# Patient Record
Sex: FEMALE | Race: WHITE | NOT HISPANIC OR LATINO | Employment: UNEMPLOYED | URBAN - METROPOLITAN AREA
[De-identification: names, ages, dates, MRNs, and addresses within clinical notes are randomized per-mention and may not be internally consistent; named-entity substitution may affect disease eponyms.]

---

## 2018-04-16 ENCOUNTER — HOSPITAL ENCOUNTER (EMERGENCY)
Facility: HOSPITAL | Age: 58
End: 2018-04-16
Attending: EMERGENCY MEDICINE | Admitting: EMERGENCY MEDICINE
Payer: COMMERCIAL

## 2018-04-16 ENCOUNTER — APPOINTMENT (EMERGENCY)
Dept: CT IMAGING | Facility: HOSPITAL | Age: 58
End: 2018-04-16
Payer: COMMERCIAL

## 2018-04-16 ENCOUNTER — HOSPITAL ENCOUNTER (INPATIENT)
Facility: HOSPITAL | Age: 58
LOS: 16 days | DRG: 021 | End: 2018-05-02
Attending: EMERGENCY MEDICINE | Admitting: INTERNAL MEDICINE
Payer: COMMERCIAL

## 2018-04-16 VITALS
RESPIRATION RATE: 16 BRPM | TEMPERATURE: 97.5 F | WEIGHT: 192 LBS | HEART RATE: 84 BPM | OXYGEN SATURATION: 96 % | SYSTOLIC BLOOD PRESSURE: 136 MMHG | DIASTOLIC BLOOD PRESSURE: 90 MMHG

## 2018-04-16 DIAGNOSIS — R45.851 PASSIVE SUICIDAL IDEATIONS: ICD-10-CM

## 2018-04-16 DIAGNOSIS — K08.89 TOOTH PAIN WITH CHEWING: ICD-10-CM

## 2018-04-16 DIAGNOSIS — K08.89 TOOTH PAIN: ICD-10-CM

## 2018-04-16 DIAGNOSIS — I60.9 SUBARACHNOID BLEED (HCC): Primary | ICD-10-CM

## 2018-04-16 DIAGNOSIS — F41.1 ANXIETY STATE: ICD-10-CM

## 2018-04-16 DIAGNOSIS — I60.9 SAH (SUBARACHNOID HEMORRHAGE) (HCC): Primary | ICD-10-CM

## 2018-04-16 LAB
ANION GAP BLD CALC-SCNC: 14 MMOL/L (ref 4–13)
APTT PPP: 28 SECONDS (ref 23–35)
BASOPHILS # BLD AUTO: 0.03 THOUSANDS/ΜL (ref 0–0.1)
BASOPHILS NFR BLD AUTO: 0 % (ref 0–1)
BUN BLD-MCNC: 18 MG/DL (ref 5–25)
CA-I BLD-SCNC: 1.18 MMOL/L (ref 1.12–1.32)
CHLORIDE BLD-SCNC: 105 MMOL/L (ref 100–108)
CREAT BLD-MCNC: 0.9 MG/DL (ref 0.6–1.3)
EOSINOPHIL # BLD AUTO: 0.09 THOUSAND/ΜL (ref 0–0.61)
EOSINOPHIL NFR BLD AUTO: 1 % (ref 0–6)
ERYTHROCYTE [DISTWIDTH] IN BLOOD BY AUTOMATED COUNT: 14.2 % (ref 11.6–15.1)
ETHANOL EXG-MCNC: 0 MG/DL
GFR SERPL CREATININE-BSD FRML MDRD: 71 ML/MIN/1.73SQ M
GLUCOSE SERPL-MCNC: 95 MG/DL (ref 65–140)
HCT VFR BLD AUTO: 41.3 % (ref 34.8–46.1)
HCT VFR BLD CALC: 43 % (ref 34.8–46.1)
HGB BLD-MCNC: 13.4 G/DL (ref 11.5–15.4)
HGB BLDA-MCNC: 14.6 G/DL (ref 11.5–15.4)
INR PPP: 0.93 (ref 0.86–1.16)
LYMPHOCYTES # BLD AUTO: 1.22 THOUSANDS/ΜL (ref 0.6–4.47)
LYMPHOCYTES NFR BLD AUTO: 11 % (ref 14–44)
MCH RBC QN AUTO: 30.5 PG (ref 26.8–34.3)
MCHC RBC AUTO-ENTMCNC: 32.4 G/DL (ref 31.4–37.4)
MCV RBC AUTO: 94 FL (ref 82–98)
MONOCYTES # BLD AUTO: 0.75 THOUSAND/ΜL (ref 0.17–1.22)
MONOCYTES NFR BLD AUTO: 7 % (ref 4–12)
NEUTROPHILS # BLD AUTO: 9.09 THOUSANDS/ΜL (ref 1.85–7.62)
NEUTS SEG NFR BLD AUTO: 81 % (ref 43–75)
NRBC BLD AUTO-RTO: 0 /100 WBCS
PCO2 BLD: 26 MMOL/L (ref 21–32)
PLATELET # BLD AUTO: 484 THOUSANDS/UL (ref 149–390)
PMV BLD AUTO: 10.8 FL (ref 8.9–12.7)
POTASSIUM BLD-SCNC: 3.9 MMOL/L (ref 3.5–5.3)
PROTHROMBIN TIME: 12.5 SECONDS (ref 12.1–14.4)
RBC # BLD AUTO: 4.4 MILLION/UL (ref 3.81–5.12)
SODIUM BLD-SCNC: 141 MMOL/L (ref 136–145)
SPECIMEN SOURCE: ABNORMAL
WBC # BLD AUTO: 11.18 THOUSAND/UL (ref 4.31–10.16)

## 2018-04-16 PROCEDURE — 96361 HYDRATE IV INFUSION ADD-ON: CPT

## 2018-04-16 PROCEDURE — 80053 COMPREHEN METABOLIC PANEL: CPT | Performed by: EMERGENCY MEDICINE

## 2018-04-16 PROCEDURE — 36415 COLL VENOUS BLD VENIPUNCTURE: CPT | Performed by: PHYSICIAN ASSISTANT

## 2018-04-16 PROCEDURE — 84100 ASSAY OF PHOSPHORUS: CPT | Performed by: EMERGENCY MEDICINE

## 2018-04-16 PROCEDURE — 85014 HEMATOCRIT: CPT

## 2018-04-16 PROCEDURE — 85730 THROMBOPLASTIN TIME PARTIAL: CPT | Performed by: PHYSICIAN ASSISTANT

## 2018-04-16 PROCEDURE — 70450 CT HEAD/BRAIN W/O DYE: CPT

## 2018-04-16 PROCEDURE — 84484 ASSAY OF TROPONIN QUANT: CPT | Performed by: EMERGENCY MEDICINE

## 2018-04-16 PROCEDURE — 96375 TX/PRO/DX INJ NEW DRUG ADDON: CPT

## 2018-04-16 PROCEDURE — 80047 BASIC METABLC PNL IONIZED CA: CPT

## 2018-04-16 PROCEDURE — 83735 ASSAY OF MAGNESIUM: CPT | Performed by: EMERGENCY MEDICINE

## 2018-04-16 PROCEDURE — 96376 TX/PRO/DX INJ SAME DRUG ADON: CPT

## 2018-04-16 PROCEDURE — 70496 CT ANGIOGRAPHY HEAD: CPT

## 2018-04-16 PROCEDURE — 82075 ASSAY OF BREATH ETHANOL: CPT | Performed by: PHYSICIAN ASSISTANT

## 2018-04-16 PROCEDURE — 85610 PROTHROMBIN TIME: CPT | Performed by: PHYSICIAN ASSISTANT

## 2018-04-16 PROCEDURE — 96365 THER/PROPH/DIAG IV INF INIT: CPT

## 2018-04-16 PROCEDURE — 85025 COMPLETE CBC W/AUTO DIFF WBC: CPT | Performed by: PHYSICIAN ASSISTANT

## 2018-04-16 PROCEDURE — 99285 EMERGENCY DEPT VISIT HI MDM: CPT

## 2018-04-16 RX ORDER — LABETALOL HYDROCHLORIDE 5 MG/ML
10 INJECTION, SOLUTION INTRAVENOUS EVERY 4 HOURS PRN
Status: DISCONTINUED | OUTPATIENT
Start: 2018-04-16 | End: 2018-04-16

## 2018-04-16 RX ORDER — ALBUTEROL SULFATE 90 UG/1
2 AEROSOL, METERED RESPIRATORY (INHALATION) EVERY 6 HOURS PRN
Status: DISCONTINUED | OUTPATIENT
Start: 2018-04-16 | End: 2018-05-02 | Stop reason: HOSPADM

## 2018-04-16 RX ORDER — LABETALOL HYDROCHLORIDE 5 MG/ML
5 INJECTION, SOLUTION INTRAVENOUS ONCE
Status: COMPLETED | OUTPATIENT
Start: 2018-04-16 | End: 2018-04-16

## 2018-04-16 RX ORDER — MORPHINE SULFATE 2 MG/ML
2 INJECTION, SOLUTION INTRAMUSCULAR; INTRAVENOUS ONCE
Status: DISCONTINUED | OUTPATIENT
Start: 2018-04-16 | End: 2018-04-16

## 2018-04-16 RX ORDER — METOCLOPRAMIDE HYDROCHLORIDE 5 MG/ML
10 INJECTION INTRAMUSCULAR; INTRAVENOUS ONCE
Status: COMPLETED | OUTPATIENT
Start: 2018-04-16 | End: 2018-04-16

## 2018-04-16 RX ORDER — ONDANSETRON 2 MG/ML
4 INJECTION INTRAMUSCULAR; INTRAVENOUS EVERY 6 HOURS PRN
Status: DISCONTINUED | OUTPATIENT
Start: 2018-04-16 | End: 2018-05-02 | Stop reason: HOSPADM

## 2018-04-16 RX ORDER — MONTELUKAST SODIUM 10 MG/1
10 TABLET ORAL DAILY
COMMUNITY

## 2018-04-16 RX ORDER — DOCUSATE SODIUM 100 MG/1
100 CAPSULE, LIQUID FILLED ORAL 2 TIMES DAILY
Status: DISCONTINUED | OUTPATIENT
Start: 2018-04-16 | End: 2018-05-02 | Stop reason: HOSPADM

## 2018-04-16 RX ORDER — MORPHINE SULFATE 2 MG/ML
2 INJECTION, SOLUTION INTRAMUSCULAR; INTRAVENOUS ONCE
Status: COMPLETED | OUTPATIENT
Start: 2018-04-16 | End: 2018-04-16

## 2018-04-16 RX ORDER — DIPHENHYDRAMINE HYDROCHLORIDE 50 MG/ML
12.5 INJECTION INTRAMUSCULAR; INTRAVENOUS ONCE
Status: COMPLETED | OUTPATIENT
Start: 2018-04-16 | End: 2018-04-16

## 2018-04-16 RX ORDER — PRAVASTATIN SODIUM 40 MG
40 TABLET ORAL
Status: DISCONTINUED | OUTPATIENT
Start: 2018-04-17 | End: 2018-05-02 | Stop reason: HOSPADM

## 2018-04-16 RX ORDER — KETOROLAC TROMETHAMINE 30 MG/ML
15 INJECTION, SOLUTION INTRAMUSCULAR; INTRAVENOUS ONCE
Status: COMPLETED | OUTPATIENT
Start: 2018-04-16 | End: 2018-04-16

## 2018-04-16 RX ORDER — MONTELUKAST SODIUM 10 MG/1
10 TABLET ORAL DAILY
Status: DISCONTINUED | OUTPATIENT
Start: 2018-04-17 | End: 2018-05-02 | Stop reason: HOSPADM

## 2018-04-16 RX ORDER — FENTANYL CITRATE 50 UG/ML
50 INJECTION, SOLUTION INTRAMUSCULAR; INTRAVENOUS EVERY 2 HOUR PRN
Status: DISCONTINUED | OUTPATIENT
Start: 2018-04-16 | End: 2018-04-17

## 2018-04-16 RX ORDER — SODIUM CHLORIDE, SODIUM GLUCONATE, SODIUM ACETATE, POTASSIUM CHLORIDE, MAGNESIUM CHLORIDE, SODIUM PHOSPHATE, DIBASIC, AND POTASSIUM PHOSPHATE .53; .5; .37; .037; .03; .012; .00082 G/100ML; G/100ML; G/100ML; G/100ML; G/100ML; G/100ML; G/100ML
100 INJECTION, SOLUTION INTRAVENOUS CONTINUOUS
Status: DISCONTINUED | OUTPATIENT
Start: 2018-04-16 | End: 2018-04-18

## 2018-04-16 RX ORDER — FLUTICASONE FUROATE AND VILANTEROL 200; 25 UG/1; UG/1
1 POWDER RESPIRATORY (INHALATION) DAILY
COMMUNITY

## 2018-04-16 RX ORDER — LABETALOL HYDROCHLORIDE 5 MG/ML
10 INJECTION, SOLUTION INTRAVENOUS EVERY 4 HOURS PRN
Status: DISCONTINUED | OUTPATIENT
Start: 2018-04-16 | End: 2018-04-19

## 2018-04-16 RX ORDER — FAMOTIDINE 20 MG/1
20 TABLET, FILM COATED ORAL 2 TIMES DAILY
Status: DISCONTINUED | OUTPATIENT
Start: 2018-04-16 | End: 2018-04-17

## 2018-04-16 RX ORDER — DEXTROSE AND SODIUM CHLORIDE 5; .45 G/100ML; G/100ML
100 INJECTION, SOLUTION INTRAVENOUS CONTINUOUS
Status: DISCONTINUED | OUTPATIENT
Start: 2018-04-16 | End: 2018-04-16

## 2018-04-16 RX ORDER — LORAZEPAM 2 MG/ML
2 INJECTION INTRAMUSCULAR AS NEEDED
Status: DISCONTINUED | OUTPATIENT
Start: 2018-04-16 | End: 2018-04-17

## 2018-04-16 RX ORDER — NIMODIPINE 30 MG/1
60 CAPSULE, LIQUID FILLED ORAL
Status: DISCONTINUED | OUTPATIENT
Start: 2018-04-17 | End: 2018-04-30

## 2018-04-16 RX ORDER — ALBUTEROL SULFATE 90 UG/1
2 AEROSOL, METERED RESPIRATORY (INHALATION) EVERY 6 HOURS PRN
COMMUNITY

## 2018-04-16 RX ORDER — ACETAMINOPHEN 325 MG/1
650 TABLET ORAL EVERY 6 HOURS PRN
Status: DISCONTINUED | OUTPATIENT
Start: 2018-04-16 | End: 2018-04-17

## 2018-04-16 RX ADMIN — LABETALOL 20 MG/4 ML (5 MG/ML) INTRAVENOUS SYRINGE 5 MG: at 21:01

## 2018-04-16 RX ADMIN — SODIUM CHLORIDE, SODIUM GLUCONATE, SODIUM ACETATE, POTASSIUM CHLORIDE, MAGNESIUM CHLORIDE, SODIUM PHOSPHATE, DIBASIC, AND POTASSIUM PHOSPHATE 100 ML/HR: .53; .5; .37; .037; .03; .012; .00082 INJECTION, SOLUTION INTRAVENOUS at 23:43

## 2018-04-16 RX ADMIN — SODIUM CHLORIDE 1000 ML: 0.9 INJECTION, SOLUTION INTRAVENOUS at 20:19

## 2018-04-16 RX ADMIN — LEVETIRACETAM 1000 MG: 100 INJECTION, SOLUTION INTRAVENOUS at 23:29

## 2018-04-16 RX ADMIN — IOHEXOL 85 ML: 350 INJECTION, SOLUTION INTRAVENOUS at 21:21

## 2018-04-16 RX ADMIN — MORPHINE SULFATE 2 MG: 2 INJECTION, SOLUTION INTRAMUSCULAR; INTRAVENOUS at 21:44

## 2018-04-16 RX ADMIN — DESMOPRESSIN ACETATE 26 MCG: 4 SOLUTION INTRAVENOUS at 21:27

## 2018-04-16 RX ADMIN — LABETALOL 20 MG/4 ML (5 MG/ML) INTRAVENOUS SYRINGE 5 MG: at 21:33

## 2018-04-16 RX ADMIN — NIMODIPINE 60 MG: 30 CAPSULE ORAL at 23:46

## 2018-04-16 RX ADMIN — DOCUSATE SODIUM 100 MG: 100 CAPSULE, LIQUID FILLED ORAL at 23:41

## 2018-04-16 RX ADMIN — FENTANYL CITRATE 50 MCG: 50 INJECTION, SOLUTION INTRAMUSCULAR; INTRAVENOUS at 23:44

## 2018-04-16 RX ADMIN — DIPHENHYDRAMINE HYDROCHLORIDE 12.5 MG: 50 INJECTION, SOLUTION INTRAMUSCULAR; INTRAVENOUS at 20:21

## 2018-04-16 RX ADMIN — KETOROLAC TROMETHAMINE 15 MG: 30 INJECTION, SOLUTION INTRAMUSCULAR at 20:19

## 2018-04-16 RX ADMIN — METOCLOPRAMIDE 10 MG: 5 INJECTION, SOLUTION INTRAMUSCULAR; INTRAVENOUS at 20:22

## 2018-04-16 RX ADMIN — FAMOTIDINE 20 MG: 20 TABLET, FILM COATED ORAL at 23:42

## 2018-04-16 RX ADMIN — MORPHINE SULFATE 2 MG: 2 INJECTION, SOLUTION INTRAMUSCULAR; INTRAVENOUS at 21:38

## 2018-04-17 ENCOUNTER — APPOINTMENT (INPATIENT)
Dept: RADIOLOGY | Facility: HOSPITAL | Age: 58
DRG: 021 | End: 2018-04-17
Payer: COMMERCIAL

## 2018-04-17 ENCOUNTER — ANESTHESIA (INPATIENT)
Dept: RADIOLOGY | Facility: HOSPITAL | Age: 58
DRG: 021 | End: 2018-04-17
Payer: COMMERCIAL

## 2018-04-17 ENCOUNTER — ANESTHESIA EVENT (INPATIENT)
Dept: RADIOLOGY | Facility: HOSPITAL | Age: 58
DRG: 021 | End: 2018-04-17
Payer: COMMERCIAL

## 2018-04-17 ENCOUNTER — APPOINTMENT (INPATIENT)
Dept: NON INVASIVE DIAGNOSTICS | Facility: HOSPITAL | Age: 58
DRG: 021 | End: 2018-04-17
Payer: COMMERCIAL

## 2018-04-17 PROBLEM — E87.6 HYPOKALEMIA: Status: ACTIVE | Noted: 2018-04-17

## 2018-04-17 PROBLEM — E88.09 HYPOALBUMINEMIA: Status: ACTIVE | Noted: 2018-04-17

## 2018-04-17 PROBLEM — Z87.09 HISTORY OF ASTHMA: Status: ACTIVE | Noted: 2018-04-17

## 2018-04-17 PROBLEM — I60.9 SAH (SUBARACHNOID HEMORRHAGE) (HCC): Status: ACTIVE | Noted: 2018-04-17

## 2018-04-17 LAB
ABO GROUP BLD: NORMAL
ALBUMIN SERPL BCP-MCNC: 2.9 G/DL (ref 3.5–5)
ALBUMIN SERPL BCP-MCNC: 3.1 G/DL (ref 3.5–5)
ALP SERPL-CCNC: 51 U/L (ref 46–116)
ALP SERPL-CCNC: 62 U/L (ref 46–116)
ALT SERPL W P-5'-P-CCNC: 13 U/L (ref 12–78)
ALT SERPL W P-5'-P-CCNC: 15 U/L (ref 12–78)
ANION GAP SERPL CALCULATED.3IONS-SCNC: 2 MMOL/L (ref 4–13)
ANION GAP SERPL CALCULATED.3IONS-SCNC: 5 MMOL/L (ref 4–13)
AST SERPL W P-5'-P-CCNC: 12 U/L (ref 5–45)
AST SERPL W P-5'-P-CCNC: 22 U/L (ref 5–45)
ATRIAL RATE: 68 BPM
BASOPHILS # BLD AUTO: 0.03 THOUSANDS/ΜL (ref 0–0.1)
BASOPHILS NFR BLD AUTO: 0 % (ref 0–1)
BILIRUB SERPL-MCNC: 0.31 MG/DL (ref 0.2–1)
BILIRUB SERPL-MCNC: 0.35 MG/DL (ref 0.2–1)
BLD GP AB SCN SERPL QL: NEGATIVE
BUN SERPL-MCNC: 14 MG/DL (ref 5–25)
BUN SERPL-MCNC: 14 MG/DL (ref 5–25)
CALCIUM SERPL-MCNC: 8 MG/DL (ref 8.3–10.1)
CALCIUM SERPL-MCNC: 8.1 MG/DL (ref 8.3–10.1)
CHLORIDE SERPL-SCNC: 110 MMOL/L (ref 100–108)
CHLORIDE SERPL-SCNC: 111 MMOL/L (ref 100–108)
CHOLEST SERPL-MCNC: 136 MG/DL (ref 50–200)
CO2 SERPL-SCNC: 21 MMOL/L (ref 21–32)
CO2 SERPL-SCNC: 26 MMOL/L (ref 21–32)
CREAT SERPL-MCNC: 0.58 MG/DL (ref 0.6–1.3)
CREAT SERPL-MCNC: 0.72 MG/DL (ref 0.6–1.3)
EOSINOPHIL # BLD AUTO: 0.19 THOUSAND/ΜL (ref 0–0.61)
EOSINOPHIL NFR BLD AUTO: 2 % (ref 0–6)
ERYTHROCYTE [DISTWIDTH] IN BLOOD BY AUTOMATED COUNT: 14.4 % (ref 11.6–15.1)
EST. AVERAGE GLUCOSE BLD GHB EST-MCNC: 97 MG/DL
GFR SERPL CREATININE-BSD FRML MDRD: 103 ML/MIN/1.73SQ M
GFR SERPL CREATININE-BSD FRML MDRD: 93 ML/MIN/1.73SQ M
GLUCOSE SERPL-MCNC: 94 MG/DL (ref 65–140)
GLUCOSE SERPL-MCNC: 96 MG/DL (ref 65–140)
HBA1C MFR BLD: 5 % (ref 4.2–6.3)
HCT VFR BLD AUTO: 35.3 % (ref 34.8–46.1)
HDLC SERPL-MCNC: 50 MG/DL (ref 40–60)
HGB BLD-MCNC: 11.1 G/DL (ref 11.5–15.4)
LDLC SERPL CALC-MCNC: 77 MG/DL (ref 0–100)
LYMPHOCYTES # BLD AUTO: 2.75 THOUSANDS/ΜL (ref 0.6–4.47)
LYMPHOCYTES NFR BLD AUTO: 27 % (ref 14–44)
MAGNESIUM SERPL-MCNC: 2.1 MG/DL (ref 1.6–2.6)
MAGNESIUM SERPL-MCNC: 2.2 MG/DL (ref 1.6–2.6)
MCH RBC QN AUTO: 30.3 PG (ref 26.8–34.3)
MCHC RBC AUTO-ENTMCNC: 31.4 G/DL (ref 31.4–37.4)
MCV RBC AUTO: 96 FL (ref 82–98)
MONOCYTES # BLD AUTO: 0.88 THOUSAND/ΜL (ref 0.17–1.22)
MONOCYTES NFR BLD AUTO: 9 % (ref 4–12)
NEUTROPHILS # BLD AUTO: 6.5 THOUSANDS/ΜL (ref 1.85–7.62)
NEUTS SEG NFR BLD AUTO: 62 % (ref 43–75)
NRBC BLD AUTO-RTO: 0 /100 WBCS
P AXIS: 62 DEGREES
PHOSPHATE SERPL-MCNC: 3.2 MG/DL (ref 2.7–4.5)
PHOSPHATE SERPL-MCNC: 3.3 MG/DL (ref 2.7–4.5)
PLATELET # BLD AUTO: 430 THOUSANDS/UL (ref 149–390)
PMV BLD AUTO: 9.7 FL (ref 8.9–12.7)
POTASSIUM SERPL-SCNC: 3.7 MMOL/L (ref 3.5–5.3)
POTASSIUM SERPL-SCNC: 4.4 MMOL/L (ref 3.5–5.3)
PR INTERVAL: 158 MS
PROT SERPL-MCNC: 5.6 G/DL (ref 6.4–8.2)
PROT SERPL-MCNC: 7 G/DL (ref 6.4–8.2)
QRS AXIS: 66 DEGREES
QRSD INTERVAL: 83 MS
QT INTERVAL: 383 MS
QTC INTERVAL: 408 MS
RBC # BLD AUTO: 3.66 MILLION/UL (ref 3.81–5.12)
RH BLD: POSITIVE
SODIUM SERPL-SCNC: 134 MMOL/L (ref 136–145)
SODIUM SERPL-SCNC: 141 MMOL/L (ref 136–145)
SPECIMEN EXPIRATION DATE: NORMAL
T WAVE AXIS: 43 DEGREES
TRIGL SERPL-MCNC: 46 MG/DL
TROPONIN I SERPL-MCNC: <0.02 NG/ML
VENTRICULAR RATE: 68 BPM
WBC # BLD AUTO: 10.37 THOUSAND/UL (ref 4.31–10.16)

## 2018-04-17 PROCEDURE — 99291 CRITICAL CARE FIRST HOUR: CPT | Performed by: EMERGENCY MEDICINE

## 2018-04-17 PROCEDURE — 36226 PLACE CATH VERTEBRAL ART: CPT | Performed by: NEUROLOGICAL SURGERY

## 2018-04-17 PROCEDURE — C1894 INTRO/SHEATH, NON-LASER: HCPCS

## 2018-04-17 PROCEDURE — 80307 DRUG TEST PRSMV CHEM ANLYZR: CPT | Performed by: NEUROLOGICAL SURGERY

## 2018-04-17 PROCEDURE — 80061 LIPID PANEL: CPT | Performed by: EMERGENCY MEDICINE

## 2018-04-17 PROCEDURE — 36556 INSERT NON-TUNNEL CV CATH: CPT | Performed by: EMERGENCY MEDICINE

## 2018-04-17 PROCEDURE — 86850 RBC ANTIBODY SCREEN: CPT | Performed by: PHYSICIAN ASSISTANT

## 2018-04-17 PROCEDURE — 80053 COMPREHEN METABOLIC PANEL: CPT | Performed by: EMERGENCY MEDICINE

## 2018-04-17 PROCEDURE — B41FYZZ FLUOROSCOPY OF RIGHT LOWER EXTREMITY ARTERIES USING OTHER CONTRAST: ICD-10-PCS | Performed by: NEUROLOGICAL SURGERY

## 2018-04-17 PROCEDURE — 85025 COMPLETE CBC W/AUTO DIFF WBC: CPT | Performed by: EMERGENCY MEDICINE

## 2018-04-17 PROCEDURE — 05HM33Z INSERTION OF INFUSION DEVICE INTO RIGHT INTERNAL JUGULAR VEIN, PERCUTANEOUS APPROACH: ICD-10-PCS | Performed by: EMERGENCY MEDICINE

## 2018-04-17 PROCEDURE — 36226 PLACE CATH VERTEBRAL ART: CPT

## 2018-04-17 PROCEDURE — 93886 INTRACRANIAL COMPLETE STUDY: CPT

## 2018-04-17 PROCEDURE — 84100 ASSAY OF PHOSPHORUS: CPT | Performed by: EMERGENCY MEDICINE

## 2018-04-17 PROCEDURE — 93886 INTRACRANIAL COMPLETE STUDY: CPT | Performed by: SURGERY

## 2018-04-17 PROCEDURE — C1769 GUIDE WIRE: HCPCS

## 2018-04-17 PROCEDURE — 93005 ELECTROCARDIOGRAM TRACING: CPT

## 2018-04-17 PROCEDURE — 83036 HEMOGLOBIN GLYCOSYLATED A1C: CPT | Performed by: EMERGENCY MEDICINE

## 2018-04-17 PROCEDURE — 71045 X-RAY EXAM CHEST 1 VIEW: CPT

## 2018-04-17 PROCEDURE — B318YZZ FLUOROSCOPY OF BILATERAL INTERNAL CAROTID ARTERIES USING OTHER CONTRAST: ICD-10-PCS | Performed by: NEUROLOGICAL SURGERY

## 2018-04-17 PROCEDURE — 92610 EVALUATE SWALLOWING FUNCTION: CPT

## 2018-04-17 PROCEDURE — B31DYZZ FLUOROSCOPY OF RIGHT VERTEBRAL ARTERY USING OTHER CONTRAST: ICD-10-PCS | Performed by: NEUROLOGICAL SURGERY

## 2018-04-17 PROCEDURE — 83735 ASSAY OF MAGNESIUM: CPT | Performed by: EMERGENCY MEDICINE

## 2018-04-17 PROCEDURE — 36224 PLACE CATH CAROTD ART: CPT | Performed by: NEUROLOGICAL SURGERY

## 2018-04-17 PROCEDURE — 61107 TDH PNXR IMPLT VENTR CATH: CPT | Performed by: NEUROLOGICAL SURGERY

## 2018-04-17 PROCEDURE — 36224 PLACE CATH CAROTD ART: CPT

## 2018-04-17 PROCEDURE — 86900 BLOOD TYPING SEROLOGIC ABO: CPT | Performed by: PHYSICIAN ASSISTANT

## 2018-04-17 PROCEDURE — 93010 ELECTROCARDIOGRAM REPORT: CPT | Performed by: INTERNAL MEDICINE

## 2018-04-17 PROCEDURE — C1760 CLOSURE DEV, VASC: HCPCS

## 2018-04-17 PROCEDURE — 99232 SBSQ HOSP IP/OBS MODERATE 35: CPT | Performed by: NEUROLOGICAL SURGERY

## 2018-04-17 PROCEDURE — 86901 BLOOD TYPING SEROLOGIC RH(D): CPT | Performed by: PHYSICIAN ASSISTANT

## 2018-04-17 RX ORDER — FENTANYL CITRATE 50 UG/ML
INJECTION, SOLUTION INTRAMUSCULAR; INTRAVENOUS AS NEEDED
Status: DISCONTINUED | OUTPATIENT
Start: 2018-04-17 | End: 2018-04-17 | Stop reason: SURG

## 2018-04-17 RX ORDER — PROPOFOL 10 MG/ML
INJECTION, EMULSION INTRAVENOUS AS NEEDED
Status: DISCONTINUED | OUTPATIENT
Start: 2018-04-17 | End: 2018-04-17 | Stop reason: SURG

## 2018-04-17 RX ORDER — CEFAZOLIN SODIUM 1 G/3ML
INJECTION, POWDER, FOR SOLUTION INTRAMUSCULAR; INTRAVENOUS AS NEEDED
Status: DISCONTINUED | OUTPATIENT
Start: 2018-04-17 | End: 2018-04-17 | Stop reason: SURG

## 2018-04-17 RX ORDER — POTASSIUM CHLORIDE 20 MEQ/1
40 TABLET, EXTENDED RELEASE ORAL ONCE
Status: DISCONTINUED | OUTPATIENT
Start: 2018-04-17 | End: 2018-04-17

## 2018-04-17 RX ORDER — ALBUTEROL SULFATE 2.5 MG/3ML
SOLUTION RESPIRATORY (INHALATION) AS NEEDED
Status: DISCONTINUED | OUTPATIENT
Start: 2018-04-17 | End: 2018-04-17 | Stop reason: SURG

## 2018-04-17 RX ORDER — HALOPERIDOL 5 MG/ML
5 INJECTION INTRAMUSCULAR ONCE
Status: COMPLETED | OUTPATIENT
Start: 2018-04-17 | End: 2018-04-17

## 2018-04-17 RX ORDER — LORAZEPAM 2 MG/ML
0.5 INJECTION INTRAMUSCULAR AS NEEDED
Status: DISCONTINUED | OUTPATIENT
Start: 2018-04-17 | End: 2018-04-17

## 2018-04-17 RX ORDER — SODIUM CHLORIDE, SODIUM LACTATE, POTASSIUM CHLORIDE, CALCIUM CHLORIDE 600; 310; 30; 20 MG/100ML; MG/100ML; MG/100ML; MG/100ML
INJECTION, SOLUTION INTRAVENOUS CONTINUOUS PRN
Status: DISCONTINUED | OUTPATIENT
Start: 2018-04-17 | End: 2018-04-17 | Stop reason: SURG

## 2018-04-17 RX ORDER — OXYCODONE HYDROCHLORIDE 5 MG/1
5 TABLET ORAL EVERY 6 HOURS PRN
Status: DISCONTINUED | OUTPATIENT
Start: 2018-04-17 | End: 2018-04-20

## 2018-04-17 RX ORDER — EPHEDRINE SULFATE 50 MG/ML
INJECTION, SOLUTION INTRAVENOUS AS NEEDED
Status: DISCONTINUED | OUTPATIENT
Start: 2018-04-17 | End: 2018-04-17 | Stop reason: SURG

## 2018-04-17 RX ORDER — HYDROMORPHONE HYDROCHLORIDE 2 MG/ML
INJECTION, SOLUTION INTRAMUSCULAR; INTRAVENOUS; SUBCUTANEOUS AS NEEDED
Status: DISCONTINUED | OUTPATIENT
Start: 2018-04-17 | End: 2018-04-17 | Stop reason: SURG

## 2018-04-17 RX ORDER — IBUPROFEN 200 MG
200 TABLET ORAL EVERY 6 HOURS PRN
COMMUNITY
End: 2018-05-02 | Stop reason: HOSPADM

## 2018-04-17 RX ORDER — ONDANSETRON 2 MG/ML
INJECTION INTRAMUSCULAR; INTRAVENOUS AS NEEDED
Status: DISCONTINUED | OUTPATIENT
Start: 2018-04-17 | End: 2018-04-17 | Stop reason: SURG

## 2018-04-17 RX ORDER — LIDOCAINE HYDROCHLORIDE 10 MG/ML
INJECTION, SOLUTION EPIDURAL; INFILTRATION; INTRACAUDAL; PERINEURAL
Status: COMPLETED
Start: 2018-04-17 | End: 2018-04-17

## 2018-04-17 RX ORDER — POTASSIUM CHLORIDE 29.8 MG/ML
40 INJECTION INTRAVENOUS ONCE
Status: COMPLETED | OUTPATIENT
Start: 2018-04-17 | End: 2018-04-17

## 2018-04-17 RX ORDER — ACETAMINOPHEN 325 MG/1
650 TABLET ORAL EVERY 6 HOURS SCHEDULED
Status: DISCONTINUED | OUTPATIENT
Start: 2018-04-17 | End: 2018-05-02 | Stop reason: HOSPADM

## 2018-04-17 RX ORDER — LIDOCAINE HYDROCHLORIDE 10 MG/ML
INJECTION, SOLUTION INFILTRATION; PERINEURAL AS NEEDED
Status: DISCONTINUED | OUTPATIENT
Start: 2018-04-17 | End: 2018-04-17 | Stop reason: SURG

## 2018-04-17 RX ORDER — ROCURONIUM BROMIDE 10 MG/ML
INJECTION, SOLUTION INTRAVENOUS AS NEEDED
Status: DISCONTINUED | OUTPATIENT
Start: 2018-04-17 | End: 2018-04-17 | Stop reason: SURG

## 2018-04-17 RX ORDER — SODIUM CHLORIDE 9 MG/ML
INJECTION, SOLUTION INTRAVENOUS CONTINUOUS PRN
Status: DISCONTINUED | OUTPATIENT
Start: 2018-04-17 | End: 2018-04-17 | Stop reason: SURG

## 2018-04-17 RX ADMIN — PROPOFOL 150 MG: 10 INJECTION, EMULSION INTRAVENOUS at 08:46

## 2018-04-17 RX ADMIN — ROCURONIUM BROMIDE 20 MG: 10 INJECTION INTRAVENOUS at 09:49

## 2018-04-17 RX ADMIN — HYDROMORPHONE HYDROCHLORIDE 1 MG: 1 INJECTION, SOLUTION INTRAMUSCULAR; INTRAVENOUS; SUBCUTANEOUS at 13:12

## 2018-04-17 RX ADMIN — NIMODIPINE 30 MG: 30 CAPSULE ORAL at 06:30

## 2018-04-17 RX ADMIN — EPHEDRINE SULFATE 5 MG: 50 INJECTION, SOLUTION INTRAMUSCULAR; INTRAVENOUS; SUBCUTANEOUS at 09:57

## 2018-04-17 RX ADMIN — SODIUM CHLORIDE: 0.9 INJECTION, SOLUTION INTRAVENOUS at 08:57

## 2018-04-17 RX ADMIN — OXYCODONE HYDROCHLORIDE 5 MG: 5 TABLET ORAL at 23:56

## 2018-04-17 RX ADMIN — DOCUSATE SODIUM 100 MG: 100 CAPSULE, LIQUID FILLED ORAL at 20:13

## 2018-04-17 RX ADMIN — ACETAMINOPHEN 650 MG: 325 TABLET, FILM COATED ORAL at 23:55

## 2018-04-17 RX ADMIN — HYDROMORPHONE HYDROCHLORIDE 0.2 MG: 2 INJECTION, SOLUTION INTRAMUSCULAR; INTRAVENOUS; SUBCUTANEOUS at 10:40

## 2018-04-17 RX ADMIN — NIMODIPINE 60 MG: 30 CAPSULE ORAL at 23:55

## 2018-04-17 RX ADMIN — NIMODIPINE 60 MG: 30 CAPSULE ORAL at 13:00

## 2018-04-17 RX ADMIN — ROCURONIUM BROMIDE 40 MG: 10 INJECTION INTRAVENOUS at 08:46

## 2018-04-17 RX ADMIN — NIMODIPINE 30 MG: 30 CAPSULE ORAL at 04:34

## 2018-04-17 RX ADMIN — LEVETIRACETAM 750 MG: 100 INJECTION, SOLUTION INTRAVENOUS at 20:14

## 2018-04-17 RX ADMIN — ALBUTEROL SULFATE 2.5 MG: 2.5 SOLUTION RESPIRATORY (INHALATION) at 10:36

## 2018-04-17 RX ADMIN — IODIXANOL 60 ML: 320 INJECTION, SOLUTION INTRAVASCULAR at 10:37

## 2018-04-17 RX ADMIN — SODIUM CHLORIDE, SODIUM GLUCONATE, SODIUM ACETATE, POTASSIUM CHLORIDE, MAGNESIUM CHLORIDE, SODIUM PHOSPHATE, DIBASIC, AND POTASSIUM PHOSPHATE 100 ML/HR: .53; .5; .37; .037; .03; .012; .00082 INJECTION, SOLUTION INTRAVENOUS at 16:39

## 2018-04-17 RX ADMIN — NIMODIPINE 60 MG: 30 CAPSULE ORAL at 20:13

## 2018-04-17 RX ADMIN — FAMOTIDINE 20 MG: 10 INJECTION, SOLUTION INTRAVENOUS at 13:07

## 2018-04-17 RX ADMIN — HYDROMORPHONE HYDROCHLORIDE 0.5 MG: 1 INJECTION, SOLUTION INTRAMUSCULAR; INTRAVENOUS; SUBCUTANEOUS at 20:14

## 2018-04-17 RX ADMIN — FENTANYL CITRATE 25 MCG: 50 INJECTION, SOLUTION INTRAMUSCULAR; INTRAVENOUS at 09:00

## 2018-04-17 RX ADMIN — SODIUM CHLORIDE, SODIUM LACTATE, POTASSIUM CHLORIDE, AND CALCIUM CHLORIDE: .6; .31; .03; .02 INJECTION, SOLUTION INTRAVENOUS at 08:35

## 2018-04-17 RX ADMIN — FLUTICASONE PROPIONATE AND SALMETEROL 1 PUFF: 50; 250 POWDER RESPIRATORY (INHALATION) at 12:51

## 2018-04-17 RX ADMIN — NIMODIPINE 60 MG: 30 CAPSULE ORAL at 16:41

## 2018-04-17 RX ADMIN — LIDOCAINE HYDROCHLORIDE 50 MG: 10 INJECTION, SOLUTION INFILTRATION; PERINEURAL at 08:46

## 2018-04-17 RX ADMIN — FENTANYL CITRATE 50 MCG: 50 INJECTION, SOLUTION INTRAMUSCULAR; INTRAVENOUS at 06:32

## 2018-04-17 RX ADMIN — POTASSIUM CHLORIDE 40 MEQ: 400 INJECTION, SOLUTION INTRAVENOUS at 13:02

## 2018-04-17 RX ADMIN — ACETAMINOPHEN 650 MG: 325 TABLET, FILM COATED ORAL at 20:12

## 2018-04-17 RX ADMIN — CEFAZOLIN 2000 MG: 1 INJECTION, POWDER, FOR SOLUTION INTRAVENOUS at 08:54

## 2018-04-17 RX ADMIN — ONDANSETRON 4 MG: 2 INJECTION INTRAMUSCULAR; INTRAVENOUS at 10:29

## 2018-04-17 RX ADMIN — HYDROMORPHONE HYDROCHLORIDE 1 MG: 1 INJECTION, SOLUTION INTRAMUSCULAR; INTRAVENOUS; SUBCUTANEOUS at 01:03

## 2018-04-17 RX ADMIN — FENTANYL CITRATE 50 MCG: 50 INJECTION, SOLUTION INTRAMUSCULAR; INTRAVENOUS at 03:41

## 2018-04-17 RX ADMIN — FENTANYL CITRATE 75 MCG: 50 INJECTION, SOLUTION INTRAMUSCULAR; INTRAVENOUS at 08:46

## 2018-04-17 RX ADMIN — SUGAMMADEX 200 MG: 100 INJECTION, SOLUTION INTRAVENOUS at 10:23

## 2018-04-17 RX ADMIN — EPHEDRINE SULFATE 5 MG: 50 INJECTION, SOLUTION INTRAMUSCULAR; INTRAVENOUS; SUBCUTANEOUS at 10:25

## 2018-04-17 RX ADMIN — HALOPERIDOL LACTATE 5 MG: 5 INJECTION, SOLUTION INTRAMUSCULAR at 01:04

## 2018-04-17 RX ADMIN — LEVETIRACETAM 750 MG: 100 INJECTION, SOLUTION INTRAVENOUS at 12:48

## 2018-04-17 RX ADMIN — LIDOCAINE HYDROCHLORIDE 10 MG: 10 INJECTION, SOLUTION EPIDURAL; INFILTRATION; INTRACAUDAL; PERINEURAL at 01:00

## 2018-04-17 RX ADMIN — ONDANSETRON 4 MG: 2 INJECTION INTRAMUSCULAR; INTRAVENOUS at 18:05

## 2018-04-17 RX ADMIN — ROCURONIUM BROMIDE 10 MG: 10 INJECTION INTRAVENOUS at 09:40

## 2018-04-17 RX ADMIN — HYDROMORPHONE HYDROCHLORIDE 0.5 MG: 1 INJECTION, SOLUTION INTRAMUSCULAR; INTRAVENOUS; SUBCUTANEOUS at 18:07

## 2018-04-17 RX ADMIN — SODIUM CHLORIDE, POTASSIUM CHLORIDE, SODIUM LACTATE AND CALCIUM CHLORIDE 500 ML: 600; 310; 30; 20 INJECTION, SOLUTION INTRAVENOUS at 04:03

## 2018-04-17 RX ADMIN — EPHEDRINE SULFATE 5 MG: 50 INJECTION, SOLUTION INTRAMUSCULAR; INTRAVENOUS; SUBCUTANEOUS at 09:48

## 2018-04-17 RX ADMIN — DEXAMETHASONE SODIUM PHOSPHATE 10 MG: 10 INJECTION INTRAMUSCULAR; INTRAVENOUS at 09:30

## 2018-04-17 NOTE — ED ATTENDING ATTESTATION
Lanette Navarrete MD, saw and evaluated the patient  I have discussed the patient with the resident/non-physician practitioner and agree with the resident's/non-physician practitioner's findings, Plan of Care, and MDM as documented in the resident's/non-physician practitioner's note, except where noted  All available labs and Radiology studies were reviewed  At this point I agree with the current assessment done in the Emergency Department  I have conducted an independent evaluation of this patient a history and physical is as follows:  Patient with c/o headache, described as dull, frontal headache; denies fever, neck pain; does have history of migraines, has been taking Fioricet at home  On exam patient is alert, appears anxious, but patient is stable, neuro exam shows normal equal round reactive pupils, no nystagmus, no cranial nerve or focal neuro deficit  Patient was given migraine meds due to patient's history but still was very anxious complaining of persistent headache for which CT scan was done that showed extensive SAH  Patient remained neuro intact; labetalol was given has blood pressure med to systolic BP 752Y  Physician assistant spoke to neuro critical care at [address](please see further detailed PA note)  Transferred to [facility] under neuro Critical Care  Critical Care Time  The patient presented with a condition in which there was a high probability of imminent or life-threatening deterioration, and critical care services (excluding separately billable procedures) totalled 30-74 minutes          CriticalCare Time  Performed by: Carrington Cushing by: Osa Simmonds     Critical care provider statement:     Critical care time (minutes):  30    Critical care time was exclusive of:  Separately billable procedures and treating other patients and teaching time    Critical care was necessary to treat or prevent imminent or life-threatening deterioration of the following conditions:  CNS failure or compromise    Critical care was time spent personally by me on the following activities:  Blood draw for specimens, obtaining history from patient or surrogate, development of treatment plan with patient or surrogate, discussions with consultants, evaluation of patient's response to treatment, examination of patient, interpretation of cardiac output measurements, ordering and performing treatments and interventions, ordering and review of laboratory studies, ordering and review of radiographic studies, re-evaluation of patient's condition and review of old charts    I assumed direction of critical care for this patient from another provider in my specialty: no

## 2018-04-17 NOTE — CASE MANAGEMENT
Initial Clinical Review    Admission: Date/Time/Statement: 4/16/18 @ 2219     Orders Placed This Encounter   Procedures    Inpatient Admission     Standing Status:   Standing     Number of Occurrences:   1     Order Specific Question:   Admitting Physician     Answer:   Semaj Mom [[id number]]     Order Specific Question:   Level of Care     Answer:   Critical Care [15]     Order Specific Question:   Estimated length of stay     Answer:   More than 2 Midnights     Order Specific Question:   Certification     Answer:   I certify that inpatient services are medically necessary for this patient for a duration of greater than two midnights  See H&P and MD Progress Notes for additional information about the patient's course of treatment  ED: Date/Time/Mode of Arrival:   ED Arrival Information     Patient not seen in ED                       Chief Complaint: No chief complaint on file  History of Illness:  27-year-old female who was at home in her normal state of health, patient went outside to smoke cigarette developed some new onset of severe headache and neck pain  She also had photophobia as well as nausea that time  EMS was contacted by the patient and she was brought to the emergency department at [address] for evaluation  Initially patient was treated as a migraine as she has a history of migraines and administered Toradol  Prior to patient's arrival she did take Aleve for her headache  CT scan was obtained and showed subarachnoid hemorrhage consistent with aneurysmal rupture  CTA was obtained which did not show aneurysm  Patient was administer DDAVP at [address] and blood pressure was controlled with labetalol for goal systolic pressure of less than 140    Upon arrival to [facility] patient was able to follow commands without difficulty, strength 5/5 x4 extremities, patient did have slight right facial droop  Neurosurgery contacted regarding subarachnoid hemorrhage and no aneurysm on CTA, plan for angiogram today  Continue with blood pressure goal of less than 140  Nimodipine 60mg q 4 hours to be scheduled  Kera for seizure prophylaxis  Neurologic checks q 1 hour  TCDs will be obtained daily  Baseline EKG and echocardiogram will be obtained  Continue with albuterol as needed for asthma  Schedule outpatient inhaler and singular as per prescribed dose  She states she has not had to use an abortive migraine medication in the past year  She reports discomfort of the right mastoid 2 days ago but no other descriptions of headache leading up to today  The patient was noted to have a subarachnoid hemorrhage on CT head  Follow-up CTA imaging showed no focal stenosis or saccular aneurysm within the [place]  Patient was given 10 mg total labetalol prior to arrival as well as 4 of morphine for headache  Patient was given DDAVP as she was administered Toradol in the emergency department and took an NSAID earlier in the day  The patient was transferred to [facility] for critical care monitoring and neurosurgical evaluation    Upon arrival to Critical access hospital patient had a GCS of 15  but continued to complain of 8/10 frontal nonradiating headache       ED Vital Signs:   ED Triage Vitals   Temperature Pulse Respirations Blood Pressure SpO2   04/16/18 2220 04/16/18 2220 04/16/18 2220 04/16/18 2220 04/16/18 2220   98 °F (36 7 °C) 83 18 117/69 97 %      Temp Source Heart Rate Source Patient Position - Orthostatic VS BP Location FiO2 (%)   04/16/18 2220 04/17/18 0000 04/17/18 0000 04/17/18 0000 --   Oral Monitor Lying Left arm       Pain Score       04/16/18 2220       8        Wt Readings from Last 1 Encounters:   04/16/18 90 7 kg (199 lb 15 3 oz)       Vital Signs (abnormal): bp down to 88/54    Abnormal Labs/Diagnostic Test Results: ekg-   Normal sinus rhythm  Nonspecific T wave abnormality  Abnormal ECG  When compared with ECG of 14-JUN-2007 10:53,  Inverted T waves have replaced nonspecific T wave abnormality in Anterior leads      ij line placed  Ct of head-Large amount of acute subarachnoid hemorrhage involving the suprasellar cistern, sylvian fissures, basal cisterns and in anterior falx   A ruptured aneurysm is suspected   A CTA head is recommended for further evaluation  Wbc 10 37---hgb 11 1---platlets 430  Cl 110--cr 0 58--ca 8 0--t  Pro 5 6--alb 2 9  ED Treatment:   Medication Administration - No Administrations Displayed (No Start Event Found)     None          Past Medical/Surgical History: Active Ambulatory Problems     Diagnosis Date Noted    No Active Ambulatory Problems     Resolved Ambulatory Problems     Diagnosis Date Noted    No Resolved Ambulatory Problems     Past Medical History:   Diagnosis Date    Asthma        Admitting Diagnosis: SAH (subarachnoid hemorrhage) ([facility] [address] ) [I60 9]    Age/Sex: 63 y o  female     Assessment and plan:Neuro:  1  Subarachnoid hemorrhage (HHS 1)              - neurosurgery aware and plan for angiogram in the morning, will call and get stat cth with any acute changes in GCS or neurologic    exam              - seizure prophylaxis, Keppra load 1g, continued seizure prophylaxis with 1 g b i d starting tomorrow              - SBP goal < 140, p r n  Labetalol   If requiring frequent dosage, will start Cardene ggt               - goal euvolemic and euglycemia              - daily TCD              - nimodipine pain 60 mg every 4 hours              - initiate statin therapy              - stool softener, antiemetic p r n               - pain control              - PT/OT/ST                            CV:  No active issues              - SBP goal 140-160              - labetalol 10 mg p r n  for pressure greater than 140 q 6 hours              - admission troponin EKG                 Lung:  No active issues              - p r n  nasal cannula oxygenation                 GI:  Stress ulcer prophylaxis                 FEN:     Isolyte 100 mL/hour maintenance fluids                            Monitor replete electrolytes              - monitor for hyponatremia                 NPO to morning                 :  No active issues              - strict I&O                 ID:  Leukocytosis              - morning CBC                 Heme:  No active issues                 Endo:  No active issues              - goal euglycemia, Q 6 our Accu-Chek and p r n  sliding-scale insulin                 Msk/Skin:  No active issues              - frequent repositioning to avoid skin breakdown                 Disposition:  ICU     VTE Pharmacologic Prophylaxis: Reason for no pharmacologic prophylaxis Subarachnoid hemorrhage  VTE Mechanical Prophylaxis: sequential compression device       Admission Orders:  Scheduled Meds:   Current Facility-Administered Medications:  acetaminophen 650 mg Oral Q6H PRN Isaias Perez, DO    albuterol 2 puff Inhalation Q6H PRN Isaias Perez, DO    docusate sodium 100 mg Oral BID Isaias Perez, DO    famotidine 20 mg Oral BID Isaias Perez, DO    Or        famotidine 20 mg Intravenous BID Isaias Perez, DO    fentanyl citrate (PF) 50 mcg Intravenous Q2H PRN Isaias Perez, DO    fluticasone-salmeterol 1 puff Inhalation Daily Lowell Perez, DO    HYDROmorphone 1 mg Intravenous Q2H PRN Isaias Perez, DO    labetalol 10 mg Intravenous Q4H PRN Isaias Perez, DO    levETIRAcetam 750 mg Intravenous Q12H [address] Aurora Barragan PA-C    LORazepam 2 mg Intravenous PRN Isaias Perez, DO    montelukast 10 mg Oral Daily Isaias Perez, DO    multi-electrolyte 100 mL/hr Intravenous Continuous Isaias Perez,  Last Rate: 100 mL/hr (04/16/18 7143)   niMODipine 60 mg Oral Q4H [address] Lowell Perez, DO    ondansetron 4 mg Intravenous Q6H PRN Isaias Perez, DO    potassium chloride 40 mEq Intravenous Once Aurora Barragan PA-C    pravastatin 40 mg Oral Daily With [employer], DO      Facility-Administered Medications Ordered in Other Encounters:  ceFAZolin  Intravenous PRN Essence Hermosillo MD   dexamethasone   PRN Addi Foley, CRNA   ePHEDrine   PRN Essence Hermosillo MD   fentanyl citrate (PF)  Intravenous PRN Essence Hermosillo MD   lactated ringers   Continuous PRN Fontana Alaina, CRNA   lidocaine   PRN Addi Foley, CRNA   propofol  Intravenous PRN Essence Hermosillo MD   rocuronium   PRN Essence Hermosillo MD   sodium chloride   Continuous PRN Fontana Alaina, CRNA   Sugammadex Sodium   PRN Addi Alaina, CRNA     Continuous Infusions:   multi-electrolyte 100 mL/hr Last Rate: 100 mL/hr (04/16/18 5355)     PRN Meds:   acetaminophen    albuterol    fentanyl citrate (PF)    HYDROmorphone    labetalol    LORazepam    ondansetron      Neurosurgery consult--A/p Bd2 HH3F3 SAH   - Ventriculostomy today  - Angio today for diagnostic +/- intervention with possible clipping  - consent obtained from mother  [facility] core measures, nimotop, keppra, sbp <140, tte, trop, tox screen     - dvt ppx, scds    A line monitoring  scd  fs glucose q6  Dysphagia assessment   Seizure precautions  Bedrest  IR cerebral angiography--pending  Pt/ot  speech  Neuro signs q1

## 2018-04-17 NOTE — ANESTHESIA PREPROCEDURE EVALUATION
Review of Systems/Medical History  Patient summary reviewed  Chart reviewed  No history of anesthetic complications     Cardiovascular  Exercise tolerance: good,  Hypertension ,    Pulmonary  Smoker , Asthma: Asthma type of rescue: chronic medication usage,        GI/Hepatic      Comment: NPO appropriate     Negative  ROS        Endo/Other  Negative endo/other ROS      GYN       Hematology  Negative hematology ROS      Musculoskeletal  Negative musculoskeletal ROS        Neurology      Comment: Admitted 4/16 with large SAH, suspected aneurysmal Psychology   Anxiety, Depression ,              Physical Exam    Airway    Mallampati score: II  TM Distance: >3 FB  Neck ROM: limited     Dental   No notable dental hx     Cardiovascular  Rhythm: regular, Rate: normal,     Pulmonary  Breath sounds clear to auscultation, No wheezes,     Other Findings        Anesthesia Plan  ASA Score- 3     Anesthesia Type- IV sedation with anesthesia and general with ASA Monitors  Additional Monitors:   Airway Plan:         Plan Factors-    Induction- intravenous  Postoperative Plan- Plan for postoperative opioid use  Informed Consent- Anesthetic plan and risks discussed with mother (Phone consent obtained from Deena [place], patient's mother)  CTA Head 4/16/18: IMPRESSION:  No focal stenosis or saccular aneurysm within the [place]  Reidentified acute subarachnoid hemorrhage  CXR 4/17/18: IMPRESSION:  Right IJ line placement, appears to be in satisfactory position, tip is at the cavoatrial junction  No pneumothorax  Low lung volumes        Lab Results   Component Value Date    WBC 10 37 (H) 04/17/2018    HGB 11 1 (L) 04/17/2018    HCT 35 3 04/17/2018    MCV 96 04/17/2018     (H) 04/17/2018     Lab Results   Component Value Date    GLUCOSE 94 04/17/2018    CALCIUM 8 0 (L) 04/17/2018     04/17/2018    K 3 7 04/17/2018    CO2 26 04/17/2018     (H) 04/17/2018    BUN 14 04/17/2018 CREATININE 0 58 (L) 04/17/2018     Lab Results   Component Value Date    ALT 13 04/17/2018    AST 12 04/17/2018    ALKPHOS 51 04/17/2018    BILITOT 0 31 04/17/2018     Lab Results   Component Value Date    INR 0 93 04/16/2018    PROTIME 12 5 04/16/2018     Lab Results   Component Value Date    HGBA1C 5 0 04/17/2018

## 2018-04-17 NOTE — OP NOTE
OPERATIVE REPORT  PATIENT NAME: Kristina Astudillo     :  1960  MRN: [id number]   Pt Location: Interventional radiology    SURGERY DATE: 18     Preop Diagnosis:  1  HH3F3 SAH  2  Acute hydrocephalus    Postop Diagnosis  1  HH3F3 SAH  2  Acute hydrocephalus    Procedure:  Right Internal Carotid Arteriogram  Left Internal Carotid Arteriogram  Right Vertebral Artery Arteriogram  Limited Right Femoral Arteriogram    Surgeon:   Brent Blackwood MD    Specimen(s):  None    Estimated Blood Loss:   None    Drains:  None    Anesthesia Type:   General    Complications:  None    Operative Indications:  Kristina Astudillo  is a 63 y o  female who is bleed day 2 Raines Thomson 3 Velazco 3 SAH from unknown source  Given her hemorrhage and signs/symptoms of hydrocephalus the family ws consented for ventriculostomy placement and angiogram with possible interventions  After discussing the risks and benefits of the procedure including bleeding, stroke, groin hematoma, and death they elected to go ahead and proceed  Procedure Details:    Percutaneous access with a 5-Serbian micropuncture kit was obtained into the right femoral artery  A 5-Serbian introducer sheath was placed and a 5-Serbian angled glide catheter was then advanced into the aorta, and over the aortic arch over a Glidewire  The catheter was then advanced and the right internal carotid artery was then catheterized  AP lateral and magnified oblique images of the right intracranial carotid circulation were obtained  The catheter was then withdrawn into the brachiocephalic artery and advanced into  the right vertebral artery  Transfascial lateral and oblique images of the right vertebral artery intracranial circulation were obtained  The catheter was then advanced and the left internal carotid artery was then catheterized  AP lateral and magnified oblique images of the left intracranial carotid circulation were obtained        The catheter was then withdrawn from the body and a limited right femoral arteriogram was run  Puncture site was found to be compatible with a Mynx Closure device and this was done successfully  There was appropriate hemostasis  The patient was then awoken from his monitored anesthesia care and found to be in his neurologic baseline  All sponge and needle counts were correct  A sai CT was preformed to check catheter placement  INTERPRETATION OF ANGIOGRAPHIC FINDINGS:   1  The Right internal carotid artery circulation reveals antegrade flow into the middle cerebral and anterior cerebral arteries  There is an azygous A2  There is a large posterior communicating artery, which appears fetal  There is no evidence of aneurysm, AVM, or vascular malformation  The capillary and venous phases are unremarkable  2  The Left internal carotid artery circulation reveals antegrade flow into the middle cerebral and anterior cerebral arteries  There is a patent posterior communicating artery  There is no evidence of aneurysm, AVM, or vascular malformation  The capillary and venous phases are unremarkable  3  The Right vertebral intracranial circulation reveals retrograde reflux down the contralateral vertebral artery  There is a small right P1 consistent with a fetal origin  The basilar apex is appears broad, but no aneurysmal dilitation  Both PICAs are well visualized  Antegrade flow is present into all the posterior circulation branches  There are no AVMs or aneurysms  The capillary and venous phases are unremarkable  Limited Right femoral arteriogram reveals normal puncture site anatomy  3D CT reveals catheter placement in the right lateral ventricle  Impression:  Angio negative SAH    Azygous A2    Patient Disposition:  Critical Care Unit    SIGNATURE: Leroy Ley MD  DATE: 04/17/18   TIME: 1000

## 2018-04-17 NOTE — PROGRESS NOTES
Progress Note - Critical Care   Humphrey Carver 63 y o  female MRN: [id number]  Unit/Bed#: ICU 05 Encounter: [id number]    Attending Physician: Leah Stevens, DO      ______________________________________________________________________  Assessment and Plan:   Principal Problem:    SAH (subarachnoid hemorrhage) ([address] )  Active Problems:    History of asthma  Resolved Problems:    * No resolved hospital problems  *        Neuro:    -Large SAH on CT head without contrast  NSGY aware, going for angiogram this morning  CTA head with/without showed no aneurysm, no stenosis  Continues to complain of 10/10 headache which she says has been ongoing all night  However, frequently falls asleep during interview  Last Dilaudid 1mg was at 0103; last Fentanyl 50mcg was at 0632    -Nimodipine 30mg Q 4 H (dose was decreased for soft BP)   -Loaded with Keppra 1g,  Per H&P, to start 1g BID -- not yet ordered   -SBP goal <140  Labetalol 10mg Q 4 H prn, none given   -Pravachol 40mg daily   -Monitor for seizure activity  Ativan ordered prn, none given  -STAT head CT for GCS change 2 or more points in 1 hour  Currently a 13 (E 3 V 4 M 6)    CV:    -No active issues   -SBP goal < 140   -Labetalol 10mg Q 4 H prn, none given      Pulm:    -Hx of asthma   -Home dose Advair, Singulair  Albuterol prn    GI:    -No acute issues   -SUP with Pepcid 20mg BID while NPO   -Colace 100mg BID    :    -BUN/Cr 14/0 58   -400cc urine out since admission   -Monitor I/O   No Conner      F/E/N:     Fluids: Isolyte 100cc/hour while NPO    Electrolytes:  Na 141  K 3 7 -- repleted   Cl 110  Ca 8 0 -- corrected for albumin is 8 9  Phos 3 3  Mg 2 2    Nutrition: NPO until after angiogram    ID:    -No acute issues   -WBC 11 from 10   -Afebrile    Heme:    -Hgb 11 from 13   -Platelets 980   -No A/C or VTE ppx at this time d/t SAH    Endo:    -No acute issues   -POCT glucose Q 6 H   -SSI -- not yet ordered     Msk/Skin:    -PT/OT   -Reposition to prevent ulcer    Disposition: ICU    Code Status: Level 1 - Full Code    Counseling / Coordination of Care  Total Critical Care time spent 35 minutes excluding procedures, teaching and family updates  ______________________________________________________________________    Chief Complaint: Headache    24 Hour Events: Transferred from [facility] where she was seen for frontal headache and found to have a large SAH  Received DDAVP, 2 doses of labetalol  She last received pain medication (fentanyl) this morning at 0630  Is somnolent, but follows commands  ______________________________________________________________________    Physical Exam:       Constitutional: Sleeping; wakes to voice, sometimes loud voice/repeated verbal stimulation noted  NAD  HENT: NCAT  PERRL  Cv: RRR, no murmur noted  Good peripheral perfusion  Pulm: CTAB  No adventitious breath sounds noted    Gi: soft    Gu: deferred    Ms: No edema    Neuro: Sleeping, wakes to voice, sometimes repeated verbal stimulation needed  Can give her full name; states it is 2018  Aware why she came to the hospital  Follows commands in all four extremities  Normal sensation to light touch in all extremities  Frequently falls back asleep during interview  Skin: warm and dry      ______________________________________________________________________  Vitals:    18 0210 18 0410 18 0510 18 0610   BP: (!) 89/54 (!) 88/64 94/62 102/68   BP Location:  Left arm     Pulse: 64 62 60 62   Resp: 13 (!) 10 12 12   Temp:  97 8 °F (36 6 °C)     TempSrc:  Oral     SpO2: 94% 95% 96% 95%   Weight:       Height:           Temperature:   Temp (24hrs), Av 8 °F (36 6 °C), Min:97 5 °F (36 4 °C), Max:98 °F (36 7 °C)    Current Temperature: 97 8 °F (36 6 °C)  Weights:   IBW: 52 4 kg    Body mass index is 35 42 kg/m²    Weight (last 2 days)     Date/Time   Weight    18 2220  90 7 (199 96)            Hemodynamic Monitoring:  N/A Non-Invasive/Invasive Ventilation Settings:  Respiratory    Lab Data (Last 4 hours)    None         O2/Vent Data (Last 4 hours)    None              No results found for: PHART, WVY3XBG, PO2ART, RZV9COF, A9IPBPAX, BEART, SOURCE  SpO2: SpO2: 95 %  Intake and Outputs:  I/O       04/15 0701 - 04/16 0700 04/16 0701 - 04/17 0700 04/17 0701 - 04/18 0700    I V  (mL/kg)  630 (6 9)     IV Piggyback  600     Total Intake(mL/kg)  1230 (13 6)     Urine (mL/kg/hr)  400     Total Output   400      Net   +830                   Nutrition:        Diet Orders            Start     Ordered    04/16/18 2225  Diet NPO; Sips with meds  Diet effective now     Question Answer Comment   Diet Type NPO    NPO Except: Sips with meds    RD to adjust diet per protocol?  Yes        04/16/18 2229          Labs:     Results from last 7 days  Lab Units 04/17/18  0430 04/16/18  2100 04/16/18  2055   WBC Thousand/uL 10 37*  --  11 18*   HEMOGLOBIN g/dL 11 1*  --  13 4   I STAT HEMOGLOBIN g/dl  --  14 6  --    HEMATOCRIT % 35 3  --  41 3   PLATELETS Thousands/uL 430*  --  484*   NEUTROS PCT % 62  --  81*   MONOS PCT % 9  --  7       Results from last 7 days  Lab Units 04/17/18 0430 04/16/18  2342 04/16/18  2100   SODIUM mmol/L 141 134*  --    POTASSIUM mmol/L 3 7 4 4  --    CHLORIDE mmol/L 110* 111*  --    CO2 mmol/L 26 21  --    BUN mg/dL 14 14  --    CREATININE mg/dL 0 58* 0 72  --    CALCIUM mg/dL 8 0* 8 1*  --    TOTAL PROTEIN g/dL 5 6* 7 0  --    BILIRUBIN TOTAL mg/dL 0 31 0 35  --    ALK PHOS U/L 51 62  --    ALT U/L 13 15  --    AST U/L 12 22  --    GLUCOSE RANDOM mg/dL 94 96  --    GLUCOSE, ISTAT mg/dl  --   --  95       Results from last 7 days  Lab Units 04/17/18  0430 04/16/18  2342   MAGNESIUM mg/dL 2 2 2 1     Lab Results   Component Value Date    PHOS 3 3 04/17/2018    PHOS 3 2 04/16/2018        Results from last 7 days  Lab Units 04/16/18  2055   INR  0 93   PTT seconds 28       0  Lab Value Date/Time   TROPONINI <0 02 04/16/2018 4437 ABG:No results found for: PHART, EHM7JXF, PO2ART, AYP3FXV, A6PBGJHU, BEART, SOURCE  Imaging: None new overnight I have personally reviewed pertinent reports  EKG: None new  Micro:  No results found for: [place], WOUNDCULT, SPUTUMCULTUR  Allergies: No Known Allergies  Medications:   Scheduled Meds:  Current Facility-Administered Medications:  acetaminophen 650 mg Oral Q6H PRN Brena [place] Bashor, DO    albuterol 2 puff Inhalation Q6H PRN Brena [place] Bashor, DO    docusate sodium 100 mg Oral BID Brena [place] Bashor, DO    famotidine 20 mg Oral BID Brena [place] Bashor, DO    Or        famotidine 20 mg Intravenous BID Brena [place] Bashor, DO    fentanyl citrate (PF) 50 mcg Intravenous Q2H PRN Brena [place] Bashor, DO    fluticasone-salmeterol 1 puff Inhalation Daily Lowell S Bashor, DO    HYDROmorphone 1 mg Intravenous Q2H PRN Brena [place] Bashor, DO    labetalol 10 mg Intravenous Q4H PRN Brena [place] Bashor, DO    LORazepam 2 mg Intravenous PRN Brena [place] Bashor, DO    montelukast 10 mg Oral Daily Brena [place] Bashor, DO    multi-electrolyte 100 mL/hr Intravenous Continuous Brena [place] Bashor, DO Last Rate: 100 mL/hr (04/16/18 2343)   niMODipine 60 mg Oral Q4H [address] Lowell S Bashor, DO    ondansetron 4 mg Intravenous Q6H PRN Brena [place] Bashor, DO    pravastatin 40 mg Oral Daily With Roomixer, DO      Continuous Infusions:  multi-electrolyte 100 mL/hr Last Rate: 100 mL/hr (04/16/18 2343)     PRN Meds:    acetaminophen 650 mg Q6H PRN   albuterol 2 puff Q6H PRN   fentanyl citrate (PF) 50 mcg Q2H PRN   HYDROmorphone 1 mg Q2H PRN   labetalol 10 mg Q4H PRN   LORazepam 2 mg PRN   ondansetron 4 mg Q6H PRN     VTE Pharmacologic Prophylaxis: Pharmacologic VTE Prophylaxis contraindicated due to [address]  VTE Mechanical Prophylaxis: sequential compression device  Invasive lines and devices:   Invasive Devices     Central Venous Catheter Line            CVC Central Lines 04/17/18 Triple 16cm less than 1 day          Peripheral Intravenous Line Peripheral IV 04/16/18 Right Antecubital less than 1 day          Arterial Line            Arterial Line 04/17/18 Right Radial less than 1 day                     Portions of the record may have been created with voice recognition software  Occasional wrong word or "sound a like" substitutions may have occurred due to the inherent limitations of voice recognition software  Read the chart carefully and recognize, using context, where substitutions have occurred      Lorenzo Miller PA-C

## 2018-04-17 NOTE — EMTALA/ACUTE CARE TRANSFER
[facility]  [address] [address]  Dept: [phone]      EMTALA TRANSFER CONSENT    NAME Tana Padron                                         1960                              MRN [id number]    I have been informed of my rights regarding examination, treatment, and transfer   by Dr Lulu Otoole MD    Benefits:      Risks: Potential for delay in receiving treatment, Potential deterioration of medical condition, Loss of IV, Increased discomfort during transfer, Possible worsening of condition or death during transfer      Consent for Transfer:  I acknowledge that my medical condition has been evaluated and explained to me by the emergency department physician or other qualified medical person and/or my attending physician, who has recommended that I be transferred to the service of  Accepting Physician: Jade coronel    The above potential benefits of such transfer, the potential risks associated with such transfer, and the probable risks of not being transferred have been explained to me, and I fully understand them  The doctor has explained that, in my case, the benefits of transfer outweigh the risks  I agree to be transferred  I authorize the performance of emergency medical procedures and treatments upon me in both transit and upon arrival at the receiving facility  Additionally, I authorize the release of any and all medical records to the receiving facility and request they be transported with me, if possible  I understand that the safest mode of transportation during a medical emergency is an ambulance and that the Hospital advocates the use of this mode of transport  Risks of traveling to the receiving facility by car, including absence of medical control, life sustaining equipment, such as oxygen, and medical personnel has been explained to me and I fully understand them      (QUE CORRECT BOX BELOW)  [  ]  I consent to the stated transfer and to be transported by ambulance/helicopter  [  ]  I consent to the stated transfer, but refuse transportation by ambulance and accept full responsibility for my transportation by car  I understand the risks of non-ambulance transfers and I exonerate the Hospital and its staff from any deterioration in my condition that results from this refusal     X___________________________________________    DATE  18  TIME________  Signature of patient or legally responsible individual signing on patient behalf           RELATIONSHIP TO PATIENT_________________________          Provider Certification    NAME Zach Wright                                         1960                              MRN [id number]    A medical screening exam was performed on the above named patient  Based on the examination:    Condition Necessitating Transfer The encounter diagnosis was Subarachnoid bleed ([address] )  Patient Condition: The patient has been stabilized such that within reasonable medical probability, no material deterioration of the patient condition or the condition of the unborn child(nahed) is likely to result from the transfer    Reason for Transfer: Level of Care needed not available at this facility    Transfer Requirements: Facility     · Space available and qualified personnel available for treatment as acknowledged by    · Agreed to accept transfer and to provide appropriate medical treatment as acknowledged by       [place]  · Appropriate medical records of the examination and treatment of the patient are provided at the time of transfer   [address], [address] _______  · Transfer will be performed by qualified personnel from    and appropriate transfer equipment as required, including the use of necessary and appropriate life support measures      Provider Certification: I have examined the patient and explained the following risks and benefits of being transferred/refusing transfer to the patient/family:  General risk, such as traffic hazards, adverse weather conditions, rough terrain or turbulence, possible failure of equipment (including vehicle or aircraft), or consequences of actions of persons outside the control of the transport personnel      Based on these reasonable risks and benefits to the patient and/or the unborn child(nahed), and based upon the information available at the time of the patients examination, I certify that the medical benefits reasonably to be expected from the provision of appropriate medical treatments at another medical facility outweigh the increasing risks, if any, to the individuals medical condition, and in the case of labor to the unborn child, from effecting the transfer      X____________________________________________ DATE 04/16/18        TIME_______      ORIGINAL - SEND TO MEDICAL RECORDS   COPY - SEND WITH PATIENT DURING TRANSFER

## 2018-04-17 NOTE — SPEECH THERAPY NOTE
Speech Language/Pathology  Speech/Language Pathology  Assessment    Patient Name: Fátima MCGOWANI Date: 2018     Problem List  Patient Active Problem List   Diagnosis    SAH ([address]arachnoid hemorrhage) [address] [address] [facility] )    History of asthma     Past Medical History  Past Medical History:   Diagnosis Date    Asthma      Past Surgical History  Past Surgical History:   Procedure Laterality Date     SECTION      SPLENECTOMY     41-year-old female who was at home in her normal state of health, patient went outside to smoke cigarette developed some new onset of severe headache and neck pain  She also had photophobia as well as nausea that time  EMS was contacted by the patient and she was brought to the emergency department at [address] for evaluation  Initially patient was treated as a migraine as she has a history of migraines and administered Toradol  Prior to patient's arrival she did take Aleve for her headache  CT scan was obtained and showed subarachnoid hemorrhage consistent with aneurysmal rupture  CTA was obtained which did not show aneurysm  Patient was administer DDAVP at [address] and blood pressure was controlled with labetalol for goal systolic pressure of less than 140  Upon arrival to [facility] patient was able to follow commands without difficulty, strength 5/5 x4 extremities, patient did have slight right facial droop  Neurosurgery contacted regarding subarachnoid hemorrhage and no aneurysm on CTA, plan for angiogram today  Continue with blood pressure goal of less than 140  Nimodipine 60mg q 4 hours to be scheduled  [facility] for seizure prophylaxis  Neurologic checks q 1 hour  TCDs will be obtained daily  Baseline EKG and echocardiogram will be obtained  Continue with albuterol as needed for asthma  Schedule outpatient inhaler and singular as per prescribed dose    CT head-Large amount of acute subarachnoid hemorrhage involving the suprasellar cistern, sylvian fissures, basal cisterns and in anterior falx  A ruptured aneurysm is suspected  A CTA head is recommended for further evaluation  Summary:  Pt presents w/ functional swallow to resume solids w/ thin liquids  Pt c/o pain w/ chewing but continued to eat/drink w/o difficulty  Recommendations:  Diet: regular  Liquid: thin  Meds: as tolerating  Supervision: may need assist  Positioning:Upright  Strategies: Pt to take PO/Meds only when fully alert and upright  Oral care: frequently  Eval only, No f/u tx indicated  Reason for consult:  R/o aspiration  Determine safest and least restrictive diet  New neuro event  H/o neurological disease    Precautions:  Aspiration? Current diet:  meds w/ water as tolerated  Premorbid diet[de-identified]  Regular w/ thin  Previous VBS:  -  O2 requirement:  RA  Voice/Speech:  Clear, wfl  Social:  Home, lives w/dtr  Follows commands:          yes                Cognitive Status:  Lethargic, able  Wake & have conversation  Oral mech exam:  Full dentition  Full symmetry-?able R sided weakness  Labial weakness on the R    Items administered:  Puree, soft solid, hard solid,  thin liquids  Liquids were taken by straw/cup  Oral stage:  Lip closure: wfl  Mastication: wfl  Bolus formation:wfl  Bolus control: wfl  Transfer: wfl  Oral residue: none noted      Pharyngeal stage:  Swallow promptness: timely swallow  Laryngeal rise: wfl  Wet voice: none noted  Throat clear: none  Cough: no coughing    Esophageal stage:  No s/s reported  Results d/w:  Pt, nursing        Thank you for this consult  Please feel free to call with any questions    Sandy Clay MS, CCC/SLP  [phone]

## 2018-04-17 NOTE — PLAN OF CARE
Activity Intolerance/Impaired Mobility     Mobility/activity is maintained at optimum level for patient Progressing        CARDIOVASCULAR - ADULT     Maintains optimal cardiac output and hemodynamic stability Progressing     Absence of cardiac dysrhythmias or at baseline rhythm Progressing        Communication Impairment     Ability to express needs and understand communication Progressing        GASTROINTESTINAL - ADULT     Minimal or absence of nausea and/or vomiting Progressing     Maintains or returns to baseline bowel function Progressing     Maintains adequate nutritional intake Progressing        GENITOURINARY - ADULT     Maintains or returns to baseline urinary function Progressing     Absence of urinary retention Progressing        HEMATOLOGIC - ADULT     Maintains hematologic stability Progressing        METABOLIC, FLUID AND ELECTROLYTES - ADULT     Electrolytes maintained within normal limits Progressing     Fluid balance maintained Progressing     Glucose maintained within target range Progressing        MUSCULOSKELETAL - ADULT     Maintain or return mobility to safest level of function Progressing     Maintain proper alignment of affected body part Progressing        Neurological Deficit     Neurological status is stable or improving Progressing        NEUROSENSORY - ADULT     Achieves stable or improved neurological status Progressing     Absence of seizures Progressing     Remains free of injury related to seizures activity Progressing     Achieves maximal functionality and self care Progressing        Nutrition     Nutrition/Hydration status is improving Progressing        Potential for Aspiration     Non-ventilated patient's risk of aspiration is minimized Progressing        Potential for Falls     Patient will remain free of falls Progressing        Prexisting or High Potential for Compromised Skin Integrity     Skin integrity is maintained or improved Progressing RESPIRATORY - ADULT     Achieves optimal ventilation and oxygenation Progressing        SKIN/TISSUE INTEGRITY - ADULT     Skin integrity remains intact Progressing     Incision(s), wounds(s) or drain site(s) healing without S/S of infection Progressing     Oral mucous membranes remain intact Progressing

## 2018-04-17 NOTE — CONSULTS
CC: SAH  HPI: 63 yoF pw MARIA L after smoking  Was evaluated at [facility] and diffuse SAH was seen on Ct  CTA negative for saccular aneurysm  Transferred for further management  Patient is lethargic and only able to provide minimal history  ROS unobtainable    Past Medical History:   Diagnosis Date    Asthma      Past Surgical History:   Procedure Laterality Date     SECTION      SPLENECTOMY       +tobacco  State remote history of drug use  Not   Two children  ?family history of sah    Temp:  [97 5 °F (36 4 °C)-98 °F (36 7 °C)] 97 9 °F (36 6 °C)  HR:  [60-90] 60  Resp:  [10-23] 13  BP: ()/(54-98) 102/68  Arterial Line BP: (106-129)/(54-74) 129/74  Asleep, but arouses and answers simple questions  Falls back to sleep without consistent stimulation  Oriented to self, year, and place  PERRL EOMI TML  Mild right facial improves with smile  Symmetric strength in bilateral uppers and lowers    Na 141, cr 0 58, wbc 10 37, plts 430 inr 0 9    A/p Bd2 HH3F3 SAH   - Ventriculostomy today  - Angio today for diagnostic +/- intervention with possible clipping  - consent obtained from mother  [facility] core measures, nimotop, keppra, sbp <140, tte, trop, tox screen     - dvt ppx, scds

## 2018-04-17 NOTE — PROCEDURES
Central Line Insertion  Date/Time: 4/17/2018 1:49 AM  Performed by: Dane Philippe  Authorized by: Dane Philippe     Patient location:  Bedside  Other Assisting Provider: No    Consent:     Consent obtained:  Written    Consent given by:  Patient    Risks discussed:  Arterial puncture, bleeding, infection, incorrect placement, pneumothorax and nerve damage    Alternatives discussed:  No treatment  Universal protocol:     Procedure explained and questions answered to patient or proxy's satisfaction: yes      Relevant documents present and verified: yes      Test results available and properly labeled: yes      Imaging studies available: yes      Required blood products, implants, devices, and special equipment available: yes      Site/side marked: yes      Immediately prior to procedure, a time out was called: yes      Patient identity confirmed:  Verbally with patient and arm band  Pre-procedure details:     Hand hygiene: Hand hygiene performed prior to insertion      Sterile barrier technique: All elements of maximal sterile technique followed      Skin preparation:  2% chlorhexidine and ChloraPrep    Skin preparation agent: Skin preparation agent completely dried prior to procedure    Indications:     Central line indications: medications requiring central line and hemodynamic monitoring    Sedation:     Sedation type: Anxiolysis  Anesthesia (see MAR for exact dosages):      Anesthesia method:  Local infiltration    Local anesthetic:  Lidocaine 1% WITH epi  Procedure details:     Location:  Right internal jugular    Vessel type: vein      Laterality:  Right    Approach: percutaneous technique used      Patient position:  Flat    Catheter type:  Triple lumen 16cm    Catheter size:  7 Fr    Landmarks identified: yes      Ultrasound guidance: yes      Sterile ultrasound techniques: Sterile gel and sterile probe covers were used      Number of attempts:  1    Successful placement: yes    Post-procedure details: Post-procedure:  Dressing applied and line sutured    Assessment:  Blood return through all ports, no pneumothorax on x-ray, placement verified by x-ray and free fluid flow    Post-procedure complications: none      Patient tolerance of procedure:   Tolerated well, no immediate complications

## 2018-04-17 NOTE — PROGRESS NOTES
Post-op assessment: IR angiography and ventriculostomy placement    Pt returned from IR in stable condition  No aneurysm noted on angiogram  R frontal EVD placed  Patient continues to be somnolent, but wakes to verbal stimuli  Continues to complain of headache, which has not changed since arrival to the unit, per patient  Last received Dilaudid 0 5mg around 1830  Was nauseated and vomited about 100cc  Nursing discussed this with Dr Geo Reynolds  Exam:  General: Sleeping, wakes easily  NAD  HENT: R frontal EVD in place  CV : RRR  Pulm: CTAB  Abd: soft  Neuro: A&O x 4  ROM and MS normal in all extremities  Follows commands in all extremities  Sensation to light touch normal in all extremities      Assessment:  -SAH  -S/P R frontal EVD placement  -Hx asthma  -Hx migraine headache  -Tobacco abuse    Plan:  -SBP goal <140 per NSGY  -Labetalol prn to achieve SBP goal  -Keppra 750mg Q 12 hours  -LR bolus 500cc completed per NSGY  -Nimodipine 60mg Q 4 H  -Pain regiment changed to Tylenol 650mg Q 6 H scheduled, plus oxycodone 5mg for moderate pain and Dilaudid 0 5mg for severe pain as patient seemed excessively somnolent after 1mg of Dilaudid  -Avoid benzos or antipsychotics  -Management per NSGY

## 2018-04-17 NOTE — H&P
Chart made in error prior to arrival  Please see H&P 18 2:11AM      History and Physical - Critical Care  Elza Sahni 63 y o  female MRN: [id number]  Unit/Bed#: ED 01 Encounter: [id number]     Reason for Admission / Chief Complaint:  Subarachnoid hemorrhage     History of Present Illness:  Elza Sahni is a 63 y o  female w     History obtained from the patient  Past Medical History:  Past Medical History:   Diagnosis Date    Asthma         Past Surgical History:  Past Surgical History:   Procedure Laterality Date     SECTION      SPLENECTOMY          Past Family History:  No family history on file  Social History:  History   Smoking Status    Current Every Day Smoker    Packs/day: 0 25   Smokeless Tobacco    Never Used     History   Alcohol Use No     History   Drug Use No     Comment: per patient: history of cocaine abuse      Marital Status: Legally   Exercise Histor     Medications:  Current Facility-Administered Medications   Medication Dose Route Frequency    desmopressin (DDAVP) 26 mcg in sodium chloride 0 9 % 50 mL IVPB  0 3 mcg/kg Intravenous Once    sodium chloride 0 9 % bolus 1,000 mL  1,000 mL Intravenous Once     Home medications:  Prior to Admission medications    Medication Sig Start Date End Date Taking?  Authorizing Provider   albuterol (PROVENTIL HFA,VENTOLIN HFA) 90 mcg/act inhaler Inhale 2 puffs every 6 (six) hours as needed for wheezing   Yes Historical Provider, MD   fluticasone furoate-vilanterol (BREO ELLIPTA) 200-25 MCG/INH inhaler Inhale 1 puff daily   Yes Historical Provider, MD   montelukast (SINGULAIR) 10 mg tablet Take 10 mg by mouth daily   Yes Historical Provider, MD     Allergies:  No Known Allergies     ROS:   Review of Systems     Vitals:  Vitals:    18 1907 18 2102 18   BP: 157/98 160/72 136/54   BP Location: Left arm Left arm Left arm   Pulse: 90 88 80   Resp: 17 16 16   Temp: 97 5 °F (36 4 °C)     TempSrc: Temporal SpO2: 93% 98% 97%   Weight: 87 1 kg (192 lb)       Temperature:   Temp (24hrs), Av 5 °F (36 4 °C), Min:97 5 °F (36 4 °C), Max:97 5 °F (36 4 °C)    Current: Temperature: 97 5 °F (36 4 °C)     Weights:   IBW: -92 5 kg  There is no height or weight on file to calculate BMI  Hemodynamic Monitoring:  N/A     Non-Invasive/Invasive Ventilation Settings:  Respiratory    Lab Data (Last 4 hours)    None         O2/Vent Data (Last 4 hours)    None              No results found for: PHART, YZI1MRG, PO2ART, HSM7GHW, O9AUXHFG, BEART, SOURCE  SpO2: SpO2: 96 %     Physical Exam:  Physical Exam     Labs:    Results from last 7 days  Lab Units 18  2100 18  2055   WBC Thousand/uL  --  11 18*   HEMOGLOBIN g/dL  --  13 4   I STAT HEMOGLOBIN g/dl 14 6  --    HEMATOCRIT %  --  41 3   PLATELETS Thousands/uL  --  484*   NEUTROS PCT %  --  81*   MONOS PCT %  --  7      Results from last 7 days  Lab Units 18  2100   GLUCOSE, ISTAT mg/dl 95                      No results found for: TROPONINI     Imaging:  I have personally reviewed pertinent reports  EKG: This was personally reviewed by myself  Micro:  No results found for: Ted Mason SPUTUMCULTMAVIS    Assessment:         Plan:                  Neuro:                  CV:                  Lung:                  GI:                  FEN:                  :                  ID:                  Heme:                  Endo:                  Msk/Skin:                  Disposition:      VTE Pharmacologic Prophylaxis: Reason for no pharmacologic prophylaxis NA  VTE Mechanical Prophylaxis: sequential compression device     Invasive lines and devices: Invasive Devices     Peripheral Intravenous Line            Peripheral IV 18 Right Antecubital less than 1 day                 Code Status: No Order  POA:    POLST:       Given critical illness, patient length of stay will require greater than two midnights            Portions of the record may have been created with voice recognition software  Occasional wrong word or "sound a like" substitutions may have occurred due to the inherent limitations of voice recognition software  Read the chart carefully and recognize, using context, where substitutions have occurred          Mio Burroughs,

## 2018-04-17 NOTE — H&P
History and Physical - Critical Care  Leida Hernández 63 y o  female MRN: [id number]  Unit/Bed#: ICU 05 Encounter: [id number]     Reason for Admission / Chief Complaint:  Subarachnoid hemorrhage     History of Present Illness:  Leida Hernández is a 63 y o  female With history of smoking and asthma who presented to [facility]  for evaluation of a headache  She states she ran out of her home medications so presented to the ED for evaluation and treatment  She reports she was smoking a cigarette earlier today and had a sudden onset of frontal headache, nonradiating  10/10 in severity and constant throbbing  Had associated photophobia nausea without vomiting  No focal numbness tingling weakness  States the headache was similar to previous migraines  She states she has not had to use an abortive migraine medication in the past year  She reports discomfort of the right mastoid 2 days ago but no other descriptions of headache leading up to today  The patient was noted to have a subarachnoid hemorrhage on CT head  Follow-up CTA imaging showed no focal stenosis or saccular aneurysm within the [place]  Patient was given 10 mg total labetalol prior to arrival as well as 4 of morphine for headache  Patient was given DDAVP as she was administered Toradol in the emergency department and took an NSAID earlier in the day  The patient [facility] for critical care monitoring and neurosurgical evaluation  Upon arrival to [facility] patient had a GCS of 15  but continued to complain of 8/10 frontal nonradiating headache  History obtained from chart review and the patient  Past Medical History:  Past Medical History:   Diagnosis Date    Asthma         Past Surgical History:  Past Surgical History:   Procedure Laterality Date     SECTION      SPLENECTOMY          Past Family History:  No family history on file       Social History:  History   Smoking Status    Current Every Day Smoker    Packs/day: 0 25   Smokeless Tobacco    Never Used     History   Alcohol Use No     History   Drug Use No     Comment: per patient: history of cocaine abuse      Marital Status: Legally        Medications:  Current Facility-Administered Medications   Medication Dose Route Frequency    acetaminophen (TYLENOL) tablet 650 mg  650 mg Oral Q6H PRN    albuterol (PROVENTIL HFA,VENTOLIN HFA) inhaler 2 puff  2 puff Inhalation Q6H PRN    docusate sodium (COLACE) capsule 100 mg  100 mg Oral BID    famotidine (PEPCID) tablet 20 mg  20 mg Oral BID    Or    famotidine (PEPCID) injection 20 mg  20 mg Intravenous BID    fentanyl citrate (PF) 100 MCG/2ML 50 mcg  50 mcg Intravenous Q2H PRN    [START ON 4/17/2018] fluticasone-salmeterol (ADVAIR) 250-50 mcg/dose inhaler 1 puff  1 puff Inhalation Daily    labetalol (NORMODYNE) injection 10 mg  10 mg Intravenous Q4H PRN    [START ON 4/17/2018] montelukast (SINGULAIR) tablet 10 mg  10 mg Oral Daily    multi-electrolyte (ISOLYTE-S PH 7 4 equivalent) IV solution  100 mL/hr Intravenous Continuous    [START ON 4/17/2018] niMODipine (NIMOTOP) capsule 60 mg  60 mg Oral Q4H [facility] & custodial    ondansetron (ZOFRAN) injection 4 mg  4 mg Intravenous Q6H PRN    [START ON 4/17/2018] pravastatin (PRAVACHOL) tablet 40 mg  40 mg Oral Daily With Dinner     Home medications:  Prior to Admission medications    Medication Sig Start Date End Date Taking? Authorizing Provider   albuterol (PROVENTIL HFA,VENTOLIN HFA) 90 mcg/act inhaler Inhale 2 puffs every 6 (six) hours as needed for wheezing   Yes Historical Provider, MD   fluticasone furoate-vilanterol (BREO ELLIPTA) 200-25 MCG/INH inhaler Inhale 1 puff daily   Yes Historical Provider, MD   montelukast (SINGULAIR) 10 mg tablet Take 10 mg by mouth daily   Yes Historical Provider, MD     Allergies:  No Known Allergies     ROS:   Review of Systems   Constitutional: Negative for chills, fatigue and fever     HENT: Negative for congestion, ear pain, postnasal drip, rhinorrhea, sinus pressure and trouble swallowing  Eyes: Positive for photophobia  Negative for pain and visual disturbance  Respiratory: Negative for cough, chest tightness, shortness of breath and wheezing  Cardiovascular: Negative for chest pain and palpitations  Gastrointestinal: Positive for nausea  Negative for abdominal distention, abdominal pain, constipation, diarrhea and vomiting  Genitourinary: Negative for dysuria, hematuria and urgency  Musculoskeletal: Positive for neck pain  Negative for arthralgias, back pain, myalgias and neck stiffness  Skin: Negative for rash  Neurological: Positive for headaches  Negative for dizziness, weakness and numbness  Hematological: Negative for adenopathy  Vitals:  Vitals:    18 2220   BP: 117/69   Pulse: 83   Resp: 18   Temp: 98 °F (36 7 °C)   TempSrc: Oral   SpO2: 97%   Weight: 90 7 kg (199 lb 15 3 oz)   Height: 5' 3" (1 6 m)     Temperature:   Temp (24hrs), Av 8 °F (36 6 °C), Min:97 5 °F (36 4 °C), Max:98 °F (36 7 °C)    Current: Temperature: 98 °F (36 7 °C)     Weights:   IBW: 52 4 kg  Body mass index is 35 42 kg/m²  Hemodynamic Monitoring:  N/A     Non-Invasive/Invasive Ventilation Settings:  Respiratory    Lab Data (Last 4 hours)    None         O2/Vent Data (Last 4 hours)    None              No results found for: PHART, LWW7BVV, PO2ART, VRV4WGM, Q4HSHZHQ, BEART, SOURCE  SpO2: SpO2: 97 %     Physical Exam:  Physical Exam   Constitutional: She is oriented to person, place, and time  Vital signs are normal  She appears well-developed and well-nourished  She does not appear ill  No distress  HENT:   Head: Normocephalic and atraumatic  Head is without raccoon's eyes, without Linares's sign, without abrasion and without contusion  Mouth/Throat: Oropharynx is clear and moist  No oropharyngeal exudate  Eyes: Conjunctivae and EOM are normal  Pupils are equal, round, and reactive to light   Right eye exhibits normal extraocular motion and no nystagmus  Left eye exhibits normal extraocular motion and no nystagmus  EOMI, no nystagmus   Neck: Normal range of motion and phonation normal  Neck supple  No JVD present  Cardiovascular: Normal rate, regular rhythm and normal heart sounds  Exam reveals no friction rub  No murmur heard  Pulses:       Radial pulses are 2+ on the right side, and 2+ on the left side  Dorsalis pedis pulses are 2+ on the right side, and 2+ on the left side  Pulmonary/Chest: Effort normal and breath sounds normal  No respiratory distress  She has no decreased breath sounds  She has no wheezes  She has no rhonchi  She has no rales  Abdominal: Soft  Bowel sounds are normal  She exhibits no distension  There is no tenderness  There is no rebound and no guarding  Musculoskeletal: Normal range of motion  Moves all extremities equally   Lymphadenopathy:     She has no cervical adenopathy  Neurological: She is alert and oriented to person, place, and time  She has normal strength  A cranial nerve deficit is present  No sensory deficit  Coordination normal  GCS eye subscore is 4  GCS verbal subscore is 5  GCS motor subscore is 6  Trace right-sided facial droop otherwise Unremarkable cranial nerve exam II-XII  Pupils 4 mm equal round reactive to light  No nystagmus, normal extraocular motion  5 out of 5 upper and lower extremity strength  No subjective sensory deficits to the face, upper or lower extremity  Normal finger-nose and heel shin  No expressive or receptive aphasia  No dysarthria  Skin: Skin is warm, dry and intact  No rash noted  She is not diaphoretic  No erythema  Psychiatric: Her mood appears anxious          Labs:    Results from last 7 days  Lab Units 04/16/18  2100 04/16/18  2055   WBC Thousand/uL  --  11 18*   HEMOGLOBIN g/dL  --  13 4   I STAT HEMOGLOBIN g/dl 14 6  --    HEMATOCRIT %  --  41 3   PLATELETS Thousands/uL  --  484*   NEUTROS PCT %  -- 81*   MONOS PCT %  --  7        Results from last 7 days  Lab Units 04/16/18  2100   GLUCOSE, ISTAT mg/dl 95                Results from last 7 days  Lab Units 04/16/18  2055   INR  0 93   PTT seconds 28         No results found for: TROPONINI     Imaging: CTH - Large amount of acute subarachnoid hemorrhage involving the suprasellar cistern, sylvian fissures, basal cisterns and in anterior falx   A ruptured aneurysm is suspected   A CTA head is recommended for further evaluation  CTA - No focal stenosis or saccular aneurysm within the [place]  Reidentified acute subarachnoid hemorrhage  I have personally reviewed pertinent reports  EKG: pending This was personally reviewed by myself  Micro:  No results found for: Shan Reynolds SPUTUMCULTUR    Assessment:  21-year-old female with subarachnoid hemorrhage        Plan:                  Neuro:  1  Subarachnoid hemorrhage (HHS 1)   - neurosurgery aware and plan for angiogram in the morning, will call and get stat cth with any acute changes in GCS or neurologic  exam   - seizure prophylaxis, Keppra load 1g, continued seizure prophylaxis with 1 g b i d starting tomorrow   - SBP goal < 140, p r n  Labetalol   If requiring frequent dosage, will start Cardene ggt    - goal euvolemic and euglycemia   - daily TCD   - nimodipine pain 60 mg every 4 hours   - initiate statin therapy   - stool softener, antiemetic p r n    - pain control   - PT/OT/ST                 CV:  No active issues   - SBP goal 140-160   - labetalol 10 mg p r n  for pressure greater than 140 q 6 hours   - admission troponin EKG                 Lung:  No active issues   - p r n  nasal cannula oxygenation                 GI:  Stress ulcer prophylaxis                 FEN:      Isolyte 100 mL/hour maintenance fluids      Monitor replete electrolytes   - monitor for hyponatremia     NPO to morning                 :  No active issues   - strict I&O                 ID: Leukocytosis   - morning CBC                 Heme:  No active issues                 Endo:  No active issues   - goal euglycemia, Q 6 our Accu-Chek and p r n  sliding-scale insulin                 Msk/Skin:  No active issues   - frequent repositioning to avoid skin breakdown                 Disposition:  ICU     VTE Pharmacologic Prophylaxis: Reason for no pharmacologic prophylaxis Subarachnoid hemorrhage  VTE Mechanical Prophylaxis: sequential compression device     Invasive lines and devices: Invasive Devices     Peripheral Intravenous Line            Peripheral IV 04/16/18 Right Antecubital less than 1 day                 Code Status: Level 1 - Full Code  POA:    POLST:       Given critical illness, patient length of stay will require greater than two midnights  Portions of the record may have been created with voice recognition software  Occasional wrong word or "sound a like" substitutions may have occurred due to the inherent limitations of voice recognition software  Read the chart carefully and recognize, using context, where substitutions have occurred          Christiano Plan, DO

## 2018-04-17 NOTE — ANESTHESIA POSTPROCEDURE EVALUATION
Post-Op Assessment Note      CV Status:  Stable    Mental Status:  Awake and somnolent    Hydration Status:  Euvolemic    PONV Controlled:  Controlled    Airway Patency:  Patent  Airway: intubated    Post Op Vitals Reviewed: Yes          Staff: CRNA       Comments: Patient transferred to ICU on monitors, placed on monitors in ICU, report given to Demar Moya RN; VSS          BP   132/67 (93)   Temp   97 0   Pulse  66 bpm   Resp   14   SpO2   99% on FM

## 2018-04-17 NOTE — ED PROVIDER NOTES
History  Chief Complaint   Patient presents with    Headache - Recurrent or Known Dx Migraines     Headache starting "20 minutes" ago  patietn "I haven't had one in a while"  This is a 60-year-old female patient who presents for her "migraine headache" she states she ran out of her home medication  States she was smoking a cigarrette before arrival and felt a headache  She is unsure what that medication was but the way she describes it is something Fioricet  She stated she felt like an aura before the headache began and now has a frontal headache with some photophobia and mild nausea  She states she has not needed to come to the hospital because her home medications kept the headache away  Unclear when she had her last antimigraine medication  No fever no chills  no blurred vision or double vision  No neck stiffness  No cough congestion or sore throat no vomiting or diarrhea  No chest pain or shortness of breath  Nothing makes it better or worse  During the history of present illness patient began crying stating that she has a lot of stress and that she cannot get into a psychiatrist   She is not homicidal or suicidal but she is mostly upset  It is unclear whether this headache is a migraine that was spurred on by a tension headache or she has a headache from rebound  versus bleed versus space occupying lesion She is neurologically intact  I will treat the headache symptoms  Due to fact that she is emotionally labile I will scan her head and have her be seen by crisis  Prior to Admission Medications   Prescriptions Last Dose Informant Patient Reported? Taking?    albuterol (PROVENTIL HFA,VENTOLIN HFA) 90 mcg/act inhaler   Yes Yes   Sig: Inhale 2 puffs every 6 (six) hours as needed for wheezing   fluticasone furoate-vilanterol (BREO ELLIPTA) 200-25 MCG/INH inhaler   Yes Yes   Sig: Inhale 1 puff daily   montelukast (SINGULAIR) 10 mg tablet   Yes Yes   Sig: Take 10 mg by mouth daily Facility-Administered Medications: None       Past Medical History:   Diagnosis Date    Asthma        Past Surgical History:   Procedure Laterality Date     SECTION      SPLENECTOMY         No family history on file  I have reviewed and agree with the history as documented  Social History   Substance Use Topics    Smoking status: Current Every Day Smoker     Packs/day: 0 25    Smokeless tobacco: Never Used    Alcohol use No        Review of Systems   All other systems reviewed and are negative  Physical Exam  ED Triage Vitals [18 1907]   Temperature Pulse Respirations Blood Pressure SpO2   97 5 °F (36 4 °C) 90 17 157/98 93 %      Temp Source Heart Rate Source Patient Position - Orthostatic VS BP Location FiO2 (%)   Temporal Monitor Sitting Left arm --      Pain Score       Worst Possible Pain           Orthostatic Vital Signs  Vitals:    18 2130 18 2135 18   BP: 159/91 132/76 142/64 136/90   Pulse: 85  84    Patient Position - Orthostatic VS: Sitting Lying Lying Lying       Physical Exam   Constitutional: She is oriented to person, place, and time  She appears well-developed and well-nourished  HENT:   Head: Normocephalic and atraumatic  Right Ear: External ear normal    Left Ear: External ear normal    Nose: Nose normal    Mouth/Throat: Oropharynx is clear and moist    Eyes: Conjunctivae are normal  Pupils are equal, round, and reactive to light  Neck: Normal range of motion  Neck supple  No Brudzinski's sign and no Kernig's sign noted  Cardiovascular: Normal rate and regular rhythm  Pulmonary/Chest: Effort normal and breath sounds normal    Abdominal: Soft  Bowel sounds are normal  There is no tenderness  Neurological: She is alert and oriented to person, place, and time  She displays normal reflexes  No cranial nerve deficit or sensory deficit  She exhibits normal muscle tone  Coordination normal    Skin: Skin is warm     Psychiatric: Her speech is normal  Judgment and thought content normal  Her affect is labile  Cognition and memory are normal    Nursing note and vitals reviewed        ED Medications  Medications   sodium chloride 0 9 % bolus 1,000 mL (0 mL Intravenous Stopped 4/16/18 2123)   ketorolac (TORADOL) injection 15 mg (15 mg Intravenous Given 4/16/18 2019)   metoclopramide (REGLAN) injection 10 mg (10 mg Intravenous Given 4/16/18 2022)   diphenhydrAMINE (BENADRYL) injection 12 5 mg (12 5 mg Intravenous Given 4/16/18 2021)   desmopressin (DDAVP) 26 mcg in sodium chloride 0 9 % 50 mL IVPB (26 mcg Intravenous New Bag 4/16/18 2127)   labetalol (NORMODYNE) injection 5 mg (5 mg Intravenous Given 4/16/18 2101)   iohexol (OMNIPAQUE) 350 MG/ML injection (MULTI-DOSE) 85 mL (85 mL Intravenous Given 4/16/18 2121)   labetalol (NORMODYNE) injection 5 mg (5 mg Intravenous Given 4/16/18 2133)   morphine injection 2 mg (2 mg Intravenous Given 4/16/18 2138)   morphine injection 2 mg (2 mg Intravenous Given 4/16/18 2144)       Diagnostic Studies  Results Reviewed     Procedure Component Value Units Date/Time    Protime-INR [[id number]]  (Normal) Collected:  04/16/18 2055    Lab Status:  Final result Specimen:  Blood from Arm, Right Updated:  04/16/18 2114     Protime 12 5 seconds      INR 0 93    APTT [[id number]]  (Normal) Collected:  04/16/18 2055    Lab Status:  Final result Specimen:  Blood from Arm, Right Updated:  04/16/18 2114     PTT 28 seconds     CBC and differential [[id number]]  (Abnormal) Collected:  04/16/18 2055    Lab Status:  Final result Specimen:  Blood from Arm, Right Updated:  04/16/18 2106     WBC 11 18 (H) Thousand/uL      RBC 4 40 Million/uL      Hemoglobin 13 4 g/dL      Hematocrit 41 3 %      MCV 94 fL      MCH 30 5 pg      MCHC 32 4 g/dL      RDW 14 2 %      MPV 10 8 fL      Platelets 934 (H) Thousands/uL      nRBC 0 /100 WBCs      Neutrophils Relative 81 (H) %      Lymphocytes Relative 11 (L) %      Monocytes Relative 7 % Eosinophils Relative 1 %      Basophils Relative 0 %      Neutrophils Absolute 9 09 (H) Thousands/µL      Lymphocytes Absolute 1 22 Thousands/µL      Monocytes Absolute 0 75 Thousand/µL      Eosinophils Absolute 0 09 Thousand/µL      Basophils Absolute 0 03 Thousands/µL     POCT Chem 8+ [[id number]]  (Abnormal) Collected:  04/16/18 2100    Lab Status:  Final result Updated:  04/16/18 2104     SODIUM, I-STAT 141 mmol/l      Potassium, i-STAT 3 9 mmol/L      Chloride, istat 105 mmol/L      CO2, i-STAT 26 mmol/L      Anion Gap, Istat 14 (H) mmol/L      Calcium, Ionized i-STAT 1 18 mmol/L      BUN, I-STAT 18 mg/dl      Creatinine, i-STAT 0 9 mg/dl      eGFR 71 ml/min/1 73sq m      Glucose, i-STAT 95 mg/dl      Hct, i-STAT 43 %      Hgb, i-STAT 14 6 g/dl      Specimen Type VENOUS    POCT alcohol breath test [[id number]]  (Normal) Resulted:  04/16/18 2051    Lab Status:  Final result Updated:  04/16/18 2051     EXTBreath Alcohol 0 00                 CTA head with and without contrast   Final Result by Pat Wang MD (04/16 2152)      No focal stenosis or saccular aneurysm within the [place]  Reidentified acute subarachnoid hemorrhage  Workstation performed: [id number]         CT head without contrast   Final Result by Pat Wang MD (04/16 2051)      Large amount of acute subarachnoid hemorrhage involving the suprasellar cistern, sylvian fissures, basal cisterns and in anterior falx  A ruptured aneurysm is suspected  A CTA head is recommended for further evaluation               I personally discussed this study with León Luevano on 4/16/2018 at 8:37 PM                   Workstation performed: [id number]         IR consult    (Results Pending)              Procedures  CriticalCare Time  Performed by: Qian Beltran by: Melisa Bernard     Critical care provider statement:     Critical care time (minutes):  60    Critical care was necessary to treat or prevent imminent or life-threatening deterioration of the following conditions: Subarachnoid bleed  Critical care was time spent personally by me on the following activities:  Evaluation of patient's response to treatment, discussions with consultants, examination of patient, ordering and review of radiographic studies and ordering and review of laboratory studies           Phone Contacts  ED Phone Contact    ED Course  ED Course as of Apr 16 2323 Mon Apr 16, 2018 2041 Spoke with trauma about sub arachnoid bleed advised to call medical critical care    2054 Spoke with Dr Gabi Emanuel from [address] [place] [address]  Explained case in detail  She requested DDAVP 0 3 milligrams/kilogram   Reduce her blood pressure to 597 systolic CTA of the brain with and without an emergent transfer to [facility] for neurosurgical evaluation  Patient remains alert  Her pain is lessened  She is no longer nauseous  [address] significant other [phone] attempted to call left message to call back   Call requested by patient                                MDM  CritCare Time    Disposition  Final diagnoses:   Subarachnoid bleed ([address] )     Time reflects when diagnosis was documented in both MDM as applicable and the Disposition within this note     Time User Action Codes Description Comment    4/16/2018  9:18 PM Mirna Gill [I60 9] Subarachnoid bleed [facility])       ED Disposition     ED Disposition Condition Comment    Transfer to Another [address] [place] [address] Rd should be transferred out to Dr Flavia Morgan MD Documentation    On license of [facility] Most Recent Value   Patient Condition  The patient has been stabilized such that within reasonable medical probability, no material deterioration of the patient condition or the condition of the unborn child(nahed) is likely to result from the transfer   Reason for Transfer  Level of Care needed not available at this facility   Risks of Transfer  Potential for delay in receiving treatment, Potential deterioration of medical condition, Loss of IV, Increased discomfort during transfer, Possible worsening of condition or death during transfer   Accepting Physician  Mckinley   Provider Certification  General risk, such as traffic hazards, adverse weather conditions, rough terrain or turbulence, possible failure of equipment (including vehicle or aircraft), or consequences of actions of persons outside the control of the transport personnel      RN Documentation    Flowsheet Row Most Recent Value   Report Given to  [facility] RN   Level of Care  Advanced life support      Follow-up Information    None       Discharge Medication List as of 4/16/2018 10:02 PM      CONTINUE these medications which have NOT CHANGED    Details   albuterol (PROVENTIL HFA,VENTOLIN HFA) 90 mcg/act inhaler Inhale 2 puffs every 6 (six) hours as needed for wheezing, Historical Med      fluticasone furoate-vilanterol (BREO ELLIPTA) 200-25 MCG/INH inhaler Inhale 1 puff daily, Historical Med      montelukast (SINGULAIR) 10 mg tablet Take 10 mg by mouth daily, Historical Med           No discharge procedures on file      ED Provider  Electronically Signed by           Brionna Nielson PA-C  04/16/18 2365       Brionna Nielson PA-C  04/16/18 9465       [facility] JOSEPH Gage  04/16/18 5389

## 2018-04-17 NOTE — PROGRESS NOTES
04/17/18 1600   Clinical Encounter Type   Visited With Family; Patient   Referral From Nurse   Referral To    Mosque Encounters   Mosque Needs Prayer   Sacramental Encounters   Sacrament of Sick-Anointing Anointed   Patient Spiritual Encounters   Spiritual Encounter Notes Katelynn Jarvis is one of four sisters  Mother present as well as one son-in-law this morning  Provided comfort, care and empathetic listening to family  Called Ramon whiteside on family request  He annointed PT     Family Spiritual Encounters   Family Coping Open/discussion

## 2018-04-17 NOTE — ANESTHESIA PROCEDURE NOTES
Arterial Line Insertion  Date/Time: 4/17/2018 9:00 AM  Performed by: Tello Plascencia  Authorized by: Shakila Osman   Consent: Verbal consent obtained  Written consent obtained  Risks and benefits: risks, benefits and alternatives were discussed  Consent given by: patient  Patient understanding: patient states understanding of the procedure being performed  Patient identity confirmed: hospital-assigned identification number and arm band  Preparation: Patient was prepped and draped in the usual sterile fashion  Indications: hemodynamic monitoring  Orientation:  LeftLocation: radial artery    Sedation:  Patient sedated: yes  Analgesia: see MAR for details  Vitals: Vital signs were monitored during sedation    Almas's test normal: yes  Needle gauge: 20  Number of attempts: 2  Post-procedure: dressing applied  Post-procedure CNS: normal  Patient tolerance: Patient tolerated the procedure well with no immediate complications

## 2018-04-17 NOTE — OCCUPATIONAL THERAPY NOTE
Occupational Therapy Cancellation     Orders received  Chart reviewed  Pt admit w/ SAH and recently returned from IR  Spoke to [employer] [employer]  Plan to hold on therapy services this date  Will continue to follow pt on caseload in order to initiate formal OT evaluation at a later date       Britney Barbosa MS, OTR/L

## 2018-04-18 ENCOUNTER — APPOINTMENT (INPATIENT)
Dept: RADIOLOGY | Facility: HOSPITAL | Age: 58
DRG: 021 | End: 2018-04-18
Payer: COMMERCIAL

## 2018-04-18 ENCOUNTER — APPOINTMENT (INPATIENT)
Dept: NON INVASIVE DIAGNOSTICS | Facility: HOSPITAL | Age: 58
DRG: 021 | End: 2018-04-18
Payer: COMMERCIAL

## 2018-04-18 PROBLEM — G91.9 ACQUIRED HYDROCEPHALUS (HCC): Status: ACTIVE | Noted: 2018-04-18

## 2018-04-18 LAB
ANION GAP SERPL CALCULATED.3IONS-SCNC: 5 MMOL/L (ref 4–13)
BUN SERPL-MCNC: 9 MG/DL (ref 5–25)
CALCIUM SERPL-MCNC: 7.9 MG/DL (ref 8.3–10.1)
CHLORIDE SERPL-SCNC: 106 MMOL/L (ref 100–108)
CO2 SERPL-SCNC: 26 MMOL/L (ref 21–32)
CREAT SERPL-MCNC: 0.51 MG/DL (ref 0.6–1.3)
ERYTHROCYTE [DISTWIDTH] IN BLOOD BY AUTOMATED COUNT: 14 % (ref 11.6–15.1)
GFR SERPL CREATININE-BSD FRML MDRD: 107 ML/MIN/1.73SQ M
GLUCOSE SERPL-MCNC: 99 MG/DL (ref 65–140)
HCT VFR BLD AUTO: 34.7 % (ref 34.8–46.1)
HGB BLD-MCNC: 10.9 G/DL (ref 11.5–15.4)
MAGNESIUM SERPL-MCNC: 2.2 MG/DL (ref 1.6–2.6)
MCH RBC QN AUTO: 30.1 PG (ref 26.8–34.3)
MCHC RBC AUTO-ENTMCNC: 31.4 G/DL (ref 31.4–37.4)
MCV RBC AUTO: 96 FL (ref 82–98)
OSMOLALITY UR/SERPL-RTO: 295 MMOL/KG (ref 282–298)
OSMOLALITY UR: 88 MMOL/KG
PHOSPHATE SERPL-MCNC: 2.8 MG/DL (ref 2.7–4.5)
PLATELET # BLD AUTO: 423 THOUSANDS/UL (ref 149–390)
PMV BLD AUTO: 9.8 FL (ref 8.9–12.7)
POTASSIUM SERPL-SCNC: 4.1 MMOL/L (ref 3.5–5.3)
RBC # BLD AUTO: 3.62 MILLION/UL (ref 3.81–5.12)
SODIUM 24H UR-SCNC: 24 MOL/L
SODIUM SERPL-SCNC: 137 MMOL/L (ref 136–145)
URATE SERPL-MCNC: 3.4 MG/DL (ref 2–6.8)
WBC # BLD AUTO: 11.66 THOUSAND/UL (ref 4.31–10.16)

## 2018-04-18 PROCEDURE — 83930 ASSAY OF BLOOD OSMOLALITY: CPT | Performed by: PHYSICIAN ASSISTANT

## 2018-04-18 PROCEDURE — 72141 MRI NECK SPINE W/O DYE: CPT

## 2018-04-18 PROCEDURE — 70450 CT HEAD/BRAIN W/O DYE: CPT

## 2018-04-18 PROCEDURE — G8978 MOBILITY CURRENT STATUS: HCPCS | Performed by: PHYSICAL THERAPIST

## 2018-04-18 PROCEDURE — 93306 TTE W/DOPPLER COMPLETE: CPT

## 2018-04-18 PROCEDURE — 84550 ASSAY OF BLOOD/URIC ACID: CPT | Performed by: PHYSICIAN ASSISTANT

## 2018-04-18 PROCEDURE — 93306 TTE W/DOPPLER COMPLETE: CPT | Performed by: INTERNAL MEDICINE

## 2018-04-18 PROCEDURE — 84100 ASSAY OF PHOSPHORUS: CPT | Performed by: PHYSICIAN ASSISTANT

## 2018-04-18 PROCEDURE — 93886 INTRACRANIAL COMPLETE STUDY: CPT

## 2018-04-18 PROCEDURE — 80048 BASIC METABOLIC PNL TOTAL CA: CPT | Performed by: PHYSICIAN ASSISTANT

## 2018-04-18 PROCEDURE — 70551 MRI BRAIN STEM W/O DYE: CPT

## 2018-04-18 PROCEDURE — G8988 SELF CARE GOAL STATUS: HCPCS

## 2018-04-18 PROCEDURE — 00160ZB BYPASS CEREBRAL VENTRICLE TO CEREBRAL CISTERNS, OPEN APPROACH: ICD-10-PCS | Performed by: NEUROLOGICAL SURGERY

## 2018-04-18 PROCEDURE — 99233 SBSQ HOSP IP/OBS HIGH 50: CPT | Performed by: EMERGENCY MEDICINE

## 2018-04-18 PROCEDURE — 97167 OT EVAL HIGH COMPLEX 60 MIN: CPT

## 2018-04-18 PROCEDURE — 84300 ASSAY OF URINE SODIUM: CPT | Performed by: PHYSICIAN ASSISTANT

## 2018-04-18 PROCEDURE — 99233 SBSQ HOSP IP/OBS HIGH 50: CPT | Performed by: NEUROLOGICAL SURGERY

## 2018-04-18 PROCEDURE — 83735 ASSAY OF MAGNESIUM: CPT | Performed by: PHYSICIAN ASSISTANT

## 2018-04-18 PROCEDURE — 83935 ASSAY OF URINE OSMOLALITY: CPT | Performed by: PHYSICIAN ASSISTANT

## 2018-04-18 PROCEDURE — 85027 COMPLETE CBC AUTOMATED: CPT | Performed by: PHYSICIAN ASSISTANT

## 2018-04-18 PROCEDURE — 99254 IP/OBS CNSLTJ NEW/EST MOD 60: CPT | Performed by: PSYCHIATRY & NEUROLOGY

## 2018-04-18 PROCEDURE — G8979 MOBILITY GOAL STATUS: HCPCS | Performed by: PHYSICAL THERAPIST

## 2018-04-18 PROCEDURE — G8987 SELF CARE CURRENT STATUS: HCPCS

## 2018-04-18 PROCEDURE — 93886 INTRACRANIAL COMPLETE STUDY: CPT | Performed by: SURGERY

## 2018-04-18 PROCEDURE — 97163 PT EVAL HIGH COMPLEX 45 MIN: CPT | Performed by: PHYSICAL THERAPIST

## 2018-04-18 RX ORDER — HEPARIN SODIUM 5000 [USP'U]/ML
5000 INJECTION, SOLUTION INTRAVENOUS; SUBCUTANEOUS EVERY 8 HOURS SCHEDULED
Status: DISCONTINUED | OUTPATIENT
Start: 2018-04-18 | End: 2018-05-02 | Stop reason: HOSPADM

## 2018-04-18 RX ORDER — LORAZEPAM 2 MG/ML
0.5 INJECTION INTRAMUSCULAR ONCE
Status: COMPLETED | OUTPATIENT
Start: 2018-04-18 | End: 2018-04-18

## 2018-04-18 RX ORDER — SODIUM CHLORIDE 9 MG/ML
75 INJECTION, SOLUTION INTRAVENOUS CONTINUOUS
Status: DISCONTINUED | OUTPATIENT
Start: 2018-04-18 | End: 2018-04-20

## 2018-04-18 RX ORDER — LEVETIRACETAM 750 MG/1
750 TABLET ORAL EVERY 12 HOURS SCHEDULED
Status: COMPLETED | OUTPATIENT
Start: 2018-04-18 | End: 2018-04-23

## 2018-04-18 RX ORDER — FLUTICASONE FUROATE AND VILANTEROL 200; 25 UG/1; UG/1
1 POWDER RESPIRATORY (INHALATION) DAILY
Status: DISCONTINUED | OUTPATIENT
Start: 2018-04-19 | End: 2018-05-02 | Stop reason: HOSPADM

## 2018-04-18 RX ADMIN — SODIUM CHLORIDE 100 ML/HR: 0.9 INJECTION, SOLUTION INTRAVENOUS at 09:33

## 2018-04-18 RX ADMIN — NIMODIPINE 60 MG: 30 CAPSULE ORAL at 07:55

## 2018-04-18 RX ADMIN — ACETAMINOPHEN 650 MG: 325 TABLET, FILM COATED ORAL at 11:01

## 2018-04-18 RX ADMIN — NIMODIPINE 60 MG: 30 CAPSULE ORAL at 04:44

## 2018-04-18 RX ADMIN — LORAZEPAM 0.5 MG: 2 INJECTION INTRAMUSCULAR; INTRAVENOUS at 21:40

## 2018-04-18 RX ADMIN — MONTELUKAST SODIUM 10 MG: 10 TABLET, FILM COATED ORAL at 09:26

## 2018-04-18 RX ADMIN — DOCUSATE SODIUM 100 MG: 100 CAPSULE, LIQUID FILLED ORAL at 09:25

## 2018-04-18 RX ADMIN — NIMODIPINE 60 MG: 30 CAPSULE ORAL at 19:23

## 2018-04-18 RX ADMIN — HYDROMORPHONE HYDROCHLORIDE 0.5 MG: 1 INJECTION, SOLUTION INTRAMUSCULAR; INTRAVENOUS; SUBCUTANEOUS at 04:44

## 2018-04-18 RX ADMIN — DOCUSATE SODIUM 100 MG: 100 CAPSULE, LIQUID FILLED ORAL at 18:48

## 2018-04-18 RX ADMIN — ACETAMINOPHEN 650 MG: 325 TABLET, FILM COATED ORAL at 18:48

## 2018-04-18 RX ADMIN — OXYCODONE HYDROCHLORIDE 5 MG: 5 TABLET ORAL at 20:22

## 2018-04-18 RX ADMIN — NIMODIPINE 60 MG: 30 CAPSULE ORAL at 12:21

## 2018-04-18 RX ADMIN — LEVETIRACETAM 750 MG: 750 TABLET ORAL at 09:24

## 2018-04-18 RX ADMIN — PRAVASTATIN SODIUM 40 MG: 40 TABLET ORAL at 16:54

## 2018-04-18 RX ADMIN — SODIUM CHLORIDE 1000 ML: 0.9 INJECTION, SOLUTION INTRAVENOUS at 16:56

## 2018-04-18 RX ADMIN — HYDROMORPHONE HYDROCHLORIDE 0.5 MG: 1 INJECTION, SOLUTION INTRAMUSCULAR; INTRAVENOUS; SUBCUTANEOUS at 19:24

## 2018-04-18 RX ADMIN — LEVETIRACETAM 750 MG: 750 TABLET ORAL at 20:32

## 2018-04-18 RX ADMIN — FLUTICASONE PROPIONATE AND SALMETEROL 1 PUFF: 50; 250 POWDER RESPIRATORY (INHALATION) at 09:24

## 2018-04-18 RX ADMIN — NIMODIPINE 60 MG: 30 CAPSULE ORAL at 16:54

## 2018-04-18 RX ADMIN — OXYCODONE HYDROCHLORIDE 5 MG: 5 TABLET ORAL at 10:55

## 2018-04-18 RX ADMIN — ACETAMINOPHEN 650 MG: 325 TABLET, FILM COATED ORAL at 06:09

## 2018-04-18 RX ADMIN — HYDROMORPHONE HYDROCHLORIDE 0.5 MG: 1 INJECTION, SOLUTION INTRAMUSCULAR; INTRAVENOUS; SUBCUTANEOUS at 21:39

## 2018-04-18 RX ADMIN — SODIUM CHLORIDE, SODIUM GLUCONATE, SODIUM ACETATE, POTASSIUM CHLORIDE, MAGNESIUM CHLORIDE, SODIUM PHOSPHATE, DIBASIC, AND POTASSIUM PHOSPHATE 100 ML/HR: .53; .5; .37; .037; .03; .012; .00082 INJECTION, SOLUTION INTRAVENOUS at 02:37

## 2018-04-18 RX ADMIN — HYDROMORPHONE HYDROCHLORIDE 0.5 MG: 1 INJECTION, SOLUTION INTRAMUSCULAR; INTRAVENOUS; SUBCUTANEOUS at 02:17

## 2018-04-18 NOTE — OP NOTE
PreOp Diagnosis:   Acute hydrocephalus secondary to aneurysmal subarachnoid hemorrhage    PostOp Diagnosis:  Acute hydrocephalus secondary to aneurysmal subarachnoid hemorrhage    Procedure:  Right  frontal ventriculostomy    Anesthesia:  Local    Surgeon:  Sawyer Laguerre MD        Indications:  Saintclair Cooter is a 63 y o  female who presents as a Raines Thomson 3 Velaz[address] 3 subarachnoid hemorrhage  CT was revealing for diffuse SAH, brain edema and hydrocephalus  As such, we discussed the risks and benefits of a right frontal ventriculostomy followed by diagnostic cerebral arteriogram and aneurysm coiling versus clipping  The fiance understood the risks and the benefits including bleeding infection stroke paralysis seizure and death  Operative details: The patient was given 2 g of IV cefazolin  The head was clipped in the usual fashion  Diania Lade point was marked  The head was then prepped and draped in the usual sterile fashion  A surgical time-out was performed  A 15 blade was then used to make a 2 cm linear incision  Self-retaining retractor was then placed  The periosteum was then elevated  A hand drill was then used to make a single baylee hole  The dura was opened sharply with a ventriculostomy trocar  Next a ventriculostomy was placed into the right frontal horn on the 1st attempt  Brisk egress of bloody CSF was and countered  The opening pressure was >20 cm water  The catheter was then tunneled posterior laterally and secured in place  The incision was closed using 3 0 nylons  The drain was secured in place with a tension loop

## 2018-04-18 NOTE — OCCUPATIONAL THERAPY NOTE
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    Nondependent alcohol abuse    Nondependent cocaine abuse    Renal colic    Severe episode of recurrent major depressive disorder (HCC)    Disorder of menstrual bleeding    Anxiety state    Bladder spasm    Hypoalbuminemia    Hypokalemia     Past Medical History  Past Medical History:   Diagnosis Date    Asthma      Past Surgical History  Past Surgical History:   Procedure Laterality Date     SECTION      SPLENECTOMY                   18 1145   Note Type   Note type Eval/Treat   Restrictions/Precautions   Weight Bearing Precautions Per Order No   Other Precautions Fall Risk; Chair Alarm;Multiple lines;Telemetry;O2;Cognitive   Pain Assessment   Pain Assessment 0-10   Pain Score Worst Possible Pain   Pain Location Head;Neck   Pain Orientation Bilateral   Hospital Pain Intervention(s) Repositioned   Response to Interventions TOLERATED   Home Living   Type of Home Apartment   Home Layout One level  (0 ANG)   Bathroom Accessibility Accessible   Home Equipment (NO DME USE AT BASELINE)   Additional Comments AT END OF EVALUATION PT STATES THAT SHE WILL BE EVICTED FROM HER APARTMENT AT THE END OF THE MONTH AND PLANS TO MOVE IN WITH HER SISTER  Prior Function   Level of [place] Independent with ADLs and functional mobility   Lives With Daughter  (PT REPORTS DAUGHTER IS "SPECIAL NEEDS")   Receives Help From Family   ADL Assistance Independent   IADLs Independent   Falls in the last 6 months 0   Vocational Unemployed   Lifestyle   Autonomy PT REPORTS BASELINE INDEPENDENCE IN ADLS/IADLS/DRIVING   NO DME USE AT BASELINE AND NO RECENT H/O FALLS   Reciprocal Relationships PT RESIDES WITH DAUGHTER WHO HAS "SPECIAL NEEDS" PER PT  PT STATES HER DAUGHTER HAS MILD PHYSICAL AND MENTAL DISABILITIES  Service to Others PT IS CURRENTLY UNEMPLOYED  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  Intrinsic Gratification PT ENJOYS SPENDING TIME W/ HER CAT AND DOING HOUSEWORK  Psychosocial   Psychosocial (WDL) X   Patient Behaviors/Mood Flat affect   Subjective   Subjective "I FEEL DIZZY"   ADL   Where Assessed Chair   Eating Assistance 5  Supervision/Setup   Grooming Assistance 4  Minimal Assistance   UB Bathing Assistance 3  Moderate Assistance   LB Bathing Assistance 2  Maximal Assistance   UB Dressing Assistance 3  Moderate Assistance   LB Dressing Assistance 2  Maximal [address]  3  Moderate Assistance   Bed Mobility   Supine to Sit 4  Minimal assistance   Additional items Assist x 1; Increased time required;Verbal cues;LE management   Transfers   Sit to Stand 4  Minimal assistance   Additional items Assist x 2; Increased time required;Verbal cues; Impulsive   Stand to Sit 4  Minimal assistance   Additional items Assist x 2; Increased time required;Verbal cues; Impulsive   Functional Mobility   Functional Mobility 4  Minimal assistance   Additional Comments ASSIST X2 USING HHA FROM THERAPIST  Additional items Hand hold assistance   Balance   Static Sitting Fair   Dynamic Sitting Fair -   Static Standing Fair -   Dynamic Standing Poor +   Ambulatory Poor +   Activity Tolerance   Activity Tolerance Treatment limited secondary to medical complications (Comment); Patient limited by pain   Nurse Made Aware OKAY TO SEE PER RN JESSICA, DRAIN CLAMPED PRIOR TO EVALUATION  F/U W/ RN POST EVAL RE: PT'S PAIN      RUE Assessment   RUE Assessment WFL   LUE Assessment   LUE Assessment WFL   Hand Function   Gross Motor Coordination Functional   Fine Motor Coordination Functional   Cognition   Overall Cognitive Status Impaired Arousal/Participation Responsive   Attention Attends with cues to redirect   Orientation Level Oriented X4  (GROSSLY ORIENTED TO SITUATION)   Memory Decreased recall of precautions;Decreased recall of recent events   Following Commands Follows multistep commands with increased time or repetition   Comments PT ENGAGES IN CONVERSATION HOWEVER REQUIRES INCREASED TIME FOR PROCESSING AND NOTED W/ IMPAIRED SAFETY, JUDGEMENT AND INSIGHT  WILL REQUIRE CONTINUED ASSESSMENT  Assessment   Limitation Decreased ADL status; Decreased Safe judgement during ADL;Decreased cognition;Decreased endurance;Decreased self-care trans;Decreased high-level ADLs  (BALANCE, MEDICAL STATUS)   Prognosis Fair   Assessment PT IS A 64 YO F ADMIT AS TRANSFER [facility]  PT PRESENTED W/ SEVERE HEADACHE, NECK PAIN AND PHOTOPHOBIA  SLIGHT FACIAL DROOP NOTED  CT SCAN REVEALED LARGE AMOUNT FO ACUTE SAH INVOLVING SUPRASELLAR CISTERN, SYLVIAN FISSURES, BASAL CISTERNS AND IN ANTERIOR FALX W/ A RUPTURED ANEURYSM SUSPECTED  PT IS NOW S/P R FRONTAL VENTRICULOSTOMY AND IR ANGIOGRAPHY  PT W/ PMH SIGNIFICANT FOR MIGRAINES, HTN, ASTHMA, TOBACCO ABUSE, SI, DEPRESSION  AT BASELINE, PT RESIDES WITH DAUGHTER WHO PT REPORTS HAS "SPECIAL NEEDS" (MINOR PHYSICAL/MENTAL)  PT REPORTS BASELINE INDEPENDENCE IN ADLS/IADLS/DRIVING  NO DME USE AT BASELINE AND NO RECENT H/O FALLS  CURRENTLY PT REQUIRING MOD A UB ADLS, MOD/MAX A LB ADLS, MIN A BED MOBILITY, MIN A X2 SIT>STAND TRANSFERS/SHORT DISTANCE AMBULATION USING HHA  PT DOES APPEAR LIMITED AT THIS TIME 2* IMPAIRED BALANCE, ACTIVITY TOLERANCE, MEDICAL STATUS, ADL IMPAIRMENTS, PAIN AS WELL AS IMPAIRED ATTENTION, INSIGHT, SAFETY, JUDGEMENT AND RECALL  FROM OT PERSPECTIVE, PT WILL BENEFIT FROM CONTINUED OT SERVICES IN AN INPT REHAB SETTING PENDING FURTHER PROGRESS AND MEDICAL STABILITY  WILL CONTINUE TO FOLLOW PT 3-5X/WEEK IN ORDER TO MEET THE BELOW DESCRIBED GOALS IN 10-14 DAYS      Goals   Patient Goals PT WOULD LIKE TO HAVE LESS PAIN AND GET BACK TO BED   LTG Time Frame 10-14   Long Term Goal #1 PLEASE SEE BELOW DESCRIBED GOALS  Plan   Treatment Interventions ADL retraining;Functional transfer training; Endurance training;Cognitive reorientation;Patient/family training;Equipment evaluation/education; Compensatory technique education;Continued evaluation; Energy conservation; Activityengagement   Goal Expiration Date 05/02/18   OT Frequency 3-5x/wk   Recommendation   OT Discharge Recommendation Short Term Rehab   OT - OK to Discharge (TO STR AT THIS TIME)   Barthel Index   Feeding 5   Bathing 0   Grooming Score 0   Dressing Score 5   Bladder Score 0   Bowels Score 10   Toilet Use Score 0   Transfers (Bed/Chair) Score 5   Mobility (Level Surface) Score 0   Stairs Score 0   Barthel Index Score 25   Modified L[place] Scale   Modified Lown[place] Scale 4     GOALS TO BE MET IN 10-14 DAYS:    1) Pt will increase bed mobility to MOD I and transfer EOB to participate in functional activity with G tolerance and balance  2) Pt will improve functional transfers to MOD I on/off all surfaces using DME PRN w/ G balance/safety including toileting  3) Pt will increase independence in all ADLS to MOD I with G balance sitting upright in chair  4) Pt will complete toileting w/ MOD I w/ G hygiene/thoroughness using DME PRN  5) Pt will improve activity tolerance to G for min 30 min txment sessions  6) Pt will participate in light grooming task with MOD I using setup standing at sink ~3-5mins with G safety and balance  7) Pt will engage in ongoing cognitive assessment(S) w/ G participation to A w/ safe d/c planning/recommendations      8) Pt will follow 100% simple 2 step commands and be A&O x4 consistently with environmental cues to increase activity participation to G     9) Pt will be monitored and screened for signs/symptoms of depression through screening tools and discuss positive coping mechanisms and leisure interests to increase positive affect and promote overall well being  10) Pt will ID 2-3 leisure interests to participate while hospitalized to increase overall positive affect, activity tolerance, and increase positive coping      DOCUMENTATION COMPLETED BY Jennifer Ayers MS, OTR/L

## 2018-04-18 NOTE — PHYSICAL THERAPY NOTE
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    Nondependent alcohol abuse    Nondependent cocaine abuse    Renal colic    Severe episode of recurrent major depressive disorder ([facility])    Disorder of menstrual bleeding    Anxiety state    Bladder spasm    Hypoalbuminemia    Hypokalemia       Past Medical History:   Diagnosis Date    Asthma        Past Surgical History:   Procedure Laterality Date     SECTION      SPLENECTOMY        18 1140   Note Type   Note type Eval only   Pain Assessment   Pain Assessment 0-10   Pain Score Worst Possible Pain   Pain Location Head;Neck   Pain Orientation Bilateral   Hospital Pain Intervention(s) Repositioned; Ambulation/increased activity; Emotional support  (notified nursing)   Home Living   Type of Home Apartment  (mopving out and will be moving in with sister)   Prior Function   Level of [place] Independent with ADLs and functional mobility   Lives With Daughter   Receives Help From Family   ADL Assistance Independent   IADLs Independent   Falls in the last 6 months 0   Comments pt livew wiht special needs daughter, who cane care for self for limited time  Restrictions/Precautions   Other Precautions Chair Alarm;Multiple lines;Telemetry; Fall Risk;O2;Pain;Cognitive   General   Family/Caregiver Present No   Cognition   Overall Cognitive Status Impaired   Orientation Level Oriented X4   RUE Assessment   RUE Assessment WFL   LUE Assessment   LUE Assessment WFL   RLE Assessment   RLE Assessment X  (strength 4+/5)   LLE Assessment   LLE Assessment X  (strength 4+/5)   Coordination   Movements are Fluid and Coordinated 1   Sensation WFL   Bed Mobility   Rolling R 4  Minimal assistance   Additional items Assist x 1; Increased time required;Verbal cues;LE management   Supine to Sit 4  Minimal assistance   Additional items Assist x 1; Increased time required;Verbal cues;LE management   Transfers   Sit to Stand 4  Minimal assistance   Additional items Assist x 2; Increased time required; Impulsive;Verbal cues   Stand to Sit 4  Minimal assistance   Additional items Assist x 2; Increased time required;Verbal cues   Ambulation/Elevation   Gait pattern Short stride;Narrow LADAN   Gait Assistance 4  Minimal assist   Additional items Assist x 2;Verbal cues; Tactile cues   Assistive Device Other (Comment)  (arm in arm 2 persons, numerous lines)   Distance 4'   Balance   Static Sitting Good   Dynamic Sitting Fair -   Static Standing Fair -   Dynamic Standing Poor +   Ambulatory Poor +   Endurance Deficit   Endurance Deficit Yes   Endurance Deficit Description severe headache when standing   Activity Tolerance   Activity Tolerance Treatment limited secondary to medical complications (Comment); Patient limited by pain   Nurse Made Aware yes- kari, notified of increased pain wiht standing   Assessment   Prognosis Good   Problem List Decreased strength;Decreased endurance; Impaired balance;Decreased mobility; Impaired judgement;Decreased safety awareness;Pain   Assessment pt admitted with severe headache and neck pain =that started suddenly when smoking   pt dx with sah, EVD placed and pt refered to PT  pt was indep PTA, no assistive device and taking care of special needs daughter, pt will be moving to sister's home  pt demonstrated moderate to severe functional limitations due to recent illness and deficits in strength, balance, gait sequencing and stability, pain control and neurological status   pt will need skilled PT but anticipate that she will ultimately be able to return home, pt was able to come to sitting on edge of bed with min assist  reported being slightly light headed, pt then transfered to chair with min assist of 2  pt reported increased head pain and nursing notified  RN came in to open drain once re-set  pt made cmnfortable  Barriers to Discharge Decreased caregiver support   Goals   Patient Goals less pain   STG Expiration Date 05/02/18   Short Term Goal #1 bed mobility with supervision  transfers with min assist of 1  amb using least restrictive device for > 200'  stairs with railing and supervision  improve strength and balance by 1/2 grade, improve activity tolerance to 45 minutes  Plan   Treatment/Interventions Functional transfer training;LE strengthening/ROM; Elevations; Therapeutic exercise; Endurance training;Patient/family training;Bed mobility; Equipment eval/education;Gait training;Spoke to nursing;Spoke to case management;OT   PT Frequency 5x/wk   Recommendation   Recommendation Defer at this time   PT - OK to Discharge No   Modified [place] Scale   Modified [place] Scale 4   Barthel Index   Feeding 10   Bathing 0   Grooming Score 0   Dressing Score 5   Bladder Score 0   Bowels Score 10   Toilet Use Score 5   Transfers (Bed/Chair) Score 5   Mobility (Level Surface) Score 0   Stairs Score 0   Barthel Index Score 35   History: co - morbidities, fall risk,  assist for adl's, cognition, multiple lines  Exam: impairments in locomotion, musculoskeletal, balance,neurologic, pain control;, cognition   Clinical: unstable/unpredictable  Complexity:high        Lizandro Patrick, PT

## 2018-04-18 NOTE — PROGRESS NOTES
04/18/18 1400   Clinical Encounter Type   Visited With Patient and family together   Routine Visit Introduction   Family Spiritual Encounters   Family Coping Open/discussion   Family Participation in Care 5   Family Support During Treatment 5   Caregiver-Patient Relationship 5

## 2018-04-18 NOTE — PROGRESS NOTES
Progress Note - Neurosurgery   Leida Hernández 63 y o  female MRN: [id number]  Unit/Bed#: ICU 05 Encounter: [id number]    Assessment:  1  Subarachnoid hemorrhage involving the suprasellar cistern, sylvian fissures, basal cisterns and in the anterior falx  Bleed day #2  2  HH3, [place] 3  3  Acute hydrocephalus   4  History of asthma    Plan:  · Exam: GCS 15  AAOx3  BARRERA wo focal weakness  No PD, marrufo or clonus  Minimal left facial droop  3/3 immediate and delayed object recall intact  Imaging: personally reviewed and reviewed by attending:  · 4/16 - CT head wo: large amount of acute subarachnoid hemorrhage involving the suprasellar cistern, sylvian fissures, basal cisterns and in anterior falx  · 4/16 - CTA head w/wo: no focal stenosis or saccular aneurysm within the [place] of [place]  · 4/17 - IR cerebral angio:    · CT head this morning for evaluation of EVD track  · MRI head and neck, likely will be completed later tonight  · STAT CT head without contrast if decline in GCS >2 pts/ 1 hr   Subarachnoid Management:  · Continue Nimodipine 60mg q4h and TCDs   · TCDs on 4/18: right 1 6; left 1 9  · Plan to repeat angio next week   · Continue Keppra 750mg x 1 week total (day 2/7)  · Nam 137, goal 140-150  · SBP goal <160  Drain management:  · EVD at 10 mmHg above tragus with 254 cc output in 24 hr  · Call if output is greater than 20cc in 1 hour  · ICPs ranging 2-16, average 11+  Call if ICPs >20 sustained without provoking cause  Medical management:   · PT/OT: may mobilize from a neurosurgical standpoint  · DVT ppx: SCDs, pending stability CTH   · Pain control: schedule tylenol 650mg q6h, with as needed oxycodone 5mg q6h prn moderate pain, dilaudid 0 5 mg q2h prn severe pain   · Requiring two dose of dilaudid today so far  · WBC 11 66, Tmax of 99   Currently 98 4  · Hbg 10 9 continue to trend, if <8 transfuse   Neurosurgery will continue to closely monitor, call for questions or concerns/change in examination  Subjective/Objective   Chief Complaint: "I have a headache"    Subjective: patient states she is having a bifrontal headache, that is constant and described as "just there", typically rated 6 out of 10  She states the pain radiates behind her eyes  She admits to worsening of pain with coughing, movement  She has associated neck stiffness, dizziness, photophobia and phonophobia  She also admits to having a burning sensation in her abdomen after eating breakfast this morning  She denies chest pain, SOB, abdominal pain/N/V, weakness  Objective: laying in bed, NAD  I/O       04/16 0701 - 04/17 0700 04/17 0701 - 04/18 0700 04/18 0701 - 04/19 0700    I V  (mL/kg) 630 (6 9) 2779 7 (30 6)     IV Piggyback 600 300     Total Intake(mL/kg) 1230 (13 6) 3079 7 (34)     Urine (mL/kg/hr) 400 1910 (0 9) 50 (0 3)    Emesis/NG output  100 (0)     Drains  254 (0 1)     Total Output 400 2264 50    Net +830 +815 7 -50                 Invasive Devices     Central Venous Catheter Line            CVC Central Lines 04/17/18 Triple 16cm 1 day          Peripheral Intravenous Line            Peripheral IV 04/16/18 Right Antecubital 1 day          Arterial Line            Arterial Line 04/17/18 Left Radial less than 1 day          Drain            Urethral Catheter Temperature probe;Non-latex 16 Fr  less than 1 day    Ventriculostomy/Subdural Ventricular drainage catheter Right Parietal region less than 1 day                Physical Exam:  Vitals: Blood pressure 125/78, pulse 60, temperature 98 4 °F (36 9 °C), temperature source Oral, resp  rate 18, height 5' 3" (1 6 m), weight 90 7 kg (199 lb 15 3 oz), SpO2 98 %  ,Body mass index is 35 42 kg/m²      Hemodynamic Monitoring: MAP: Arterial Line MAP (mmHg): 84 mmHg, CPP: CPP: 75, ICP Mean: ICP Mean (mmHg): 11 mmHg    General appearance: alert, appears stated age, cooperative and no distress  Head: Normocephalic, without obvious abnormality, atraumatic  Eyes: EOMI, PERRL, visual fields intact  Lungs: non labored breathing  Heart: regular heart rate  Extremities: right groin dressing is dry, clear and intact  Catheter insertion site is without erythema or edema, mild tenderness on palpation  Distal pedal pulses 1+ bilaterally   Neurologic:   Mental status: Alert, oriented x3, thought content appropriate  Speech is fluent, clear  Able to repeat a sentence, briskly answers questions  3/3 immediate and delayed object recall intact  Able to add simple math  Cranial nerves: grossly intact (Cranial nerves II-XII)  Appreciated minimal left facial droop  Tongue is midline  Sensory: normal to LT in bilateral upper and lower extremities  DSS intact  Motor: moving all extremities without focal weakness, strength 5/5 throughout  Reflexes: 2+ and symmetric  negative marrufo, negative clonus  Coordination: finger to nose normal bilaterally, no drift bilaterally   3/3 JPS intact      Lab Results:    Results from last 7 days  Lab Units 04/18/18 0437 04/17/18 0430 04/16/18  2100 04/16/18  2055   WBC Thousand/uL 11 66* 10 37*  --  11 18*   HEMOGLOBIN g/dL 10 9* 11 1*  --  13 4   I STAT HEMOGLOBIN g/dl  --   --  14 6  --    HEMATOCRIT % 34 7* 35 3  --  41 3   PLATELETS Thousands/uL 423* 430*  --  484*   NEUTROS PCT %  --  62  --  81*   MONOS PCT %  --  9  --  7       Results from last 7 days  Lab Units 04/18/18 0437 04/17/18 0430 04/16/18  2342   SODIUM mmol/L 137 141 134*   POTASSIUM mmol/L 4 1 3 7 4 4   CHLORIDE mmol/L 106 110* 111*   CO2 mmol/L 26 26 21   BUN mg/dL 9 14 14   CREATININE mg/dL 0 51* 0 58* 0 72   CALCIUM mg/dL 7 9* 8 0* 8 1*   TOTAL PROTEIN g/dL  --  5 6* 7 0   BILIRUBIN TOTAL mg/dL  --  0 31 0 35   ALK PHOS U/L  --  51 62   ALT U/L  --  13 15   AST U/L  --  12 22   GLUCOSE RANDOM mg/dL 99 94 96       Results from last 7 days  Lab Units 04/18/18 0437 04/17/18  0430 04/16/18  2342   MAGNESIUM mg/dL 2 2 2 2 2 1       Results from last 7 days  Lab Units 04/18/18 0437 04/17/18  0430 04/16/18  2342   PHOSPHORUS mg/dL 2 8 3 3 3 2       Results from last 7 days  Lab Units 04/16/18  2055   INR  0 93   PTT seconds 28     No results found for: TROPONINT  ABG:No results found for: PHART, PLS5VSH, PO2ART, BFG7IEB, G2CFXYBH, BEART, SOURCE    Imaging Studies: I have personally reviewed pertinent reports  and I have personally reviewed pertinent films in PACS    VAS transcranial doppler, complete study   Final Result      XR chest 1 view portable   Final Result      Right IJ line placement, appears to be in satisfactory position, tip is at the cavoatrial junction  No pneumothorax  Low lung volumes  Workstation performed: [id number]         IR cerebral angiography / intervention    (Results Pending)   VAS transcranial doppler, complete study    (Results Pending)   MRI inpatient order    (Results Pending)   VAS transcranial doppler, complete study    (Results Pending)   CT head wo contrast    (Results Pending)     EKG, Pathology, and Other Studies: I have personally reviewed pertinent reports        VTE  Prophylaxis: Sequential compression device (Venodyne)

## 2018-04-18 NOTE — PLAN OF CARE
Problem: OCCUPATIONAL THERAPY ADULT  Goal: Performs self-care activities at highest level of function for planned discharge setting  See evaluation for individualized goals  Treatment Interventions: ADL retraining, Functional transfer training, Endurance training, Cognitive reorientation, Patient/family training, Equipment evaluation/education, Compensatory technique education, Continued evaluation, Energy conservation, Activityengagement          See flowsheet documentation for full assessment, interventions and recommendations  Limitation: Decreased ADL status, Decreased Safe judgement during ADL, Decreased cognition, Decreased endurance, Decreased self-care trans, Decreased high-level ADLs (BALANCE, MEDICAL STATUS)  Prognosis: Fair  Assessment: PT IS A 62 YO F ADMIT AS TRANSFER FROM [facility]  PT PRESENTED W/ SEVERE HEADACHE, NECK PAIN AND PHOTOPHOBIA  SLIGHT FACIAL DROOP NOTED  CT SCAN REVEALED LARGE AMOUNT FO ACUTE SAH INVOLVING SUPRASELLAR CISTERN, SYLVIAN FISSURES, BASAL CISTERNS AND IN ANTERIOR FALX W/ A RUPTURED ANEURYSM SUSPECTED  PT IS NOW S/P R FRONTAL VENTRICULOSTOMY AND IR ANGIOGRAPHY  PT W/ PMH SIGNIFICANT FOR MIGRAINES, HTN, ASTHMA, TOBACCO ABUSE, SI, DEPRESSION  AT BASELINE, PT RESIDES WITH DAUGHTER WHO PT REPORTS HAS "SPECIAL NEEDS" (MINOR PHYSICAL/MENTAL)  PT REPORTS BASELINE INDEPENDENCE IN ADLS/IADLS/DRIVING  NO DME USE AT BASELINE AND NO RECENT H/O FALLS  CURRENTLY PT REQUIRING MOD A UB ADLS, MOD/MAX A LB ADLS, MIN A BED MOBILITY, MIN A X2 SIT>STAND TRANSFERS/SHORT DISTANCE AMBULATION USING HHA  PT DOES APPEAR LIMITED AT THIS TIME 2* IMPAIRED BALANCE, ACTIVITY TOLERANCE, MEDICAL STATUS, ADL IMPAIRMENTS, PAIN AS WELL AS IMPAIRED ATTENTION, INSIGHT, SAFETY, JUDGEMENT AND RECALL  FROM OT PERSPECTIVE, PT WILL BENEFIT FROM CONTINUED OT SERVICES IN AN INPT REHAB SETTING PENDING FURTHER PROGRESS AND MEDICAL STABILITY   WILL CONTINUE TO FOLLOW PT 3-5X/WEEK IN ORDER TO MEET THE BELOW DESCRIBED GOALS IN 10-14 DAYS        OT Discharge Recommendation: Short Term Rehab  OT - OK to Discharge:  (TO STR AT THIS TIME)

## 2018-04-18 NOTE — PLAN OF CARE
Problem: PHYSICAL THERAPY ADULT  Goal: Performs mobility at highest level of function for planned discharge setting  See evaluation for individualized goals  Treatment/Interventions: Functional transfer training, LE strengthening/ROM, Elevations, Therapeutic exercise, Endurance training, Patient/family training, Bed mobility, Equipment eval/education, Gait training, Spoke to nursing, Spoke to case management, OT          See flowsheet documentation for full assessment, interventions and recommendations  Prognosis: Good  Problem List: Decreased strength, Decreased endurance, Impaired balance, Decreased mobility, Impaired judgement, Decreased safety awareness, Pain  Assessment: pt admitted with severe headache and neck pain =that started suddenly when smoking   pt dx with sah, EVD placed and pt refered to PT  pt was indep PTA, no assistive device and taking care of special needs daughter, pt will be moving to sister's home  pt demonstrated moderate to severe functional limitations due to recent illness and deficits in strength, balance, gait sequencing and stability, pain control and neurological status  pt will need skilled PT but anticipate that she will ultimately be able to return home, pt was able to come to sitting on edge of bed with min assist  reported being slightly light headed, pt then transfered to chair with min assist of 2  pt reported increased head pain and nursing notified  RN came in to open drain once re-set  pt made cmnfortable  Barriers to Discharge: Decreased caregiver support     Recommendation: Defer at this time     PT - OK to Discharge: No    See flowsheet documentation for full assessment

## 2018-04-18 NOTE — CONSULTS
Consultation - [address] [facility] 63 y o  female MRN: [id number]  Unit/Bed#: ICU 05 Encounter: [id number]      Chief Complaint: "I am depressed  I am doing better "    History of Present Illness   Physician Requesting Consult: Trini Napoles, DO  Reason for Consult / Principal Problem: 1  Passive suicidal ideation    Charli Caballero is a 63 y o  female presents complaining of depression that has been present for a couple of years  On 4/16 patient presented to [facility]  complaining of headache  She was found to have a subarachnoid hemorrhage and transferred to [facility] for neurosurgical management  Today, patient is calm and cooperative  She reports having periods of increased energy, poor sleep, happiness, impulsive spending habits, and pressured speech with periods of low energy, depression, hypersomnia afterwards  Patient denies visual hallucination and auditory hallucination  She denies any active suicidal thoughts, plans, or intent  She states that she is feeling better  She states that she had been under lot of stress in the last few weeks but today's her mood is better  Psychiatric Review Of Systems:  sleep: yes, periods of hypersomnia and hyposomnia  appetite changes: yes  weight changes: no  energy/anergy: yes  interest/pleasure/anhedonia: yes  somatic symptoms: no  anxiety/panic: no  eboni: no  guilty/hopeless: no  self injurious behavior/risky behavior: no    Historical Information   Past Psychiatric History: In Patient at [facility] for depressive symptoms lasting 10 days a couple years ago  Currently in treatment with none  Past Suicide attempts: none  Past Violent behavior: none  Past Psychiatric medication trial: Wellbutrin, Citalopram, Xanax    Substance Abuse History:  Past history of crack cocaine, alcohol according to record   Patient denies any     I have assessed this patient for substance use within the past 12 months     History of IP/OP rehabilitation program: none  Smoking history: 1 ppd for the last 2 weeks, previously a couple cigarettes per day  Family Psychiatric History:   Brother  of overdose  Social History  Education: 11th grade  Learning Disabilities: none  Marital history:   Living arrangement, social support: The patient lives in home with daughter  Planning to move in with sister     Occupational History: unemployed  Functioning Relationships: good support system  Other Pertinent History: None    Traumatic History:   Abuse: none  Other Traumatic Events: none    Past Medical History:   Diagnosis Date    Asthma        Medical Review Of Systems:  Review of Systems - Negative except headache, abdominal pain, and depression  All other systems reviewed and were negative       Meds/Allergies   current meds:   Current Facility-Administered Medications   Medication Dose Route Frequency    acetaminophen (TYLENOL) tablet 650 mg  650 mg Oral Q6H [facility]    albuterol (PROVENTIL HFA,VENTOLIN HFA) inhaler 2 puff  2 puff Inhalation Q6H PRN    docusate sodium (COLACE) capsule 100 mg  100 mg Oral BID    fluticasone-salmeterol (ADVAIR) 250-50 mcg/dose inhaler 1 puff  1 puff Inhalation Daily    HYDROmorphone (DILAUDID) injection 0 5 mg  0 5 mg Intravenous Q2H PRN    labetalol (NORMODYNE) injection 10 mg  10 mg Intravenous Q4H PRN    levETIRAcetam (KEPPRA) tablet 750 mg  750 mg Oral Q12H [facility]    montelukast (SINGULAIR) tablet 10 mg  10 mg Oral Daily    niMODipine (NIMOTOP) capsule 60 mg  60 mg Oral Q4H [facility]    ondansetron (ZOFRAN) injection 4 mg  4 mg Intravenous Q6H PRN    oxyCODONE (ROXICODONE) IR tablet 5 mg  5 mg Oral Q6H PRN    pravastatin (PRAVACHOL) tablet 40 mg  40 mg Oral Daily With Dinner    sodium chloride 0 9 % infusion  100 mL/hr Intravenous Continuous     No Known Allergies    Objective   Vital signs in last 24 hours:  Temp:  [97 2 °F (36 2 °C)-99 °F (37 2 °C)] 98 5 °F (36 9 °C)  HR:  [56-78] 72  Resp:  [12-39] 39  Arterial Line BP: (104-146)/(48-80) 104/48      Intake/Output Summary (Last 24 hours) at 04/18/18 1158  Last data filed at 04/18/18 1101   Gross per 24 hour   Intake             2753 ml   Output             1981 ml   Net              772 ml       Mental Status Evaluation:  Appearance:  age appropriate and disheveled   Behavior:  cooperative   Speech:  soft   Mood:  depressed   Affect:  mood-congruent   Language: naming objects and repeating phrases   Thought Process:  goal directed   Associations: intact associations   Thought Content:  normal   Perceptual Disturbances: None   Risk Potential: She denies any suicidal or homicidal ideation plan or intent   Sensorium:  person, place and time/date   Memory:  recent and remote memory grossly intact   Cognition:  grossly intact   Consciousness:  alert and awake    Attention: attention span appeared shorter than expected for age   Intellect: within normal limits   Fund of Knowledge: awareness of current events: Fair   Insight:  fair   Judgment: fair   Muscle Strength and Tone: Within normal limits   Gait/Station: Unable to assess   Motor Activity: no abnormal movements     Lab Results:    Lab Results   Component Value Date    WBC 11 66 (H) 04/18/2018    HGB 10 9 (L) 04/18/2018    HCT 34 7 (L) 04/18/2018    MCV 96 04/18/2018     (H) 04/18/2018     Lab Results   Component Value Date     04/18/2018    K 4 1 04/18/2018     04/18/2018    CO2 26 04/18/2018    ANIONGAP 5 04/18/2018    BUN 9 04/18/2018    CREATININE 0 51 (L) 04/18/2018    GLUCOSE 99 04/18/2018    CALCIUM 7 9 (L) 04/18/2018    AST 12 04/17/2018    ALT 13 04/17/2018    ALKPHOS 51 04/17/2018    PROT 5 6 (L) 04/17/2018    BILITOT 0 31 04/17/2018    EGFR 107 04/18/2018         Code Status: )Level 1 - Full Code    Assessment/Plan     Assessment:  Hasmukh Reyes is a 63 y o  female admitted for neurosurgical management of subarachnoid hemorrhage  Patient has long standing history of depression  Consult was requested to evaluate depression  She states that she has lots of stressors in the last week  She losing her apartment and her boyfriend of 18 years wants to separate  She states that she feels better today  She denies any suicidal thoughts, plans or intent  Patient requests to go back to Wellbutrin but this is contraindicated in her condition because this decreases the seizure threshold  We discussed other SSRIs and she said she would think about it  Diagnosis: Major depressive disorder, severe recurrent without psychotic features F33 2  Plan:   Continue medical management  Patient will think about the possibility of starting SSRI  I will follow up  Discussed with the primary team  Risks, benefits and possible side effects of Medications:   Risks, benefits, and possible side effects of medications explained to patient and patient verbalizes understanding       I discussed with the patient the side effects of Wellbutrin and also the side effects of SSRIs      Melisa Gautam MD

## 2018-04-18 NOTE — PROGRESS NOTES
Progress Note - Critical Care   Dane Frey 63 y o  female MRN: [id number]  Unit/Bed#: ICU 05 Encounter: [id number]    Attending Physician: Robin Bhatti, DO      ______________________________________________________________________  Assessment and Plan:   Principal Problem:    SAH (subarachnoid hemorrhage) ([address] )  Active Problems:    History of asthma    Hypoalbuminemia    Hypokalemia  Resolved Problems:    * No resolved hospital problems  *        Neuro:    -Large SAH with acute hydrocephalus: POD #1 from IR angiography and R frontal EVD placement  No aneurysm identified on angiography  To be repeated in 1 week per neurosurgery     -Pain well-controlled on scheduled Tylenol, prn oxycodone 5mg Q 6 H prn (last given 8553 yesterday) and Dilaudid 0 5mg Q 2 H prn (last given 5817)  Much more alert today as well     -Nimodipine 60mg Q 4  No vasospasm identified on TCD yesterday  TCD to be done daily     -Keppra 750mg IV Q 12 H  Consider change to Po     -SBP goal <140  No prn labetalol needed  -Monitor for seizure activity    -STAT CT head for decrease in GCS 2 or more in 1 hour  Currently 14 (E 3 V 5 M 6)      CV:    -No acute issues   -BP well-controlled   -Labetalol 10mg Q 4 H prn, none needed      Pulm:    -Hx of asthma  Home dose Advair and Singulair  Albuterol prn  None needed  GI:    -No acute issues   -Pepcid discontinued, no SUP needed while eating   -Colace 100mg BID      :    -BUN/Cr 0/0 51   -UA 0 9 cc/kg/hour   -Consider d/c Conner      F/E/N:     Fluids: Isolyte 100/hour -- now eating, will discontiue    Electrolytes:   Na 137  K 4 1  Cl 106  Ca 7 9  Phos 2 8  Mg 2 2    Nutrition: Regular diet    ID:    -No concerns at this time   -Afebrile   -WBC 11    Heme:    -Hgb 10 9 from 11 1 -- initially 13   Monitor    -Platelets 382,409 from 430,000    Endo:    -No acute issues   -BG 99 today     Msk/Skin:    -Reposition to prevent ulcer    Disposition: ICU    Code Status: Level 1 - Full Code    Counseling / Coordination of Care  Total Critical Care time spent 25 minutes excluding procedures, teaching and family updates  ______________________________________________________________________    Chief Complaint: None  When asked about pain, states "it's ok "     24 Hour Events: No issues overnight  Patient is much more alert today     ______________________________________________________________________    Physical Exam:     Constitutional: Sleeping, but wakes easily and is much quicker to respond  Not falling asleep during exam as she was yesterday  HENT: NCAT  Symmetric facial movement  R frontal EVD in place with serosanguinous drainage  Cv: RRR  NSR on tele  Good peripheral perfusion  Pulm: CTAB    Gi: soft, NT    Gu: deferred    Ms: no edema    Neuro: A&O x 4  Normal ROM, strength and sensation of all 4 extremities  CN grossly intact  Skin: warm and dry  ______________________________________________________________________  Vitals:    18 0400 18 0500 18 0514 18 0600   BP:       BP Location:       Pulse: 72 62  60   Resp:    Temp: 98 4 °F (36 9 °C)      TempSrc: Oral      SpO2: 97% 98%  98%   Weight:       Height:           Temperature:   Temp (24hrs), Av 8 °F (36 6 °C), Min:96 6 °F (35 9 °C), Max:99 °F (37 2 °C)    Current Temperature: 98 4 °F (36 9 °C)  Weights:   IBW: 52 4 kg    Body mass index is 35 42 kg/m²    Weight (last 2 days)     Date/Time   Weight    18 2220  90 7 (199 96)            Hemodynamic Monitoring:  N/A     Non-Invasive/Invasive Ventilation Settings:  Respiratory    Lab Data (Last 4 hours)    None         O2/Vent Data (Last 4 hours)    None              No results found for: PHART, AUH0LUR, PO2ART, SRC6WVG, Y1RFKWEJ, BEART, SOURCE  SpO2: SpO2: 98 %  Intake and Outputs:  I/O        07 -  0700  07 -  0700    I V  (mL/kg) 630 (6 9) 2779 7 (30 6)    IV Piggyback 600 300    Total Intake(mL/kg) 1230 (13 6) 3079 7 (34)    Urine (mL/kg/hr) 400 1910 (0 9)    Emesis/NG output  100 (0)    Drains  254 (0 1)    Total Output 400 2264    Net +830 +815 7              UOP: 0 9cc/kg/hour   Nutrition:        Diet Orders            Start     Ordered    04/17/18 1539  Diet Regular; Regular House; Regular House  Diet effective now     Question Answer Comment   Diet Type Regular    Regular Regular House    Other Restriction(s): Regular House    RD to adjust diet per protocol? Yes        04/17/18 1539          Labs:     Results from last 7 days  Lab Units 04/18/18  0437 04/17/18  0430 04/16/18  2100 04/16/18 2055   WBC Thousand/uL 11 66* 10 37*  --  11 18*   HEMOGLOBIN g/dL 10 9* 11 1*  --  13 4   I STAT HEMOGLOBIN g/dl  --   --  14 6  --    HEMATOCRIT % 34 7* 35 3  --  41 3   PLATELETS Thousands/uL 423* 430*  --  484*   NEUTROS PCT %  --  62  --  81*   MONOS PCT %  --  9  --  7       Results from last 7 days  Lab Units 04/18/18  0437 04/17/18  0430 04/16/18  2342   SODIUM mmol/L 137 141 134*   POTASSIUM mmol/L 4 1 3 7 4 4   CHLORIDE mmol/L 106 110* 111*   CO2 mmol/L 26 26 21   BUN mg/dL 9 14 14   CREATININE mg/dL 0 51* 0 58* 0 72   CALCIUM mg/dL 7 9* 8 0* 8 1*   TOTAL PROTEIN g/dL  --  5 6* 7 0   BILIRUBIN TOTAL mg/dL  --  0 31 0 35   ALK PHOS U/L  --  51 62   ALT U/L  --  13 15   AST U/L  --  12 22   GLUCOSE RANDOM mg/dL 99 94 96       Results from last 7 days  Lab Units 04/18/18  0437 04/17/18  0430 04/16/18  2342   MAGNESIUM mg/dL 2 2 2 2 2 1     Lab Results   Component Value Date    PHOS 2 8 04/18/2018    PHOS 3 3 04/17/2018    PHOS 3 2 04/16/2018        Results from last 7 days  Lab Units 04/16/18 2055   INR  0 93   PTT seconds 28       0  Lab Value Date/Time   TROPONINI <0 02 04/16/2018 2342         ABG:No results found for: PHART, SLN6UHJ, PO2ART, FPB5ZBC, D1VFJORM, BEART, SOURCE  Imaging: TCD 4/17 normal I have personally reviewed pertinent reports      EKG: None new  Micro:  No results found for: Rolena Prudent, WOUNDCULT, SPUTUMCULTUR  Allergies: No Known Allergies  Medications:   Scheduled Meds:  Current Facility-Administered Medications:  acetaminophen 650 mg Oral Q6H [address] Aurora Barragan PA-C    albuterol 2 puff Inhalation Q6H PRN Jon Perez, DO    docusate sodium 100 mg Oral BID Jon Perez, DO    fluticasone-salmeterol 1 puff Inhalation Daily Jon Perez, DO    HYDROmorphone 0 5 mg Intravenous Q2H PRN Aurora Barragan PA-C    labetalol 10 mg Intravenous Q4H PRN Jon Perez, DO    levETIRAcetam 750 mg Intravenous Q12H [address] Aurora Barragan PA-C Last Rate: Stopped (04/17/18 2315)   montelukast 10 mg Oral Daily Jon Perez, DO    multi-electrolyte 100 mL/hr Intravenous Continuous Jon Perez DO Last Rate: 100 mL/hr (04/18/18 0600)   niMODipine 60 mg Oral Q4H [address] Lowell Perez, DO    ondansetron 4 mg Intravenous Q6H PRN Jon Perez, DO    oxyCODONE 5 mg Oral Q6H PRN Aurora Barragan PA-C    pravastatin 40 mg Oral Daily With Compendium, DO      Continuous Infusions:  multi-electrolyte 100 mL/hr Last Rate: 100 mL/hr (04/18/18 0600)     PRN Meds:    albuterol 2 puff Q6H PRN   HYDROmorphone 0 5 mg Q2H PRN   labetalol 10 mg Q4H PRN   ondansetron 4 mg Q6H PRN   oxyCODONE 5 mg Q6H PRN     VTE Pharmacologic Prophylaxis: Pharmacologic VTE Prophylaxis contraindicated due to [facility]  VTE Mechanical Prophylaxis: sequential compression device  Invasive lines and devices:   Invasive Devices     Central Venous Catheter Line            CVC Central Lines 04/17/18 Triple 16cm 1 day          Peripheral Intravenous Line            Peripheral IV 04/16/18 Right Antecubital 1 day          Arterial Line            Arterial Line 04/17/18 Left Radial less than 1 day          Drain            Urethral Catheter Temperature probe;Non-latex 16 Fr  less than 1 day    Ventriculostomy/Subdural Ventricular drainage catheter Right Parietal region less than 1 day                     Portions of the record may have been created with voice recognition software  Occasional wrong word or "sound a like" substitutions may have occurred due to the inherent limitations of voice recognition software  Read the chart carefully and recognize, using context, where substitutions have occurred      Katherine Mccauley PA-C

## 2018-04-19 ENCOUNTER — APPOINTMENT (INPATIENT)
Dept: NON INVASIVE DIAGNOSTICS | Facility: HOSPITAL | Age: 58
DRG: 021 | End: 2018-04-19
Payer: COMMERCIAL

## 2018-04-19 ENCOUNTER — APPOINTMENT (INPATIENT)
Dept: RADIOLOGY | Facility: HOSPITAL | Age: 58
DRG: 021 | End: 2018-04-19
Attending: INTERNAL MEDICINE
Payer: COMMERCIAL

## 2018-04-19 ENCOUNTER — APPOINTMENT (INPATIENT)
Dept: RADIOLOGY | Facility: HOSPITAL | Age: 58
DRG: 021 | End: 2018-04-19
Attending: EMERGENCY MEDICINE
Payer: COMMERCIAL

## 2018-04-19 PROBLEM — E83.39 HYPOPHOSPHATEMIA: Status: ACTIVE | Noted: 2018-04-19

## 2018-04-19 LAB
ANION GAP SERPL CALCULATED.3IONS-SCNC: 5 MMOL/L (ref 4–13)
BUN SERPL-MCNC: 9 MG/DL (ref 5–25)
CALCIUM SERPL-MCNC: 7.8 MG/DL (ref 8.3–10.1)
CHLORIDE SERPL-SCNC: 113 MMOL/L (ref 100–108)
CO2 SERPL-SCNC: 27 MMOL/L (ref 21–32)
CREAT SERPL-MCNC: 0.63 MG/DL (ref 0.6–1.3)
ERYTHROCYTE [DISTWIDTH] IN BLOOD BY AUTOMATED COUNT: 14.4 % (ref 11.6–15.1)
GFR SERPL CREATININE-BSD FRML MDRD: 100 ML/MIN/1.73SQ M
GLUCOSE SERPL-MCNC: 107 MG/DL (ref 65–140)
HCT VFR BLD AUTO: 35.2 % (ref 34.8–46.1)
HGB BLD-MCNC: 11 G/DL (ref 11.5–15.4)
MAGNESIUM SERPL-MCNC: 2 MG/DL (ref 1.6–2.6)
MCH RBC QN AUTO: 30.4 PG (ref 26.8–34.3)
MCHC RBC AUTO-ENTMCNC: 31.3 G/DL (ref 31.4–37.4)
MCV RBC AUTO: 97 FL (ref 82–98)
PHOSPHATE SERPL-MCNC: 2.2 MG/DL (ref 2.7–4.5)
PLATELET # BLD AUTO: 413 THOUSANDS/UL (ref 149–390)
PMV BLD AUTO: 9.9 FL (ref 8.9–12.7)
POTASSIUM SERPL-SCNC: 3.7 MMOL/L (ref 3.5–5.3)
RBC # BLD AUTO: 3.62 MILLION/UL (ref 3.81–5.12)
SODIUM SERPL-SCNC: 145 MMOL/L (ref 136–145)
WBC # BLD AUTO: 10.5 THOUSAND/UL (ref 4.31–10.16)

## 2018-04-19 PROCEDURE — 02HV33Z INSERTION OF INFUSION DEVICE INTO SUPERIOR VENA CAVA, PERCUTANEOUS APPROACH: ICD-10-PCS | Performed by: INTERNAL MEDICINE

## 2018-04-19 PROCEDURE — 36569 INSJ PICC 5 YR+ W/O IMAGING: CPT

## 2018-04-19 PROCEDURE — 85027 COMPLETE CBC AUTOMATED: CPT | Performed by: PHYSICIAN ASSISTANT

## 2018-04-19 PROCEDURE — 97116 GAIT TRAINING THERAPY: CPT

## 2018-04-19 PROCEDURE — 97530 THERAPEUTIC ACTIVITIES: CPT

## 2018-04-19 PROCEDURE — 84100 ASSAY OF PHOSPHORUS: CPT | Performed by: PHYSICIAN ASSISTANT

## 2018-04-19 PROCEDURE — 80048 BASIC METABOLIC PNL TOTAL CA: CPT | Performed by: PHYSICIAN ASSISTANT

## 2018-04-19 PROCEDURE — C1751 CATH, INF, PER/CENT/MIDLINE: HCPCS

## 2018-04-19 PROCEDURE — 71045 X-RAY EXAM CHEST 1 VIEW: CPT

## 2018-04-19 PROCEDURE — 93886 INTRACRANIAL COMPLETE STUDY: CPT

## 2018-04-19 PROCEDURE — 83735 ASSAY OF MAGNESIUM: CPT | Performed by: PHYSICIAN ASSISTANT

## 2018-04-19 PROCEDURE — 99233 SBSQ HOSP IP/OBS HIGH 50: CPT | Performed by: NEUROLOGICAL SURGERY

## 2018-04-19 RX ORDER — ALPRAZOLAM 0.25 MG/1
0.25 TABLET ORAL 3 TIMES DAILY PRN
Status: DISCONTINUED | OUTPATIENT
Start: 2018-04-19 | End: 2018-04-20

## 2018-04-19 RX ORDER — SERTRALINE HYDROCHLORIDE 20 MG/ML
25 SOLUTION ORAL DAILY
Status: DISCONTINUED | OUTPATIENT
Start: 2018-04-20 | End: 2018-04-20

## 2018-04-19 RX ORDER — ALPRAZOLAM 0.25 MG/1
0.25 TABLET ORAL ONCE
Status: COMPLETED | OUTPATIENT
Start: 2018-04-19 | End: 2018-04-19

## 2018-04-19 RX ORDER — LABETALOL HYDROCHLORIDE 5 MG/ML
10 INJECTION, SOLUTION INTRAVENOUS EVERY 4 HOURS PRN
Status: DISCONTINUED | OUTPATIENT
Start: 2018-04-19 | End: 2018-05-02 | Stop reason: HOSPADM

## 2018-04-19 RX ORDER — METHOCARBAMOL 500 MG/1
500 TABLET, FILM COATED ORAL EVERY 6 HOURS PRN
Status: DISCONTINUED | OUTPATIENT
Start: 2018-04-19 | End: 2018-04-20

## 2018-04-19 RX ADMIN — POTASSIUM PHOSPHATE, MONOBASIC AND POTASSIUM PHOSPHATE, DIBASIC 12 MMOL: 224; 236 INJECTION, SOLUTION INTRAVENOUS at 07:52

## 2018-04-19 RX ADMIN — ACETAMINOPHEN 650 MG: 325 TABLET, FILM COATED ORAL at 05:13

## 2018-04-19 RX ADMIN — HYDROMORPHONE HYDROCHLORIDE 0.5 MG: 1 INJECTION, SOLUTION INTRAMUSCULAR; INTRAVENOUS; SUBCUTANEOUS at 14:06

## 2018-04-19 RX ADMIN — NIMODIPINE 60 MG: 30 CAPSULE ORAL at 00:05

## 2018-04-19 RX ADMIN — HEPARIN SODIUM 5000 UNITS: 5000 INJECTION, SOLUTION INTRAVENOUS; SUBCUTANEOUS at 00:04

## 2018-04-19 RX ADMIN — HYDROMORPHONE HYDROCHLORIDE 0.5 MG: 1 INJECTION, SOLUTION INTRAMUSCULAR; INTRAVENOUS; SUBCUTANEOUS at 00:03

## 2018-04-19 RX ADMIN — HEPARIN SODIUM 5000 UNITS: 5000 INJECTION, SOLUTION INTRAVENOUS; SUBCUTANEOUS at 05:13

## 2018-04-19 RX ADMIN — NIMODIPINE 60 MG: 30 CAPSULE ORAL at 11:51

## 2018-04-19 RX ADMIN — HYDROMORPHONE HYDROCHLORIDE 0.5 MG: 1 INJECTION, SOLUTION INTRAMUSCULAR; INTRAVENOUS; SUBCUTANEOUS at 04:35

## 2018-04-19 RX ADMIN — NIMODIPINE 60 MG: 30 CAPSULE ORAL at 04:35

## 2018-04-19 RX ADMIN — NIMODIPINE 60 MG: 30 CAPSULE ORAL at 08:36

## 2018-04-19 RX ADMIN — ACETAMINOPHEN 650 MG: 325 TABLET, FILM COATED ORAL at 11:52

## 2018-04-19 RX ADMIN — DOCUSATE SODIUM 100 MG: 100 CAPSULE, LIQUID FILLED ORAL at 17:19

## 2018-04-19 RX ADMIN — LEVETIRACETAM 750 MG: 750 TABLET ORAL at 20:07

## 2018-04-19 RX ADMIN — ACETAMINOPHEN 650 MG: 325 TABLET, FILM COATED ORAL at 17:19

## 2018-04-19 RX ADMIN — MONTELUKAST SODIUM 10 MG: 10 TABLET, FILM COATED ORAL at 08:36

## 2018-04-19 RX ADMIN — SODIUM CHLORIDE 1000 ML: 0.9 INJECTION, SOLUTION INTRAVENOUS at 06:12

## 2018-04-19 RX ADMIN — PRAVASTATIN SODIUM 40 MG: 40 TABLET ORAL at 15:59

## 2018-04-19 RX ADMIN — NIMODIPINE 60 MG: 30 CAPSULE ORAL at 20:07

## 2018-04-19 RX ADMIN — ALPRAZOLAM 0.25 MG: 0.25 TABLET ORAL at 21:37

## 2018-04-19 RX ADMIN — NIMODIPINE 60 MG: 30 CAPSULE ORAL at 16:00

## 2018-04-19 RX ADMIN — METHOCARBAMOL 500 MG: 500 TABLET ORAL at 20:21

## 2018-04-19 RX ADMIN — HYDROMORPHONE HYDROCHLORIDE 0.5 MG: 1 INJECTION, SOLUTION INTRAMUSCULAR; INTRAVENOUS; SUBCUTANEOUS at 18:12

## 2018-04-19 RX ADMIN — LEVETIRACETAM 750 MG: 750 TABLET ORAL at 08:20

## 2018-04-19 RX ADMIN — DOCUSATE SODIUM 100 MG: 100 CAPSULE, LIQUID FILLED ORAL at 08:20

## 2018-04-19 RX ADMIN — ALPRAZOLAM 0.25 MG: 0.25 TABLET ORAL at 18:33

## 2018-04-19 RX ADMIN — FLUTICASONE FUROATE AND VILANTEROL 1 PUFF: 200; 25 POWDER RESPIRATORY (INHALATION) at 08:36

## 2018-04-19 RX ADMIN — OXYCODONE HYDROCHLORIDE 5 MG: 5 TABLET ORAL at 21:34

## 2018-04-19 RX ADMIN — HEPARIN SODIUM 5000 UNITS: 5000 INJECTION, SOLUTION INTRAVENOUS; SUBCUTANEOUS at 14:05

## 2018-04-19 RX ADMIN — OXYCODONE HYDROCHLORIDE 5 MG: 5 TABLET ORAL at 11:51

## 2018-04-19 RX ADMIN — ACETAMINOPHEN 650 MG: 325 TABLET, FILM COATED ORAL at 00:03

## 2018-04-19 RX ADMIN — OXYCODONE HYDROCHLORIDE 5 MG: 5 TABLET ORAL at 15:59

## 2018-04-19 NOTE — OCCUPATIONAL THERAPY NOTE
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    Nondependent alcohol abuse    Nondependent cocaine abuse    Renal colic    Severe episode of recurrent major depressive disorder (HCC)    Disorder of menstrual bleeding    Anxiety state    Bladder spasm    Hypoalbuminemia    Hypokalemia    Acquired hydrocephalus    Hypophosphatemia                 04/19/18 1532   Restrictions/Precautions   Weight Bearing Precautions Per Order No   Other Precautions Fall Risk;Cognitive; Bed Alarm;Multiple lines;Telemetry;Pain   General   Family/Caregiver Present PT'S SISTER PRESENT DURING TREATMENT -- INITIALLY HESISTANT RE: PARTICIPATION IN THERAPY HOWEVER ENCOURAGING AND SUPPORTIVE TOWARDS PT AND PROGRESS  SISTER PRESENT STATES THAT PATIENTS DAUGHTER AND CAT ARE WELL, PT BRIGHTERS AND IS HAPPY TO HEAR THE NEWS  Pain Assessment   Pain Assessment 0-10   Pain Score 8   Pain Type Acute pain;Surgical pain   Hospital Pain Intervention(s) Repositioned   Response to Interventions TOLERATED   ADL   Where Assessed Supine, bed   UB Dressing Assistance 3  Moderate Assistance   UB Dressing Deficit Fasteners   UB Dressing Comments PT REQUIRED MOD A TO FASTEN GOWN SITTING AT EOB    LB Dressing Assistance 4  Minimal Assistance   LB Dressing Deficit Don/doff R sock; Don/doff L sock   LB Dressing Comments IN SUPINE, PT ADJUSTED SOCKS BY ELEVATED LE'S   Bed Mobility   Supine to Sit 4  Minimal assistance   Additional items Assist x 1; Increased time required;Verbal cues   Sit to Supine 4  Minimal assistance   Additional items Assist x 1; Increased time required;Verbal cues;LE management   Transfers   Sit to Stand 3 Moderate assistance   Additional items Assist x 2; Increased time required;Verbal cues; Impulsive   Stand to Sit 3  Moderate assistance   Additional items Assist x 2; Increased time required;Verbal cues   Functional Mobility   Functional Mobility 3  Moderate assistance   Additional Comments ASSIST X2 USING HHA FROM THERAPIST -- LIMITED BY DIZZINESS T/O MOBILITY  Additional items Hand hold assistance   Cognition   Overall Cognitive Status Impaired   Arousal/Participation Responsive   Attention Attends with cues to redirect   Orientation Level Oriented to person;Oriented to place;Oriented to time;Disoriented to situation   Memory Decreased recall of precautions;Decreased recall of recent events;Decreased short term memory   Following Commands Follows one step commands with increased time or repetition   Comments PT ENGAGED IN COGNITIVE RE-ORIENTATION THIS PM  PT IMPULSIVE  [facility]  PT UTILIZES WHITE BOARD TO RE-ORIENT SELF TO TIME HOWEVER CONTINUES TO DEMONSTRATE POOR INSIGHT/SAFETY DURING FUNCTIONAL ACTIVITY  Activity Tolerance   Activity Tolerance Patient limited by pain;Treatment limited secondary to medical complications (Comment)   Medical Staff Made Aware OKAY TO SEE PER TAI NICHOLE   Assessment   Assessment PT SEEN FOR OT TREATMENT SESSION FOCUSED ON BED MOBILITY, FUNCTIONAL TRANSFERS, SHORT DISTANCE AMBULATION, UB/LB ADLS AND COGNITIVE RE-ORIENTATION  TAI NICHOLE CLAMPED DRAIN PRIOR TO MOBILITY  IN [place] PT ADJUSTED SOCKS W/ MIN BY ELEVATING LE'S  PT COMPLETED BED MOBILITY W/ MIN A  AT EOB PT STATED "SHOULD I STAND?" PT REQUIRES COGNITIVE CUES FOR PACING AND SAFETY AS PT THEN STATES "I'M DIZZY"  PT REQUIRED MOD A TO FASTEN GOWN AT EOB  PT COMPLETED SIT>STAND TRANSFER W/ MIN/MOD A X2 AND AMBULATED W/ MOD A 2 USING HHA  PT W/ DIZZINESS T/O MOBILITY  PT RETURNED TO BED POST TREATMENT   PT ORIENTED TO DATE UTILIZING WHITE BOARD HOWEVER CONTINUES TO DEMONSTRATE POOR SAFETY, JUDGEMENT AND INSIGHT  PT REMAINS LIMITED 2* IMPAIRED BALANCE, ACTIVITY TOLERANCE, MEDICAL STATUS, PAIN, WEAKNESS, DECONDITIONING, ADL IMPAIRMENTS AND AFOREMENTIONED COGNITIVE DEFICITS  CONTINUE TO RECOMMEND INPATIENT REHAB AT TIME OF D/C  Plan   Treatment Interventions ADL retraining;Functional transfer training; Endurance training;Patient/family training;Cognitive reorientation; Neuromuscular reeducation;Equipment evaluation/education; Compensatory technique education;Continued evaluation; Energy conservation; Activityengagement   Goal Expiration Date 05/02/18   Treatment Day 1   OT Frequency 3-5x/wk   Recommendation   OT Discharge Recommendation Short Term Rehab   OT - OK to Discharge (TO STR AT THIS TIME)   Barthel Index   Feeding 5   Bathing 0   Grooming Score 0   Dressing Score 0   Bladder Score 0   Bowels Score 10   Toilet Use Score 0   Transfers (Bed/Chair) Score 5   Mobility (Level Surface) Score 0   Stairs Score 0   Barthel Index Score 20   Modified [place] Scale   Modified [place] Scale 4     DOCUMENTATION COMPLETED BY LEORA SKINNER MS, OTR/L

## 2018-04-19 NOTE — PLAN OF CARE
Problem: OCCUPATIONAL THERAPY ADULT  Goal: Performs self-care activities at highest level of function for planned discharge setting  See evaluation for individualized goals  Treatment Interventions: ADL retraining, Functional transfer training, Endurance training, Cognitive reorientation, Patient/family training, Equipment evaluation/education, Compensatory technique education, Continued evaluation, Energy conservation, Activityengagement          See flowsheet documentation for full assessment, interventions and recommendations  Outcome: Progressing  Limitation: Decreased ADL status, Decreased Safe judgement during ADL, Decreased cognition, Decreased endurance, Decreased self-care trans, Decreased high-level ADLs (BALANCE, MEDICAL STATUS)  Prognosis: Fair  Assessment: PT SEEN FOR OT TREATMENT SESSION FOCUSED ON BED MOBILITY, FUNCTIONAL TRANSFERS, SHORT DISTANCE AMBULATION, UB/LB ADLS AND COGNITIVE RE-ORIENTATION  TAI NICHOLE CLAMPED DRAIN PRIOR TO MOBILITY  IN [place] PT ADJUSTED SOCKS W/ MIN BY ELEVATING LE'S  PT COMPLETED BED MOBILITY W/ MIN A  AT EOB PT STATED "SHOULD I STAND?" PT REQUIRES COGNITIVE CUES FOR PACING AND SAFETY AS PT THEN STATES "I'M DIZZY"  PT REQUIRED MOD A TO FASTEN GOWN AT EOB  PT COMPLETED SIT>STAND TRANSFER W/ MIN/MOD A X2 AND AMBULATED W/ MOD A 2 USING HHA  PT W/ DIZZINESS T/O MOBILITY  PT RETURNED TO BED POST TREATMENT  PT ORIENTED TO DATE UTILIZING WHITE BOARD HOWEVER CONTINUES TO DEMONSTRATE POOR SAFETY, JUDGEMENT AND INSIGHT  PT REMAINS LIMITED 2* IMPAIRED BALANCE, ACTIVITY TOLERANCE, MEDICAL STATUS, PAIN, WEAKNESS, DECONDITIONING, ADL IMPAIRMENTS AND AFOREMENTIONED COGNITIVE DEFICITS  CONTINUE TO RECOMMEND INPATIENT REHAB AT TIME OF D/C        OT Discharge Recommendation: Short Term Rehab  OT - OK to Discharge:  (TO STR AT THIS TIME)

## 2018-04-19 NOTE — PROGRESS NOTES
Progress Note - Neurosurgery   Paula Haque 63 y o  female MRN: [id number]  Unit/Bed#: ICU 05 Encounter: [id number]    Assessment:  1  Subarachnoid hemorrhage involving the suprasellar cistern, sylvian fissures, basal cisterns and in the anterior falx  Bleed day #3  2  HH3, Velazco 3  3  Acute hydrocephalus   4  History of asthma    Plan:  · Exam: GCS 15  AAOx3  BARRERA w/generalized weakness  No PD, marrufo or clonus  Decreased ROM of c-spine secondary to pain  SANTOSH in bilateral hands clumsy    Imaging: personally reviewed and reviewed by attending:  · 4/18 - CT head wo: persistent diffuse subarachnoid hemorrhage, slightly improved with some redistribution of hemorrhage layering in the occipital horns  EVD present extending into the frontal horn of right lateral ventricle  · 4/18 - MRI brain wo: small linear focus of restricted diffusion in left cerebellar hemisphere without associated hemorrhage  Grossly stable subarachnoid throughout basal cistern  Stable mild hydrocephalus  · 4/18 - MRI cspine wo: Chronic disc degenerative changes throughout, resulting in central canal stenosis at C5-6 and C6-7  T2-3 paracentral disc protrusion  · STAT CT head without contrast if decline in GCS >2 pts/ 1 hr  · Plan repeat angiogram in 1 week    Subarachnoid Management:  · Continue Nimodipine 60mg q4h and TCDs   · TCDs on 4/18: right 1 6; left 1 9  · TCDs on 4/19: pending completion   · Continue Keppra 750mg x 1 week total (day 3/7)  · Na 145, goal 140-150 - received one sodium chloride bolus   · SBP goal <160  Drain management:  · EVD at 10 mmHg above tragus with 261 cc output in 24 hr  · Call if output is greater than 20cc in 1 hour  · ICPs ranging 1-12; average 6+  Call if ICPs >20 sustained without provoking cause  Medical management:   · PT/OT: PT deferred recommendations at this time; OT recommends short term rehab     · DVT ppx: SCDs, pending stability CTH   · Pain control: schedule tylenol 650mg q6h, with as needed oxycodone 5mg q6h prn moderate pain, dilaudid 0 5 mg q2h prn severe pain   · Requiring two dose of dilaudid today so far  · WBC 10 50, Tmax of 99 1  Currently 98 4  · Hbg 11 0 - transfuse if <8  Neurosurgery will continue to closely monitor, call for questions or concerns/change in examination  Subjective/Objective   Chief Complaint: "I'm okay"    Subjective: patient states she is doing okay  She admits to having an intermittent headache that is present bifrontally and feels like it is wrapping around her eyes bilaterally  She states the headache is only present with movement and coughing  She rates the pain 10 out of 10 when present  She also complains of constant midline neck pain, described as stiffness  She states she cannot get comfortable  She denies radiation of pain into the arms bilaterally, or weakness  The patient admits to having some chest pain especially with coughing  She denies any abdominal pain/N/V but admits to having some pain yesterday after eating breakfast which subsided  Objective: sitting up in chair, eating breakfast  NAD    I/O       04/17 0701 - 04/18 0700 04/18 0701 - 04/19 0700 04/19 0701 - 04/20 0700    P  O   840 420    I V  (mL/kg) 2779 7 (30 6) 2458 3 (27 2)     IV Piggyback 300 2000 250    Total Intake(mL/kg) 3079 7 (34) 5298 3 (58 7) 670 (7 4)    Urine (mL/kg/hr) 1910 (0 9) 4355 (2) 850 (3 9)    Emesis/NG output 100 (0)      Drains 254 (0 1) 261 (0 1) 36 (0 2)    Total Output 2264 4616 886    Net +815 7 +682 3 -216                 Invasive Devices     Central Venous Catheter Line            CVC Central Lines 04/17/18 Triple 16cm 2 days          Peripheral Intravenous Line            Peripheral IV 04/16/18 Right Antecubital 2 days          Drain            Ventriculostomy/Subdural Ventricular drainage catheter Right Parietal region 2 days    Urethral Catheter Temperature probe;Non-latex 16 Fr  1 day                Physical Exam:  Vitals: Blood pressure 136/85, pulse 64, temperature 98 4 °F (36 9 °C), temperature source Oral, resp  rate 21, height 5' 3" (1 6 m), weight 90 7 kg (199 lb 15 3 oz), SpO2 96 %  ,Body mass index is 35 42 kg/m²  Hemodynamic Monitoring: MAP: Arterial Line MAP (mmHg): 96 mmHg, CPP: CPP: 94, ICP Mean: ICP Mean (mmHg): 9 mmHg    General appearance: alert, appears stated age, cooperative and no distress  Head: Normocephalic, right frontal EVD placed with slightly pink tinged CSF present in canister  Eyes: EOMI, PERRL  Neck: decreased ROM secondary to pain  Generalized midline cervical tenderness on palpation  No cervical paraspinous muscle spasms  Lungs: non labored breathing  Heart: regular heart rate  Neurologic:   Mental status: Alert, oriented x4, thought content appropriate  Speech is fluent, clear  Able to repeat a sentence, able to briskly answer questions  Able to perform simple math  Cranial nerves: grossly intact (Cranial nerves II-XII)  Subtle left facial weakness at rest that corrects with expression  Tongue is midline  Sensory: normal to LT in bilateral upper and lower extremities  Motor: moving all extremities with generalized weakness 4/5  Reflexes: 2+ and symmetric  negative marrufo, negative clonus  Coordination: finger to nose normal bilaterally, no drift bilaterally  SANTOSH completed with clumsiness bilaterally  Lab Results:    Results from last 7 days  Lab Units 04/19/18 0441 04/18/18 0437 04/17/18 0430 04/16/18  2055   WBC Thousand/uL 10 50* 11 66* 10 37*  --  11 18*   HEMOGLOBIN g/dL 11 0* 10 9* 11 1*  --  13 4   I STAT HEMOGLOBIN   --   --   --   < >  --    HEMATOCRIT % 35 2 34 7* 35 3  --  41 3   PLATELETS Thousands/uL 413* 423* 430*  --  484*   NEUTROS PCT %  --   --  62  --  81*   MONOS PCT %  --   --  9  --  7   < > = values in this interval not displayed      Results from last 7 days  Lab Units 04/19/18 0441 04/18/18 0437 04/17/18  0430 04/16/18  2342   SODIUM mmol/L 145 137 141 134*   POTASSIUM mmol/L 3 7 4 1 3 7 4 4   CHLORIDE mmol/L 113* 106 110* 111*   CO2 mmol/L 27 26 26 21   BUN mg/dL 9 9 14 14   CREATININE mg/dL 0 63 0 51* 0 58* 0 72   CALCIUM mg/dL 7 8* 7 9* 8 0* 8 1*   TOTAL PROTEIN g/dL  --   --  5 6* 7 0   BILIRUBIN TOTAL mg/dL  --   --  0 31 0 35   ALK PHOS U/L  --   --  51 62   ALT U/L  --   --  13 15   AST U/L  --   --  12 22   GLUCOSE RANDOM mg/dL 107 99 94 96       Results from last 7 days  Lab Units 04/19/18  0441 04/18/18  0437 04/17/18  0430   MAGNESIUM mg/dL 2 0 2 2 2 2       Results from last 7 days  Lab Units 04/19/18  0441 04/18/18  0437 04/17/18  0430   PHOSPHORUS mg/dL 2 2* 2 8 3 3       Results from last 7 days  Lab Units 04/16/18  2055   INR  0 93   PTT seconds 28     No results found for: TROPONINT  ABG:No results found for: PHART, BOY3CQH, PO2ART, MOZ7GLQ, S9MSFEBJ, BEART, SOURCE    Imaging Studies: I have personally reviewed pertinent reports  and I have personally reviewed pertinent films in PACS    MRI cervical spine wo contrast   Final Result         1  Chronic disc degenerative change throughout the cervical spine resulting in severe central canal stenosis at C5-6 and C6-7  Chronic mass effect on the cord without evidence of cord signal abnormality or myelomalacia  2   T2-3 paracentral disc protrusion and right paracentral disc extrusion with probable mild mass effect on the right ventral aspect of the cord  Workstation performed: [id number]         MRI brain wo contrast   Final Result         1  Small linear focus of restricted diffusion in the left cerebellar hemisphere without associated hemorrhage, suggesting an acute or early subacute ischemic infarct  2   Grossly stable subarachnoid hemorrhage throughout the basal cisterns  No significant mass effect  3   Stable mild hydrocephalus and intraventricular blood products               I personally discussed this study with Dr Yoel Norris on 4/18/2018 at 10:53 PM                Workstation performed: [id number]         VAS transcranial doppler, complete study   Final Result      CT head wo contrast   Final Result      Persistent diffuse subarachnoid hemorrhage, slightly improved  There is some redistribution with hemorrhage layering in the occipital horns of the lateral ventricles  Ventricular drain present extending into the frontal horn of the right lateral ventricle  Workstation performed: [id number]         VAS transcranial doppler, complete study   Final Result      XR chest 1 view portable   Final Result      Right IJ line placement, appears to be in satisfactory position, tip is at the cavoatrial junction  No pneumothorax  Low lung volumes  Workstation performed: [id number]         IR cerebral angiography / intervention    (Results Pending)   VAS transcranial doppler, complete study    (Results Pending)   VAS transcranial doppler, complete study    (Results Pending)     EKG, Pathology, and Other Studies: I have personally reviewed pertinent reports        VTE  Prophylaxis: Sequential compression device (Venodyne)  and Heparin

## 2018-04-19 NOTE — PROGRESS NOTES
TCD result reviewed  Mild vasospasm of the L MCA  BP is 130s/80s  Continuing with nimodipine 60mg Q 4 H  Neurologic exam is unchanged  Minimal R facial droop with smile  CN otherwise grossly intact  Normal ROM/strength of all four extremities  Continuing to replace urine output with isolyte  Have replaced 1 9L between 8am and 12pm  Since 12pm, has put out 2 2L  Replacing with isolyte as well  Dr Alexandra Burrell made aware of all of the above

## 2018-04-19 NOTE — SOCIAL WORK
CM met with pt at bedside who is awake and alert  Pt resides with her daughter Narinder Rivera  Pt reports her daughter is an adult but handicap  She reports family is helping Amrit Salazar while pt is here in the hospital  Pt home is single story with 0 ANG  Pt home is handicap accessible  Pt independent with ADLs and ambulation PTA  Pt able to drive PTA  Pt reports she does not work  Pt has no hx of STR  Pt reports hx of VNA for IV abx in the past  Pt reports hx of depression  Pt has had IP tx at [facility] and partial program at [facility] in 2014  Pt has no current psychiatrist  Pt denies hx of drug/alchol abuse  [facility] is [facility] [address]  CM to follow  CM reviewed d/c planning process including the following: identifying help at home, patient preference for d/c planning needs, Discharge Lounge, [facility] Meds to Bed program, availability of treatment team to discuss questions or concerns patient and/or family may have regarding understanding medications and recognizing signs and symptoms once discharged  CM also encouraged patient to follow up with all recommended appointments after discharge  Patient advised of importance for patient and family to participate in managing patients medical well being

## 2018-04-19 NOTE — PROGRESS NOTES
Progress Note - Critical Care   Jacqueline Kang 63 y o  female MRN: [id number]  Unit/Bed#: ICU 05 Encounter: [id number]    Attending Physician: Albino Jacobson DO      ______________________________________________________________________  Assessment and Plan:   Principal Problem:    SAH (subarachnoid hemorrhage) ([address] )  Active Problems:    History of asthma    Tobacco use disorder    Severe episode of recurrent major depressive disorder (HCC)    Hypoalbuminemia    Hypokalemia    Acquired hydrocephalus  Resolved Problems:    * No resolved hospital problems  *        Neuro:    -Large SAH with acute hydrocephalus, likely 2/2 ruptured aneurysm (though none yet identified on angiogram)  POD #2 from IR angiogram and R frontal EVD placement  Angiogram to be repeated 1 week from previous  -ICP ranging 5-14, CPP 67-88   -EVD out 261cc in 24 hours  Highest per 2 hours has been 40 betwween 0500 and 0700 today  -CT head confirmed stable bleed; slight improvement  MRI showed small area of likely acute/subacute infarct in the L hemisphere, but no change in neuro exam    -Pain well-controlled in general on current regimen of round-the-clock Tylenol, oxycodone 5mg for moderate pain (last at 2022) and Dilaudid 0 5mg for severe pain (last at 0435)  -Nimodipine 60mg Q 4 H  No vasospasm on TCD yesterday  To be done daily    -Began having high urine outputs of diluted urine (osm 88) consistent with cerebral salt wasting  Matching UO 1:1 with NSS  1L NSS started today at 0600     -SBP goal now <160; more liberal to reduce risk of vasospasm   -Monitor for seizure activity   -STAT head CT for decrease in GCS >2 in 1 hour  Currently 14 ( E 3 V 5 M 6)    CV:    -No acute issues   -BP well-controlled  Goal SBP <160   -SBP range 104-134 by a-line   -Labetalol prn for BP      Pulm:    -Hx of asthma  Home dose Advair, Singulair, and Breo  Albuterol prn      GI:    -No acute issues   -Regular diet   -Colace 100mg BID      :    -Likely cerebral salt wasting with high output of dilute urine   -Serum osm 295, urine osm 88, urine Na 24   -Match UO 1:1 with NSS   -BUN/Cr 9/0 63      F/E/N:     Fluids: /hour, now has a 1L bolus running since 0600    Electrolytes:  Na 145  K 3 7  Cl 113  Ca 7 8 (corrected 8 7 for albumin of 2 9 yesterday)  Phos 2 2 -- repleted with K phos 12mmol  Mg 2 0    Nutrition: Regular diet    ID:   -No infectious concerns at this time  -WBC 10 from 11  -Afebrile    Heme:   -Hgb 11 from 10 9  -Platelets 875,935 from 423,000    Endo:   -No issues  - this AM     Msk/Skin:   -PT/OT  -Out of bed    Disposition: ICU    Code Status: Level 1 - Full Code    Counseling / Coordination of Care  Total Critical Care time spent 35 minutes excluding procedures, teaching and family updates  ______________________________________________________________________    Chief Complaint: No pain when lying down  Worsening headache when sitting up  She complains of neck pressure intermittently since just prior to her initial presentation to the hospital  At end of exam, she denies any pain  24 Hour Events: CT and MRI completed, results above  No change in neuro exam overnight       ______________________________________________________________________    Physical Exam:     Constitutional: Sleeping, wakes easily  NAD  HENT: NCAT  R frontal EVD in place  Cv: RRR, no murmur  Good peripheral perfusion  Pulm: CTAB    Gi: soft    Gu: deferred    Ms: no edema    Neuro: A&O x 4  Minimal R facial droop  Otherwise grossly normal cranial nerve exam  Normal ROM/strength in all extremities      Skin: warm, dry  ______________________________________________________________________  Vitals:    04/19/18 0400 04/19/18 0500 04/19/18 0505 04/19/18 0600   BP: 119/70 116/65 116/65 104/66   Pulse: 72 66 66 68   Resp: 18 18 18 18   Temp: 99 1 °F (37 3 °C)      TempSrc: Oral      SpO2: 94% 98% 98% 95%   Weight:       Height:           Temperature: Temp (24hrs), Av 3 °F (36 8 °C), Min:97 5 °F (36 4 °C), Max:99 1 °F (37 3 °C)    Current Temperature: 99 1 °F (37 3 °C)  Weights:   IBW: 52 4 kg    Body mass index is 35 42 kg/m²  Weight (last 2 days)     None        Hemodynamic Monitoring:  N/A     Non-Invasive/Invasive Ventilation Settings:  Respiratory    Lab Data (Last 4 hours)    None         O2/Vent Data (Last 4 hours)    None              No results found for: PHART, MPZ2USK, PO2ART, AAE3YIT, C1DBGVOZ, BEART, SOURCE  SpO2: SpO2: 95 %  Intake and Outputs:  I/O        07 -  0700 701 -  07    P  O   840    I V  (mL/kg) 2779 7 (30 6) 2458 3 (27 2)    IV Piggyback 300 1000    Total Intake(mL/kg) 3079 7 (34) 4298 3 (47 6)    Urine (mL/kg/hr) 1910 (0 9) 4355 (2)    Emesis/NG output 100 (0)     Drains 254 (0 1) 261 (0 1)    Total Output 2264 4616    Net +815 7 -317 7              UOP: 2cc/kg/hour   Nutrition:        Diet Orders            Start     Ordered    18 153  Diet Regular; Regular House; Regular House  Diet effective now     Question Answer Comment   Diet Type Regular    Regular Regular House    Other Restriction(s): Regular House    RD to adjust diet per protocol? Yes        18 153        Labs:     Results from last 7 days  Lab Units 18  0437 18  04318  2055   WBC Thousand/uL 10 50* 11 66* 10 37*  --  11 18*   HEMOGLOBIN g/dL 11 0* 10 9* 11 1*  --  13 4   I STAT HEMOGLOBIN   --   --   --   < >  --    HEMATOCRIT % 35 2 34 7* 35 3  --  41 3   PLATELETS Thousands/uL 413* 423* 430*  --  484*   NEUTROS PCT %  --   --  62  --  81*   MONOS PCT %  --   --  9  --  7   < > = values in this interval not displayed      Results from last 7 days  Lab Units 18  0437 18  0430 18  2342   SODIUM mmol/L 145 137 141 134*   POTASSIUM mmol/L 3 7 4 1 3 7 4 4   CHLORIDE mmol/L 113* 106 110* 111*   CO2 mmol/L 27 26 26 21   BUN mg/dL 9 9 14 14   CREATININE mg/dL 0 63 0 51* 0 58* 0 72   CALCIUM mg/dL 7 8* 7 9* 8 0* 8 1*   TOTAL PROTEIN g/dL  --   --  5 6* 7 0   BILIRUBIN TOTAL mg/dL  --   --  0 31 0 35   ALK PHOS U/L  --   --  51 62   ALT U/L  --   --  13 15   AST U/L  --   --  12 22   GLUCOSE RANDOM mg/dL 107 99 94 96       Results from last 7 days  Lab Units 04/19/18  0441 04/18/18  0437 04/17/18  0430   MAGNESIUM mg/dL 2 0 2 2 2 2     Lab Results   Component Value Date    PHOS 2 2 (L) 04/19/2018    PHOS 2 8 04/18/2018    PHOS 3 3 04/17/2018        Results from last 7 days  Lab Units 04/16/18 2055   INR  0 93   PTT seconds 28       0  Lab Value Date/Time   TROPONINI <0 02 04/16/2018 2342         ABG:No results found for: PHART, LWA2RTL, PO2ART, ZFJ8JFQ, E9YYJTMO, BEART, SOURCE  Imaging: CT head: persistent diffuse SAH, slight improvement  Redistribution of blood in the occipital horns  MRI: small linear focus of likely acute/subacute infarct  No mass effect  Stable mild hydrocephalus  I have personally reviewed pertinent reports      EKG: None new  Micro:  No results found for: Kesha Sago, WOUNDCULT, SPUTUMCULTUR  Allergies: No Known Allergies  Medications:   Scheduled Meds:  Current Facility-Administered Medications:  acetaminophen 650 mg Oral Q6H [facility] Aurora Barragan PA-C    albuterol 2 puff Inhalation Q6H PRN Freddie Perez DO    docusate sodium 100 mg Oral BID Freddie Perez, DO    fluticasone furoate-vilanterol 1 puff Inhalation Daily Aurora Barragan PA-C    heparin (porcine) 5,000 Units Subcutaneous [facility] Aurora Barragan PA-C    HYDROmorphone 0 5 mg Intravenous Q2H PRN Aurora Barragan PA-C    labetalol 10 mg Intravenous Q4H PRN Freddie Perez DO    levETIRAcetam 750 mg Oral Q12H [facility] HOME Carlene Cedeño MD    montelukast 10 mg Oral Daily Elikory Broussardhor, DO    niMODipine 60 mg Oral Q4H [facility] Lowell Perez, DO    ondansetron 4 mg Intravenous Q6H PRN Freddie Perez, DO    oxyCODONE 5 mg Oral Q6H PRN Aurora Barragan PA-C    pravastatin 40 mg Oral Daily With Ramón Perez DO    sodium chloride 1,000 mL Intravenous Once Dock Valdemar Barragan PA-C Last Rate: 1,000 mL (04/19/18 0612)   sodium chloride 100 mL/hr Intravenous Continuous Shayy Finch MD Last Rate: Stopped (04/19/18 0602)     Continuous Infusions:  sodium chloride 100 mL/hr Last Rate: Stopped (04/19/18 0602)     PRN Meds:    albuterol 2 puff Q6H PRN   HYDROmorphone 0 5 mg Q2H PRN   labetalol 10 mg Q4H PRN   ondansetron 4 mg Q6H PRN   oxyCODONE 5 mg Q6H PRN     VTE Pharmacologic Prophylaxis: Heparin  VTE Mechanical Prophylaxis: sequential compression device  Invasive lines and devices: Invasive Devices     Central Venous Catheter Line            CVC Central Lines 04/17/18 Triple 16cm 2 days          Peripheral Intravenous Line            Peripheral IV 04/16/18 Right Antecubital 2 days          Drain            Urethral Catheter Temperature probe;Non-latex 16 Fr  1 day    Ventriculostomy/Subdural Ventricular drainage catheter Right Parietal region 1 day                     Portions of the record may have been created with voice recognition software  Occasional wrong word or "sound a like" substitutions may have occurred due to the inherent limitations of voice recognition software  Read the chart carefully and recognize, using context, where substitutions have occurred      Hannah Roberts PA-C

## 2018-04-19 NOTE — PROCEDURES
Insert PICC line  Date/Time: 4/19/2018 10:41 AM  Performed by: Marcella Vanegas  Authorized by: Taryn Stallworth     Patient location:  Bedside  Other Assisting Provider: Yes (comment) (joy Alcantar)    Consent:     Consent obtained:  Written (MD obtained )    Consent given by:  Patient  Universal protocol:     Procedure explained and questions answered to patient or proxy's satisfaction: yes      Relevant documents present and verified: yes      Test results available and properly labeled: yes      Imaging studies available: yes      Required blood products, implants, devices, and special equipment available: yes      Site/side marked: yes      Immediately prior to procedure, a time out was called: yes      Patient identity confirmed:  Verbally with patient and arm band  Pre-procedure details:     Hand hygiene: Hand hygiene performed prior to insertion      Sterile barrier technique: All elements of maximal sterile technique followed      Skin preparation:  ChloraPrep    Skin preparation agent: Skin preparation agent completely dried prior to procedure    Indications:     PICC line indications: medications requiring central line    Anesthesia (see MAR for exact dosages):      Anesthesia method:  Local infiltration (3ml)    Local anesthetic:  Lidocaine 1% w/o epi  Procedure details:     Location:  Basilic    Vessel type: vein      Laterality:  Left    Approach: percutaneous endoscopic technique used      Patient position:  Flat    Procedural supplies:  Triple lumen    Catheter size:  5 Fr    Landmarks identified: yes      Ultrasound guidance: yes      Sterile ultrasound techniques: Sterile gel and sterile probe covers were used      Number of attempts:  1    Successful placement: yes      Vessel of catheter tip end:  Chest Xray needed to confirm placement    Total catheter length (cm):  46    Catheter out on skin (cm):  0    Max flow rate:  999/ml     Arm circumference:  35  Post-procedure details: Post-procedure:  Securement device placed    Assessment:  Blood return through all ports, placement verification pending x-ray result and free fluid flow    Patient tolerance of procedure:   Tolerated well, no immediate complications  Comments:      Pink stickers applied - RN notified pending xray  Ref [id number]  Lot [id number]

## 2018-04-19 NOTE — PHYSICAL THERAPY NOTE
Physical Therapy Treatment Note     04/19/18 7538   Pain Assessment   Pain Assessment 0-10   Pain Score 7   Pain Type Acute pain   Pain Location Head   Patient's Stated Pain Goal No pain   Hospital Pain Intervention(s) Ambulation/increased activity;Repositioned   Response to Interventions unchanged   Restrictions/Precautions   Weight Bearing Precautions Per Order No   Other Precautions Cognitive; Chair Alarm; Bed Alarm;Multiple lines;Telemetry; Fall Risk;Pain  (pt w/ ventric in place- clamped by nursing)   General   Chart Reviewed Yes   Family/Caregiver Present Yes   Cognition   Overall Cognitive Status Impaired   Arousal/Participation Responsive   Attention Attends with cues to redirect   Orientation Level Oriented X4   Memory Unable to assess   Following Commands Follows one step commands without difficulty   Subjective   Subjective states she feels OK, but dizziness, severe HA worse w/ mobility    cooperative w/ session despite same   Bed Mobility   Supine to Sit 4  Minimal assistance   Additional items Assist x 1   Sit to Supine 4  Minimal assistance   Additional items Assist x 2   Additional Comments sat x 10 min on EOB, focus on tolerance due to dizziness and HA   maintains w/ CGA   Transfers   Sit to Stand 3  Moderate assistance   Additional items Assist x 2   Stand to Sit 4  Minimal assistance   Additional items Assist x 2   Ambulation/Elevation   Gait pattern (short step length, narrow LADAN, posterior LOB)   Gait Assistance 3  Moderate assist   Additional items Assist x 2   Assistive Device (HHA of 2)   Distance 15'- after gait sat back on EOB x 5 min, then repositioned to comfort in supine   Balance   Static Sitting Fair   Dynamic Sitting Fair -   Static Standing Poor +   Dynamic Standing Poor +   Ambulatory Poor   Endurance Deficit   Endurance Deficit Yes   Endurance Deficit Description fatigue, weakness HA   Activity Tolerance   Activity Tolerance Patient limited by fatigue;Patient limited by pain;Treatment limited secondary to medical complications (Comment)   Nurse Made Aware yes   Assessment   Prognosis Fair   Problem List Decreased strength; Impaired balance;Decreased endurance;Decreased mobility; Decreased coordination;Decreased cognition;Decreased safety awareness; Impaired judgement;Pain   Assessment Pt seen for session x25 min for gait and PT 1:1 activiy for setup, bed mob, time on EOB, gait, repositioning time  Pt awake, c/o HA that is worse w/ mobility  Improving mobility tolerance w/ increased gait distance  still w/ ataxia w/ mobility, will follow for progress for d/c recommendations  currently recommend rehab at d/c, but follow for progress   Goals   Patient Goals none stated   STG Expiration Date 05/02/18   Treatment Day 1   Plan   Treatment/Interventions LE strengthening/ROM; Functional transfer training; Therapeutic exercise; Endurance training;Patient/family training;Equipment eval/education; Bed mobility;Gait training   Progress Progressing toward goals   PT Frequency 5x/wk   Recommendation   Recommendation (currently recommend rehab at d/c)   Equipment Recommended Hassan Shone   PT - OK to Discharge (to rehab when stable)   Frandy Cancel PT, DPT CSRS

## 2018-04-19 NOTE — PLAN OF CARE
Problem: PHYSICAL THERAPY ADULT  Goal: Performs mobility at highest level of function for planned discharge setting  See evaluation for individualized goals  Treatment/Interventions: Functional transfer training, LE strengthening/ROM, Elevations, Therapeutic exercise, Endurance training, Patient/family training, Bed mobility, Equipment eval/education, Gait training, Spoke to nursing, Spoke to case management, OT          See flowsheet documentation for full assessment, interventions and recommendations  Outcome: Progressing  Prognosis: Fair  Problem List: Decreased strength, Impaired balance, Decreased endurance, Decreased mobility, Decreased coordination, Decreased cognition, Decreased safety awareness, Impaired judgement, Pain  Assessment: Pt seen for session x25 min for gait and PT 1:1 activiy for setup, bed mob, time on EOB, gait, repositioning time  Pt awake, c/o HA that is worse w/ mobility  Improving mobility tolerance w/ increased gait distance  still w/ ataxia w/ mobility, will follow for progress for d/c recommendations  currently recommend rehab at d/c, but follow for progress  Barriers to Discharge: Decreased caregiver support     Recommendation:  (currently recommend rehab at d/c)     PT - OK to Discharge: No    See flowsheet documentation for full assessment

## 2018-04-19 NOTE — PROGRESS NOTES
CCM rounding bedside, Dr Leatha Boyce aware of pt   Urine out put, 1:1 fluid replacement Q4 hrs with isolyte ordered

## 2018-04-20 ENCOUNTER — APPOINTMENT (INPATIENT)
Dept: NON INVASIVE DIAGNOSTICS | Facility: HOSPITAL | Age: 58
DRG: 021 | End: 2018-04-20
Payer: COMMERCIAL

## 2018-04-20 PROBLEM — R35.89 POLYURIA: Status: ACTIVE | Noted: 2018-04-20

## 2018-04-20 LAB
ANION GAP SERPL CALCULATED.3IONS-SCNC: 6 MMOL/L (ref 4–13)
BUN SERPL-MCNC: 3 MG/DL (ref 5–25)
CALCIUM SERPL-MCNC: 7.9 MG/DL (ref 8.3–10.1)
CHLORIDE SERPL-SCNC: 111 MMOL/L (ref 100–108)
CO2 SERPL-SCNC: 27 MMOL/L (ref 21–32)
CREAT SERPL-MCNC: 0.5 MG/DL (ref 0.6–1.3)
ERYTHROCYTE [DISTWIDTH] IN BLOOD BY AUTOMATED COUNT: 14.2 % (ref 11.6–15.1)
GFR SERPL CREATININE-BSD FRML MDRD: 108 ML/MIN/1.73SQ M
GLUCOSE SERPL-MCNC: 105 MG/DL (ref 65–140)
HCT VFR BLD AUTO: 36.3 % (ref 34.8–46.1)
HGB BLD-MCNC: 11.5 G/DL (ref 11.5–15.4)
MAGNESIUM SERPL-MCNC: 2.6 MG/DL (ref 1.6–2.6)
MCH RBC QN AUTO: 30.4 PG (ref 26.8–34.3)
MCHC RBC AUTO-ENTMCNC: 31.7 G/DL (ref 31.4–37.4)
MCV RBC AUTO: 96 FL (ref 82–98)
PHOSPHATE SERPL-MCNC: 1.9 MG/DL (ref 2.7–4.5)
PHOSPHATE SERPL-MCNC: 2.6 MG/DL (ref 2.7–4.5)
PLATELET # BLD AUTO: 406 THOUSANDS/UL (ref 149–390)
PMV BLD AUTO: 10.3 FL (ref 8.9–12.7)
POTASSIUM SERPL-SCNC: 3.6 MMOL/L (ref 3.5–5.3)
RBC # BLD AUTO: 3.78 MILLION/UL (ref 3.81–5.12)
SODIUM SERPL-SCNC: 144 MMOL/L (ref 136–145)
WBC # BLD AUTO: 11.84 THOUSAND/UL (ref 4.31–10.16)

## 2018-04-20 PROCEDURE — 99232 SBSQ HOSP IP/OBS MODERATE 35: CPT | Performed by: NEUROLOGICAL SURGERY

## 2018-04-20 PROCEDURE — 80048 BASIC METABOLIC PNL TOTAL CA: CPT | Performed by: PHYSICIAN ASSISTANT

## 2018-04-20 PROCEDURE — 84100 ASSAY OF PHOSPHORUS: CPT | Performed by: PHYSICIAN ASSISTANT

## 2018-04-20 PROCEDURE — 83735 ASSAY OF MAGNESIUM: CPT | Performed by: PHYSICIAN ASSISTANT

## 2018-04-20 PROCEDURE — 99232 SBSQ HOSP IP/OBS MODERATE 35: CPT | Performed by: PSYCHIATRY & NEUROLOGY

## 2018-04-20 PROCEDURE — 85027 COMPLETE CBC AUTOMATED: CPT | Performed by: PHYSICIAN ASSISTANT

## 2018-04-20 PROCEDURE — 93886 INTRACRANIAL COMPLETE STUDY: CPT | Performed by: SURGERY

## 2018-04-20 PROCEDURE — 93886 INTRACRANIAL COMPLETE STUDY: CPT

## 2018-04-20 RX ORDER — SODIUM CHLORIDE, SODIUM GLUCONATE, SODIUM ACETATE, POTASSIUM CHLORIDE, MAGNESIUM CHLORIDE, SODIUM PHOSPHATE, DIBASIC, AND POTASSIUM PHOSPHATE .53; .5; .37; .037; .03; .012; .00082 G/100ML; G/100ML; G/100ML; G/100ML; G/100ML; G/100ML; G/100ML
1000 INJECTION, SOLUTION INTRAVENOUS ONCE
Status: COMPLETED | OUTPATIENT
Start: 2018-04-20 | End: 2018-04-20

## 2018-04-20 RX ORDER — POLYETHYLENE GLYCOL 3350 17 G/17G
17 POWDER, FOR SOLUTION ORAL DAILY PRN
Status: DISCONTINUED | OUTPATIENT
Start: 2018-04-20 | End: 2018-04-21

## 2018-04-20 RX ORDER — POTASSIUM CHLORIDE 20 MEQ/1
20 TABLET, EXTENDED RELEASE ORAL ONCE
Status: COMPLETED | OUTPATIENT
Start: 2018-04-20 | End: 2018-04-20

## 2018-04-20 RX ORDER — ALPRAZOLAM 0.25 MG/1
0.25 TABLET ORAL DAILY PRN
Status: DISCONTINUED | OUTPATIENT
Start: 2018-04-20 | End: 2018-05-02 | Stop reason: HOSPADM

## 2018-04-20 RX ORDER — METHOCARBAMOL 500 MG/1
500 TABLET, FILM COATED ORAL EVERY 6 HOURS SCHEDULED
Status: DISCONTINUED | OUTPATIENT
Start: 2018-04-21 | End: 2018-04-21

## 2018-04-20 RX ORDER — ALBUMIN, HUMAN INJ 5% 5 %
25 SOLUTION INTRAVENOUS ONCE
Status: COMPLETED | OUTPATIENT
Start: 2018-04-20 | End: 2018-04-20

## 2018-04-20 RX ORDER — OXYCODONE HYDROCHLORIDE 10 MG/1
10 TABLET ORAL EVERY 4 HOURS PRN
Status: DISCONTINUED | OUTPATIENT
Start: 2018-04-20 | End: 2018-04-28

## 2018-04-20 RX ORDER — SERTRALINE HYDROCHLORIDE 25 MG/1
25 TABLET, FILM COATED ORAL DAILY
Status: DISCONTINUED | OUTPATIENT
Start: 2018-04-20 | End: 2018-05-02 | Stop reason: HOSPADM

## 2018-04-20 RX ORDER — FENTANYL CITRATE 50 UG/ML
25 INJECTION, SOLUTION INTRAMUSCULAR; INTRAVENOUS EVERY 2 HOUR PRN
Status: DISCONTINUED | OUTPATIENT
Start: 2018-04-20 | End: 2018-04-21

## 2018-04-20 RX ORDER — SODIUM CHLORIDE 9 MG/ML
50 INJECTION, SOLUTION INTRAVENOUS CONTINUOUS
Status: DISPENSED | OUTPATIENT
Start: 2018-04-20 | End: 2018-04-22

## 2018-04-20 RX ORDER — OXYCODONE HYDROCHLORIDE 5 MG/1
5 TABLET ORAL EVERY 4 HOURS PRN
Status: DISCONTINUED | OUTPATIENT
Start: 2018-04-20 | End: 2018-04-28

## 2018-04-20 RX ORDER — ALBUMIN (HUMAN) 12.5 G/50ML
25 SOLUTION INTRAVENOUS EVERY 6 HOURS
Status: DISCONTINUED | OUTPATIENT
Start: 2018-04-20 | End: 2018-04-20

## 2018-04-20 RX ORDER — ALBUMIN (HUMAN) 12.5 G/50ML
25 SOLUTION INTRAVENOUS EVERY 6 HOURS
Status: DISCONTINUED | OUTPATIENT
Start: 2018-04-20 | End: 2018-04-21

## 2018-04-20 RX ORDER — SENNOSIDES 8.6 MG
1 TABLET ORAL
Status: DISCONTINUED | OUTPATIENT
Start: 2018-04-20 | End: 2018-05-02 | Stop reason: HOSPADM

## 2018-04-20 RX ADMIN — HEPARIN SODIUM 5000 UNITS: 5000 INJECTION, SOLUTION INTRAVENOUS; SUBCUTANEOUS at 13:31

## 2018-04-20 RX ADMIN — SERTRALINE HYDROCHLORIDE 25 MG: 20 SOLUTION, CONCENTRATE ORAL at 10:23

## 2018-04-20 RX ADMIN — ALPRAZOLAM 0.25 MG: 0.25 TABLET ORAL at 10:22

## 2018-04-20 RX ADMIN — POTASSIUM CHLORIDE 20 MEQ: 1500 TABLET, EXTENDED RELEASE ORAL at 19:30

## 2018-04-20 RX ADMIN — PRAVASTATIN SODIUM 40 MG: 40 TABLET ORAL at 16:15

## 2018-04-20 RX ADMIN — OXYCODONE HYDROCHLORIDE 5 MG: 5 TABLET ORAL at 05:10

## 2018-04-20 RX ADMIN — NIMODIPINE 60 MG: 30 CAPSULE ORAL at 16:15

## 2018-04-20 RX ADMIN — NIMODIPINE 60 MG: 30 CAPSULE ORAL at 05:00

## 2018-04-20 RX ADMIN — POTASSIUM PHOSPHATE, MONOBASIC AND POTASSIUM PHOSPHATE, DIBASIC 30 MMOL: 224; 236 INJECTION, SOLUTION INTRAVENOUS at 09:21

## 2018-04-20 RX ADMIN — LEVETIRACETAM 750 MG: 750 TABLET ORAL at 08:27

## 2018-04-20 RX ADMIN — ALBUMIN HUMAN 25 G: 0.05 INJECTION, SOLUTION INTRAVENOUS at 10:22

## 2018-04-20 RX ADMIN — NIMODIPINE 60 MG: 30 CAPSULE ORAL at 00:11

## 2018-04-20 RX ADMIN — METHOCARBAMOL 500 MG: 500 TABLET ORAL at 10:22

## 2018-04-20 RX ADMIN — HEPARIN SODIUM 5000 UNITS: 5000 INJECTION, SOLUTION INTRAVENOUS; SUBCUTANEOUS at 22:19

## 2018-04-20 RX ADMIN — HYDROMORPHONE HYDROCHLORIDE 0.5 MG: 1 INJECTION, SOLUTION INTRAMUSCULAR; INTRAVENOUS; SUBCUTANEOUS at 00:14

## 2018-04-20 RX ADMIN — MONTELUKAST SODIUM 10 MG: 10 TABLET, FILM COATED ORAL at 08:28

## 2018-04-20 RX ADMIN — OXYCODONE HYDROCHLORIDE 5 MG: 5 TABLET ORAL at 19:30

## 2018-04-20 RX ADMIN — DOCUSATE SODIUM 100 MG: 100 CAPSULE, LIQUID FILLED ORAL at 18:09

## 2018-04-20 RX ADMIN — FLUTICASONE FUROATE AND VILANTEROL 1 PUFF: 200; 25 POWDER RESPIRATORY (INHALATION) at 08:28

## 2018-04-20 RX ADMIN — ALBUMIN HUMAN 25 G: 0.25 SOLUTION INTRAVENOUS at 20:22

## 2018-04-20 RX ADMIN — SENNOSIDES 8.6 MG: 8.6 TABLET, FILM COATED ORAL at 22:20

## 2018-04-20 RX ADMIN — POTASSIUM CHLORIDE 20 MEQ: 1500 TABLET, EXTENDED RELEASE ORAL at 08:30

## 2018-04-20 RX ADMIN — HEPARIN SODIUM 5000 UNITS: 5000 INJECTION, SOLUTION INTRAVENOUS; SUBCUTANEOUS at 00:13

## 2018-04-20 RX ADMIN — SODIUM CHLORIDE, SODIUM GLUCONATE, SODIUM ACETATE, POTASSIUM CHLORIDE, MAGNESIUM CHLORIDE, SODIUM PHOSPHATE, DIBASIC, AND POTASSIUM PHOSPHATE 1000 ML: .53; .5; .37; .037; .03; .012; .00082 INJECTION, SOLUTION INTRAVENOUS at 14:57

## 2018-04-20 RX ADMIN — NIMODIPINE 60 MG: 30 CAPSULE ORAL at 19:30

## 2018-04-20 RX ADMIN — LEVETIRACETAM 750 MG: 750 TABLET ORAL at 21:05

## 2018-04-20 RX ADMIN — ACETAMINOPHEN 650 MG: 325 TABLET, FILM COATED ORAL at 05:09

## 2018-04-20 RX ADMIN — NIMODIPINE 60 MG: 30 CAPSULE ORAL at 08:27

## 2018-04-20 RX ADMIN — METHOCARBAMOL 500 MG: 500 TABLET ORAL at 16:01

## 2018-04-20 RX ADMIN — ACETAMINOPHEN 650 MG: 325 TABLET, FILM COATED ORAL at 00:11

## 2018-04-20 RX ADMIN — HYDROMORPHONE HYDROCHLORIDE 0.5 MG: 1 INJECTION, SOLUTION INTRAMUSCULAR; INTRAVENOUS; SUBCUTANEOUS at 08:50

## 2018-04-20 RX ADMIN — ACETAMINOPHEN 650 MG: 325 TABLET, FILM COATED ORAL at 12:14

## 2018-04-20 RX ADMIN — SERTRALINE HYDROCHLORIDE 25 MG: 25 TABLET ORAL at 10:59

## 2018-04-20 RX ADMIN — ALBUMIN HUMAN 25 G: 0.05 INJECTION, SOLUTION INTRAVENOUS at 14:55

## 2018-04-20 RX ADMIN — NIMODIPINE 60 MG: 30 CAPSULE ORAL at 12:15

## 2018-04-20 RX ADMIN — OXYCODONE HYDROCHLORIDE 5 MG: 5 TABLET ORAL at 13:31

## 2018-04-20 RX ADMIN — SODIUM CHLORIDE 75 ML/HR: 0.9 INJECTION, SOLUTION INTRAVENOUS at 00:28

## 2018-04-20 RX ADMIN — ACETAMINOPHEN 650 MG: 325 TABLET, FILM COATED ORAL at 18:09

## 2018-04-20 RX ADMIN — SODIUM CHLORIDE 90 ML/HR: 0.9 INJECTION, SOLUTION INTRAVENOUS at 21:38

## 2018-04-20 RX ADMIN — SODIUM CHLORIDE 90 ML/HR: 0.9 INJECTION, SOLUTION INTRAVENOUS at 10:04

## 2018-04-20 RX ADMIN — ONDANSETRON 4 MG: 2 INJECTION INTRAMUSCULAR; INTRAVENOUS at 10:29

## 2018-04-20 RX ADMIN — HEPARIN SODIUM 5000 UNITS: 5000 INJECTION, SOLUTION INTRAVENOUS; SUBCUTANEOUS at 05:10

## 2018-04-20 RX ADMIN — DOCUSATE SODIUM 100 MG: 100 CAPSULE, LIQUID FILLED ORAL at 08:27

## 2018-04-20 NOTE — PROGRESS NOTES
Progress Note - Critical Care   Jacqueline Kang 63 y o  female MRN: [id number]  Unit/Bed#: ICU 05 Encounter: [id number]    Attending Physician: Albino Jacobson DO      ______________________________________________________________________  Assessment and Plan:   Principal Problem:    SAH (subarachnoid hemorrhage) ([address] )  Active Problems:    History of asthma    Tobacco use disorder    Severe episode of recurrent major depressive disorder (HCC)    Hypoalbuminemia    Hypokalemia    Acquired hydrocephalus    Hypophosphatemia  Resolved Problems:    * No resolved hospital problems  *        Neuro:    -Large SAH with acute hydrocephalus, likely 2/2 ruptured aneurysm that has not yet been identified on angiogram  POD #3 s/p angiogram and R frontal EVD placement  Angiogram to be repeated 1 week from last    -ICP ranging 1-8   -EVD output 91cc/24 H   -MAP range   SBP goal <160 to reduce risk of vasospasm yet in the presence of presumed unsecured aneurysm  No labetalol required for high Bp    -MRI on 4/18 showed small acute/subacute infarct of L cerebellum  NSGY aware; some clumsiness of SANTOSH noted on exam    -TCD yesterday showed mild vasospasm of L MCA, no change in neuro exam  Continuing nimodipine 60mg Q 4 H   -Aim for euvolemia with UO 1:1 replacement with Isolyte   -Pain: Tylenol 650mg Q 6 H scheduled, oxycodone 5mg for moderate pain (0510) and Dilaudid 0 5mg for severe pain (0014)  -Monitor for seizure activity   -STAT CT head for decrease in GCS >2 in 1 hour  Currently 14 (E 3 V 5 M 6)      CV:    -No acute issue   -BP well-controlled  Permissive HTN up to 603 systolic   -Highest SBP has been 162   -MAP    -Labetalol PRN (none given)      Pulm:    -No acute issues   -Hx of asthma  Home meds of Singulair, Breo, Advair  Albuterol prn  Gi:   -Regular diet   -Colace 100mg BID   -No BM documented since admission  Add Elham scheduled and Miralax prn         :    -Likely cerebral salt wasting, though serum Na+ remains normal   -High UO around 1 8L/hour  Matching output with Isolyte   -BUN/Cr 3/0 50      F/E/N:     Fluids: Isolyte boluses per urine output    Electrolytes:  Na 144  K 3 6 -- replete  Cl 111  Phos 1 9 -- replete with Kphos 21mmol  Mg 2 6      Nutrition: Regular diet    ID:    -No infectious concerns at this time   -WBC 11 8 from 10 5   -Afebrile      Heme:    -Hgb 11 5 from 11 0   -Platelets 421,313 from 413,000    Endo:    -No acute issues   - this AM     Msk/Skin:    -Out of bed   -PT/PT    Disposition: ICU    Code Status: Level 1 - Full Code    Counseling / Coordination of Care  Total Critical Care time spent 25 minutes excluding procedures, teaching and family updates  ______________________________________________________________________    Chief Complaint: Headache  24 Hour Events:  Continues to have high urine output, replacing 1:1 with Isolyte  About 1 8L/hour  Highest per 2 hours was 3 4L      ______________________________________________________________________    Physical Exam:     Constitutional: Sleeping, wakes easily  NAD  HENT: NCAT  R frontal EVD  Cv: RRR, no murmur, normal peripheral pulses  NSR on tele  Pulm: CTAB  Normal effort  Gi: soft, NT    Gu: deferred    Ms: no edema    Neuro: A&O x 4  Very mild R facial droop, otherwise CN II-XII grossly intact  Normal ROM/strength in all 4 extremities  Skin: warm, dry     ______________________________________________________________________  Vitals:    18 0200 18 0300 18 0400 18 0500   BP: 142/88  148/86    BP Location:       Pulse: 66 66 74 66   Resp: (!) 24 (!) 23 (!) 29 16   Temp:       TempSrc:       SpO2: 93% 94% 95% 95%   Weight:       Height:           Temperature:   Temp (24hrs), Av 6 °F (37 °C), Min:98 4 °F (36 9 °C), Max:98 8 °F (37 1 °C)    Current Temperature: 98 8 °F (37 1 °C)  Weights:   IBW: 52 4 kg    Body mass index is 35 42 kg/m²    Weight (last 2 days)     None Hemodynamic Monitoring:  N/A     Non-Invasive/Invasive Ventilation Settings:  Respiratory    Lab Data (Last 4 hours)    None         O2/Vent Data (Last 4 hours)    None              No results found for: PHART, JNV7MJE, PO2ART, WUH8XJF, X0UNWWIL, BEART, SOURCE  SpO2: SpO2: 95 %  Intake and Outputs:  I/O       04/18 0701 - 04/19 0700 04/19 0701 - 04/20 0700    P  O  840 920    I V  (mL/kg) 2458 3 (27 2) 9894 6 (109 6)    IV Piggyback 2000 250    Total Intake(mL/kg) 5298 3 (58 7) 92560 6 (122 5)    Urine (mL/kg/hr) 4355 (2) 77076 (5 5)    Drains 261 (0 1) 91 (0)    Total Output 4616 15440    Net +682 3 -1026 4              UOP: 5 5 cc/kg/hour   Nutrition:        Diet Orders            Start     Ordered    04/17/18 1539  Diet Regular; Regular House; Regular House  Diet effective now     Question Answer Comment   Diet Type Regular    Regular Regular House    Other Restriction(s): Regular House    RD to adjust diet per protocol? Yes        04/17/18 1539        Labs:     Results from last 7 days  Lab Units 04/20/18  0516 04/19/18  0441 04/18/18  0437 04/17/18  0430  04/16/18  2055   WBC Thousand/uL 11 84* 10 50* 11 66* 10 37*  --  11 18*   HEMOGLOBIN g/dL 11 5 11 0* 10 9* 11 1*  --  13 4   I STAT HEMOGLOBIN   --   --   --   --   < >  --    HEMATOCRIT % 36 3 35 2 34 7* 35 3  --  41 3   PLATELETS Thousands/uL 406* 413* 423* 430*  --  484*   NEUTROS PCT %  --   --   --  62  --  81*   MONOS PCT %  --   --   --  9  --  7   < > = values in this interval not displayed      Results from last 7 days  Lab Units 04/20/18  0517 04/19/18  0441 04/18/18  0437 04/17/18  0430 04/16/18  2342   SODIUM mmol/L 144 145 137 141 134*   POTASSIUM mmol/L 3 6 3 7 4 1 3 7 4 4   CHLORIDE mmol/L 111* 113* 106 110* 111*   CO2 mmol/L 27 27 26 26 21   BUN mg/dL 3* 9 9 14 14   CREATININE mg/dL 0 50* 0 63 0 51* 0 58* 0 72   CALCIUM mg/dL 7 9* 7 8* 7 9* 8 0* 8 1*   TOTAL PROTEIN g/dL  --   --   --  5 6* 7 0   BILIRUBIN TOTAL mg/dL  --   --   --  0 31 0  35   ALK PHOS U/L  --   --   --  51 62   ALT U/L  --   --   --  13 15   AST U/L  --   --   --  12 22   GLUCOSE RANDOM mg/dL 105 107 99 94 96       Results from last 7 days  Lab Units 04/20/18  0517 04/19/18  0441 04/18/18  0437   MAGNESIUM mg/dL 2 6 2 0 2 2     Lab Results   Component Value Date    PHOS 1 9 (L) 04/20/2018    PHOS 2 2 (L) 04/19/2018    PHOS 2 8 04/18/2018        Results from last 7 days  Lab Units 04/16/18  2055   INR  0 93   PTT seconds 28       0  Lab Value Date/Time   TROPONINI <0 02 04/16/2018 2342         ABG:No results found for: PHART, IVV4EGE, PO2ART, TDL6ANT, L4QBFLXI, BEART, SOURCE  Imaging: TCD yesterday: mild vasospasm of L MCA  CXR: confirm PICC placement  I have personally reviewed pertinent reports  EKG: None new    Micro:  No results found for: Pat Pointer, WOUNDCULT, SPUTUMCULTUR  Allergies: No Known Allergies  Medications:   Scheduled Meds:  Current Facility-Administered Medications:  acetaminophen 650 mg Oral Q6H [facility] Aurora Barragan PA-C   albuterol 2 puff Inhalation Q6H PRN Vania Perez, DO   ALPRAZolam 0 25 mg Oral TID PRN Maximiliano Vaca MD   docusate sodium 100 mg Oral BID Vania Perez,    fluticasone furoate-vilanterol 1 puff Inhalation Daily Aurora Barragan PA-C   heparin (porcine) 5,000 Units Subcutaneous [facility] Aurora Barragan PA-C   HYDROmorphone 0 5 mg Intravenous Q2H PRN Aurora Barragan PA-C   labetalol 10 mg Intravenous Q4H PRN Fredis Hernandez MD   levETIRAcetam 750 mg Oral Q12H [facility] Gita Faulkner MD   methocarbamol 500 mg Oral Q6H PRN Aurora Barragan PA-C   montelukast 10 mg Oral Daily Vania Perez,    niMODipine 60 mg Oral Q4H [facility] Lowell Perez, DO   ondansetron 4 mg Intravenous Q6H PRN Vania Perez, DO   oxyCODONE 5 mg Oral Q6H PRN Aurora Barragan PA-C   pravastatin 40 mg Oral Daily [employer], DO   sertraline 25 mg Oral Daily Virgen Reyez MD     Continuous Infusions:   PRN Meds:    albuterol 2 puff Q6H PRN   ALPRAZolam 0 25 mg TID PRN   HYDROmorphone 0 5 mg Q2H PRN   labetalol 10 mg Q4H PRN   methocarbamol 500 mg Q6H PRN   ondansetron 4 mg Q6H PRN   oxyCODONE 5 mg Q6H PRN     VTE Pharmacologic Prophylaxis: Heparin  VTE Mechanical Prophylaxis: sequential compression device  Invasive lines and devices: Invasive Devices     Peripherally Inserted Central Catheter Line            PICC Line 03/37/31 Left Basilic less than 1 day          Drain            Urethral Catheter Temperature probe;Non-latex 16 Fr  2 days    Ventriculostomy/Subdural Ventricular drainage catheter Right Parietal region 2 days                     Portions of the record may have been created with voice recognition software  Occasional wrong word or "sound a like" substitutions may have occurred due to the inherent limitations of voice recognition software  Read the chart carefully and recognize, using context, where substitutions have occurred      Tomer Houston PA-C

## 2018-04-20 NOTE — OCCUPATIONAL THERAPY NOTE
OT cancel note: Attempted OT treatment, however, pt stated she cannot particiapte today due to feeling dizzy and feeling of needing to throw up  RN aware  Pt stated "hell no" when asked about participating in therapy  OT will continue to follow and treat as appropriate

## 2018-04-20 NOTE — PROGRESS NOTES
Progress Note - [facility] 63 y o  female MRN: [id number]  Unit/Bed#: ICU 05 Encounter: [id number]        I came to see the patient for continuation of care, she states that she feels depressed secondary to being in the hospital   She states that her sleep is better, still had poor appetite  She had headache today  She also states that she got Zoloft liquid and she vomited after she took it  She denies any other issues  Behavior over the last 24 hours:  improved  Sleep: normal  Appetite: poor  Medication side effects: No  ROS: headache    Mental Status Evaluation:  Appearance:  age appropriate   Behavior:  cooperative   Speech:  soft   Mood:  depressed   Affect:  mood-congruent   Language: naming objects and repeating phrases   Thought Process:  goal directed   Associations: intact associations   Thought Content:  normal   Perceptual Disturbances: None   Risk Potential: She denies any suicidal thoughts plans or intent   Sensorium:  person, place, time/date and situation   Memory:  recent and remote memory grossly intact   Cognition:  grossly intact   Consciousness:  alert and awake    Attention: attention span and concentration were age appropriate   Intellect: normal   Fund of Knowledge: awareness of current events: Fair   Insight:  fair   Judgment: fair   Muscle Strength and Tone: Within normal limits   Gait/Station: Unable to assess   Motor Activity: no abnormal movements         Assessment/Plan  Fátiam Gonzalez is a 63 y o  female admitted for neurosurgical management of subarachnoid hemorrhage  She has a long-standing history of depression and agrees to start medication  She had a dose of liquid Zoloft this morning but threw up as a result of such  She agreed to take the tablet form and is tolerating well thus far  She is to continue taking Zoloft 25mg PO daily       Diagnosis: Major depressive disorder, severe recurrent without psychotic features F33 2       Recommended Treatment: Continue medical management  Continue Zoloft 25mg tablet PO daily  Medical management per primary team   I will follow up  Discussed with primary team      Medications:   current meds:   Current Facility-Administered Medications   Medication Dose Route Frequency    acetaminophen (TYLENOL) tablet 650 mg  650 mg Oral Q6H [facility]    albuterol (PROVENTIL HFA,VENTOLIN HFA) inhaler 2 puff  2 puff Inhalation Q6H PRN    docusate sodium (COLACE) capsule 100 mg  100 mg Oral BID    fluticasone furoate-vilanterol (BREO ELLIPTA) 200-25 MCG/INH inhaler 1 puff  1 puff Inhalation Daily    heparin (porcine) subcutaneous injection 5,000 Units  5,000 Units Subcutaneous Q8H [facility]    HYDROmorphone (DILAUDID) injection 0 5 mg  0 5 mg Intravenous Q2H PRN    labetalol (NORMODYNE) injection 10 mg  10 mg Intravenous Q4H PRN    levETIRAcetam (KEPPRA) tablet 750 mg  750 mg Oral Q12H [facility]    methocarbamol (ROBAXIN) tablet 500 mg  500 mg Oral Q6H PRN    montelukast (SINGULAIR) tablet 10 mg  10 mg Oral Daily    niMODipine (NIMOTOP) capsule 60 mg  60 mg Oral Q4H [facility]    ondansetron (ZOFRAN) injection 4 mg  4 mg Intravenous Q6H PRN    oxyCODONE (ROXICODONE) immediate release tablet 10 mg  10 mg Oral Q4H PRN    oxyCODONE (ROXICODONE) IR tablet 5 mg  5 mg Oral Q6H PRN    polyethylene glycol (MIRALAX) packet 17 g  17 g Oral Daily PRN    potassium phosphate 30 mmol in sodium chloride 0 9 % 250 mL infusion  30 mmol Intravenous Once    pravastatin (PRAVACHOL) tablet 40 mg  40 mg Oral Daily With Dinner    senna (SENOKOT) tablet 8 6 mg  1 tablet Oral HS    sertraline (ZOLOFT) tablet 25 mg  25 mg Oral Daily    sodium chloride 0 9 % infusion  90 mL/hr Intravenous Continuous         Risks, benefits and possible side effects of Medications:     Risks, benefits, and possible side effects of medications explained to patient and patient verbalizes understanding  Labs: I have personally reviewed all pertinent laboratory results       Lab Results   Component Value Date    WBC 11 84 (H) 04/20/2018    HGB 11 5 04/20/2018    HCT 36 3 04/20/2018    MCV 96 04/20/2018     (H) 04/20/2018     Lab Results   Component Value Date     04/20/2018    K 3 6 04/20/2018     (H) 04/20/2018    CO2 27 04/20/2018    ANIONGAP 6 04/20/2018    BUN 3 (L) 04/20/2018    CREATININE 0 50 (L) 04/20/2018    GLUCOSE 105 04/20/2018    CALCIUM 7 9 (L) 04/20/2018    AST 12 04/17/2018    ALT 13 04/17/2018    ALKPHOS 51 04/17/2018    PROT 5 6 (L) 04/17/2018    BILITOT 0 31 04/17/2018    EGFR 108 04/20/2018         Elias Núñez MD

## 2018-04-20 NOTE — PROGRESS NOTES
Progress Note - Neurosurgery   Judy Prnice 63 y o  female MRN: [id number]  Unit/Bed#: ICU 05 Encounter: [id number]    Assessment:  1  Subarachnoid hemorrhage involving the suprasellar cistern, sylvian fissures, basal cisterns and in the anterior falx  Bleed day #3  2  HH3, Velazco 3  3  Acute hydrocephalus   4  History of asthma    Plan:  · Exam: GCS15  AAOx3  BARRERA with generalized weakness  No midline tenderness on palpation  Appreciated subtle left facial weakness  LT intact  No PD, marrufo or clonus  Imaging: personally reviewed and reviewed by attending:  · 4/18 - CT head wo: persistent diffuse subarachnoid hemorrhage, slightly improved with some redistribution of hemorrhage layering in the occipital horns  EVD present extending into the frontal horn of right lateral ventricle  · 4/18 - MRI brain wo: small linear focus of restricted diffusion in left cerebellar hemisphere without associated hemorrhage  Grossly stable subarachnoid throughout basal cistern  Stable mild hydrocephalus  · 4/18 - MRI cspine wo: Chronic disc degenerative changes throughout, resulting in central canal stenosis at C5-6 and C6-7  T2-3 paracentral disc protrusion  · STAT CT head without contrast if decline in GCS >2 pts/ 1 hr  · Plan repeat angiogram on Monday, 4/23  Subarachnoid Management:  · Continue Nimodipine 60mg q4h and TCDs   · TCDs on 4/18: right 1 6; left 1 9  · TCDs on 4/19: right 2 3; left 3 0  · TCDs on 4/20: right 2 85; left 3 6  · Deferred angio at this time secondary to stable examination  · Continue Keppra 750mg x 1 week total (day 4/7)  · Na 144, goal 140-150  · SBP goal <160  · Urine output -2 1L overnight - goal euvolemia   Drain management:  · EVD at 10 mmHg above tragus with 91 cc output from 5776-1215 - per report drain was working fine overnight without documentation of output     · From this morning 23 cc bloody CSF present in canister  · Call if output is greater than 20cc in 1 hour  · ICPs ranging -3-22; One time spike to 22  Average 8+  Call if ICPs >20 sustained without provoking cause  Medical management:   · PT/OT: recommend short term rehab  · DVT ppx: SCDs, HSQ  · Pain control: schedule tylenol 650mg q6h, with as needed oxycodone 5mg q6h prn moderate pain, dilaudid 0 5 mg q2h prn severe pain   · WBC 11 84, Currently 98 7  · Hbg 11 5 - transfuse if <8  · Platelets 473  · Bowel regimen  · Recommend d/c fulton, start voiding trial and PVR  Neurosurgery will continue to closely monitor, call for questions or concerns/change in examination  Subjective/Objective   Chief Complaint: "I have a headache"    Subjective: patient admits to having a bifrontal headache that wraps behind her eyes and down into her upper teeth  She describes the pain as intermittent, only occur with movement and coughing  She rates the pain with movement 6 out of 10  She admits to associated dizziness but denies photophobia/phonophobia  She also admits to having neck and back pain  Objective: moving from bed to chair with assistance x2    I/O       04/18 0701 - 04/19 0700 04/19 0701 - 04/20 0700 04/20 0701 - 04/21 0700    P  O  840 1120     I V  (mL/kg) 2458 3 (27 2) 83398 6 (149 7)     IV Piggyback 2000 250     Total Intake(mL/kg) 5298 3 (58 7) 04806 6 (164 9)     Urine (mL/kg/hr) 4355 (2) 49606 (7 8)     Emesis/NG output       Drains 261 (0 1) 91 (0)     Total Output 4616 45745      Net +682 3 -2101 4                   Invasive Devices     Peripherally Inserted Central Catheter Line            PICC Line 25/42/71 Left Basilic less than 1 day          Drain            Urethral Catheter Temperature probe;Non-latex 16 Fr  2 days    Ventriculostomy/Subdural Ventricular drainage catheter Right Parietal region 2 days                Physical Exam:  Vitals: Blood pressure 144/85, pulse 72, temperature 98 8 °F (37 1 °C), resp  rate 18, height 5' 3" (1 6 m), weight 90 7 kg (199 lb 15 3 oz), SpO2 95 %  ,Body mass index is 35 42 kg/m²      Hemodynamic Monitoring: MAP: Arterial Line MAP (mmHg): 96 mmHg, CPP: CPP: 105, ICP Mean: ICP Mean (mmHg): -3 mmHg    General appearance: alert, appears stated age, cooperative and no distress  Head: Normocephalic, right frontal EVD placed at 10mmHg above tragus with bloody CSF present in canister  Eyes: EOMI, PERRL  Neck: no palpable midline or paraspinous muscle tenderness  Lungs: non labored breathing  Heart: regular heart rate  Neurologic:   Mental status: Alert, oriented x4, thought content appropriate  Speech is fluent, clear  Able to repeat a sentence, perform simple math  Briskly answers questions  3/3 immediate and delayed object recall intact  Cranial nerves: grossly intact (Cranial nerves II-XII)  Subtle left facial droop  Tongue is midline  Sensory: normal to LT in bilateral upper and lower extremities  DSS intact  Motor: moving all extremities with generalized weakness 5-/5 throughout upper and lower extremities  Reflexes: 2+ and symmetric  negative marrufo, negative clonus  Coordination: finger to nose slight dysmetria bilaterally, no drift bilaterally  3/3 JPS intact  Lab Results:    Results from last 7 days  Lab Units 04/20/18  0516 04/19/18 0441 04/18/18  0437 04/17/18  0430  04/16/18  2055   WBC Thousand/uL 11 84* 10 50* 11 66* 10 37*  --  11 18*   HEMOGLOBIN g/dL 11 5 11 0* 10 9* 11 1*  --  13 4   I STAT HEMOGLOBIN   --   --   --   --   < >  --    HEMATOCRIT % 36 3 35 2 34 7* 35 3  --  41 3   PLATELETS Thousands/uL 406* 413* 423* 430*  --  484*   NEUTROS PCT %  --   --   --  62  --  81*   MONOS PCT %  --   --   --  9  --  7   < > = values in this interval not displayed      Results from last 7 days  Lab Units 04/20/18  0517 04/19/18  0441 04/18/18  0437 04/17/18  0430 04/16/18  2342   SODIUM mmol/L 144 145 137 141 134*   POTASSIUM mmol/L 3 6 3 7 4 1 3 7 4 4   CHLORIDE mmol/L 111* 113* 106 110* 111*   CO2 mmol/L 27 27 26 26 21   BUN mg/dL 3* 9 9 14 14   CREATININE mg/dL 0 50* 0 63 0 51* 0 58* 0 72 CALCIUM mg/dL 7 9* 7 8* 7 9* 8 0* 8 1*   TOTAL PROTEIN g/dL  --   --   --  5 6* 7 0   BILIRUBIN TOTAL mg/dL  --   --   --  0 31 0 35   ALK PHOS U/L  --   --   --  51 62   ALT U/L  --   --   --  13 15   AST U/L  --   --   --  12 22   GLUCOSE RANDOM mg/dL 105 107 99 94 96       Results from last 7 days  Lab Units 04/20/18  0517 04/19/18  0441 04/18/18  0437   MAGNESIUM mg/dL 2 6 2 0 2 2       Results from last 7 days  Lab Units 04/20/18  0517 04/19/18  0441 04/18/18  0437   PHOSPHORUS mg/dL 1 9* 2 2* 2 8       Results from last 7 days  Lab Units 04/16/18 2055   INR  0 93   PTT seconds 28     No results found for: TROPONINT  ABG:No results found for: PHART, JWF4SLT, PO2ART, YGA5RZG, G1MKQDIV, BEART, SOURCE    Imaging Studies: I have personally reviewed pertinent reports  and I have personally reviewed pertinent films in PACS    VAS transcranial doppler, complete study   Final Result      XR chest portable   Final Result   Right IJ catheter tip in the caval atrial junction region  Left PICC line tip also in the caval atrial junction region  Minimal bibasilar atelectasis  Workstation performed: [id number]         XR chest PICC line portable   Final Result   Left upper extremity PICC line projecting into the right atrium  Consider repositioning the PICC line by withdrawing approximately 2 cm  The study was marked in [facility] for immediate notification  Workstation performed: [id number]         MRI cervical spine wo contrast   Final Result         1  Chronic disc degenerative change throughout the cervical spine resulting in severe central canal stenosis at C5-6 and C6-7  Chronic mass effect on the cord without evidence of cord signal abnormality or myelomalacia  2   T2-3 paracentral disc protrusion and right paracentral disc extrusion with probable mild mass effect on the right ventral aspect of the cord                 Workstation performed: [id number]         MRI brain wo contrast   Final Result         1  Small linear focus of restricted diffusion in the left cerebellar hemisphere without associated hemorrhage, suggesting an acute or early subacute ischemic infarct  2   Grossly stable subarachnoid hemorrhage throughout the basal cisterns  No significant mass effect  3   Stable mild hydrocephalus and intraventricular blood products  I personally discussed this study with Dr King Noe on 4/18/2018 at 10:53 PM                Workstation performed: [id number]         VAS transcranial doppler, complete study   Final Result      CT head wo contrast   Final Result      Persistent diffuse subarachnoid hemorrhage, slightly improved  There is some redistribution with hemorrhage layering in the occipital horns of the lateral ventricles  Ventricular drain present extending into the frontal horn of the right lateral ventricle  Workstation performed: [id number]         VAS transcranial doppler, complete study   Final Result      XR chest 1 view portable   Final Result      Right IJ line placement, appears to be in satisfactory position, tip is at the cavoatrial junction  No pneumothorax  Low lung volumes  Workstation performed: [id number]         IR cerebral angiography / intervention    (Results Pending)   VAS transcranial doppler, complete study    (Results Pending)   VAS transcranial doppler, complete study    (Results Pending)       EKG, Pathology, and Other Studies: I have personally reviewed pertinent reports        VTE  Prophylaxis: Sequential compression device (Venodyne)  and Heparin

## 2018-04-20 NOTE — PHYSICAL THERAPY NOTE
Pt refuses session this AM due to N/V, dizziness  Will follow as appropriate    Kim Hare PT, DPT CSRS

## 2018-04-21 ENCOUNTER — APPOINTMENT (INPATIENT)
Dept: NON INVASIVE DIAGNOSTICS | Facility: HOSPITAL | Age: 58
DRG: 021 | End: 2018-04-21
Payer: COMMERCIAL

## 2018-04-21 ENCOUNTER — APPOINTMENT (INPATIENT)
Dept: RADIOLOGY | Facility: HOSPITAL | Age: 58
DRG: 021 | End: 2018-04-21
Payer: COMMERCIAL

## 2018-04-21 PROBLEM — E87.6 HYPOKALEMIA: Status: RESOLVED | Noted: 2018-04-17 | Resolved: 2018-04-21

## 2018-04-21 LAB
AMPHETAMINES UR QL SCN: NEGATIVE NG/ML
ANION GAP SERPL CALCULATED.3IONS-SCNC: 5 MMOL/L (ref 4–13)
BARBITURATES UR QL SCN: NEGATIVE NG/ML
BENZODIAZ UR QL SCN: NEGATIVE NG/ML
BUN SERPL-MCNC: 3 MG/DL (ref 5–25)
BZE UR QL: POSITIVE
CALCIUM SERPL-MCNC: 8.6 MG/DL (ref 8.3–10.1)
CANNABINOIDS UR QL SCN: NEGATIVE NG/ML
CHLORIDE SERPL-SCNC: 111 MMOL/L (ref 100–108)
CO2 SERPL-SCNC: 26 MMOL/L (ref 21–32)
CREAT SERPL-MCNC: 0.63 MG/DL (ref 0.6–1.3)
ERYTHROCYTE [DISTWIDTH] IN BLOOD BY AUTOMATED COUNT: 14.2 % (ref 11.6–15.1)
GFR SERPL CREATININE-BSD FRML MDRD: 100 ML/MIN/1.73SQ M
GLUCOSE SERPL-MCNC: 111 MG/DL (ref 65–140)
HCT VFR BLD AUTO: 30.7 % (ref 34.8–46.1)
HGB BLD-MCNC: 10.1 G/DL (ref 11.5–15.4)
MAGNESIUM SERPL-MCNC: 2.3 MG/DL (ref 1.6–2.6)
MCH RBC QN AUTO: 30.7 PG (ref 26.8–34.3)
MCHC RBC AUTO-ENTMCNC: 32.9 G/DL (ref 31.4–37.4)
MCV RBC AUTO: 93 FL (ref 82–98)
METHADONE UR QL SCN: NEGATIVE NG/ML
OPIATES UR QL: NEGATIVE NG/ML
PCP UR QL: NEGATIVE NG/ML
PHOSPHATE SERPL-MCNC: 2.3 MG/DL (ref 2.7–4.5)
PLATELET # BLD AUTO: 348 THOUSANDS/UL (ref 149–390)
PMV BLD AUTO: 10.1 FL (ref 8.9–12.7)
POTASSIUM SERPL-SCNC: 4 MMOL/L (ref 3.5–5.3)
PROCALCITONIN SERPL-MCNC: <0.05 NG/ML
PROPOXYPH UR QL: NEGATIVE NG/ML
RBC # BLD AUTO: 3.29 MILLION/UL (ref 3.81–5.12)
SODIUM SERPL-SCNC: 142 MMOL/L (ref 136–145)
WBC # BLD AUTO: 12.57 THOUSAND/UL (ref 4.31–10.16)

## 2018-04-21 PROCEDURE — 71045 X-RAY EXAM CHEST 1 VIEW: CPT

## 2018-04-21 PROCEDURE — 80048 BASIC METABOLIC PNL TOTAL CA: CPT | Performed by: PHYSICIAN ASSISTANT

## 2018-04-21 PROCEDURE — 99233 SBSQ HOSP IP/OBS HIGH 50: CPT | Performed by: NEUROLOGICAL SURGERY

## 2018-04-21 PROCEDURE — 84145 PROCALCITONIN (PCT): CPT | Performed by: PHYSICIAN ASSISTANT

## 2018-04-21 PROCEDURE — 93886 INTRACRANIAL COMPLETE STUDY: CPT | Performed by: SURGERY

## 2018-04-21 PROCEDURE — 93886 INTRACRANIAL COMPLETE STUDY: CPT

## 2018-04-21 PROCEDURE — 83735 ASSAY OF MAGNESIUM: CPT | Performed by: PHYSICIAN ASSISTANT

## 2018-04-21 PROCEDURE — 85027 COMPLETE CBC AUTOMATED: CPT | Performed by: PHYSICIAN ASSISTANT

## 2018-04-21 PROCEDURE — 99291 CRITICAL CARE FIRST HOUR: CPT | Performed by: INTERNAL MEDICINE

## 2018-04-21 PROCEDURE — 84100 ASSAY OF PHOSPHORUS: CPT | Performed by: PHYSICIAN ASSISTANT

## 2018-04-21 RX ORDER — ALBUMIN, HUMAN INJ 5% 5 %
25 SOLUTION INTRAVENOUS EVERY 8 HOURS
Status: DISCONTINUED | OUTPATIENT
Start: 2018-04-21 | End: 2018-04-22

## 2018-04-21 RX ORDER — BISACODYL 10 MG
10 SUPPOSITORY, RECTAL RECTAL DAILY PRN
Status: DISCONTINUED | OUTPATIENT
Start: 2018-04-21 | End: 2018-05-02 | Stop reason: HOSPADM

## 2018-04-21 RX ORDER — METHOCARBAMOL 500 MG/1
500 TABLET, FILM COATED ORAL EVERY 6 HOURS PRN
Status: DISCONTINUED | OUTPATIENT
Start: 2018-04-21 | End: 2018-05-02 | Stop reason: HOSPADM

## 2018-04-21 RX ADMIN — HEPARIN SODIUM 5000 UNITS: 5000 INJECTION, SOLUTION INTRAVENOUS; SUBCUTANEOUS at 06:00

## 2018-04-21 RX ADMIN — OXYCODONE HYDROCHLORIDE 5 MG: 5 TABLET ORAL at 14:06

## 2018-04-21 RX ADMIN — ALPRAZOLAM 0.25 MG: 0.25 TABLET ORAL at 12:01

## 2018-04-21 RX ADMIN — HEPARIN SODIUM 5000 UNITS: 5000 INJECTION, SOLUTION INTRAVENOUS; SUBCUTANEOUS at 14:06

## 2018-04-21 RX ADMIN — FLUTICASONE FUROATE AND VILANTEROL 1 PUFF: 200; 25 POWDER RESPIRATORY (INHALATION) at 08:47

## 2018-04-21 RX ADMIN — NIMODIPINE 60 MG: 30 CAPSULE ORAL at 16:17

## 2018-04-21 RX ADMIN — DOCUSATE SODIUM 100 MG: 100 CAPSULE, LIQUID FILLED ORAL at 17:43

## 2018-04-21 RX ADMIN — MONTELUKAST SODIUM 10 MG: 10 TABLET, FILM COATED ORAL at 08:47

## 2018-04-21 RX ADMIN — NIMODIPINE 60 MG: 30 CAPSULE ORAL at 11:52

## 2018-04-21 RX ADMIN — METHOCARBAMOL 500 MG: 500 TABLET ORAL at 19:52

## 2018-04-21 RX ADMIN — SENNOSIDES 8.6 MG: 8.6 TABLET, FILM COATED ORAL at 22:15

## 2018-04-21 RX ADMIN — ACETAMINOPHEN 650 MG: 325 TABLET, FILM COATED ORAL at 17:43

## 2018-04-21 RX ADMIN — NIMODIPINE 60 MG: 30 CAPSULE ORAL at 04:35

## 2018-04-21 RX ADMIN — METHOCARBAMOL 500 MG: 500 TABLET ORAL at 11:51

## 2018-04-21 RX ADMIN — OXYCODONE HYDROCHLORIDE 5 MG: 5 TABLET ORAL at 04:40

## 2018-04-21 RX ADMIN — OXYCODONE HYDROCHLORIDE 5 MG: 5 TABLET ORAL at 00:20

## 2018-04-21 RX ADMIN — ACETAMINOPHEN 650 MG: 325 TABLET, FILM COATED ORAL at 00:11

## 2018-04-21 RX ADMIN — LEVETIRACETAM 750 MG: 750 TABLET ORAL at 08:46

## 2018-04-21 RX ADMIN — METHOCARBAMOL 500 MG: 500 TABLET ORAL at 05:59

## 2018-04-21 RX ADMIN — ALBUMIN HUMAN 25 G: 0.25 SOLUTION INTRAVENOUS at 01:42

## 2018-04-21 RX ADMIN — NIMODIPINE 60 MG: 30 CAPSULE ORAL at 19:52

## 2018-04-21 RX ADMIN — SODIUM CHLORIDE 50 ML/HR: 0.9 INJECTION, SOLUTION INTRAVENOUS at 22:29

## 2018-04-21 RX ADMIN — OXYCODONE HYDROCHLORIDE 5 MG: 5 TABLET ORAL at 17:43

## 2018-04-21 RX ADMIN — NIMODIPINE 60 MG: 30 CAPSULE ORAL at 00:12

## 2018-04-21 RX ADMIN — OXYCODONE HYDROCHLORIDE 5 MG: 5 TABLET ORAL at 08:46

## 2018-04-21 RX ADMIN — NIMODIPINE 60 MG: 30 CAPSULE ORAL at 08:46

## 2018-04-21 RX ADMIN — DOCUSATE SODIUM 100 MG: 100 CAPSULE, LIQUID FILLED ORAL at 08:46

## 2018-04-21 RX ADMIN — HEPARIN SODIUM 5000 UNITS: 5000 INJECTION, SOLUTION INTRAVENOUS; SUBCUTANEOUS at 22:15

## 2018-04-21 RX ADMIN — SERTRALINE HYDROCHLORIDE 25 MG: 25 TABLET ORAL at 08:46

## 2018-04-21 RX ADMIN — LEVETIRACETAM 750 MG: 750 TABLET ORAL at 20:52

## 2018-04-21 RX ADMIN — METHOCARBAMOL 500 MG: 500 TABLET ORAL at 00:11

## 2018-04-21 RX ADMIN — ACETAMINOPHEN 650 MG: 325 TABLET, FILM COATED ORAL at 05:59

## 2018-04-21 RX ADMIN — POTASSIUM PHOSPHATE, MONOBASIC AND POTASSIUM PHOSPHATE, DIBASIC 21 MMOL: 224; 236 INJECTION, SOLUTION INTRAVENOUS at 10:16

## 2018-04-21 RX ADMIN — PRAVASTATIN SODIUM 40 MG: 40 TABLET ORAL at 16:17

## 2018-04-21 RX ADMIN — ALBUMIN HUMAN 25 G: 0.05 INJECTION, SOLUTION INTRAVENOUS at 16:17

## 2018-04-21 RX ADMIN — ACETAMINOPHEN 650 MG: 325 TABLET, FILM COATED ORAL at 11:50

## 2018-04-21 RX ADMIN — HYDROMORPHONE HYDROCHLORIDE 0.5 MG: 1 INJECTION, SOLUTION INTRAMUSCULAR; INTRAVENOUS; SUBCUTANEOUS at 21:25

## 2018-04-21 RX ADMIN — POLYETHYLENE GLYCOL 3350 17 G: 17 POWDER, FOR SOLUTION ORAL at 08:57

## 2018-04-21 RX ADMIN — ALBUMIN HUMAN 25 G: 0.25 SOLUTION INTRAVENOUS at 08:30

## 2018-04-21 NOTE — PROGRESS NOTES
Progress Note - Neurosurgery   Tana Padron 63 y o  female MRN: [id number]  Unit/Bed#: ICU 05 Encounter: [id number]    Assessment/Plan:    · SAH #4, CTA and agram negative  · HH#, F3  · EVD at +10, drained 213 yesterday  Continue  · Follow Exam, TCDs; bilaterally <3 today  · Aim for euvolemia  · Plan for repeat angiogram Monday 4/23  · Keppra for 1 week (day 5/7)  · Low grade temp last 24h  Tmax 101  1     · Discussed with CCM attending, Dr Lesia Howell      History:    Chief Complaint: headache    Subjective: "My head hurts, my neck hurts " Denies W/N/T      all current active meds have been reviewed and current meds:   Current Facility-Administered Medications   Medication Dose Route Frequency    acetaminophen (TYLENOL) tablet 650 mg  650 mg Oral Q6H [address]    albumin human (FLEXBUMIN) 25 % injection 25 g  25 g Intravenous Q6H    albuterol (PROVENTIL HFA,VENTOLIN HFA) inhaler 2 puff  2 puff Inhalation Q6H PRN    ALPRAZolam (XANAX) tablet 0 25 mg  0 25 mg Oral Daily PRN    docusate sodium (COLACE) capsule 100 mg  100 mg Oral BID    fentanyl citrate (PF) 100 MCG/2ML 25 mcg  25 mcg Intravenous Q2H PRN    fluticasone furoate-vilanterol (BREO ELLIPTA) 200-25 MCG/INH inhaler 1 puff  1 puff Inhalation Daily    heparin (porcine) subcutaneous injection 5,000 Units  5,000 Units Subcutaneous Q8H [address]    HYDROmorphone (DILAUDID) injection 0 5 mg  0 5 mg Intravenous Q2H PRN    labetalol (NORMODYNE) injection 10 mg  10 mg Intravenous Q4H PRN    levETIRAcetam (KEPPRA) tablet 750 mg  750 mg Oral Q12H [address]    methocarbamol (ROBAXIN) tablet 500 mg  500 mg Oral Q6H [address]    montelukast (SINGULAIR) tablet 10 mg  10 mg Oral Daily    niMODipine (NIMOTOP) capsule 60 mg  60 mg Oral Q4H SAMI    ondansetron (ZOFRAN) injection 4 mg  4 mg Intravenous Q6H PRN    oxyCODONE (ROXICODONE) immediate release tablet 10 mg  10 mg Oral Q4H PRN    oxyCODONE (ROXICODONE) IR tablet 5 mg  5 mg Oral Q4H PRN    polyethylene glycol (MIRALAX) packet 17 g  17 g Oral Daily PRN    pravastatin (PRAVACHOL) tablet 40 mg  40 mg Oral Daily With Dinner    senna (SENOKOT) tablet 8 6 mg  1 tablet Oral HS    sertraline (ZOLOFT) tablet 25 mg  25 mg Oral Daily    sodium chloride 0 9 % infusion  90 mL/hr Intravenous Continuous       Objective:     Exam:     Vitals: Blood pressure 137/69, pulse 68, temperature (!) 100 9 °F (38 3 °C), temperature source Oral, resp  rate (!) 24, height 5' 3" (1 6 m), weight 90 7 kg (199 lb 15 3 oz), SpO2 97 %  ,Body mass index is 35 42 kg/m²  MAPs 90-100s    I/O -1 3 L last 24h    ICPs 4-16 last 24h; 21 x 1  EVD output 213 last 24h  Set at +10 mm Hg  Physical Exam   Constitutional: She is oriented to person, place, and time  She appears well-developed and well-nourished  HENT:   Head: Normocephalic and atraumatic  Eyes: EOM are normal    Cardiovascular: Normal rate  Pulmonary/Chest: Effort normal    Abdominal: Soft  Musculoskeletal: Normal range of motion  Neurological: She is oriented to person, place, and time  No cranial nerve deficit  Skin: Skin is warm and dry  Psychiatric: She has a normal mood and affect  Her speech is normal        Neurologic Exam     Mental Status   Oriented to person, place, and time  Oriented to person  Oriented to place  Oriented to city and area  Oriented to year and month     Attention: normal    Speech: speech is normal   Level of consciousness: arousable by verbal stimuli    Cranial Nerves     CN III, IV, VI   Extraocular motions are normal      Motor Exam   Muscle bulk: normal  Right arm pronator drift: absent  Left arm pronator drift: absent    Gait, Coordination, and Reflexes     Tremor   Resting tremor: absent  Intention tremor: absent  Action tremor: absent        MDM:    Lab Results:      Results from last 7 days  Lab Units 04/21/18  0442 04/20/18  0516 04/19/18  0441  04/17/18  0430  04/16/18  2055   WBC Thousand/uL 12 57* 11 84* 10 50*  < > 10 37*  --  11 18*   HEMOGLOBIN g/dL 10 1* 11 5 11 0*  < > 11 1*  --  13 4   I STAT HEMOGLOBIN   --   --   --   --   --   < >  --    HEMATOCRIT % 30 7* 36 3 35 2  < > 35 3  --  41 3   PLATELETS Thousands/uL 348 406* 413*  < > 430*  --  484*   NEUTROS PCT %  --   --   --   --  62  --  81*   MONOS PCT %  --   --   --   --  9  --  7   < > = values in this interval not displayed  Results from last 7 days  Lab Units 04/21/18 0442 04/20/18 0517 04/19/18 0441  04/17/18  0430 04/16/18  2342   SODIUM mmol/L 142 144 145  < > 141 134*   POTASSIUM mmol/L 4 0 3 6 3 7  < > 3 7 4 4   CHLORIDE mmol/L 111* 111* 113*  < > 110* 111*   CO2 mmol/L 26 27 27  < > 26 21   BUN mg/dL 3* 3* 9  < > 14 14   CREATININE mg/dL 0 63 0 50* 0 63  < > 0 58* 0 72   CALCIUM mg/dL 8 6 7 9* 7 8*  < > 8 0* 8 1*   TOTAL PROTEIN g/dL  --   --   --   --  5 6* 7 0   BILIRUBIN TOTAL mg/dL  --   --   --   --  0 31 0 35   ALK PHOS U/L  --   --   --   --  51 62   ALT U/L  --   --   --   --  13 15   AST U/L  --   --   --   --  12 22   GLUCOSE RANDOM mg/dL 111 105 107  < > 94 96   < > = values in this interval not displayed  Above lab results personally reviewed

## 2018-04-21 NOTE — PROGRESS NOTES
Progress Note - Critical Care   Judy Prince 63 y o  female MRN: [id number]  Unit/Bed#: ICU 05 Encounter: [id number]    Attending Physician: Esteban Webb DO      ______________________________________________________________________  Assessment and Plan:   Principal Problem:    SAH (subarachnoid hemorrhage) ([address] )  Active Problems:    History of asthma    Tobacco use disorder    Severe episode of recurrent major depressive disorder (HCC)    Hypoalbuminemia    Hypokalemia    Acquired hydrocephalus    Hypophosphatemia    Polyuria  Resolved Problems:    * No resolved hospital problems  *        Neuro:    -Large SAH with acute hydrocephalus, likely 2/2 ruptured aneurysm that has not yet been identified on angiogram  POD #4 s/p angiogram and R frontal EVD placement  Angiogram to be repeated on Monday, 4/23  -ICP ranging 4-16   -CPP ranging    -EVD output 213cc in 24 H  Highest in 2H period was 55cc between 00:00 and 01:00   -MAP range    -TCD yesterday showed increased velocities, ratio 2 9 on R, 3 7 on L  Exam unchanged, so angiogram deferred  NSGY aware  -Aim for euvolemia  UO has tapered off since yesterday afternoon  Plan overnight was replace every 1cc of urine with 0 5cc Isolyte and continue NSS at 90cc/hour with albumin Q 6 H     -As of 1700 yesterday, the pt was -572cc  Between 1700 and 1800, she diuresed 1 3L  In that time frame, she received 830cc in, making her overall negative about 1l  Per the The 0 5 : 1 replacement regimen was ordered to start at 1800, and as a result, she gradually became slightly more negative overnight  As of 0700 today, she was negative 1 3L  At the start of day shift, she diuresed 450cc, so she currently has about 250cc hanging  Consider an additional 500cc bolus  -TCD improved today: 2 36 on R, 2 87 on L  Continue nimodipine    -Pain regimen adjusted overnight   Now: APAP Q 6 H scheduled, Robaxin now scheduled Q 6 H, oxycodone 5mg every 4H prn (given at 0440), oxycodone 10mg every 4H prn (none given since changes), and Dilaudid 0 5mg Q 2 H prn as well as Fentanyl 25mcg Q 2 H prn  Oxycodone 10mg and Dilaudid 0 5mg both indicated for "severe pain" and Fentanyl is ordered for breatthrough pain  Will re-assess  CV:    -No acute issues   -Permissive HTN up to 756 systolic  Range has been 123-174  Has not received labetalol  -MAP range       Pulm:    -CXR this AM done for fever overnight -- appears to have a R sided effusion and possible pulmonary vascular congestion, R > L -- discuss on AM rounds  She is non-toxic appearing on exam  Will sit up to better assess lung sounds on rounds when EVD can be adjusted at the same time    -Hx of asthma   -Home Singulair, Breo, Advair  Albuterol prn  None given  GI:    -Regular diet   -Colace 100mg BID, Senokot QHS   -Miralax prn  Give today  No BM documented this admission  :    -Likely cerebral salt wasting, though serum Na has been normal  142 today    -High Uo  Overall in 24H, 1945 out, 1943 in  Negative 1293 in 24H  See neuro above  -BUN/Cr 3 0 63  F/E/N:     Fluids: NSS 90cc/hour, albumin Q 6 H, Isolyte replacement 0 5cc for every 1cc urine    Electrolytes:  Na 142  K 4 0  Cl 111 -- change maintenance fluid to Isolyte? Ca 8 6  Phos 2 3  Mg 2 3    Nutrition: Regular diet    ID:    -Temp spiked overnight to 101 1 at midnight  Already has Tylenol scheduled every 6 hours  -CXR ordered this Am, as well as procalcitonin  Blood cultures ordered    -No temp since midnight; reassess   -WBC 12 from 11   -Consider CSF studies    Heme:    -Hgb 10 2 from 11 5   -Platelets 148,055   -heparin ppx   -SCDs    Endo:    -No acute issues   - this AM     Msk/Skin:    -Out of bed   -PT/OT    Disposition: ICU  Code Status: Level 1 - Full Code    Counseling / Coordination of Care  Total Critical Care time spent 40 minutes excluding procedures, teaching and family updates  ______________________________________________________________________    Chief Complaint: None  Asked about headache -- "it's not there yet "    24 Hour Events: Temp spiked to 101 1 overnight  None new yet recorded  High Uo, though seems to be leveling off       ______________________________________________________________________    Physical Exam:     Constitutional: Sleeping, wakes easily  HENT: R frontal EVD  Pupils are equal, round, and briskly reactive  Cv: RRR, no murmur, good peripheral perfusion  Pulm: CTAB anterior  Gi: soft, NT    Gu: deferred    Ms: no edema    Neuro: GCS 14 (E 3 V 5 M 6)  Very subtle R facial droop, otherwise CN II-XII grossly intact  Normal ROM and strength in all extremities  Normal finger to nose and SANTOSH      Skin: Warm and dry    ______________________________________________________________________  Vitals:    18 0400 18 0500 18 0600 18 0700   BP: 132/78 116/63 136/72 137/69   Pulse: 74 76 68 68   Resp: (!) 24 (!) 28 16 (!) 24   Temp: (!) 100 9 °F (38 3 °C)      TempSrc: Oral      SpO2: 92% 90% 97% 97%   Weight:       Height:           Temperature:   Temp (24hrs), Av 7 °F (37 6 °C), Min:98 5 °F (36 9 °C), Max:101 1 °F (38 4 °C)    Current Temperature: (!) 100 9 °F (38 3 °C)  Weights:   IBW: 52 4 kg    Body mass index is 35 42 kg/m²  Weight (last 2 days)     None        Hemodynamic Monitoring:  N/A     Non-Invasive/Invasive Ventilation Settings:  Respiratory    Lab Data (Last 4 hours)    None         O2/Vent Data (Last 4 hours)    None              No results found for: PHART, HIK3PXX, PO2ART, KIM7NEL, B3TJGIAQ, BEART, SOURCE  SpO2: SpO2: 97 %  Intake and Outputs:  I/O        07 07 0700    P  O  1120 1620     I V  (mL/kg) 89238 6 (149 7) 6066 3 (67 2) 406 5 (4 5)    IV Piggyback 250 1250     Total Intake(mL/kg) 97123 6 (164 9) 8936 3 (99) 406 5 (4 5)    Urine (mL/kg/hr) 46550 (7 8) 38894 (4 8) 450 (4 8)    Drains 91 (0) 213 (0 1)     Total Output 56282 09840 450    Net -2101 4 -1576 8 -43 5                  Nutrition:        Diet Orders            Start     Ordered    04/20/18 1900  Dietary nutrition supplements  Once     Question Answer Comment   Select Supplement: Ensure Enlive-Chocolate    Frequency Breakfast, Lunch, Dinner, HS        04/20/18 1900    04/17/18 1539  Diet Regular; Regular House; Regular House  Diet effective now     Question Answer Comment   Diet Type Regular    Regular Regular House    Other Restriction(s): Regular House    RD to adjust diet per protocol? Yes        04/17/18 1539          Labs:     Results from last 7 days  Lab Units 04/21/18 0442 04/20/18  0516 04/19/18 0441 04/17/18  0430  04/16/18  2055   WBC Thousand/uL 12 57* 11 84* 10 50*  < > 10 37*  --  11 18*   HEMOGLOBIN g/dL 10 1* 11 5 11 0*  < > 11 1*  --  13 4   I STAT HEMOGLOBIN   --   --   --   --   --   < >  --    HEMATOCRIT % 30 7* 36 3 35 2  < > 35 3  --  41 3   PLATELETS Thousands/uL 348 406* 413*  < > 430*  --  484*   NEUTROS PCT %  --   --   --   --  62  --  81*   MONOS PCT %  --   --   --   --  9  --  7   < > = values in this interval not displayed  Results from last 7 days  Lab Units 04/21/18 0442 04/20/18  0517 04/19/18  0441 04/17/18  0430 04/16/18  2342   SODIUM mmol/L 142 144 145  < > 141 134*   POTASSIUM mmol/L 4 0 3 6 3 7  < > 3 7 4 4   CHLORIDE mmol/L 111* 111* 113*  < > 110* 111*   CO2 mmol/L 26 27 27  < > 26 21   BUN mg/dL 3* 3* 9  < > 14 14   CREATININE mg/dL 0 63 0 50* 0 63  < > 0 58* 0 72   CALCIUM mg/dL 8 6 7 9* 7 8*  < > 8 0* 8 1*   TOTAL PROTEIN g/dL  --   --   --   --  5 6* 7 0   BILIRUBIN TOTAL mg/dL  --   --   --   --  0 31 0 35   ALK PHOS U/L  --   --   --   --  51 62   ALT U/L  --   --   --   --  13 15   AST U/L  --   --   --   --  12 22   GLUCOSE RANDOM mg/dL 111 105 107  < > 94 96   < > = values in this interval not displayed      Results from last 7 days  Lab Units 04/21/18  0442 04/20/18  0517 04/19/18  0441   MAGNESIUM mg/dL 2 3 2 6 2 0     Lab Results   Component Value Date    PHOS 2 3 (L) 04/21/2018    PHOS 2 6 (L) 04/20/2018    PHOS 1 9 (L) 04/20/2018        Results from last 7 days  Lab Units 04/16/18  2055   INR  0 93   PTT seconds 28       0  Lab Value Date/Time   TROPONINI <0 02 04/16/2018 2342         ABG:No results found for: PHART, NQX5DTP, PO2ART, JLJ8TJC, O1NGAXDJ, BEART, SOURCE  Imaging: CXR -- R effusion, increased pulmonary vascular congestion, R > L  TCD this AM improved, see above  I have personally reviewed pertinent reports      EKG: None new  Micro:  No results found for: Hal Healy, WOUNDCULT, SPUTUMCULTUR  Allergies: No Known Allergies  Medications:   Scheduled Meds:  Current Facility-Administered Medications:  acetaminophen 650 mg Oral Q6H [facility] Aurora Barragan PA-C    albumin human 25 g Intravenous Q6H Francisco Javier Fisher MD Last Rate: 0 g (04/20/18 2130)   albuterol 2 puff Inhalation Q6H PRN Gloria Perez DO    ALPRAZolam 0 25 mg Oral Daily PRN Aurora Barragan PA-C    docusate sodium 100 mg Oral BID Gloria Perez DO    fentanyl citrate (PF) 25 mcg Intravenous Q2H PRN Francisco Javier Fisher MD    fluticasone furoate-vilanterol 1 puff Inhalation Daily Aurora Barragan PA-C    heparin (porcine) 5,000 Units Subcutaneous [facility] Aurora Barragan PA-C    HYDROmorphone 0 5 mg Intravenous Q2H PRN Arnaud Marrero MD    labetalol 10 mg Intravenous Q4H PRN Arnaud Marrero MD    levETIRAcetam 750 mg Oral Q12H [facility] Cyndie Cheek MD    methocarbamol 500 mg Oral Q6H [facility] Francisco Javier Fisher MD    montelukast 10 mg Oral Daily [facility]    niMODipine 60 mg Oral Q4H [facility] intermediate Lowell Perez,     ondansetron 4 mg Intravenous Q6H PRN Gloria Perez DO    oxyCODONE 10 mg Oral Q4H PRN Arnaud Marrero MD    oxyCODONE 5 mg Oral Q4H PRN Francisco Javier Fisher MD    polyethylene glycol 17 g Oral Daily PRN Aurora Barragan PA-C    pravastatin 40 mg Oral Daily With Shayla Juarez     senna 1 tablet Oral HS Aurora Barragan PA-C    sertraline 25 mg Oral Daily Danitza Gutierrez MD    sodium chloride 90 mL/hr Intravenous Continuous Guy Smith PA-C Last Rate: 90 mL/hr (04/20/18 2138)     Continuous Infusions:  sodium chloride 90 mL/hr Last Rate: 90 mL/hr (04/20/18 2138)     PRN Meds:    albuterol 2 puff Q6H PRN   ALPRAZolam 0 25 mg Daily PRN   fentanyl citrate (PF) 25 mcg Q2H PRN   HYDROmorphone 0 5 mg Q2H PRN   labetalol 10 mg Q4H PRN   ondansetron 4 mg Q6H PRN   oxyCODONE 10 mg Q4H PRN   oxyCODONE 5 mg Q4H PRN   polyethylene glycol 17 g Daily PRN     VTE Pharmacologic Prophylaxis: Heparin  VTE Mechanical Prophylaxis: sequential compression device  Invasive lines and devices: Invasive Devices     Peripherally Inserted Central Catheter Line            PICC Line 43/32/67 Left Basilic 1 day          Drain            Urethral Catheter Temperature probe;Non-latex 16 Fr  3 days    Ventriculostomy/Subdural Ventricular drainage catheter Right Parietal region 3 days                     Portions of the record may have been created with voice recognition software  Occasional wrong word or "sound a like" substitutions may have occurred due to the inherent limitations of voice recognition software  Read the chart carefully and recognize, using context, where substitutions have occurred      Guy Smith PA-C

## 2018-04-22 ENCOUNTER — APPOINTMENT (INPATIENT)
Dept: NON INVASIVE DIAGNOSTICS | Facility: HOSPITAL | Age: 58
DRG: 021 | End: 2018-04-22
Payer: COMMERCIAL

## 2018-04-22 LAB
ANION GAP SERPL CALCULATED.3IONS-SCNC: 7 MMOL/L (ref 4–13)
BASOPHILS # BLD AUTO: 0.03 THOUSANDS/ΜL (ref 0–0.1)
BASOPHILS NFR BLD AUTO: 0 % (ref 0–1)
BUN SERPL-MCNC: 6 MG/DL (ref 5–25)
CALCIUM SERPL-MCNC: 8.7 MG/DL (ref 8.3–10.1)
CHLORIDE SERPL-SCNC: 108 MMOL/L (ref 100–108)
CO2 SERPL-SCNC: 26 MMOL/L (ref 21–32)
CREAT SERPL-MCNC: 0.53 MG/DL (ref 0.6–1.3)
EOSINOPHIL # BLD AUTO: 0.17 THOUSAND/ΜL (ref 0–0.61)
EOSINOPHIL NFR BLD AUTO: 1 % (ref 0–6)
ERYTHROCYTE [DISTWIDTH] IN BLOOD BY AUTOMATED COUNT: 14.3 % (ref 11.6–15.1)
GFR SERPL CREATININE-BSD FRML MDRD: 106 ML/MIN/1.73SQ M
GLUCOSE SERPL-MCNC: 106 MG/DL (ref 65–140)
HCT VFR BLD AUTO: 29.7 % (ref 34.8–46.1)
HGB BLD-MCNC: 9.4 G/DL (ref 11.5–15.4)
LYMPHOCYTES # BLD AUTO: 3.25 THOUSANDS/ΜL (ref 0.6–4.47)
LYMPHOCYTES NFR BLD AUTO: 26 % (ref 14–44)
MAGNESIUM SERPL-MCNC: 2.2 MG/DL (ref 1.6–2.6)
MCH RBC QN AUTO: 29.6 PG (ref 26.8–34.3)
MCHC RBC AUTO-ENTMCNC: 31.6 G/DL (ref 31.4–37.4)
MCV RBC AUTO: 93 FL (ref 82–98)
MONOCYTES # BLD AUTO: 1.07 THOUSAND/ΜL (ref 0.17–1.22)
MONOCYTES NFR BLD AUTO: 9 % (ref 4–12)
NEUTROPHILS # BLD AUTO: 7.89 THOUSANDS/ΜL (ref 1.85–7.62)
NEUTS SEG NFR BLD AUTO: 64 % (ref 43–75)
NRBC BLD AUTO-RTO: 0 /100 WBCS
PHOSPHATE SERPL-MCNC: 2.9 MG/DL (ref 2.7–4.5)
PLATELET # BLD AUTO: 343 THOUSANDS/UL (ref 149–390)
PMV BLD AUTO: 10.5 FL (ref 8.9–12.7)
POTASSIUM SERPL-SCNC: 4 MMOL/L (ref 3.5–5.3)
RBC # BLD AUTO: 3.18 MILLION/UL (ref 3.81–5.12)
SODIUM SERPL-SCNC: 141 MMOL/L (ref 136–145)
WBC # BLD AUTO: 12.46 THOUSAND/UL (ref 4.31–10.16)

## 2018-04-22 PROCEDURE — 93886 INTRACRANIAL COMPLETE STUDY: CPT | Performed by: SURGERY

## 2018-04-22 PROCEDURE — 83735 ASSAY OF MAGNESIUM: CPT | Performed by: PHYSICIAN ASSISTANT

## 2018-04-22 PROCEDURE — 99232 SBSQ HOSP IP/OBS MODERATE 35: CPT | Performed by: NEUROLOGICAL SURGERY

## 2018-04-22 PROCEDURE — 80048 BASIC METABOLIC PNL TOTAL CA: CPT | Performed by: PHYSICIAN ASSISTANT

## 2018-04-22 PROCEDURE — 93886 INTRACRANIAL COMPLETE STUDY: CPT

## 2018-04-22 PROCEDURE — 84100 ASSAY OF PHOSPHORUS: CPT | Performed by: PHYSICIAN ASSISTANT

## 2018-04-22 PROCEDURE — 99233 SBSQ HOSP IP/OBS HIGH 50: CPT | Performed by: INTERNAL MEDICINE

## 2018-04-22 PROCEDURE — 85025 COMPLETE CBC W/AUTO DIFF WBC: CPT | Performed by: PHYSICIAN ASSISTANT

## 2018-04-22 RX ORDER — ALBUMIN, HUMAN INJ 5% 5 %
25 SOLUTION INTRAVENOUS EVERY 12 HOURS
Status: DISCONTINUED | OUTPATIENT
Start: 2018-04-22 | End: 2018-04-23

## 2018-04-22 RX ORDER — SODIUM CHLORIDE 9 MG/ML
50 INJECTION, SOLUTION INTRAVENOUS CONTINUOUS
Status: DISCONTINUED | OUTPATIENT
Start: 2018-04-23 | End: 2018-04-23

## 2018-04-22 RX ADMIN — ALBUMIN HUMAN 25 G: 0.05 INJECTION, SOLUTION INTRAVENOUS at 00:09

## 2018-04-22 RX ADMIN — ALPRAZOLAM 0.25 MG: 0.25 TABLET ORAL at 13:29

## 2018-04-22 RX ADMIN — METHOCARBAMOL 500 MG: 500 TABLET ORAL at 11:56

## 2018-04-22 RX ADMIN — MONTELUKAST SODIUM 10 MG: 10 TABLET, FILM COATED ORAL at 08:06

## 2018-04-22 RX ADMIN — ACETAMINOPHEN 650 MG: 325 TABLET, FILM COATED ORAL at 18:13

## 2018-04-22 RX ADMIN — HYDROMORPHONE HYDROCHLORIDE 0.5 MG: 1 INJECTION, SOLUTION INTRAMUSCULAR; INTRAVENOUS; SUBCUTANEOUS at 12:21

## 2018-04-22 RX ADMIN — SERTRALINE HYDROCHLORIDE 25 MG: 25 TABLET ORAL at 08:06

## 2018-04-22 RX ADMIN — PRAVASTATIN SODIUM 40 MG: 40 TABLET ORAL at 16:04

## 2018-04-22 RX ADMIN — ACETAMINOPHEN 650 MG: 325 TABLET, FILM COATED ORAL at 05:48

## 2018-04-22 RX ADMIN — HYDROMORPHONE HYDROCHLORIDE 0.5 MG: 1 INJECTION, SOLUTION INTRAMUSCULAR; INTRAVENOUS; SUBCUTANEOUS at 19:23

## 2018-04-22 RX ADMIN — NIMODIPINE 60 MG: 30 CAPSULE ORAL at 20:24

## 2018-04-22 RX ADMIN — HEPARIN SODIUM 5000 UNITS: 5000 INJECTION, SOLUTION INTRAVENOUS; SUBCUTANEOUS at 05:52

## 2018-04-22 RX ADMIN — HEPARIN SODIUM 5000 UNITS: 5000 INJECTION, SOLUTION INTRAVENOUS; SUBCUTANEOUS at 14:59

## 2018-04-22 RX ADMIN — ALBUMIN HUMAN 25 G: 0.05 INJECTION, SOLUTION INTRAVENOUS at 11:30

## 2018-04-22 RX ADMIN — OXYCODONE HYDROCHLORIDE 5 MG: 5 TABLET ORAL at 08:41

## 2018-04-22 RX ADMIN — ACETAMINOPHEN 650 MG: 325 TABLET, FILM COATED ORAL at 00:08

## 2018-04-22 RX ADMIN — ACETAMINOPHEN 650 MG: 325 TABLET, FILM COATED ORAL at 11:48

## 2018-04-22 RX ADMIN — FLUTICASONE FUROATE AND VILANTEROL 1 PUFF: 200; 25 POWDER RESPIRATORY (INHALATION) at 08:06

## 2018-04-22 RX ADMIN — NIMODIPINE 60 MG: 30 CAPSULE ORAL at 04:13

## 2018-04-22 RX ADMIN — ACETAMINOPHEN 650 MG: 325 TABLET, FILM COATED ORAL at 23:18

## 2018-04-22 RX ADMIN — NIMODIPINE 60 MG: 30 CAPSULE ORAL at 00:09

## 2018-04-22 RX ADMIN — NIMODIPINE 60 MG: 30 CAPSULE ORAL at 16:03

## 2018-04-22 RX ADMIN — NIMODIPINE 60 MG: 30 CAPSULE ORAL at 23:19

## 2018-04-22 RX ADMIN — OXYCODONE HYDROCHLORIDE 10 MG: 10 TABLET ORAL at 23:19

## 2018-04-22 RX ADMIN — SODIUM CHLORIDE 50 ML/HR: 0.9 INJECTION, SOLUTION INTRAVENOUS at 22:39

## 2018-04-22 RX ADMIN — OXYCODONE HYDROCHLORIDE 5 MG: 5 TABLET ORAL at 16:07

## 2018-04-22 RX ADMIN — DOCUSATE SODIUM 100 MG: 100 CAPSULE, LIQUID FILLED ORAL at 08:06

## 2018-04-22 RX ADMIN — BISACODYL 10 MG: 10 SUPPOSITORY RECTAL at 08:06

## 2018-04-22 RX ADMIN — SENNOSIDES 8.6 MG: 8.6 TABLET, FILM COATED ORAL at 22:00

## 2018-04-22 RX ADMIN — HEPARIN SODIUM 5000 UNITS: 5000 INJECTION, SOLUTION INTRAVENOUS; SUBCUTANEOUS at 22:40

## 2018-04-22 RX ADMIN — LEVETIRACETAM 750 MG: 750 TABLET ORAL at 08:06

## 2018-04-22 RX ADMIN — SODIUM CHLORIDE 100 ML/HR: 0.9 INJECTION, SOLUTION INTRAVENOUS at 23:21

## 2018-04-22 RX ADMIN — NIMODIPINE 60 MG: 30 CAPSULE ORAL at 08:06

## 2018-04-22 RX ADMIN — DOCUSATE SODIUM 100 MG: 100 CAPSULE, LIQUID FILLED ORAL at 18:13

## 2018-04-22 RX ADMIN — LEVETIRACETAM 750 MG: 750 TABLET ORAL at 20:24

## 2018-04-22 RX ADMIN — NIMODIPINE 60 MG: 30 CAPSULE ORAL at 11:48

## 2018-04-22 RX ADMIN — OXYCODONE HYDROCHLORIDE 10 MG: 10 TABLET ORAL at 00:26

## 2018-04-22 RX ADMIN — ALBUMIN HUMAN 25 G: 0.05 INJECTION, SOLUTION INTRAVENOUS at 22:40

## 2018-04-22 NOTE — PROGRESS NOTES
Progress Note - Neurosurgery   Leida Hernández 63 y o  female MRN: [id number]  Unit/Bed#: ICU 05 Encounter: [id number]    Assessment/Plan:    · SAH #5, CTA and agram negative  · HH#, F3  · EVD at +10, drained 324 yesterday  Continue  · Follow Exam, TCDs; today pending  · Aim for euvolemia  · Plan for repeat angiogram Monday 4/23  NPO p MN  · Keppra for 1 week (day 6/7)  · Afeb last 24h            History:    Chief Complaint: headache    Subjective: "I'm OK " Denies W/N/T      all current active meds have been reviewed and current meds:   Current Facility-Administered Medications   Medication Dose Route Frequency    acetaminophen (TYLENOL) tablet 650 mg  650 mg Oral Q6H [address]    albuterol (PROVENTIL HFA,VENTOLIN HFA) inhaler 2 puff  2 puff Inhalation Q6H PRN    ALPRAZolam (XANAX) tablet 0 25 mg  0 25 mg Oral Daily PRN    bisacodyl (DULCOLAX) rectal suppository 10 mg  10 mg Rectal Daily PRN    docusate sodium (COLACE) capsule 100 mg  100 mg Oral BID    fluticasone furoate-vilanterol (BREO ELLIPTA) 200-25 MCG/INH inhaler 1 puff  1 puff Inhalation Daily    heparin (porcine) subcutaneous injection 5,000 Units  5,000 Units Subcutaneous Q8H [address]    HYDROmorphone (DILAUDID) injection 0 5 mg  0 5 mg Intravenous Q2H PRN    labetalol (NORMODYNE) injection 10 mg  10 mg Intravenous Q4H PRN    levETIRAcetam (KEPPRA) tablet 750 mg  750 mg Oral Q12H [address]    methocarbamol (ROBAXIN) tablet 500 mg  500 mg Oral Q6H PRN    montelukast (SINGULAIR) tablet 10 mg  10 mg Oral Daily    niMODipine (NIMOTOP) capsule 60 mg  60 mg Oral Q4H SAMI    ondansetron (ZOFRAN) injection 4 mg  4 mg Intravenous Q6H PRN    oxyCODONE (ROXICODONE) immediate release tablet 10 mg  10 mg Oral Q4H PRN    oxyCODONE (ROXICODONE) IR tablet 5 mg  5 mg Oral Q4H PRN    pravastatin (PRAVACHOL) tablet 40 mg  40 mg Oral Daily With Dinner    senna (SENOKOT) tablet 8 6 mg  1 tablet Oral HS    sertraline (ZOLOFT) tablet 25 mg  25 mg Oral Daily    sodium chloride 0 9 % infusion  50 mL/hr Intravenous Continuous       Objective:     Exam:     Vitals: Blood pressure 149/86, pulse 60, temperature 99 °F (37 2 °C), temperature source Oral, resp  rate 16, height 5' 3" (1 6 m), weight 90 7 kg (199 lb 15 3 oz), SpO2 98 %  ,Body mass index is 35 42 kg/m²  MAPs 90-100s    I/O -0 1 L last 24h    ICPs 6-14 last 24  EVD output 324 last 24h  Set at +10 mm Hg  Physical Exam   Constitutional: She is oriented to person, place, and time  She appears well-developed and well-nourished  HENT:   Head: Normocephalic and atraumatic  Eyes: EOM are normal    Cardiovascular: Normal rate  Pulmonary/Chest: Effort normal    Abdominal: Soft  Musculoskeletal: Normal range of motion  Neurological: She is oriented to person, place, and time  No cranial nerve deficit  Skin: Skin is warm and dry  Psychiatric: She has a normal mood and affect  Her speech is normal        Neurologic Exam     Mental Status   Oriented to person, place, and time  Oriented to person  Oriented to place  Oriented to city and area  Oriented to year and month     Attention: normal    Speech: speech is normal   Level of consciousness: arousable by verbal stimuli    Cranial Nerves     CN III, IV, VI   Extraocular motions are normal      Motor Exam   Muscle bulk: normal  Right arm pronator drift: absent  Left arm pronator drift: absent    Gait, Coordination, and Reflexes     Tremor   Resting tremor: absent  Intention tremor: absent  Action tremor: absent        MDM:    Lab Results:      Results from last 7 days  Lab Units 04/22/18  0554 04/21/18  0442 04/20/18  0516  04/17/18  0430  04/16/18  2055   WBC Thousand/uL 12 46* 12 57* 11 84*  < > 10 37*  --  11 18*   HEMOGLOBIN g/dL 9 4* 10 1* 11 5  < > 11 1*  --  13 4   I STAT HEMOGLOBIN   --   --   --   --   --   < >  --    HEMATOCRIT % 29 7* 30 7* 36 3  < > 35 3  --  41 3   PLATELETS Thousands/uL 343 348 406*  < > 430*  --  484*   NEUTROS PCT % 64  --   --   -- 62  --  81*   MONOS PCT % 9  --   --   --  9  --  7   < > = values in this interval not displayed  Results from last 7 days  Lab Units 04/22/18  0554 04/21/18  0442 04/20/18  0517  04/17/18  0430 04/16/18  2342   SODIUM mmol/L 141 142 144  < > 141 134*   POTASSIUM mmol/L 4 0 4 0 3 6  < > 3 7 4 4   CHLORIDE mmol/L 108 111* 111*  < > 110* 111*   CO2 mmol/L 26 26 27  < > 26 21   BUN mg/dL 6 3* 3*  < > 14 14   CREATININE mg/dL 0 53* 0 63 0 50*  < > 0 58* 0 72   CALCIUM mg/dL 8 7 8 6 7 9*  < > 8 0* 8 1*   TOTAL PROTEIN g/dL  --   --   --   --  5 6* 7 0   BILIRUBIN TOTAL mg/dL  --   --   --   --  0 31 0 35   ALK PHOS U/L  --   --   --   --  51 62   ALT U/L  --   --   --   --  13 15   AST U/L  --   --   --   --  12 22   GLUCOSE RANDOM mg/dL 106 111 105  < > 94 96   < > = values in this interval not displayed  Above lab results personally reviewed

## 2018-04-22 NOTE — PROGRESS NOTES
Progress Note - Critical Care   Kristina Astudillo 63 y o  female MRN: [id number]  Unit/Bed#: ICU 05 Encounter: [id number]    Attending Physician: Arian Smith, DO      ______________________________________________________________________  Principal Problem:    SAH (subarachnoid hemorrhage) ([address] )  Active Problems:    History of asthma    Tobacco use disorder    Severe episode of recurrent major depressive disorder (HCC)    Hypoalbuminemia    Acquired hydrocephalus    Hypophosphatemia    Polyuria  Resolved Problems:    Hypokalemia      Assessment and Plan:   SAB Hemorrhage - POD 5, SAH 6  Polyuria - 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  Depression  History of Asthma    Neuro:  Large SAH with acute hydrocephalsu, likely secondary to ruptured aneursym  POD #5 s/p angiogram and R frontal EVD placement  SAH #6  GCS 15, Mental Status Aox4  EVD @10mmhg = 239  ICPs = 2-14, highest reported 98cc 1825-4742  CPP =   TCDs - today's pending  Yesterday = R lindegaard 2 36  Left Lindegaard 2 87  No more vasospasm on left side  Attempting to obtain euvolemia  Currently I/O = +1 498 L  Can likely transition to just maintenance fluids  ** as per neurosurg - to go for angiogram Monday 4/23  C/w keppra day 6/7    CV:   Pressor requirements: none  IVF - NS at 50cc/hr + isolyte  5cc/hr for every 1 cc urine  Allowing permissive HTN up to sbp 160  SBP = 120's-140s  Maps =   Pulm:   RA  Hx of asthma - c/w home singulair, breo, advair, albuterol prn  GI:   Regular diet  Colace 100mg BID, senokot qhs    Gu:   UO 4910/24 hour  EVD = 239, Net = 1213  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  F/E/N:   Fluids: NS at 50cc/hr + isolyte  5cc/hr for every 1 cc urine  Electrolytes: largely WNL  Nutrition: Regular house diet    ID:   No temps overnight  WBC = 10->12  Procalcitonin 4/21 WNL  Fever and WBC curve    Will hold off on CSF studies for now,    Heme:   Hgb = 10->9  Platelets = ~249   No active issues  Heparin ppx  SCDs    Endo:   No active issues     Msk/Skin:   Out of bed  PT/OT    Disposition: c/w medical care  Code Status: Level 1 - Full Code    ______________________________________________________________________    Chief Complaint:   No complaints  HPI/24 Hour Events:   No large events overnight  Continues to have high UO but did receive similar amount of IV fluids  Still hastn had a real bm yet  Denies fevers, chills, nausea, vomiting, chest pain, sob, abdominal pain or difficulty urinating      ______________________________________________________________________    Physical Exam:   Physical Exam   Constitutional: She is oriented to person, place, and time  She appears well-nourished  No distress  Sitting comfortably in bed   HENT:   Has evd drain in place   Eyes: EOM are normal  Pupils are equal, round, and reactive to light  Cardiovascular: Normal rate, regular rhythm and normal heart sounds  Pulmonary/Chest: Effort normal and breath sounds normal  She has no wheezes  She has no rales  Abdominal: Soft  Bowel sounds are normal  There is no tenderness  There is no rebound and no guarding  Musculoskeletal: She exhibits no edema  Neurological: She is alert and oriented to person, place, and time  No cranial nerve deficit  Normal finger to nose   4-5/5 strength in all extremities  Gross sensation intact in all extremities   Skin: Skin is warm and dry  No rash noted  Vitals reviewed  ______________________________________________________________________  Vitals:    18 0110 18 0200 18 0300 18 0400   BP: 157/83 148/79 113/63 139/76   Pulse: 64 62 68 62   Resp: (!) 27 20 (!) 28 (!) 28   Temp:       TempSrc:       SpO2: 95% 96% 97% 96%   Weight:       Height:           Temperature:   Temp (24hrs), Av 6 °F (37 °C), Min:98 1 °F (36 7 °C), Max:99 °F (37 2 °C)    Current Temperature: 99 °F (37 2 °C)  Weights:   IBW: 52 4 kg    Body mass index is 35 42 kg/m²    Weight (last 2 days)     None        Invasive lines and devices: Invasive Devices     Peripherally Inserted Central Catheter Line            PICC Line 28/50/04 Left Basilic 2 days          Drain            Urethral Catheter Temperature probe;Non-latex 16 Fr  4 days    Ventriculostomy/Subdural Ventricular drainage catheter Right Parietal region 4 days                 Non-Invasive/Invasive Ventilation Settings:  Respiratory    Lab Data (Last 4 hours)    None         O2/Vent Data (Last 4 hours)    None              No results found for: PHART, WRV4QVE, PO2ART, DQD3DPP, N5ZXRRZF, BEART, SOURCE  RA    Intake and Outputs:  I/O       04/20 0701 - 04/21 0700 04/21 0701 - 04/22 0700    P  O  1620 1080    I V  (mL/kg) 6066 3 (67 2) 3967 3 (43 9)    IV Piggyback 1250 1000    Total Intake(mL/kg) 8936 3 (99) 6047 3 (67)    Urine (mL/kg/hr) 10346 (4 8) 4310 (2)    Drains 213 (0 1) 239 (0 1)    Total Output 56488 4549    Net -1576 8 +1498 3              Nutrition:        Diet Orders            Start     Ordered    04/21/18 1053  Dietary nutrition supplements  Once     Question Answer Comment   Select Supplement: Ensure Enlive-Chocolate    Frequency Breakfast, Lunch, Dinner        04/21/18 1056    04/20/18 1900  Dietary nutrition supplements  Once     Question Answer Comment   Select Supplement: Ensure Enlive-Chocolate    Frequency Breakfast, Lunch, Dinner, HS        04/20/18 1900    04/17/18 1539  Diet Regular; Regular House; Regular House  Diet effective now     Question Answer Comment   Diet Type Regular    Regular Regular House    Other Restriction(s): Regular House    RD to adjust diet per protocol?  Yes        04/17/18 1539        Hemodynamic Monitoring:  Blood pressure cuff     Labs:     Results from last 7 days  Lab Units 04/21/18  0442 04/20/18  0516 04/19/18  0441  04/17/18  0430  04/16/18  2055   WBC Thousand/uL 12 57* 11 84* 10 50*  < > 10 37*  --  11 18*   HEMOGLOBIN g/dL 10 1* 11 5 11 0*  < > 11 1*  --  13 4   I STAT HEMOGLOBIN   --   --   --   --   --   < >  --    HEMATOCRIT % 30 7* 36 3 35 2  < > 35 3  --  41 3   PLATELETS Thousands/uL 348 406* 413*  < > 430*  --  484*   NEUTROS PCT %  --   --   --   --  62  --  81*   MONOS PCT %  --   --   --   --  9  --  7   < > = values in this interval not displayed  Results from last 7 days  Lab Units 04/21/18 0442 04/20/18 0517 04/19/18 0441 04/17/18 0430 04/16/18  2342   SODIUM mmol/L 142 144 145  < > 141 134*   POTASSIUM mmol/L 4 0 3 6 3 7  < > 3 7 4 4   CHLORIDE mmol/L 111* 111* 113*  < > 110* 111*   CO2 mmol/L 26 27 27  < > 26 21   BUN mg/dL 3* 3* 9  < > 14 14   CREATININE mg/dL 0 63 0 50* 0 63  < > 0 58* 0 72   CALCIUM mg/dL 8 6 7 9* 7 8*  < > 8 0* 8 1*   TOTAL PROTEIN g/dL  --   --   --   --  5 6* 7 0   BILIRUBIN TOTAL mg/dL  --   --   --   --  0 31 0 35   ALK PHOS U/L  --   --   --   --  51 62   ALT U/L  --   --   --   --  13 15   AST U/L  --   --   --   --  12 22   GLUCOSE RANDOM mg/dL 111 105 107  < > 94 96   < > = values in this interval not displayed  Results from last 7 days  Lab Units 04/21/18 0442 04/20/18 0517 04/19/18  0441   MAGNESIUM mg/dL 2 3 2 6 2 0     Lab Results   Component Value Date    PHOS 2 3 (L) 04/21/2018    PHOS 2 6 (L) 04/20/2018    PHOS 1 9 (L) 04/20/2018        Results from last 7 days  Lab Units 04/16/18 2055   INR  0 93   PTT seconds 28       0  Lab Value Date/Time   TROPONINI <0 02 04/16/2018 2342         ABG:No results found for: PHART, UHK5HOD, PO2ART, DUY6GKP, I7CPQJVI, BEART, SOURCE  EKG:   Micro:  No results found for: Vale Sins, SPUTUMCULTUR  Imaging: I have personally reviewed pertinent reports  Cta Head With And Without Contrast    Result Date: 4/16/2018  Impression: No focal stenosis or saccular aneurysm within the [place]  Reidentified acute subarachnoid hemorrhage   Workstation performed: [id number]     Xr Chest Portable    Result Date: 4/19/2018  Impression: Right IJ catheter tip in the caval atrial junction region  Left PICC line tip also in the caval atrial junction region  Minimal bibasilar atelectasis  Workstation performed: [id number]     Xr Chest 1 View Portable    Result Date: 4/17/2018  Impression: Right IJ line placement, appears to be in satisfactory position, tip is at the cavoatrial junction  No pneumothorax  Low lung volumes  Workstation performed: [id number]     Ct Head Wo Contrast    Result Date: 4/18/2018  Impression: Persistent diffuse subarachnoid hemorrhage, slightly improved  There is some redistribution with hemorrhage layering in the occipital horns of the lateral ventricles  Ventricular drain present extending into the frontal horn of the right lateral ventricle  Workstation performed: [id number]     Ct Head Without Contrast    Result Date: 4/16/2018  Impression: Large amount of acute subarachnoid hemorrhage involving the suprasellar cistern, sylvian fissures, basal cisterns and in anterior falx  A ruptured aneurysm is suspected  A CTA head is recommended for further evaluation  I personally discussed this study with León Luevano on 4/16/2018 at 8:37 PM  Workstation performed: [id number]     Mri Brain Wo Contrast    Result Date: 4/18/2018  Impression: 1  Small linear focus of restricted diffusion in the left cerebellar hemisphere without associated hemorrhage, suggesting an acute or early subacute ischemic infarct  2   Grossly stable subarachnoid hemorrhage throughout the basal cisterns  No significant mass effect  3   Stable mild hydrocephalus and intraventricular blood products  I personally discussed this study with Dr Wilder Brownlee on 4/18/2018 at 10:53 PM  Workstation performed: [id number]     Mri Cervical Spine Wo Contrast    Result Date: 4/18/2018  Impression: 1  Chronic disc degenerative change throughout the cervical spine resulting in severe central canal stenosis at C5-6 and C6-7    Chronic mass effect on the cord without evidence of cord signal abnormality or myelomalacia  2   T2-3 paracentral disc protrusion and right paracentral disc extrusion with probable mild mass effect on the right ventral aspect of the cord  Workstation performed: [id number]     Xr Chest Picc Line Portable    Result Date: 4/19/2018  Impression: Left upper extremity PICC line projecting into the right atrium  Consider repositioning the PICC line by withdrawing approximately 2 cm  The study was marked in [facility] for immediate notification   Workstation performed: [id number]     Allergies: No Known Allergies  Medications:   Scheduled Meds:  Current Facility-Administered Medications:  acetaminophen 650 mg Oral Q6H [address] Aurora Barragan PA-C    albumin human 25 g Intravenous Q8H Aurora Barragan PA-C Last Rate: 0 g (04/21/18 1801)   albuterol 2 puff Inhalation Q6H PRN [facility] Ana, DO    ALPRAZolam 0 25 mg Oral Daily PRN Aurora Barragan PA-C    bisacodyl 10 mg Rectal Daily PRN Aurora Barragan PA-C    docusate sodium 100 mg Oral BID [facility] Bobomorris, DO    fluticasone furoate-vilanterol 1 puff Inhalation Daily Aurora Barragan PA-C    heparin (porcine) 5,000 Units Subcutaneous [facility] Aurora Barragan PA-C    HYDROmorphone 0 5 mg Intravenous Q2H PRN Aurora Barragan PA-C    labetalol 10 mg Intravenous Q4H PRN Marilee Oliveira MD    levETIRAcetam 750 mg Oral Q12H [address] Aurora Barragan PA-C    methocarbamol 500 mg Oral Q6H PRN Aurora Barragan PA-C    montelukast 10 mg Oral Daily Lowell XIAO Baskamryn, DO    niMODipine 60 mg Oral Q4H [address] Lowell S Ana, DO    ondansetron 4 mg Intravenous Q6H PRN [facility] Basmorris, DO    oxyCODONE 10 mg Oral Q4H PRN Marilee Oliveira MD    oxyCODONE 5 mg Oral Q4H PRN Harmony Vyas MD    pravastatin 40 mg Oral Daily With [employer], DO    senna 1 tablet Oral HS Aurora Barragan PA-C    sertraline 25 mg Oral Daily Marilee Oliveira MD    sodium chloride 50 mL/hr Intravenous Continuous Aurora Barragan PA-C Last Rate: 50 mL/hr (04/21/18 2229)     Continuous Infusions:  sodium chloride 50 mL/hr Last Rate: 50 mL/hr (04/21/18 2229)     PRN Meds:    albuterol 2 puff Q6H PRN   ALPRAZolam 0 25 mg Daily PRN   bisacodyl 10 mg Daily PRN   HYDROmorphone 0 5 mg Q2H PRN   labetalol 10 mg Q4H PRN   methocarbamol 500 mg Q6H PRN   ondansetron 4 mg Q6H PRN   oxyCODONE 10 mg Q4H PRN   oxyCODONE 5 mg Q4H PRN     VTE Pharmacologic Prophylaxis: Heparin  VTE Mechanical Prophylaxis: sequential compression device    Portions of the record may have been created with voice recognition software  Occasional wrong word or "sound a like" substitutions may have occurred due to the inherent limitations of voice recognition software  Read the chart carefully and recognize, using context, where substitutions have occurred      Juan R Dang MD

## 2018-04-23 ENCOUNTER — APPOINTMENT (INPATIENT)
Dept: RADIOLOGY | Facility: HOSPITAL | Age: 58
DRG: 021 | End: 2018-04-23
Attending: NEUROLOGICAL SURGERY
Payer: COMMERCIAL

## 2018-04-23 ENCOUNTER — APPOINTMENT (INPATIENT)
Dept: RADIOLOGY | Facility: HOSPITAL | Age: 58
DRG: 021 | End: 2018-04-23
Payer: COMMERCIAL

## 2018-04-23 ENCOUNTER — ANESTHESIA (INPATIENT)
Dept: RADIOLOGY | Facility: HOSPITAL | Age: 58
DRG: 021 | End: 2018-04-23
Payer: COMMERCIAL

## 2018-04-23 ENCOUNTER — ANESTHESIA EVENT (INPATIENT)
Dept: RADIOLOGY | Facility: HOSPITAL | Age: 58
DRG: 021 | End: 2018-04-23
Payer: COMMERCIAL

## 2018-04-23 ENCOUNTER — APPOINTMENT (INPATIENT)
Dept: NON INVASIVE DIAGNOSTICS | Facility: HOSPITAL | Age: 58
DRG: 021 | End: 2018-04-23
Payer: COMMERCIAL

## 2018-04-23 PROBLEM — E83.39 HYPOPHOSPHATEMIA: Status: RESOLVED | Noted: 2018-04-19 | Resolved: 2018-04-23

## 2018-04-23 LAB
ANION GAP SERPL CALCULATED.3IONS-SCNC: 7 MMOL/L (ref 4–13)
BASOPHILS # BLD AUTO: 0.03 THOUSANDS/ΜL (ref 0–0.1)
BASOPHILS NFR BLD AUTO: 0 % (ref 0–1)
BUN SERPL-MCNC: 6 MG/DL (ref 5–25)
CALCIUM SERPL-MCNC: 8 MG/DL (ref 8.3–10.1)
CHLORIDE SERPL-SCNC: 111 MMOL/L (ref 100–108)
CO2 SERPL-SCNC: 24 MMOL/L (ref 21–32)
CREAT SERPL-MCNC: 0.42 MG/DL (ref 0.6–1.3)
EOSINOPHIL # BLD AUTO: 0.26 THOUSAND/ΜL (ref 0–0.61)
EOSINOPHIL NFR BLD AUTO: 2 % (ref 0–6)
ERYTHROCYTE [DISTWIDTH] IN BLOOD BY AUTOMATED COUNT: 14.5 % (ref 11.6–15.1)
GFR SERPL CREATININE-BSD FRML MDRD: 114 ML/MIN/1.73SQ M
GLUCOSE SERPL-MCNC: 95 MG/DL (ref 65–140)
HCT VFR BLD AUTO: 28.7 % (ref 34.8–46.1)
HGB BLD-MCNC: 9.3 G/DL (ref 11.5–15.4)
LYMPHOCYTES # BLD AUTO: 4 THOUSANDS/ΜL (ref 0.6–4.47)
LYMPHOCYTES NFR BLD AUTO: 29 % (ref 14–44)
MAGNESIUM SERPL-MCNC: 1.8 MG/DL (ref 1.6–2.6)
MCH RBC QN AUTO: 30.2 PG (ref 26.8–34.3)
MCHC RBC AUTO-ENTMCNC: 32.4 G/DL (ref 31.4–37.4)
MCV RBC AUTO: 93 FL (ref 82–98)
MONOCYTES # BLD AUTO: 1.45 THOUSAND/ΜL (ref 0.17–1.22)
MONOCYTES NFR BLD AUTO: 11 % (ref 4–12)
NEUTROPHILS # BLD AUTO: 7.87 THOUSANDS/ΜL (ref 1.85–7.62)
NEUTS SEG NFR BLD AUTO: 58 % (ref 43–75)
NRBC BLD AUTO-RTO: 0 /100 WBCS
PHOSPHATE SERPL-MCNC: 2.8 MG/DL (ref 2.7–4.5)
PLATELET # BLD AUTO: 316 THOUSANDS/UL (ref 149–390)
PMV BLD AUTO: 10.5 FL (ref 8.9–12.7)
POTASSIUM SERPL-SCNC: 3 MMOL/L (ref 3.5–5.3)
RBC # BLD AUTO: 3.08 MILLION/UL (ref 3.81–5.12)
SODIUM SERPL-SCNC: 142 MMOL/L (ref 136–145)
WBC # BLD AUTO: 13.67 THOUSAND/UL (ref 4.31–10.16)

## 2018-04-23 PROCEDURE — C1769 GUIDE WIRE: HCPCS

## 2018-04-23 PROCEDURE — 93886 INTRACRANIAL COMPLETE STUDY: CPT

## 2018-04-23 PROCEDURE — 36226 PLACE CATH VERTEBRAL ART: CPT

## 2018-04-23 PROCEDURE — B31DYZZ FLUOROSCOPY OF RIGHT VERTEBRAL ARTERY USING OTHER CONTRAST: ICD-10-PCS | Performed by: NEUROLOGICAL SURGERY

## 2018-04-23 PROCEDURE — B41FYZZ FLUOROSCOPY OF RIGHT LOWER EXTREMITY ARTERIES USING OTHER CONTRAST: ICD-10-PCS | Performed by: NEUROLOGICAL SURGERY

## 2018-04-23 PROCEDURE — 71045 X-RAY EXAM CHEST 1 VIEW: CPT

## 2018-04-23 PROCEDURE — 36224 PLACE CATH CAROTD ART: CPT

## 2018-04-23 PROCEDURE — B318YZZ FLUOROSCOPY OF BILATERAL INTERNAL CAROTID ARTERIES USING OTHER CONTRAST: ICD-10-PCS | Performed by: NEUROLOGICAL SURGERY

## 2018-04-23 PROCEDURE — 80048 BASIC METABOLIC PNL TOTAL CA: CPT | Performed by: STUDENT IN AN ORGANIZED HEALTH CARE EDUCATION/TRAINING PROGRAM

## 2018-04-23 PROCEDURE — 84100 ASSAY OF PHOSPHORUS: CPT | Performed by: STUDENT IN AN ORGANIZED HEALTH CARE EDUCATION/TRAINING PROGRAM

## 2018-04-23 PROCEDURE — 99233 SBSQ HOSP IP/OBS HIGH 50: CPT | Performed by: INTERNAL MEDICINE

## 2018-04-23 PROCEDURE — C1760 CLOSURE DEV, VASC: HCPCS

## 2018-04-23 PROCEDURE — C1894 INTRO/SHEATH, NON-LASER: HCPCS

## 2018-04-23 PROCEDURE — 83735 ASSAY OF MAGNESIUM: CPT | Performed by: STUDENT IN AN ORGANIZED HEALTH CARE EDUCATION/TRAINING PROGRAM

## 2018-04-23 PROCEDURE — 93886 INTRACRANIAL COMPLETE STUDY: CPT | Performed by: SURGERY

## 2018-04-23 PROCEDURE — 85025 COMPLETE CBC W/AUTO DIFF WBC: CPT | Performed by: STUDENT IN AN ORGANIZED HEALTH CARE EDUCATION/TRAINING PROGRAM

## 2018-04-23 PROCEDURE — 36224 PLACE CATH CAROTD ART: CPT | Performed by: NEUROLOGICAL SURGERY

## 2018-04-23 PROCEDURE — 99232 SBSQ HOSP IP/OBS MODERATE 35: CPT | Performed by: NEUROLOGICAL SURGERY

## 2018-04-23 PROCEDURE — 36226 PLACE CATH VERTEBRAL ART: CPT | Performed by: NEUROLOGICAL SURGERY

## 2018-04-23 RX ORDER — PROPOFOL 10 MG/ML
INJECTION, EMULSION INTRAVENOUS CONTINUOUS PRN
Status: DISCONTINUED | OUTPATIENT
Start: 2018-04-23 | End: 2018-04-23 | Stop reason: SURG

## 2018-04-23 RX ORDER — POTASSIUM CHLORIDE 20 MEQ/1
40 TABLET, EXTENDED RELEASE ORAL ONCE
Status: COMPLETED | OUTPATIENT
Start: 2018-04-23 | End: 2018-04-23

## 2018-04-23 RX ORDER — FAMOTIDINE 20 MG/1
20 TABLET, FILM COATED ORAL ONCE
Status: DISCONTINUED | OUTPATIENT
Start: 2018-04-23 | End: 2018-04-23

## 2018-04-23 RX ORDER — SODIUM CHLORIDE 9 MG/ML
50 INJECTION, SOLUTION INTRAVENOUS CONTINUOUS
Status: DISCONTINUED | OUTPATIENT
Start: 2018-04-23 | End: 2018-04-26

## 2018-04-23 RX ORDER — PROPOFOL 10 MG/ML
INJECTION, EMULSION INTRAVENOUS AS NEEDED
Status: DISCONTINUED | OUTPATIENT
Start: 2018-04-23 | End: 2018-04-23 | Stop reason: SURG

## 2018-04-23 RX ORDER — MIDAZOLAM HYDROCHLORIDE 1 MG/ML
INJECTION INTRAMUSCULAR; INTRAVENOUS AS NEEDED
Status: DISCONTINUED | OUTPATIENT
Start: 2018-04-23 | End: 2018-04-23 | Stop reason: SURG

## 2018-04-23 RX ORDER — MAGNESIUM SULFATE HEPTAHYDRATE 40 MG/ML
2 INJECTION, SOLUTION INTRAVENOUS ONCE
Status: COMPLETED | OUTPATIENT
Start: 2018-04-23 | End: 2018-04-23

## 2018-04-23 RX ORDER — FENTANYL CITRATE 50 UG/ML
INJECTION, SOLUTION INTRAMUSCULAR; INTRAVENOUS AS NEEDED
Status: DISCONTINUED | OUTPATIENT
Start: 2018-04-23 | End: 2018-04-23 | Stop reason: SURG

## 2018-04-23 RX ADMIN — NIMODIPINE 60 MG: 30 CAPSULE ORAL at 16:17

## 2018-04-23 RX ADMIN — MIDAZOLAM 2 MG: 1 INJECTION INTRAMUSCULAR; INTRAVENOUS at 08:17

## 2018-04-23 RX ADMIN — IODIXANOL 156 ML: 320 INJECTION, SOLUTION INTRAVASCULAR at 15:30

## 2018-04-23 RX ADMIN — NIMODIPINE 60 MG: 30 CAPSULE ORAL at 13:11

## 2018-04-23 RX ADMIN — PRAVASTATIN SODIUM 40 MG: 40 TABLET ORAL at 16:17

## 2018-04-23 RX ADMIN — HEPARIN SODIUM 5000 UNITS: 5000 INJECTION, SOLUTION INTRAVENOUS; SUBCUTANEOUS at 22:07

## 2018-04-23 RX ADMIN — NIMODIPINE 60 MG: 30 CAPSULE ORAL at 07:54

## 2018-04-23 RX ADMIN — MAGNESIUM SULFATE HEPTAHYDRATE 2 G: 40 INJECTION, SOLUTION INTRAVENOUS at 07:50

## 2018-04-23 RX ADMIN — ACETAMINOPHEN 650 MG: 325 TABLET, FILM COATED ORAL at 18:41

## 2018-04-23 RX ADMIN — METHOCARBAMOL 500 MG: 500 TABLET ORAL at 23:11

## 2018-04-23 RX ADMIN — SENNOSIDES 8.6 MG: 8.6 TABLET, FILM COATED ORAL at 22:07

## 2018-04-23 RX ADMIN — ONDANSETRON 4 MG: 2 INJECTION INTRAMUSCULAR; INTRAVENOUS at 13:19

## 2018-04-23 RX ADMIN — SERTRALINE HYDROCHLORIDE 25 MG: 25 TABLET ORAL at 10:42

## 2018-04-23 RX ADMIN — HYDROMORPHONE HYDROCHLORIDE 0.5 MG: 1 INJECTION, SOLUTION INTRAMUSCULAR; INTRAVENOUS; SUBCUTANEOUS at 20:23

## 2018-04-23 RX ADMIN — METHOCARBAMOL 500 MG: 500 TABLET ORAL at 16:17

## 2018-04-23 RX ADMIN — OXYCODONE HYDROCHLORIDE 10 MG: 10 TABLET ORAL at 04:02

## 2018-04-23 RX ADMIN — ALBUTEROL SULFATE 2 PUFF: 90 AEROSOL, METERED RESPIRATORY (INHALATION) at 10:33

## 2018-04-23 RX ADMIN — OXYCODONE HYDROCHLORIDE 10 MG: 10 TABLET ORAL at 23:11

## 2018-04-23 RX ADMIN — FENTANYL CITRATE 25 MCG: 50 INJECTION, SOLUTION INTRAMUSCULAR; INTRAVENOUS at 09:18

## 2018-04-23 RX ADMIN — POTASSIUM CHLORIDE 40 MEQ: 1500 TABLET, EXTENDED RELEASE ORAL at 13:12

## 2018-04-23 RX ADMIN — HYDROMORPHONE HYDROCHLORIDE 0.5 MG: 1 INJECTION, SOLUTION INTRAMUSCULAR; INTRAVENOUS; SUBCUTANEOUS at 13:13

## 2018-04-23 RX ADMIN — DOCUSATE SODIUM 100 MG: 100 CAPSULE, LIQUID FILLED ORAL at 10:32

## 2018-04-23 RX ADMIN — MONTELUKAST SODIUM 10 MG: 10 TABLET, FILM COATED ORAL at 10:35

## 2018-04-23 RX ADMIN — FENTANYL CITRATE 25 MCG: 50 INJECTION, SOLUTION INTRAMUSCULAR; INTRAVENOUS at 09:09

## 2018-04-23 RX ADMIN — HYDROMORPHONE HYDROCHLORIDE 0.5 MG: 1 INJECTION, SOLUTION INTRAMUSCULAR; INTRAVENOUS; SUBCUTANEOUS at 10:33

## 2018-04-23 RX ADMIN — POTASSIUM CHLORIDE 40 MEQ: 1500 TABLET, EXTENDED RELEASE ORAL at 07:54

## 2018-04-23 RX ADMIN — PROPOFOL 30 MG: 10 INJECTION, EMULSION INTRAVENOUS at 08:42

## 2018-04-23 RX ADMIN — NIMODIPINE 60 MG: 30 CAPSULE ORAL at 03:57

## 2018-04-23 RX ADMIN — ACETAMINOPHEN 650 MG: 325 TABLET, FILM COATED ORAL at 13:11

## 2018-04-23 RX ADMIN — LEVETIRACETAM 750 MG: 750 TABLET ORAL at 20:10

## 2018-04-23 RX ADMIN — NIMODIPINE 60 MG: 30 CAPSULE ORAL at 20:10

## 2018-04-23 RX ADMIN — FLUTICASONE FUROATE AND VILANTEROL 1 PUFF: 200; 25 POWDER RESPIRATORY (INHALATION) at 10:34

## 2018-04-23 RX ADMIN — HEPARIN SODIUM 5000 UNITS: 5000 INJECTION, SOLUTION INTRAVENOUS; SUBCUTANEOUS at 13:59

## 2018-04-23 RX ADMIN — DOCUSATE SODIUM 100 MG: 100 CAPSULE, LIQUID FILLED ORAL at 18:41

## 2018-04-23 RX ADMIN — FENTANYL CITRATE 50 MCG: 50 INJECTION, SOLUTION INTRAMUSCULAR; INTRAVENOUS at 08:24

## 2018-04-23 RX ADMIN — HYDROMORPHONE HYDROCHLORIDE 0.5 MG: 1 INJECTION, SOLUTION INTRAMUSCULAR; INTRAVENOUS; SUBCUTANEOUS at 16:17

## 2018-04-23 RX ADMIN — SODIUM CHLORIDE: 0.9 INJECTION, SOLUTION INTRAVENOUS at 09:20

## 2018-04-23 RX ADMIN — ACETAMINOPHEN 650 MG: 325 TABLET, FILM COATED ORAL at 06:24

## 2018-04-23 RX ADMIN — PROPOFOL 100 MCG/KG/MIN: 10 INJECTION, EMULSION INTRAVENOUS at 08:21

## 2018-04-23 RX ADMIN — SODIUM CHLORIDE 50 ML/HR: 0.9 INJECTION, SOLUTION INTRAVENOUS at 10:43

## 2018-04-23 RX ADMIN — LEVETIRACETAM 750 MG: 750 TABLET ORAL at 10:34

## 2018-04-23 RX ADMIN — NIMODIPINE 60 MG: 30 CAPSULE ORAL at 23:13

## 2018-04-23 RX ADMIN — ACETAMINOPHEN 650 MG: 325 TABLET, FILM COATED ORAL at 23:10

## 2018-04-23 NOTE — PROGRESS NOTES
Progress Note - Neurosurgery   Leida Hernández 63 y o  female MRN: [id number]  Unit/Bed#: ICU 05 Encounter: [id number]    Assessment:  1  Subarachnoid hemorrhage involving the suprasellar cistern, sylvian fissures, basal cisterns and in the anterior falx  Bleed day #9  2  HH3, [place] 3  3  Acute hydrocephalus   4  History of asthma    Plan:  · Exam: GCS15  AAOx3  BARRERA with generalized weakness in BUE  BLE straight s/p angiogram  Appreciated right facial weakness  LT intact  No PD, marrufo or clonus  Imaging: personally reviewed and reviewed by attending:  · 4/18 - CT head wo: persistent diffuse subarachnoid hemorrhage, slightly improved with some redistribution of hemorrhage layering in the occipital horns  EVD present extending into the frontal horn of right lateral ventricle  · 4/18 - MRI brain wo: small linear focus of restricted diffusion in left cerebellar hemisphere without associated hemorrhage  Grossly stable subarachnoid throughout basal cistern  Stable mild hydrocephalus  · 4/18 - MRI cspine wo: Chronic disc degenerative changes throughout, resulting in central canal stenosis at C5-6 and C6-7  T2-3 paracentral disc protrusion  · 4/23/18 - IR cerebral angio: right and left internal carotid artery circulation reveals antegrade flow into the middle cerebral and anterior cerebral arteries  No evidence of aneurysm, AVM or vascular malformations appreciated  · Plan to repeat CT head without contrast tomorrow or STAT CT head without contrast if decline in GCS >2 pts/ 1 hr  Subarachnoid Management:  · Continue Nimodipine 60mg q4h and TCDs for a total of 14 day course  · TCDs on 4/23: right 1 0, left 3 48  · Continue Keppra 750mg x 1 week total (day 7/7)  · Na 142, goal 140-150  · SBP goal <160  · Urine output +222 5 in 12 hours - goal euvolemia +/- 500  Drain management:  · EVD at 10 mmHg above tragus with 238cc output in 24h   · Call if output is greater than 20cc in 1 hour  · ICPs ranging 0-20; Average 15+  Call if ICPs >20 sustained without provoking cause  Medical management:   · PT/OT: recommend short term rehab  · DVT ppx: SCDs, HSQ  · Pain control - discussed with CC team in regards to medication make her nauseous   · WBC 13 67, Currently 99 3  Tmax 101 1 in 48h  · 4/23/18 - CXR pending read  · Hbg 9 3 - transfuse if <8  · Platelets 826  · Bowel regimen  Neurosurgery will continue to closely monitor, call for questions or concerns/change in examination  Subjective/Objective   Chief Complaint: "ohh I'm in so much pain"    Subjective: patient states she is in a lot of pain  She states majority of her pain is located bitemporally with radiation into her eyes and down her neck  She describes the pain as intermittent, piercing, rated 10 out of 10 when present  She states the medication is making her nauseous and she just wants an injection to make her pain feel better  She admits to associated photophobia and phonophobia  She denies dizziness, chest pain, SOB, abdominal pain/N/V, weakness  Objective: laying flat  Moaning in pain  I/O       04/21 0701 - 04/22 0700 04/22 0701 - 04/23 0700 04/23 0701 - 04/24 0700    P  O  1080 1297     I V  (mL/kg) 4367 3 (48 4) 4152 5 (46)     IV Piggyback 1000 1000     Total Intake(mL/kg) 6447 3 (71 4) 6449 5 (71 4)     Urine (mL/kg/hr) 4910 (2 3) 6150 (2 8)     Drains 324 (0 1) 238 (0 1)     Stool  0 (0)     Total Output 5234 6388      Net +1213 3 +61 5             Unmeasured Stool Occurrence  1 x           Invasive Devices     Peripherally Inserted Central Catheter Line            PICC Line 99/84/55 Left Basilic 4 days          Drain            Urethral Catheter Temperature probe;Non-latex 16 Fr  6 days    Ventriculostomy/Subdural Ventricular drainage catheter Right Parietal region 6 days                Physical Exam:  Vitals: Blood pressure 150/87, pulse 66, temperature 99 3 °F (37 4 °C), temperature source Oral, resp   rate 17, height 5' 3" (1 6 m), weight 90 7 kg (199 lb 15 3 oz), SpO2 98 %  ,Body mass index is 35 42 kg/m²  Hemodynamic Monitoring: MAP: Arterial Line MAP (mmHg): 96 mmHg, CPP: CPP: 93, ICP Mean: ICP Mean (mmHg): 15 mmHg    General appearance: alert, appears stated age, cooperative and no distress  Head: Normocephalic, right frontal EVD placed at 10mmHg above tragus with bloody CSF present in canister  Eyes: EOMI, PERRL  Lungs: non labored breathing  Heart: regular heart rate  Neurologic:   Mental status: Alert, oriented x4, thought content appropriate  Speech is fluent, clear  Able to complete simple math of 2+2  Able to name objects  Cranial nerves: grossly intact (Cranial nerves II-XII)  Right facial weakness appreciated with expression  Tongue is midline  Sensory: normal to LT in bilateral upper and lower extremities  DSS intact  Motor: moving all extremities with decreased ROM of BLE secondary to angiogram  Strength in BUE 5-/5 throughout  Reflexes: 2+ and symmetric  negative marrufo, negative clonus  Coordination: finger to nose normal bilaterally, no drift bilaterally  3/3 JPS intact      Lab Results:    Results from last 7 days  Lab Units 04/23/18  0441 04/22/18  0554 04/21/18  0442  04/17/18  0430   WBC Thousand/uL 13 67* 12 46* 12 57*  < > 10 37*   HEMOGLOBIN g/dL 9 3* 9 4* 10 1*  < > 11 1*   HEMATOCRIT % 28 7* 29 7* 30 7*  < > 35 3   PLATELETS Thousands/uL 316 343 348  < > 430*   NEUTROS PCT % 58 64  --   --  62   MONOS PCT % 11 9  --   --  9   < > = values in this interval not displayed      Results from last 7 days  Lab Units 04/23/18  0441 04/22/18  0554 04/21/18  0442  04/17/18  0430 04/16/18  2342   SODIUM mmol/L 142 141 142  < > 141 134*   POTASSIUM mmol/L 3 0* 4 0 4 0  < > 3 7 4 4   CHLORIDE mmol/L 111* 108 111*  < > 110* 111*   CO2 mmol/L 24 26 26  < > 26 21   BUN mg/dL 6 6 3*  < > 14 14   CREATININE mg/dL 0 42* 0 53* 0 63  < > 0 58* 0 72   CALCIUM mg/dL 8 0* 8 7 8 6  < > 8 0* 8 1*   TOTAL PROTEIN g/dL  --   --   --   --  5 6* 7 0   BILIRUBIN TOTAL mg/dL  --   --   --   --  0 31 0 35   ALK PHOS U/L  --   --   --   --  51 62   ALT U/L  --   --   --   --  13 15   AST U/L  --   --   --   --  12 22   GLUCOSE RANDOM mg/dL 95 106 111  < > 94 96   < > = values in this interval not displayed  Results from last 7 days  Lab Units 04/23/18  0441 04/22/18  0554 04/21/18  0442   MAGNESIUM mg/dL 1 8 2 2 2 3       Results from last 7 days  Lab Units 04/23/18  0441 04/22/18  0554 04/21/18  0442   PHOSPHORUS mg/dL 2 8 2 9 2 3*       Results from last 7 days  Lab Units 04/16/18 2055   INR  0 93   PTT seconds 28     No results found for: TROPONINT  ABG:No results found for: PHART, WWD1MXT, PO2ART, JWT4WZX, O2PLVWVQ, BEART, SOURCE    Imaging Studies: I have personally reviewed pertinent reports  and I have personally reviewed pertinent films in PACS    IR cerebral angiography / intervention   Final Result      VAS transcranial doppler, complete study   Final Result      XR chest portable   Final Result      1  Right basilar consolidation could represent pneumonia or atelectasis  2   Very mild pulmonary vascular congestion without edema  Workstation performed: [id number]         VAS transcranial doppler, complete study   Final Result      VAS transcranial doppler, complete study   Final Result      VAS transcranial doppler, complete study   Final Result      XR chest portable   Final Result   Right IJ catheter tip in the caval atrial junction region  Left PICC line tip also in the caval atrial junction region  Minimal bibasilar atelectasis  Workstation performed: [id number]         XR chest PICC line portable   Final Result   Left upper extremity PICC line projecting into the right atrium  Consider repositioning the PICC line by withdrawing approximately 2 cm  The study was marked in [facility] for immediate notification  Workstation performed: [id number]         MRI cervical spine wo contrast   Final Result         1    Chronic disc degenerative change throughout the cervical spine resulting in severe central canal stenosis at C5-6 and C6-7  Chronic mass effect on the cord without evidence of cord signal abnormality or myelomalacia  2   T2-3 paracentral disc protrusion and right paracentral disc extrusion with probable mild mass effect on the right ventral aspect of the cord  Workstation performed: [id number]         MRI brain wo contrast   Final Result         1  Small linear focus of restricted diffusion in the left cerebellar hemisphere without associated hemorrhage, suggesting an acute or early subacute ischemic infarct  2   Grossly stable subarachnoid hemorrhage throughout the basal cisterns  No significant mass effect  3   Stable mild hydrocephalus and intraventricular blood products  I personally discussed this study with Dr Marilyn Zamudio on 4/18/2018 at 10:53 PM                Workstation performed: [id number]         VAS transcranial doppler, complete study   Final Result      CT head wo contrast   Final Result      Persistent diffuse subarachnoid hemorrhage, slightly improved  There is some redistribution with hemorrhage layering in the occipital horns of the lateral ventricles  Ventricular drain present extending into the frontal horn of the right lateral ventricle  Workstation performed: [id number]         VAS transcranial doppler, complete study   Final Result      XR chest 1 view portable   Final Result      Right IJ line placement, appears to be in satisfactory position, tip is at the cavoatrial junction  No pneumothorax  Low lung volumes              Workstation performed: [id number]         VAS transcranial doppler, complete study    (Results Pending)   VAS transcranial doppler, complete study    (Results Pending)   XR chest portable    (Results Pending)   IR cerebral angiography / intervention    (Results Pending)     EKG, Pathology, and Other Studies: I have personally reviewed pertinent reports        VTE  Prophylaxis: Sequential compression device (Venodyne)  and Heparin

## 2018-04-23 NOTE — ANESTHESIA POSTPROCEDURE EVALUATION
Post-Op Assessment Note      CV Status:  Stable    Mental Status:  Alert    Hydration Status:  Stable    PONV Controlled:  None    Airway Patency:  Patent    Post Op Vitals Reviewed: Yes          Staff: CRNA           BP   148/78   Temp      Pulse  67   Resp   18   SpO2   95%

## 2018-04-23 NOTE — OCCUPATIONAL THERAPY NOTE
Occupational Therapy Cancellation    Orders received  Chart reviewed  Pt currently off floor for repeat angiogram  Will continue to follow pt on caseload to see for OT treatment at a later date        Easton Shultz MS, OTR/L

## 2018-04-23 NOTE — PROGRESS NOTES
Progress Note - Critical Care   Elza Sahni 63 y o  female MRN: [id number]  Unit/Bed#: ICU 05 Encounter: [id number]  Code Status: Level 1 - Full Code    Assessment and Plan:      Neuro:     Subarachnoid hemorrhage:  Patient presented with subarachnoid hemorrhage and acute hydrocephalus  Currently post bleed day 7  Status post angiogram and right frontal EVD placement, postoperative day 6  Neurosurgery planning for angiogram today     -Neurological examination normal  GCS 15, fully alert and oriented  -EVD at 10 mm Hg, output 238 over the past 24 hours  Drainage appears blood tinged  -ICP has ranged from 0 to 16  -CPP has ranged from 75 to 95     -Transcranial Doppler is pending for today, yesterday demonstrated no vasospasm on the right, Lindegaard ratio 0 7 from 2 36 yesterday  On the left, severe vasospasm was seen in the MCA Lindegaard ratio 5 5 from 2 87 yesterday     -Targeting euvolemia:  Patient was net positive 61 5 mL yesterday  Overall -70 5 3 mL since admission     -Continue Keppra, day 7 of 7     -Continue nimodipine, 60 mg every 4 hours  -Delirium prevention, regulate sleep-wake cycles  CV:     -Goal systolic blood pressure under 140 in the setting of untreated aneurysm  -122/92-62 in the past 24 hours  -Maintain MAP above 65  Lung:     Asthma:  Patient has a history of asthma without evidence of acute exacerbation     -Continue with home medications  -Maintain oxygen saturations above 92%  On 3L NC at this time  GI:     -Patient had been on regular diet  Patient NPO in preparation for angiogram today     -Bowel regimen with Colace 100 mg twice daily and Senokot at night  FEN:     Fluids:  Patient is receiving normal saline 100 mL/hr  Targeting euvolemia  Was 61 5 mL positive yesterday  Urine output was 6150 mL or 2 8 mL/kg/hr  -Replete electrolytes PRN, goal potassium at least 4, goal magnesium at least 2, goal phosphorus at least 3    We will replete potassium and magnesium today  -NPO for angiogram today  :     -Elevated urine output at 2 8 mL/kg/hr  May be secondary to SIADH or cerebral salt wasting  Continue to target neutral fluid balance and euvolemia  -Monitor UOP  Renal function is good  ID:     -Afebrile overnight, T-max 99 8°  WBC count this morning is 13 67, was 12 46 yesterday  Monitor     -No acute issues  Monitor fever curve and WBC count  Heme:     Anemia:  Patient has normocytic anemia  Hemoglobin is 9 3 this morning, compared to 9 4 yesterday  Continue to monitor  -DVT prophylaxis with heparin and SCDs  Transfuse if hemoglobin falls below 7  Endo:     -No acute issues  Avoid hyperglycemia; goal blood glucose below 180  Msk/Skin:     -No acute issues  Out of bed as tolerated  Frequent repositioning every 2 hours for pressure ulcer prophylaxis  Disposition:  Continue ICU care   ______________________________________________________________________    Chief Complaint:  Patient reports bitemporal headache  She states the pain is coming mostly from dental pain  Patient reports that she has had poor dentition and required dental work for this  Otherwise, patient stated that she had an episode of shortness of breath but attributed this to not receiving her inhaler medications properly  She stated that the nebulized solution remained in her mouth  Otherwise, patient denies changes in vision  She has had no extremity numbness, paresthesia, or weakness  No chest pain, nausea, or vomiting  HPI/24hr events:  Per report from the overnight team, patient had a transient episode of hypoxemia with oxygen saturations dropping into the 70s  On examination, patient apparently had bilateral crackles on lung auscultation  ______________________________________________________________________    Physical Exam   Constitutional: She is oriented to person, place, and time   She appears well-developed and well-nourished  No distress  HENT:   Head: Normocephalic and atraumatic  Mouth/Throat: Dental caries present  EVD in the right frontal region  Eyes: EOM are normal  Pupils are equal, round, and reactive to light  Neck: Normal range of motion  Neck supple  Cardiovascular: Normal rate and regular rhythm  No murmur heard  Pulmonary/Chest: Effort normal  No respiratory distress  She has no wheezes  She has no rales  Patient is currently on 3 L of oxygen via nasal cannula with oxygen saturations at 95%  No respiratory distress  No crackles were appreciated on auscultation  Abdominal: Soft  Bowel sounds are normal  She exhibits no distension  There is no tenderness  Musculoskeletal: Normal range of motion  She exhibits no edema  Neurological: She is alert and oriented to person, place, and time  Patient is alert and oriented to time, person, place, and situation  Speech is fluent with no aphasia or dysarthria  CN II-XII are intact  Strength is 5/5 in the upper and lower extremities bilaterally  Sensation grossly intact  Skin: Skin is warm and dry  Psychiatric: She has a normal mood and affect  Her behavior is normal    Nursing note and vitals reviewed  ______________________________________________________________________  Vitals:    18 0300 18 0400 18 0500 18 0600   BP: 124/65 143/86 122/62 150/87   BP Location: Right arm Right arm     Pulse: 74 76 66 66   Resp: (!) 26 (!) 26 18 17   Temp:  99 3 °F (37 4 °C)     TempSrc:  Oral     SpO2: 93% 94% 96% 98%   Weight:       Height:         Arterial Line BP: 132/70  Arterial Line MAP (mmHg): 96 mmHg     Temperature:   Temp (24hrs), Av 4 °F (37 4 °C), Min:99 °F (37 2 °C), Max:99 8 °F (37 7 °C)    Current Temperature: 99 3 °F (37 4 °C)    Weights:   IBW: 52 4 kg    Body mass index is 35 42 kg/m²    Weight (last 2 days)     None          Hemodynamic Monitoring:  N/A       Non-Invasive/Invasive Ventilation Settings:  Respiratory    Lab Data (Last 4 hours)    None         O2/Vent Data (Last 4 hours)    None              No results found for: PHART, ILR6PJA, PO2ART, MIM6OPD, A2NUZJEB, BEART, SOURCE  SpO2: SpO2: 98 %    Intake and Outputs:  I/O       04/21 0701 - 04/22 0700 04/22 0701 - 04/23 0700    P  O  1080 1297    I V  (mL/kg) 4367 3 (48 4) 4152 5 (46)    IV Piggyback 1000 1000    Total Intake(mL/kg) 6447 3 (71 4) 6449 5 (71 4)    Urine (mL/kg/hr) 4910 (2 3) 6150 (2 8)    Drains 324 (0 1) 208 (0 1)    Stool  0 (0)    Total Output 5234 6358    Net +1213 3 +91 5          Unmeasured Stool Occurrence  1 x        UOP: 2 8 mL/kg/hour     Nutrition:        Diet Orders            Start     Ordered    04/23/18 0001  Diet NPO; Sips with meds  Diet effective midnight     Question Answer Comment   Diet Type NPO    NPO Except: Sips with meds        04/22/18 1843    04/21/18 1053  Dietary nutrition supplements  Once     Question Answer Comment   Select Supplement: Ensure Enlive-Chocolate    Frequency Breakfast, Lunch, Dinner        04/21/18 1056    04/20/18 1900  Dietary nutrition supplements  Once     Question Answer Comment   Select Supplement: Ensure Enlive-Chocolate    Frequency Breakfast, Lunch, Dinner, HS        04/20/18 1900          Labs:     Results from last 7 days  Lab Units 04/23/18  0441 04/22/18  0554 04/21/18  0442  04/17/18  0430   WBC Thousand/uL 13 67* 12 46* 12 57*  < > 10 37*   HEMOGLOBIN g/dL 9 3* 9 4* 10 1*  < > 11 1*   HEMATOCRIT % 28 7* 29 7* 30 7*  < > 35 3   PLATELETS Thousands/uL 316 343 348  < > 430*   NEUTROS PCT % 58 64  --   --  62   MONOS PCT % 11 9  --   --  9   < > = values in this interval not displayed     Results from last 7 days  Lab Units 04/23/18  0441 04/22/18  0554 04/21/18  0442  04/17/18  0430 04/16/18  2342   SODIUM mmol/L 142 141 142  < > 141 134*   POTASSIUM mmol/L 3 0* 4 0 4 0  < > 3 7 4 4   CHLORIDE mmol/L 111* 108 111*  < > 110* 111*   CO2 mmol/L 24 26 26  < > 26 21   BUN mg/dL 6 6 3*  < > 14 14   CREATININE mg/dL 0 42* 0 53* 0 63  < > 0 58* 0 72   CALCIUM mg/dL 8 0* 8 7 8 6  < > 8 0* 8 1*   TOTAL PROTEIN g/dL  --   --   --   --  5 6* 7 0   BILIRUBIN TOTAL mg/dL  --   --   --   --  0 31 0 35   ALK PHOS U/L  --   --   --   --  51 62   ALT U/L  --   --   --   --  13 15   AST U/L  --   --   --   --  12 22   GLUCOSE RANDOM mg/dL 95 106 111  < > 94 96   < > = values in this interval not displayed  Results from last 7 days  Lab Units 04/23/18  0441 04/22/18  0554 04/21/18  0442   MAGNESIUM mg/dL 1 8 2 2 2 3       Results from last 7 days  Lab Units 04/23/18  0441 04/22/18  0554 04/21/18  0442   PHOSPHORUS mg/dL 2 8 2 9 2 3*        Results from last 7 days  Lab Units 04/16/18  2055   INR  0 93   PTT seconds 28           Results from last 7 days  Lab Units 04/16/18  2342   TROPONIN I ng/mL <0 02       Imaging: I have personally reviewed pertinent films in PACS      Micro:  No results found for: [place], WOUNDCULT, SPUTUMCULTUR    Allergies: No Known Allergies    Medications:   Scheduled Meds:    Current Facility-Administered Medications:  acetaminophen 650 mg Oral Q6H [facility] Aurora Barragan PA-C    albumin human 25 g Intravenous Q12H Oliver Rodriguez MD Last Rate: Stopped (04/23/18 0000)   albuterol 2 puff Inhalation Q6H PRN Sharla Angelucci Bashor, DO    ALPRAZolam 0 25 mg Oral Daily PRN Aurora Barragan PA-C    bisacodyl 10 mg Rectal Daily PRN Aurora Barragan PA-C    docusate sodium 100 mg Oral BID Sharla Angelucci Bashor, DO    fluticasone furoate-vilanterol 1 puff Inhalation Daily Aurora Barragan PA-C    heparin (porcine) 5,000 Units Subcutaneous Q8H [facility] Aurora Barragan PA-C    HYDROmorphone 0 5 mg Intravenous Q2H PRN Aurora Barragan PA-C    labetalol 10 mg Intravenous Q4H PRN Oliver Rodriguez MD    levETIRAcetam 750 mg Oral Q12H [facility] Aurora Barragan PA-C    methocarbamol 500 mg Oral Q6H PRN Aurora Barragan PA-C    montelukast 10 mg Oral Daily Marvin Zuñiga DO niMODipine 60 mg Oral Q4H [facility] Lowell S Ana, DO    ondansetron 4 mg Intravenous Q6H PRN Stacey Fuentes, DO    oxyCODONE 10 mg Oral Q4H PRN Luis E Fontaine MD    oxyCODONE 5 mg Oral Q4H PRN Hemant Guido MD    pravastatin 40 mg Oral Daily With MoPix, DO    senna 1 tablet Oral HS Aurora Barragan PA-C    sertraline 25 mg Oral Daily Luis E Fontaine MD    sodium chloride 100 mL/hr Intravenous Continuous Luis E Fontaine MD Last Rate: 100 mL/hr (04/22/18 2321)     Facility-Administered Medications Ordered in Other Encounters:  fentanyl citrate (PF)  Intravenous PRN Sole Hudson, CRNA    midazolam  Intravenous PRN Downing Hudson, CRNA    propofol  Intravenous Continuous PRN Downing Hudson, CRNA Last Rate: Stopped (04/23/18 0849)   propofol  Intravenous PRN Sole Hudson, CRNA      Continuous Infusions:    sodium chloride 100 mL/hr Last Rate: 100 mL/hr (04/22/18 2321)     PRN Meds:    albuterol 2 puff Q6H PRN   ALPRAZolam 0 25 mg Daily PRN   bisacodyl 10 mg Daily PRN   HYDROmorphone 0 5 mg Q2H PRN   labetalol 10 mg Q4H PRN   methocarbamol 500 mg Q6H PRN   ondansetron 4 mg Q6H PRN   oxyCODONE 10 mg Q4H PRN   oxyCODONE 5 mg Q4H PRN       VTE Pharmacologic Prophylaxis: Heparin  VTE Mechanical Prophylaxis: sequential compression device  Invasive lines and devices:   Invasive Devices     Peripherally Inserted Central Catheter Line            PICC Line 56/61/39 Left Basilic 3 days          Drain            Urethral Catheter Temperature probe;Non-latex 16 Fr  5 days    Ventriculostomy/Subdural Ventricular drainage catheter Right Parietal region 5 days

## 2018-04-23 NOTE — OP NOTE
OPERATIVE REPORT  PATIENT NAME: Dorothy Deng    :  1960  MRN: [id number]  Pt Location: IR  Preop Diagnosis:  1  HH3F3 SAH, Bleed Day 9  2  Acute hydrocephalus     Postop Diagnosis  1  HH3F3 SAH  2  Acute hydrocephalus     Procedure:  Right Internal Carotid Arteriogram  Left Internal Carotid Arteriogram  Right Vertebral Artery Arteriogram  Limited Right Femoral Arteriogram     Surgeon:   Cyndie Cheek MD     Specimen(s):  None     Estimated Blood Loss:   None     Drains:  None     Anesthesia Type:   General     Complications:  None     Operative Indications:  Dorothy Deng  is a 63 y o  female who is bleed day 9 Raines Thomson 3 Velazco 3 SAH from unknown source  Given her hemorrhage pattern and negative first angiogram we discussed delayed repeat angiogram   After discussing the risks and benefits of the procedure including bleeding, stroke, groin hematoma, and death they elected to go ahead and proceed       Procedure Details:     Percutaneous access with a 5-[place] micropuncture kit was obtained into the right femoral artery  A 5-[place]ine[place] introducer sheath was placed and a 5-[place] angled glide catheter was then advanced into the aorta, and over the aortic arch over a Glidewire       The catheter was then advanced and the right internal carotid artery was then catheterized  AP lateral and magnified oblique images of the right intracranial carotid circulation were obtained  A 3D rotational angiogram of the ASHLEY artery was then done  Post-processed images were reviewed at a separate work station        The catheter was then withdrawn into the brachiocephalic artery and advanced into  the right vertebral artery  Transfascial lateral and oblique images of the right vertebral artery intracranial circulation were obtained  The catheter was then advanced and the left internal carotid artery was then catheterized  AP lateral and magnified oblique images of the left intracranial carotid circulation were obtained  The catheter was then withdrawn from the body and a limited right femoral arteriogram was run  Puncture site was found to be compatible with a Mynx Closure device and this was done successfully  There was appropriate hemostasis  The patient was then awoken from his monitored anesthesia care and found to be in his neurologic baseline  All sponge and needle counts were correct         INTERPRETATION OF ANGIOGRAPHIC FINDINGS:   1  The Right internal carotid artery circulation reveals antegrade flow into the middle cerebral and anterior cerebral arteries  There is an azygous A2  There is a large posterior communicating artery, which appears fetal  There is no evidence of aneurysm, AVM, or vascular malformation  The capillary and venous phases are unremarkable  The 3d rotational angiogram does not reveal any aneurysm, there are several infindibulums that match on diagnostic arteriography       2  The Left internal carotid artery circulation reveals antegrade flow into the middle cerebral and anterior cerebral arteries  There is a patent posterior communicating artery  There is no evidence of aneurysm, AVM, or vascular malformation  The capillary and venous phases are unremarkable       3  The Right vertebral intracranial circulation reveals retrograde reflux down the contralateral vertebral artery  There is a small right P1 consistent with a fetal origin  The basilar apex is appears broad, but no aneurysmal dilitation  Both PICAs are well visualized  Antegrade flow is present into all the posterior circulation branches  There are no AVMs or aneurysms  The capillary and venous phases are unremarkable       Limited Right femoral arteriogram reveals normal puncture site anatomy      3D CT reveals catheter placement in the right lateral ventricle       Impression:  Angio negative SAH    Azygous A2     Patient Disposition:  Critical Care Unit     SIGNATURE: Brent Blackwood MD  DATE: 04/23/18   TIME: 1000

## 2018-04-23 NOTE — ANESTHESIA PREPROCEDURE EVALUATION
Review of Systems/Medical History          Cardiovascular  Hypertension ,    Pulmonary  Smoker , Asthma: ,        GI/Hepatic            Endo/Other     GYN       Hematology   Musculoskeletal       Neurology    Cerebral bleeding with side effects , intracerebral hemorrhage,    Psychology   Anxiety, Depression ,              Physical Exam    Airway    Mallampati score: II         Dental   No notable dental hx     Cardiovascular      Pulmonary      Other Findings        Anesthesia Plan  ASA Score- 3     Anesthesia Type- IV sedation with anesthesia with ASA Monitors  Additional Monitors:   Airway Plan:     Comment: I, Dr Ray Bobby, the attending physician, have personally seen and evaluated the patient prior to anesthetic care  I have reviewed the pre-anesthetic record, and other medical records if appropriate to the anesthetic care  If a CRNA is involved in the case, I have reviewed the CRNA assessment, if present, and agree  The patient is in a suitable condition to proceed with my formulated anesthetic plan        Plan Factors-    Induction- intravenous  Postoperative Plan-     Informed Consent- Anesthetic plan and risks discussed with patient  I personally reviewed this patient with the CRNA  Discussed and agreed on the Anesthesia Plan with the CRNA  Milagros Calderon

## 2018-04-23 NOTE — SEDATION DOCUMENTATION
Cerebral angio by Dr Guy Templeton with anesthesia, no intervention, right groin mynx closure, no complications

## 2018-04-23 NOTE — ANESTHESIA POSTPROCEDURE EVALUATION
Post-Op Assessment Note      CV Status:  Stable    Mental Status:  Alert    Hydration Status:  Stable    PONV Controlled:  None    Airway Patency:  Patent and adequate  Airway: intubated    Post Op Vitals Reviewed: Yes          Staff: CRNA           BP      Temp      Pulse     Resp      SpO2

## 2018-04-24 ENCOUNTER — APPOINTMENT (INPATIENT)
Dept: NON INVASIVE DIAGNOSTICS | Facility: HOSPITAL | Age: 58
DRG: 021 | End: 2018-04-24
Payer: COMMERCIAL

## 2018-04-24 ENCOUNTER — APPOINTMENT (INPATIENT)
Dept: RADIOLOGY | Facility: HOSPITAL | Age: 58
DRG: 021 | End: 2018-04-24
Payer: COMMERCIAL

## 2018-04-24 LAB
ANION GAP SERPL CALCULATED.3IONS-SCNC: 5 MMOL/L (ref 4–13)
BASOPHILS # BLD AUTO: 0.04 THOUSANDS/ΜL (ref 0–0.1)
BASOPHILS NFR BLD AUTO: 0 % (ref 0–1)
BUN SERPL-MCNC: 12 MG/DL (ref 5–25)
CALCIUM SERPL-MCNC: 9 MG/DL (ref 8.3–10.1)
CHLORIDE SERPL-SCNC: 107 MMOL/L (ref 100–108)
CO2 SERPL-SCNC: 28 MMOL/L (ref 21–32)
CREAT SERPL-MCNC: 0.6 MG/DL (ref 0.6–1.3)
EOSINOPHIL # BLD AUTO: 0.31 THOUSAND/ΜL (ref 0–0.61)
EOSINOPHIL NFR BLD AUTO: 3 % (ref 0–6)
ERYTHROCYTE [DISTWIDTH] IN BLOOD BY AUTOMATED COUNT: 14.7 % (ref 11.6–15.1)
GFR SERPL CREATININE-BSD FRML MDRD: 102 ML/MIN/1.73SQ M
GLUCOSE SERPL-MCNC: 131 MG/DL (ref 65–140)
HCT VFR BLD AUTO: 29.2 % (ref 34.8–46.1)
HGB BLD-MCNC: 9.6 G/DL (ref 11.5–15.4)
LYMPHOCYTES # BLD AUTO: 2.59 THOUSANDS/ΜL (ref 0.6–4.47)
LYMPHOCYTES NFR BLD AUTO: 23 % (ref 14–44)
MCH RBC QN AUTO: 30.8 PG (ref 26.8–34.3)
MCHC RBC AUTO-ENTMCNC: 32.9 G/DL (ref 31.4–37.4)
MCV RBC AUTO: 94 FL (ref 82–98)
MONOCYTES # BLD AUTO: 1.39 THOUSAND/ΜL (ref 0.17–1.22)
MONOCYTES NFR BLD AUTO: 12 % (ref 4–12)
NEUTROPHILS # BLD AUTO: 6.86 THOUSANDS/ΜL (ref 1.85–7.62)
NEUTS SEG NFR BLD AUTO: 62 % (ref 43–75)
NRBC BLD AUTO-RTO: 0 /100 WBCS
PLATELET # BLD AUTO: 362 THOUSANDS/UL (ref 149–390)
PMV BLD AUTO: 10.2 FL (ref 8.9–12.7)
POTASSIUM SERPL-SCNC: 3.8 MMOL/L (ref 3.5–5.3)
RBC # BLD AUTO: 3.12 MILLION/UL (ref 3.81–5.12)
SODIUM SERPL-SCNC: 140 MMOL/L (ref 136–145)
WBC # BLD AUTO: 11.24 THOUSAND/UL (ref 4.31–10.16)

## 2018-04-24 PROCEDURE — 99233 SBSQ HOSP IP/OBS HIGH 50: CPT | Performed by: NEUROLOGICAL SURGERY

## 2018-04-24 PROCEDURE — 93886 INTRACRANIAL COMPLETE STUDY: CPT

## 2018-04-24 PROCEDURE — 99233 SBSQ HOSP IP/OBS HIGH 50: CPT | Performed by: ANESTHESIOLOGY

## 2018-04-24 PROCEDURE — 85025 COMPLETE CBC W/AUTO DIFF WBC: CPT | Performed by: EMERGENCY MEDICINE

## 2018-04-24 PROCEDURE — 93886 INTRACRANIAL COMPLETE STUDY: CPT | Performed by: SURGERY

## 2018-04-24 PROCEDURE — 70450 CT HEAD/BRAIN W/O DYE: CPT

## 2018-04-24 PROCEDURE — 80048 BASIC METABOLIC PNL TOTAL CA: CPT | Performed by: EMERGENCY MEDICINE

## 2018-04-24 RX ORDER — MORPHINE SULFATE 2 MG/ML
2 INJECTION, SOLUTION INTRAMUSCULAR; INTRAVENOUS EVERY 4 HOURS PRN
Status: DISCONTINUED | OUTPATIENT
Start: 2018-04-24 | End: 2018-04-28

## 2018-04-24 RX ORDER — LANOLIN ALCOHOL/MO/W.PET/CERES
6 CREAM (GRAM) TOPICAL
Status: DISCONTINUED | OUTPATIENT
Start: 2018-04-24 | End: 2018-05-02 | Stop reason: HOSPADM

## 2018-04-24 RX ORDER — SODIUM CHLORIDE, SODIUM GLUCONATE, SODIUM ACETATE, POTASSIUM CHLORIDE, MAGNESIUM CHLORIDE, SODIUM PHOSPHATE, DIBASIC, AND POTASSIUM PHOSPHATE .53; .5; .37; .037; .03; .012; .00082 G/100ML; G/100ML; G/100ML; G/100ML; G/100ML; G/100ML; G/100ML
500 INJECTION, SOLUTION INTRAVENOUS ONCE
Status: DISCONTINUED | OUTPATIENT
Start: 2018-04-24 | End: 2018-04-24

## 2018-04-24 RX ADMIN — MELATONIN TAB 3 MG 6 MG: 3 TAB at 22:10

## 2018-04-24 RX ADMIN — OXYCODONE HYDROCHLORIDE 10 MG: 10 TABLET ORAL at 14:20

## 2018-04-24 RX ADMIN — NIMODIPINE 60 MG: 30 CAPSULE ORAL at 16:33

## 2018-04-24 RX ADMIN — HEPARIN SODIUM 5000 UNITS: 5000 INJECTION, SOLUTION INTRAVENOUS; SUBCUTANEOUS at 21:34

## 2018-04-24 RX ADMIN — MORPHINE SULFATE 2 MG: 2 INJECTION, SOLUTION INTRAMUSCULAR; INTRAVENOUS at 12:33

## 2018-04-24 RX ADMIN — MONTELUKAST SODIUM 10 MG: 10 TABLET, FILM COATED ORAL at 09:06

## 2018-04-24 RX ADMIN — NIMODIPINE 60 MG: 30 CAPSULE ORAL at 12:35

## 2018-04-24 RX ADMIN — SERTRALINE HYDROCHLORIDE 25 MG: 25 TABLET ORAL at 09:05

## 2018-04-24 RX ADMIN — MORPHINE SULFATE 2 MG: 2 INJECTION, SOLUTION INTRAMUSCULAR; INTRAVENOUS at 22:10

## 2018-04-24 RX ADMIN — HYDROMORPHONE HYDROCHLORIDE 0.5 MG: 1 INJECTION, SOLUTION INTRAMUSCULAR; INTRAVENOUS; SUBCUTANEOUS at 02:32

## 2018-04-24 RX ADMIN — NIMODIPINE 60 MG: 30 CAPSULE ORAL at 03:58

## 2018-04-24 RX ADMIN — OXYCODONE HYDROCHLORIDE 10 MG: 10 TABLET ORAL at 20:15

## 2018-04-24 RX ADMIN — METHOCARBAMOL 500 MG: 500 TABLET ORAL at 16:33

## 2018-04-24 RX ADMIN — HYDROMORPHONE HYDROCHLORIDE 0.5 MG: 1 INJECTION, SOLUTION INTRAMUSCULAR; INTRAVENOUS; SUBCUTANEOUS at 09:53

## 2018-04-24 RX ADMIN — ACETAMINOPHEN 650 MG: 325 TABLET, FILM COATED ORAL at 18:23

## 2018-04-24 RX ADMIN — ONDANSETRON 4 MG: 2 INJECTION INTRAMUSCULAR; INTRAVENOUS at 12:33

## 2018-04-24 RX ADMIN — NIMODIPINE 60 MG: 30 CAPSULE ORAL at 09:05

## 2018-04-24 RX ADMIN — SODIUM CHLORIDE 50 ML/HR: 0.9 INJECTION, SOLUTION INTRAVENOUS at 04:18

## 2018-04-24 RX ADMIN — HEPARIN SODIUM 5000 UNITS: 5000 INJECTION, SOLUTION INTRAVENOUS; SUBCUTANEOUS at 06:31

## 2018-04-24 RX ADMIN — DOCUSATE SODIUM 100 MG: 100 CAPSULE, LIQUID FILLED ORAL at 18:23

## 2018-04-24 RX ADMIN — MORPHINE SULFATE 2 MG: 2 INJECTION, SOLUTION INTRAMUSCULAR; INTRAVENOUS at 18:23

## 2018-04-24 RX ADMIN — ALPRAZOLAM 0.25 MG: 0.25 TABLET ORAL at 12:32

## 2018-04-24 RX ADMIN — ACETAMINOPHEN 650 MG: 325 TABLET, FILM COATED ORAL at 12:32

## 2018-04-24 RX ADMIN — PRAVASTATIN SODIUM 40 MG: 40 TABLET ORAL at 16:32

## 2018-04-24 RX ADMIN — NIMODIPINE 60 MG: 30 CAPSULE ORAL at 20:14

## 2018-04-24 RX ADMIN — FLUTICASONE FUROATE AND VILANTEROL 1 PUFF: 200; 25 POWDER RESPIRATORY (INHALATION) at 09:54

## 2018-04-24 RX ADMIN — HYDROMORPHONE HYDROCHLORIDE 0.5 MG: 1 INJECTION, SOLUTION INTRAMUSCULAR; INTRAVENOUS; SUBCUTANEOUS at 00:00

## 2018-04-24 RX ADMIN — HEPARIN SODIUM 5000 UNITS: 5000 INJECTION, SOLUTION INTRAVENOUS; SUBCUTANEOUS at 15:24

## 2018-04-24 RX ADMIN — HYDROMORPHONE HYDROCHLORIDE 0.5 MG: 1 INJECTION, SOLUTION INTRAMUSCULAR; INTRAVENOUS; SUBCUTANEOUS at 03:58

## 2018-04-24 RX ADMIN — METHOCARBAMOL 500 MG: 500 TABLET ORAL at 22:10

## 2018-04-24 RX ADMIN — ACETAMINOPHEN 650 MG: 325 TABLET, FILM COATED ORAL at 06:30

## 2018-04-24 RX ADMIN — SENNOSIDES 8.6 MG: 8.6 TABLET, FILM COATED ORAL at 21:36

## 2018-04-24 RX ADMIN — DOCUSATE SODIUM 100 MG: 100 CAPSULE, LIQUID FILLED ORAL at 09:05

## 2018-04-24 RX ADMIN — OXYCODONE HYDROCHLORIDE 10 MG: 10 TABLET ORAL at 09:05

## 2018-04-24 RX ADMIN — METHOCARBAMOL 500 MG: 500 TABLET ORAL at 09:05

## 2018-04-24 NOTE — PROGRESS NOTES
04/24/18 [address]   Spiritual Beliefs/Perceptions   Concept of God Accepting   God's Role in Disease Natural   Support Systems Parent; Children   Stress Factors   Patient Stress Factors Health changes   Coping Responses   Patient Coping Open/discussion   Plan of Care   Comments PT defused brain injury experience that reduced stress  Expressed hope  Distress reduced despite pain  Expressed intermediate hope  Prayer  Assessment Completed by: Unit visit     Appreciates visits  Request  for communion and SOS

## 2018-04-24 NOTE — PROGRESS NOTES
Progress Note - Critical Care   Radha Winston 63 y o  female MRN: [id number]  Unit/Bed#: ICU 05 Encounter: [id number]  Code Status: Level 1 - Full Code    Assessment and Plan:                 Neuro:      Subarachnoid hemorrhage:  Patient presented with subarachnoid hemorrhage and acute hydrocephalus  Currently post bleed day 10  Status post angiogram and right frontal EVD placement, postoperative day 6  Status post repeat  angiogram, postoperative day 1      -Neurological examination normal with no focal deficits  GCS 15, fully alert and oriented  -Repeat CT head after the procedure demonstrated significant improvement in the subarachnoid hemorrhage with interval decrease in the size of the lateral ventricles  There was slight increase in interventricular hemorrhage after EVD placement      -EVD at 10 mm Hg, output 193 over the past 24 hours  Drainage appears blood tinged      -ICP has ranged from 6 to 12      -CPP has ranged from 85 to 105      -Transcranial Doppler yesterday demonstrated that the right Lindegaard ratio was normal at 1, prior 0 7  The left Lindegaard ratio was abnormal at 3 48, prior  5 5  No vasospasm was demonstrated on angiogram yesterday  No clinical change in neurological examination      -Targeting euvolemia:  Patient was net -149 8 mL yesterday  Overall -225 2 mL since admission     -Discontine Keppra      -Continue nimodipine, 60 mg every 4 hours      -Delirium prevention, regulate sleep-wake cycles                    CV:      -Goal systolic blood pressure was under 140 in the setting of presumed untreated aneurysm  With no aneurysm visualized on angiograms, may aim for a more liberal BP goal, systolic blood pressure under 160  -126/90-70 in the past 24 hours      -Maintain MAP above 65                  Lung:      Asthma:  Patient has a history of asthma without evidence of acute exacerbation      -Continue with home medications        -Maintain oxygen saturations above 92%  On  3 L by nasal cannula at this time                  GI:      -Patient is on regular diet      -Bowel regimen with Colace 100 mg twice daily and Senokot at night                  FEN:      Fluids:  Patient is receiving normal saline 50 mL/hr  Albumin discontinued  Targeting euvolemia  Was -149 8 mL yesterday  Urine output was 3575 mL or 1 7 mL/kg/hr      -Replete electrolytes PRN, goal potassium at least 4, goal magnesium at least 2, goal phosphorus at least 3  Potassium 3 8, we will replete                     :      -Elevated urine output at 1 6 mL/kg/hr, less than yesterday  May be secondary to SIADH or cerebral salt wasting  Continue to target neutral fluid balance and euvolemia      -Monitor UOP  Renal function is good                  ID:      Afebrile overnight  WBC count this morning is 11 24, was 13 67 yesterday  Monitor      -No acute issues  Monitor fever curve and WBC count                  Heme:      Anemia:  Patient has normocytic anemia  Hemoglobin is 9 6 this morning, compared to 9 3 yesterday  Continue to monitor      -DVT prophylaxis with heparin and SCDs  Transfuse if hemoglobin falls below 7                   Endo:      -No acute issues  Avoid hyperglycemia; goal blood glucose below 180  Msk/Skin:      -No acute issues  Out of bed as tolerated  Frequent repositioning every 2 hours for pressure ulcer prophylaxis                   Disposition:  Continue ICU care  ______________________________________________________________________    Chief Complaint:  Patient reports that after the angiogram yesterday, she experienced a migraine headache associated with visual scotomata  Patient does have a history of migraines and states this was a typical migraine  No worsening shortness of breath  No chest pain  Patient did vomit yesterday after taking multiple pills on an empty stomach    Patient has been tolerating her diet since then without difficulty  HPI/24hr events:   Patient went for an angiogram with Neurosurgery yesterday  Again, no aneurysm or AVM was demonstrated  The ultimate etiology of patient's subarachnoid hemorrhage is unclear     ______________________________________________________________________    Physical Exam   Constitutional: She is oriented to person, place, and time  She appears well-developed and well-nourished  No distress  HENT:   Head: Normocephalic  EVD in place in the right frontal region  Eyes: EOM are normal  Pupils are equal, round, and reactive to light  Neck: Normal range of motion  Neck supple  Cardiovascular: Normal rate and regular rhythm  No murmur heard  Pulmonary/Chest: No respiratory distress  She has no wheezes  She has no rales  Abdominal: Soft  Bowel sounds are normal  She exhibits no distension  There is no tenderness  Musculoskeletal: Normal range of motion  She exhibits no edema  Neurological: She is alert and oriented to person, place, and time  Cranial nerves are grossly intact  Patient has 5/5 strength in the upper and lower extremities bilaterally  Skin: Skin is warm and dry  Psychiatric: She has a normal mood and affect  Her behavior is normal    Nursing note and vitals reviewed  ______________________________________________________________________  Vitals:    18 0300 18 0400 18 0500 18 0600   BP: 151/84 133/72 126/77 119/67   BP Location: Right arm   Right arm   Pulse: 68 78 66 66   Resp: 14 (!) 26 22 18   Temp:       TempSrc:       SpO2: 96% 93% 95% 94%   Weight:       Height:         Arterial Line BP: 132/70  Arterial Line MAP (mmHg): 96 mmHg     Temperature:   Temp (24hrs), Av 7 °F (37 1 °C), Min:98 2 °F (36 8 °C), Max:99 1 °F (37 3 °C)    Current Temperature: 98 7 °F (37 1 °C)    Weights:   IBW: 52 4 kg    Body mass index is 35 42 kg/m²    Weight (last 2 days)     None          Hemodynamic Monitoring:  N/A Non-Invasive/Invasive Ventilation Settings:  Respiratory    Lab Data (Last 4 hours)    None         O2/Vent Data (Last 4 hours)    None              No results found for: PHART, KAE6PYX, PO2ART, JBQ2DYW, P9BKODMM, BEART, SOURCE  SpO2: SpO2: 94 %    Intake and Outputs:  I/O       04/22 0701 - 04/23 0700 04/23 0701 - 04/24 0700    P  O  1297 1067    I V  (mL/kg) 4152 5 (46) 1891 2 (20 9)    IV Piggyback 1000 50    Total Intake(mL/kg) 6449 5 (71 4) 3008 2 (33 3)    Urine (mL/kg/hr) 6150 (2 8) 3575 (1 6)    Drains 238 (0 1) 193 (0 1)    Stool 0 (0)     Total Output 6388 3768    Net +61 5 -759 8          Unmeasured Stool Occurrence 1 x         UOP: 1 7 mL/kg/hour     Nutrition:        Diet Orders            Start     Ordered    04/23/18 1321  Diet Regular; Regular House; Regular House  Diet effective midnight     Question Answer Comment   Diet Type Regular    Regular Regular House    Other Restriction(s): Regular House        04/23/18 1321    04/21/18 1053  Dietary nutrition supplements  Once     Question Answer Comment   Select Supplement: Ensure Enlive-Chocolate    Frequency Breakfast, Lunch, Dinner        04/21/18 1056    04/20/18 1900  Dietary nutrition supplements  Once     Question Answer Comment   Select Supplement: Ensure Enlive-Chocolate    Frequency Breakfast, Lunch, Dinner, HS        04/20/18 1900          Labs:     Results from last 7 days  Lab Units 04/24/18  0512 04/23/18  0441 04/22/18  0554   WBC Thousand/uL 11 24* 13 67* 12 46*   HEMOGLOBIN g/dL 9 6* 9 3* 9 4*   HEMATOCRIT % 29 2* 28 7* 29 7*   PLATELETS Thousands/uL 362 316 343   NEUTROS PCT % 62 58 64   MONOS PCT % 12 11 9        Results from last 7 days  Lab Units 04/24/18  0511 04/23/18  0441 04/22/18  0554   SODIUM mmol/L 140 142 141   POTASSIUM mmol/L 3 8 3 0* 4 0   CHLORIDE mmol/L 107 111* 108   CO2 mmol/L 28 24 26   BUN mg/dL 12 6 6   CREATININE mg/dL 0 60 0 42* 0 53*   CALCIUM mg/dL 9 0 8 0* 8 7   GLUCOSE RANDOM mg/dL 131 95 106       Results from last 7 days  Lab Units 04/23/18  0441 04/22/18  0554 04/21/18  0442   MAGNESIUM mg/dL 1 8 2 2 2 3       Results from last 7 days  Lab Units 04/23/18  0441 04/22/18  0554 04/21/18  0442   PHOSPHORUS mg/dL 2 8 2 9 2 3*                    Imaging: I have personally reviewed pertinent films in PACS      Allergies: No Known Allergies    Medications:   Scheduled Meds:    Current Facility-Administered Medications:  acetaminophen 650 mg Oral Q6H [address] Aurora Barragan PA-C    albuterol 2 puff Inhalation Q6H PRN Isaias Perez, DO    ALPRAZolam 0 25 mg Oral Daily PRN Aurora Barragan PA-C    bisacodyl 10 mg Rectal Daily PRN Aurora Barragan PA-C    docusate sodium 100 mg Oral BID Isaias Perez, DO    fluticasone furoate-vilanterol 1 puff Inhalation Daily Aurora Barragan PA-C    heparin (porcine) 5,000 Units Subcutaneous Q8H [address] Aurora Barragan PA-C    labetalol 10 mg Intravenous Q4H PRN Mary Carty MD    methocarbamol 500 mg Oral Q6H PRN Aurora Barragan PA-C    montelukast 10 mg Oral Daily Isaias Perez, DO    morphine injection 2 mg Intravenous Q4H PRN Janet Wood MD    niMODipine 60 mg Oral Q4H [address] Lowell Perez, DO    ondansetron 4 mg Intravenous Q6H PRN Juliana Khan, DO    oxyCODONE 10 mg Oral Q4H PRN Mary Carty MD    oxyCODONE 5 mg Oral Q4H PRN Ronald Camargo MD    pravastatin 40 mg Oral Daily With PlasmaSi, DO    senna 1 tablet Oral HS Aurora Barragan PA-C    sertraline 25 mg Oral Daily Mary Carty MD    sodium chloride 50 mL/hr Intravenous Continuous Mary Carty MD Last Rate: 50 mL/hr (04/24/18 0418)     Continuous Infusions:    sodium chloride 50 mL/hr Last Rate: 50 mL/hr (04/24/18 0418)     PRN Meds:    albuterol 2 puff Q6H PRN   ALPRAZolam 0 25 mg Daily PRN   bisacodyl 10 mg Daily PRN   labetalol 10 mg Q4H PRN   methocarbamol 500 mg Q6H PRN   morphine injection 2 mg Q4H PRN   ondansetron 4 mg Q6H PRN   oxyCODONE 10 mg Q4H PRN   oxyCODONE 5 mg Q4H PRN       VTE Pharmacologic Prophylaxis: Heparin  VTE Mechanical Prophylaxis: sequential compression device  Invasive lines and devices:   Invasive Devices     Peripherally Inserted Central Catheter Line            PICC Line 81/82/94 Left Basilic 4 days          Drain            Urethral Catheter Temperature probe;Non-latex 16 Fr  7 days    Ventriculostomy/Subdural Ventricular drainage catheter Right Parietal region 7 days

## 2018-04-24 NOTE — PHYSICAL THERAPY NOTE
Pt declined session due to nausea and pain, and therapist unable to return later  Will continue to follow    [place] Rachel PT, DPT CSRS

## 2018-04-24 NOTE — SOCIAL WORK
CM spoke with pt at bedside and discussed PT/OT recommendations for STR at d/c  Pt agreeable to [facility]  CM sent referral via [facility]  Pt agreeable to have CM speak with her sister - Shelley Mejia  CM spoke with Shelley Mejia via phone and updated her on same  Pt is a [employer]  CM sent MA-51 and PASSR to [address] [place] [address] on Aging for Level II assessment

## 2018-04-24 NOTE — PROGRESS NOTES
Progress Note - Neurosurgery   Ida Small 63 y o  female MRN: [id number]  Unit/Bed#: ICU 05 Encounter: [id number]    Assessment:  1  Subarachnoid hemorrhage involving the suprasellar cistern, sylvian fissures, basal cisterns and in the anterior falx  Bleed day #10  2  HH3, Velazco 3  3  Acute hydrocephalus   4  History of asthma    Plan:  · Exam: GCS15  AAOx3  BARRERA with generalized weakness in BUE and BLE 5-/5  LT intact  No PD, marrufo or clonus  Imaging: personally reviewed and reviewed by attending:  · 4/18 - CT head wo: persistent diffuse subarachnoid hemorrhage, slightly improved with some redistribution of hemorrhage layering in the occipital horns  EVD present extending into the frontal horn of right lateral ventricle  · 4/18 - MRI brain wo: small linear focus of restricted diffusion in left cerebellar hemisphere without associated hemorrhage  Grossly stable subarachnoid throughout basal cistern  Stable mild hydrocephalus  · 4/18 - MRI cspine wo: Chronic disc degenerative changes throughout, resulting in central canal stenosis at C5-6 and C6-7  T2-3 paracentral disc protrusion  · 4/23/18 - IR cerebral angio: right and left internal carotid artery circulation reveals antegrade flow into the middle cerebral and anterior cerebral arteries  No evidence of aneurysm, AVM or vascular malformations appreciated  · 4/24/18 - CT head wo: significant improvement in subarachnoid hemorrhage with near complete resolution  Decreased size of lateral ventricle with slight increase in intraventricular hemorrhage, s/p EVD catheter placement     · STAT CT head without contrast if decline in GCS >2 pts/ 1 hr  Subarachnoid Management:  · Continue Nimodipine 60mg q4h and TCDs for a total of 14 day course  · TCDs on 4/23: right 1 0, left 3 48  · TCDs on 4/24: right 5 68, left 3 0  · Completed Keppra 750mg x 1 week  · Na 140, goal 140-150  · SBP goal <160  · Urine output +1 1L in 12 hours - goal euvolemia +/- 500  Drain management:  · EVD at 10 mmHg above tragus with 229cc output in 24h - drain raised to 15mmHg above tragus on 4/24/18  · Call if output is greater than 20cc in 1 hour  · ICPs ranging 6-16; Average 12+  Call if ICPs >20 sustained without provoking cause  Medical management:   · PT/OT: recommend short term rehab  · DVT ppx: SCDs, HSQ  · Pain control - discussed with CC team in regards to medication make her nauseous   · WBC 11 24, Currently 98 7  · 4/23/18 - CXR stable central vascular congestion   · Hbg 9 6 - transfuse if <8  · Platelets 196  · Bowel regimen  Neurosurgery will continue to closely monitor, call for questions or concerns/change in examination  Subjective/Objective   Chief Complaint: "I was trying to eat"    Subjective: patient states she was trying to eat but when she positioned herself she got a headache  She describes the headache as a dull ache, rated 9 out of 10 and located bifrontally and surrounding her eyes bilaterally  She admits to associated photophobia, dizziness  She denies abdominal pain, nausea/vomiting, chest pain, SOB  Objective: laying in bed, NAD  I/O       04/22 0701 - 04/23 0700 04/23 0701 - 04/24 0700 04/24 0701 - 04/25 0700    P  O  1297 2027     I V  (mL/kg) 4152 5 (46) 1967 2 (21 8)     IV Piggyback 1000 50     Total Intake(mL/kg) 6449 5 (71 4) 4044 2 (44 8)     Urine (mL/kg/hr) 6150 (2 8) 3725 (1 7)     Drains 238 (0 1) 229 (0 1)     Stool 0 (0)      Total Output 6388 3954      Net +61 5 +90 2             Unmeasured Stool Occurrence 1 x            Invasive Devices     Peripherally Inserted Central Catheter Line            PICC Line 03/63/93 Left Basilic 4 days          Drain            Urethral Catheter Temperature probe;Non-latex 16 Fr  6 days    Ventriculostomy/Subdural Ventricular drainage catheter Right Parietal region 6 days                Physical Exam:  Vitals: Blood pressure 119/67, pulse 66, temperature 98 7 °F (37 1 °C), temperature source Oral, resp   rate 18, height 5' 3" (1 6 m), weight 90 7 kg (199 lb 15 3 oz), SpO2 94 %  ,Body mass index is 35 42 kg/m²  Hemodynamic Monitoring: MAP: Arterial Line MAP (mmHg): 96 mmHg, CPP: CPP: 80, ICP Mean: ICP Mean (mmHg): 12 mmHg    General appearance: alert, appears stated age, cooperative and no distress  Head: Normocephalic, right frontal EVD placed at 15mmHg above tragus  Eyes: EOMI, PERRL  Lungs: non labored breathing  Heart: regular heart rate  Neurologic:   Mental status: Alert, oriented x4, thought content appropriate  Speech is fluent, clear  Able to repeat a sentence, 3/3 immediate and delayed object recall intact  Cranial nerves: grossly intact (Cranial nerves II-XII)  Facial symmetry at rest and with expression  Tongue is midline  Sensory: normal to LT in bilateral upper and lower extremities  DSS intact  Motor: moving all extremities with generalized weakness 5-/5 throughout BUE and BLE  Reflexes: 2+ and symmetric  negative marrufo, negative clonus  Coordination: finger to nose normal bilaterally, no drift bilaterally  3/3 JPS intact         Lab Results:    Results from last 7 days  Lab Units 04/24/18  0512 04/23/18  0441 04/22/18  0554   WBC Thousand/uL 11 24* 13 67* 12 46*   HEMOGLOBIN g/dL 9 6* 9 3* 9 4*   HEMATOCRIT % 29 2* 28 7* 29 7*   PLATELETS Thousands/uL 362 316 343   NEUTROS PCT % 62 58 64   MONOS PCT % 12 11 9       Results from last 7 days  Lab Units 04/24/18  0511 04/23/18  0441 04/22/18  0554   SODIUM mmol/L 140 142 141   POTASSIUM mmol/L 3 8 3 0* 4 0   CHLORIDE mmol/L 107 111* 108   CO2 mmol/L 28 24 26   BUN mg/dL 12 6 6   CREATININE mg/dL 0 60 0 42* 0 53*   CALCIUM mg/dL 9 0 8 0* 8 7   GLUCOSE RANDOM mg/dL 131 95 106       Results from last 7 days  Lab Units 04/23/18  0441 04/22/18  0554 04/21/18  0442   MAGNESIUM mg/dL 1 8 2 2 2 3       Results from last 7 days  Lab Units 04/23/18  0441 04/22/18  0554 04/21/18  0442   PHOSPHORUS mg/dL 2 8 2 9 2 3*         No results found for: TROPONINT  ABG:No results found for: PHART, XMF6DCJ, PO2ART, HBW5JBB, S6BAAJLZ, BEART, SOURCE    Imaging Studies: I have personally reviewed pertinent reports  and I have personally reviewed pertinent films in PACS    CT head wo contrast   Final Result   1  Significant improvement in subarachnoid hemorrhage with near complete resolution  No acute intracranial abnormality  2   Decreased size of the lateral ventricles with slight increase in the intraventricular hemorrhage, status post external ventricular drainage catheter placement  Workstation performed: [id number]         VAS transcranial doppler, complete study   Final Result      XR chest portable   Final Result      Stable mild central vascular congestion  Workstation performed: [id number]         IR cerebral angiography / intervention   Final Result      VAS transcranial doppler, complete study   Final Result      XR chest portable   Final Result      1  Right basilar consolidation could represent pneumonia or atelectasis  2   Very mild pulmonary vascular congestion without edema  Workstation performed: [id number]         VAS transcranial doppler, complete study   Final Result      VAS transcranial doppler, complete study   Final Result      VAS transcranial doppler, complete study   Final Result      XR chest portable   Final Result   Right IJ catheter tip in the caval atrial junction region  Left PICC line tip also in the caval atrial junction region  Minimal bibasilar atelectasis  Workstation performed: [id number]         XR chest PICC line portable   Final Result   Left upper extremity PICC line projecting into the right atrium  Consider repositioning the PICC line by withdrawing approximately 2 cm  The study was marked in [facility] [place] for immediate notification  Workstation performed: [id number]         MRI cervical spine wo contrast   Final Result         1    Chronic disc degenerative change throughout the cervical spine resulting in severe central canal stenosis at C5-6 and C6-7  Chronic mass effect on the cord without evidence of cord signal abnormality or myelomalacia  2   T2-3 paracentral disc protrusion and right paracentral disc extrusion with probable mild mass effect on the right ventral aspect of the cord  Workstation performed: [id number]         MRI brain wo contrast   Final Result         1  Small linear focus of restricted diffusion in the left cerebellar hemisphere without associated hemorrhage, suggesting an acute or early subacute ischemic infarct  2   Grossly stable subarachnoid hemorrhage throughout the basal cisterns  No significant mass effect  3   Stable mild hydrocephalus and intraventricular blood products  I personally discussed this study with Dr Lynn Gonzales on 4/18/2018 at 10:53 PM                Workstation performed: [id number]         VAS transcranial doppler, complete study   Final Result      CT head wo contrast   Final Result      Persistent diffuse subarachnoid hemorrhage, slightly improved  There is some redistribution with hemorrhage layering in the occipital horns of the lateral ventricles  Ventricular drain present extending into the frontal horn of the right lateral ventricle  Workstation performed: [id number]         VAS transcranial doppler, complete study   Final Result      XR chest 1 view portable   Final Result      Right IJ line placement, appears to be in satisfactory position, tip is at the cavoatrial junction  No pneumothorax  Low lung volumes  Workstation performed: [id number]         VAS transcranial doppler, complete study    (Results Pending)   IR cerebral angiography    (Results Pending)   VAS transcranial doppler, complete study    (Results Pending)     EKG, Pathology, and Other Studies: I have personally reviewed pertinent reports        VTE  Prophylaxis: Sequential compression device (Venodyne)  and Heparin

## 2018-04-25 ENCOUNTER — APPOINTMENT (INPATIENT)
Dept: NON INVASIVE DIAGNOSTICS | Facility: HOSPITAL | Age: 58
DRG: 021 | End: 2018-04-25
Payer: COMMERCIAL

## 2018-04-25 LAB
ANION GAP SERPL CALCULATED.3IONS-SCNC: 6 MMOL/L (ref 4–13)
BASOPHILS # BLD AUTO: 0.03 THOUSANDS/ΜL (ref 0–0.1)
BASOPHILS NFR BLD AUTO: 0 % (ref 0–1)
BUN SERPL-MCNC: 11 MG/DL (ref 5–25)
CALCIUM SERPL-MCNC: 8.8 MG/DL (ref 8.3–10.1)
CHLORIDE SERPL-SCNC: 104 MMOL/L (ref 100–108)
CO2 SERPL-SCNC: 30 MMOL/L (ref 21–32)
CREAT SERPL-MCNC: 0.59 MG/DL (ref 0.6–1.3)
EOSINOPHIL # BLD AUTO: 0.64 THOUSAND/ΜL (ref 0–0.61)
EOSINOPHIL NFR BLD AUTO: 6 % (ref 0–6)
ERYTHROCYTE [DISTWIDTH] IN BLOOD BY AUTOMATED COUNT: 14.5 % (ref 11.6–15.1)
GFR SERPL CREATININE-BSD FRML MDRD: 102 ML/MIN/1.73SQ M
GLUCOSE SERPL-MCNC: 103 MG/DL (ref 65–140)
HCT VFR BLD AUTO: 30.3 % (ref 34.8–46.1)
HGB BLD-MCNC: 9.5 G/DL (ref 11.5–15.4)
LYMPHOCYTES # BLD AUTO: 2.54 THOUSANDS/ΜL (ref 0.6–4.47)
LYMPHOCYTES NFR BLD AUTO: 24 % (ref 14–44)
MCH RBC QN AUTO: 29.4 PG (ref 26.8–34.3)
MCHC RBC AUTO-ENTMCNC: 31.4 G/DL (ref 31.4–37.4)
MCV RBC AUTO: 94 FL (ref 82–98)
MONOCYTES # BLD AUTO: 1.43 THOUSAND/ΜL (ref 0.17–1.22)
MONOCYTES NFR BLD AUTO: 14 % (ref 4–12)
NEUTROPHILS # BLD AUTO: 5.82 THOUSANDS/ΜL (ref 1.85–7.62)
NEUTS SEG NFR BLD AUTO: 56 % (ref 43–75)
NRBC BLD AUTO-RTO: 0 /100 WBCS
PLATELET # BLD AUTO: 391 THOUSANDS/UL (ref 149–390)
PMV BLD AUTO: 10.2 FL (ref 8.9–12.7)
POTASSIUM SERPL-SCNC: 3.7 MMOL/L (ref 3.5–5.3)
RBC # BLD AUTO: 3.23 MILLION/UL (ref 3.81–5.12)
SODIUM SERPL-SCNC: 140 MMOL/L (ref 136–145)
WBC # BLD AUTO: 10.49 THOUSAND/UL (ref 4.31–10.16)

## 2018-04-25 PROCEDURE — 85025 COMPLETE CBC W/AUTO DIFF WBC: CPT | Performed by: EMERGENCY MEDICINE

## 2018-04-25 PROCEDURE — 97535 SELF CARE MNGMENT TRAINING: CPT

## 2018-04-25 PROCEDURE — 93886 INTRACRANIAL COMPLETE STUDY: CPT | Performed by: SURGERY

## 2018-04-25 PROCEDURE — 93886 INTRACRANIAL COMPLETE STUDY: CPT

## 2018-04-25 PROCEDURE — 99232 SBSQ HOSP IP/OBS MODERATE 35: CPT | Performed by: ANESTHESIOLOGY

## 2018-04-25 PROCEDURE — 97530 THERAPEUTIC ACTIVITIES: CPT

## 2018-04-25 PROCEDURE — 80048 BASIC METABOLIC PNL TOTAL CA: CPT | Performed by: EMERGENCY MEDICINE

## 2018-04-25 PROCEDURE — 99233 SBSQ HOSP IP/OBS HIGH 50: CPT | Performed by: NEUROLOGICAL SURGERY

## 2018-04-25 RX ADMIN — ACETAMINOPHEN 650 MG: 325 TABLET, FILM COATED ORAL at 05:53

## 2018-04-25 RX ADMIN — ACETAMINOPHEN 650 MG: 325 TABLET, FILM COATED ORAL at 13:05

## 2018-04-25 RX ADMIN — OXYCODONE HYDROCHLORIDE 10 MG: 10 TABLET ORAL at 20:39

## 2018-04-25 RX ADMIN — MORPHINE SULFATE 2 MG: 2 INJECTION, SOLUTION INTRAMUSCULAR; INTRAVENOUS at 16:05

## 2018-04-25 RX ADMIN — ACETAMINOPHEN 650 MG: 325 TABLET, FILM COATED ORAL at 18:21

## 2018-04-25 RX ADMIN — NIMODIPINE 60 MG: 30 CAPSULE ORAL at 16:05

## 2018-04-25 RX ADMIN — NIMODIPINE 60 MG: 30 CAPSULE ORAL at 20:27

## 2018-04-25 RX ADMIN — ALBUTEROL SULFATE 2 PUFF: 90 AEROSOL, METERED RESPIRATORY (INHALATION) at 03:56

## 2018-04-25 RX ADMIN — ACETAMINOPHEN 650 MG: 325 TABLET, FILM COATED ORAL at 01:00

## 2018-04-25 RX ADMIN — OXYCODONE HYDROCHLORIDE 10 MG: 10 TABLET ORAL at 09:57

## 2018-04-25 RX ADMIN — DOCUSATE SODIUM 100 MG: 100 CAPSULE, LIQUID FILLED ORAL at 18:21

## 2018-04-25 RX ADMIN — NIMODIPINE 60 MG: 30 CAPSULE ORAL at 13:05

## 2018-04-25 RX ADMIN — OXYCODONE HYDROCHLORIDE 10 MG: 10 TABLET ORAL at 01:18

## 2018-04-25 RX ADMIN — DOCUSATE SODIUM 100 MG: 100 CAPSULE, LIQUID FILLED ORAL at 08:50

## 2018-04-25 RX ADMIN — MORPHINE SULFATE 2 MG: 2 INJECTION, SOLUTION INTRAMUSCULAR; INTRAVENOUS at 04:05

## 2018-04-25 RX ADMIN — ALPRAZOLAM 0.25 MG: 0.25 TABLET ORAL at 08:49

## 2018-04-25 RX ADMIN — MORPHINE SULFATE 2 MG: 2 INJECTION, SOLUTION INTRAMUSCULAR; INTRAVENOUS at 13:04

## 2018-04-25 RX ADMIN — MORPHINE SULFATE 2 MG: 2 INJECTION, SOLUTION INTRAMUSCULAR; INTRAVENOUS at 23:11

## 2018-04-25 RX ADMIN — HEPARIN SODIUM 5000 UNITS: 5000 INJECTION, SOLUTION INTRAVENOUS; SUBCUTANEOUS at 21:16

## 2018-04-25 RX ADMIN — PRAVASTATIN SODIUM 40 MG: 40 TABLET ORAL at 16:30

## 2018-04-25 RX ADMIN — METHOCARBAMOL 500 MG: 500 TABLET ORAL at 04:05

## 2018-04-25 RX ADMIN — MELATONIN TAB 3 MG 6 MG: 3 TAB at 21:17

## 2018-04-25 RX ADMIN — SENNOSIDES 8.6 MG: 8.6 TABLET, FILM COATED ORAL at 21:17

## 2018-04-25 RX ADMIN — SERTRALINE HYDROCHLORIDE 25 MG: 25 TABLET ORAL at 08:49

## 2018-04-25 RX ADMIN — NIMODIPINE 60 MG: 30 CAPSULE ORAL at 01:00

## 2018-04-25 RX ADMIN — NIMODIPINE 60 MG: 30 CAPSULE ORAL at 04:00

## 2018-04-25 RX ADMIN — HEPARIN SODIUM 5000 UNITS: 5000 INJECTION, SOLUTION INTRAVENOUS; SUBCUTANEOUS at 05:54

## 2018-04-25 RX ADMIN — MONTELUKAST SODIUM 10 MG: 10 TABLET, FILM COATED ORAL at 08:50

## 2018-04-25 RX ADMIN — METHOCARBAMOL 500 MG: 500 TABLET ORAL at 13:05

## 2018-04-25 RX ADMIN — FLUTICASONE FUROATE AND VILANTEROL 1 PUFF: 200; 25 POWDER RESPIRATORY (INHALATION) at 08:50

## 2018-04-25 RX ADMIN — OXYCODONE HYDROCHLORIDE 10 MG: 10 TABLET ORAL at 16:05

## 2018-04-25 RX ADMIN — METHOCARBAMOL 500 MG: 500 TABLET ORAL at 18:21

## 2018-04-25 RX ADMIN — MORPHINE SULFATE 2 MG: 2 INJECTION, SOLUTION INTRAMUSCULAR; INTRAVENOUS at 08:48

## 2018-04-25 RX ADMIN — NIMODIPINE 60 MG: 30 CAPSULE ORAL at 08:49

## 2018-04-25 RX ADMIN — HEPARIN SODIUM 5000 UNITS: 5000 INJECTION, SOLUTION INTRAVENOUS; SUBCUTANEOUS at 13:05

## 2018-04-25 NOTE — PLAN OF CARE
Problem: OCCUPATIONAL THERAPY ADULT  Goal: Performs self-care activities at highest level of function for planned discharge setting  See evaluation for individualized goals  Treatment Interventions: ADL retraining, Functional transfer training, Endurance training, Cognitive reorientation, Patient/family training, Equipment evaluation/education, Compensatory technique education, Continued evaluation, Energy conservation, Activityengagement          See flowsheet documentation for full assessment, interventions and recommendations  Outcome: Progressing  Limitation: Decreased ADL status, Decreased Safe judgement during ADL, Decreased cognition, Decreased endurance, Decreased self-care trans, Decreased high-level ADLs (BALANCE, MEDICAL STATUS)  Prognosis: Fair  Assessment: Pt  seen for OT treatment session to address UB/LB bathing and dressing, grooming, toileting, transfers, mobiltiy, and functional activity tolerance  Pt  agreeable to participate and overall, pt  tolerated session fair  Prior to start of session, TAI Eli clamped EVD drain and cleared pt  For session  Once session was completed, pt  Returned to chair with SCDs, O2 and all lines for monitoring connected  Notified RN of session completion and RN reported that she would adjust EVD as needed  Pt  functioning at a min A x1 level with SBA x1 for functional mobility and transfers with the use of RW d/t: decreased functional activity tolerance, generalized weakness, deconditioning, decreased balance, increased pain, +WARD, increased impulsivity, increased distractibility  During toileting, min A was needed for steadying d/t decreased dynamic standing balance however pt  Able to physically complete clothing management and hygiene  During UB bathing and dressing, min A was required to wash back and A with fasteners  Pt  Also required mod verbal and tactile cues to avoid drain  Pt   With multiple instances of attempts to hold head over drain demonstrating poor safety awareness  During LB bathing mod A was needed as pt  Reporting "too much pain" to reach below knees to wash and A was required to steady in stand  Max A was required to don/doff socks 2/2 c/o pain and decreased dynamic seated balance  However pt  Able to complete grooming at a (s) level with v c  To avoid lines during face washing  Increased time was required for all activity as pt  With increased complaints of pain and "needing to move at her own pace" to avoid additional increase in pain  Mod verbal encouragement was needed for participation in session Pt  will continue to benefit from OT services to address noted deficits and maximize functional gains  Continue to rec  STR s/p d c      OT Discharge Recommendation: Short Term Rehab  OT - OK to Discharge:  Yes

## 2018-04-25 NOTE — OCCUPATIONAL THERAPY NOTE
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    Nondependent alcohol abuse    Nondependent cocaine abuse    Renal colic    Severe episode of recurrent major depressive disorder (HCC)    Disorder of menstrual bleeding    Anxiety state    Bladder spasm    Hypoalbuminemia    Acquired hydrocephalus    Polyuria     Past Medical History  Past Medical History:   Diagnosis Date    Asthma      Past Surgical History  Past Surgical History:   Procedure Laterality Date     SECTION      SPLENECTOMY           18 1240   Restrictions/Precautions   Weight Bearing Precautions Per Order No   Other Precautions Cognitive;Multiple lines;Telemetry;O2;Fall Risk;Pain; Impulsive   Pain Assessment   Pain Assessment 0-10   Pain Score Worst Possible Pain   Pain Type Acute pain   Pain Location (head, teeth, buttocks)   Pain Descriptors Aching; Sharp   Pain Frequency Constant/continuous   Pain Onset Ongoing   Clinical Progression Not changed   Effect of Pain on Daily Activities severely olimiting   Patient's Stated Pain Goal No pain   Hospital Pain Intervention(s) Repositioned; Ambulation/increased activity; Distraction; Emotional support   Diversional Activities (conversation)   Response to Interventions tolerated, notified RN of pt  pain level    ADL   Where Assessed Chair   Grooming Assistance 5  Supervision/Setup   Grooming Deficit Setup;Supervision/safety;Verbal cueing; Impulsive; Wash/dry face; Teeth care  (v c  for line safety)   UB Bathing Assistance 4  Minimal Assistance   UB Bathing Deficit Setup;Verbal cueing;Supervision/safety; Increased time to complete  (back, v c  for line safety)   LB Bathing Assistance 3  Moderate Assistance   LB Bathing Deficit Setup;Steadying;Verbal cueing;Supervision/safety; Increased time to complete;Right lower leg including foot; Left lower leg including foot   UB Dressing Assistance 4  Minimal Assistance   UB Dressing Deficit Setup;Verbal cueing;Supervision/safety; Increased time to complete; Fasteners  (v c  for line safety)   LB Dressing Assistance 2  Maximal Assistance   LB Dressing Deficit Setup;Verbal cueing;Supervision/safety; Increased time to complete; Don/doff R sock; Don/doff L sock   Toileting Assistance  4  Minimal Assistance   Toileting Deficit Setup;Steadying; Impulsive;Verbal cueing;Supervison/safety;Grab bar use  (std  toliet)   Transfers   Sit to Stand 4  Minimal assistance   Additional items Assist x 1; Increased time required;Verbal cues   Stand to Sit 4  Minimal assistance   Additional items Assist x 1; Impulsive;Verbal cues   Toilet transfer 4  Minimal assistance   Additional items Assist x 1; Impulsive;Verbal cues;Standard toilet  (+GB use)   Functional Mobility   Functional Mobility 4  Minimal assistance   Additional Comments x1 with SBA x1 for line management   Additional items Rolling walker   Cognition   Overall Cognitive Status Impaired   Arousal/Participation Alert   Attention Attends with cues to redirect   Memory Decreased recall of precautions   Following Commands Follows multistep commands with increased time or repetition   Activity Tolerance   Activity Tolerance Patient limited by fatigue;Patient limited by pain   Medical Staff Made Aware yes, RN Sandi Patel cleared for session, clamped drain prior to session and was notifed at session end so that drain could be repositioned and unclamped, also notified of pt  pain   Assessment   Assessment Pt  seen for OT treatment session to address UB/LB bathing and dressing, grooming, toileting, transfers, mobiltiy, and functional activity tolerance    Pt  agreeable to participate and overall, pt  tolerated session fair  Prior to start of session, RN Alcira clamped EVD drain and cleared pt  For session  Once session was completed, pt  Returned to chair with SCDs, O2 and all lines for monitoring connected  Notified RN of session completion and RN reported that she would adjust EVD as needed  Pt  functioning at a min A x1 level with SBA x1 for functional mobility and transfers with the use of RW d/t: decreased functional activity tolerance, generalized weakness, deconditioning, decreased balance, increased pain, +WARD, increased impulsivity, increased distractibility  During toileting, min A was needed for steadying d/t decreased dynamic standing balance however pt  Able to physically complete clothing management and hygiene  During UB bathing and dressing, min A was required to wash back and A with fasteners  Pt  Also required mod verbal and tactile cues to avoid drain  Pt  With multiple instances of attempts to hold head over drain demonstrating poor safety awareness  During LB bathing mod A was needed as pt  Reporting "too much pain" to reach below knees to wash and A was required to steady in stand  Max A was required to don/doff socks 2/2 c/o pain and decreased dynamic seated balance  However pt  Able to complete grooming at a (s) level with v c  To avoid lines during face washing  Increased time was required for all activity as pt  With increased complaints of pain and "needing to move at her own pace" to avoid additional increase in pain  Mod verbal encouragement was needed for participation in session Pt  will continue to benefit from OT services to address noted deficits and maximize functional gains  Continue to rec  STR s/p d c    Plan   Treatment Interventions ADL retraining;Functional transfer training; Endurance training;Cognitive reorientation;Patient/family training;Equipment evaluation/education; Compensatory technique education;Continued evaluation; Energy conservation; Activityengagement   Goal Expiration Date 05/02/18   OT Frequency 3-5x/wk   Recommendation   OT Discharge Recommendation Short Term Rehab   OT - OK to Discharge Yes   Barthel Index   Feeding 10   Bathing 0   Grooming Score 5   Dressing Score 5   Bladder Score 10   Bowels Score 10   Toilet Use Score 5   Transfers (Bed/Chair) Score 10   Mobility (Level Surface) Score 0   Stairs Score 0   Barthel Index Score 55   Modified Екатерина Scale   Modified [place] Scale 4    Tu Simpson, OTR/L

## 2018-04-25 NOTE — PROGRESS NOTES
Progress Note - Critical Care   Hasmukh Reyes 63 y o  female MRN: [id number]  Unit/Bed#: ICU 05 Encounter: [id number]  Code Status: Level 1 - Full Code    Neuro:      Subarachnoid hemorrhage:  Patient presented with subarachnoid hemorrhage and acute hydrocephalus  Subarachnoid hemorrhage Raines Thomson grade 3, modified Velazco 3   Currently post bleed day 11   Status post angiogram and right frontal EVD placement, postoperative day 7  Status post repeat  angiogram, postoperative day 2      -Neurological examination normal with no focal deficits  GCS 15, fully alert and oriented      -Repeat CT head yesterday demonstrated significant improvement in the subarachnoid hemorrhage with interval decrease in the size of the lateral ventricles  There was slight increase in interventricular hemorrhage after EVD placement      -EVD raised to 15 mm Hg yesterday, output 215 over the past 24 hours   Drainage appears blood tinged      -ICP has ranged from 0 to 20      -CPP has ranged from 70 to 97      -Transcranial Doppler yesterday demonstrated that the right Lindegaard ratio was abnormal at 5 68, prior 1  The left Lindegaard ratio was abnormal at 3, prior  3 48  No vasospasm was demonstrated on angiogram   No clinical change in neurological examination      -Targeting euvolemia:  Patient was net 988 3 mL yesterday   Overall 1003 2 mL since admission  2 unmeasured urine occurrences make this somewhat inaccurate      -Continue nimodipine, 60 mg every 4 hours      -Delirium prevention, regulate sleep-wake cycles        CV:      -Goal systolic blood pressure was under 140 in the setting of presumed untreated aneurysm  With no aneurysm visualized on angiograms, may aim for a more liberal BP goal, systolic blood pressure under 160  -112/85-59 in the past 24 hours      -Maintain MAP above 65      Lung:      Asthma:  Patient has a history of asthma without evidence of acute exacerbation      -Continue with home medications        -Maintain oxygen saturations above 92%   On 3 L at this time      GI:      -Patient is on regular diet      -Bowel regimen with Colace 100 mg twice daily and Senokot at night      FEN:      Fluids:  Patient is receiving normal saline 50 mL/hr  Targeting euvolemia  Was 988 3 mL yesterday   Urine output was 1065 mL or 0 5 mL/kg/hr  2 unmeasured urine occurrences make this inaccurate      -Replete electrolytes PRN, goal potassium at least 4, goal magnesium at least 2, goal phosphorus at least 3     -Full diet          :      -Urine output at 0 5 mL/kg/hr, less than yesterday  Continue to target neutral fluid balance and euvolemia  2 unmeasured urine occurrences make the accuracy uncertain       -Monitor UOP   Renal function is good      ID:      -Afebrile overnight      -No acute issues   Monitor fever curve and WBC count      Heme:      Anemia:  Patient has normocytic anemia   Hemoglobin is 9 6 this morning, compared to 9 3 yesterday   Continue to monitor      -DVT prophylaxis with heparin and SCDs  Transfuse if hemoglobin falls below 7       Endo:      -No acute issues   Avoid hyperglycemia; goal blood glucose below 180      Msk/Skin:      -No acute issues   Out of bed as tolerated   Frequent repositioning every 2 hours for pressure ulcer prophylaxis       Disposition:  Continue ICU care   ______________________________________________________________________    Chief Complaint:  None offered  Patient reports that her pain is better controlled with morphine  Patient denies any change in vision, extremity weakness, numbness, or paresthesia  Patient denies any chest pain or shortness of breath  No nausea or vomiting yesterday  Patient has been tolerating her diet without difficulty        HPI/24hr events:  No acute events over the past 24 hr   Neurosurgery did adjust the patient's EVD to 15 mm Hg      ______________________________________________________________________    Physical Exam   Constitutional: She is oriented to person, place, and time  She appears well-developed and well-nourished  No distress  HENT:   Head: Normocephalic  Right frontal EVD in place  Eyes: EOM are normal  Pupils are equal, round, and reactive to light  Neck: Normal range of motion  Neck supple  Cardiovascular: Normal rate and regular rhythm  No murmur heard  Pulmonary/Chest: No respiratory distress  She has no wheezes  She has no rales  Patient remains on 3 L of oxygen via nasal cannula  No tachypnea or respiratory distress  Lungs are clear  Abdominal: Soft  Bowel sounds are normal  She exhibits no distension  There is no tenderness  Musculoskeletal: Normal range of motion  She exhibits no edema  Neurological: She is alert and oriented to person, place, and time  GCS 15  Cranial nerves are grossly intact  Strength is 5/5 in the upper and lower extremities bilaterally  Skin: Skin is warm and dry  Psychiatric: She has a normal mood and affect  Thought content normal    Nursing note and vitals reviewed  ______________________________________________________________________  Vitals:    18 0300 18 0330 18 0400 18 0500   BP:  112/64  118/70   BP Location:       Pulse: 68 68 70 64   Resp: 14 14 17 14   Temp:   99 3 °F (37 4 °C)    TempSrc:   Oral    SpO2: 94% 93% 93% 95%   Weight:       Height:         Arterial Line BP: 132/70  Arterial Line MAP (mmHg): 96 mmHg     Temperature:   Temp (24hrs), Av 1 °F (37 3 °C), Min:98 7 °F (37 1 °C), Max:99 5 °F (37 5 °C)    Current Temperature: 99 3 °F (37 4 °C)    Weights:   IBW: 52 4 kg    Body mass index is 35 42 kg/m²    Weight (last 2 days)     None          Hemodynamic Monitoring:  N/A       Non-Invasive/Invasive Ventilation Settings:  Respiratory    Lab Data (Last 4 hours)    None         O2/Vent Data (Last 4 hours)    None              No results found for: PHART, JHO2POT, PO2ART, TWF7FDS, B4VKGBLW, BEART, SOURCE  SpO2: SpO2: 95 %    Intake and Outputs:  I/O       04/23 0701 - 04/24 0700 04/24 0701 - 04/25 0700    P  O  2027 1850    I V  (mL/kg) 1967 2 (21 8) 418 3 (4 6)    IV Piggyback 50     Total Intake(mL/kg) 4044 2 (44 8) 2268 3 (25 1)    Urine (mL/kg/hr) 3725 (1 7) 1065 (0 5)    Drains 229 (0 1) 195 (0 1)    Total Output 3954 1260    Net +90 2 +1008 3          Unmeasured Urine Occurrence  2 x        UOP: 0 5 mL/kg/hour with 2 unmeasured occurrences      Nutrition:        Diet Orders            Start     Ordered    04/23/18 1321  Diet Regular; Regular House; Regular House  Diet effective midnight     Question Answer Comment   Diet Type Regular    Regular Regular House    Other Restriction(s): Regular House        04/23/18 1321    04/21/18 1053  Dietary nutrition supplements  Once     Question Answer Comment   Select Supplement: Ensure Enlive-Chocolate    Frequency Breakfast, Lunch, Dinner        04/21/18 1056    04/20/18 1900  Dietary nutrition supplements  Once     Question Answer Comment   Select Supplement: Ensure Enlive-Chocolate    Frequency Breakfast, Lunch, Dinner, HS        04/20/18 1900          Labs:     Results from last 7 days  Lab Units 04/24/18  0512 04/23/18  0441 04/22/18  0554   WBC Thousand/uL 11 24* 13 67* 12 46*   HEMOGLOBIN g/dL 9 6* 9 3* 9 4*   HEMATOCRIT % 29 2* 28 7* 29 7*   PLATELETS Thousands/uL 362 316 343   NEUTROS PCT % 62 58 64   MONOS PCT % 12 11 9        Results from last 7 days  Lab Units 04/24/18  0511 04/23/18  0441 04/22/18  0554   SODIUM mmol/L 140 142 141   POTASSIUM mmol/L 3 8 3 0* 4 0   CHLORIDE mmol/L 107 111* 108   CO2 mmol/L 28 24 26   BUN mg/dL 12 6 6   CREATININE mg/dL 0 60 0 42* 0 53*   CALCIUM mg/dL 9 0 8 0* 8 7   GLUCOSE RANDOM mg/dL 131 95 106       Results from last 7 days  Lab Units 04/23/18  0441 04/22/18  0554 04/21/18  0442   MAGNESIUM mg/dL 1 8 2 2 2 3       Results from last 7 days  Lab Units 04/23/18  0441 04/22/18  0554 04/21/18  0442   PHOSPHORUS mg/dL 2 8 2 9 2 3* Imaging: I have personally reviewed pertinent films in PACS  Allergies: No Known Allergies    Medications:   Scheduled Meds:    Current Facility-Administered Medications:  acetaminophen 650 mg Oral Q6H [address] Aurora Barragan PA-C    albuterol 2 puff Inhalation Q6H PRN Vania Storm Bashor, DO    ALPRAZolam 0 25 mg Oral Daily PRN Aurora Barragan PA-C    bisacodyl 10 mg Rectal Daily PRN Aurora Barragan PA-C    docusate sodium 100 mg Oral BID Vania Dotty Bashor, DO    fluticasone furoate-vilanterol 1 puff Inhalation Daily Aurora Braragan PA-C    heparin (porcine) 5,000 Units Subcutaneous [facility] Aurora Barragan PA-C    labetalol 10 mg Intravenous Q4H PRN Fredis Hernandez MD    melatonin 6 mg Oral HS Mary Sawyer MD    methocarbamol 500 mg Oral Q6H PRN Aurora Barragan PA-C    montelukast 10 mg Oral Daily Vania Dotty Bashor, DO    morphine injection 2 mg Intravenous Q4H PRN Eloise Underwood MD    niMODipine 60 mg Oral Q4H [address] Lowell S Bashor, DO    ondansetron 4 mg Intravenous Q6H PRN Sakshi Borjas, DO    oxyCODONE 10 mg Oral Q4H PRN Fredis Hernandez MD    oxyCODONE 5 mg Oral Q4H PRN Rashard Monson MD    pravastatin 40 mg Oral Daily With Experiment, DO    senna 1 tablet Oral HS Aurora Barragan PA-C    sertraline 25 mg Oral Daily Fredis Hernandez MD    sodium chloride 50 mL/hr Intravenous Continuous Fredis Hernandez MD Last Rate: Stopped (04/24/18 1400)     Continuous Infusions:    sodium chloride 50 mL/hr Last Rate: Stopped (04/24/18 1400)     PRN Meds:    albuterol 2 puff Q6H PRN   ALPRAZolam 0 25 mg Daily PRN   bisacodyl 10 mg Daily PRN   labetalol 10 mg Q4H PRN   methocarbamol 500 mg Q6H PRN   morphine injection 2 mg Q4H PRN   ondansetron 4 mg Q6H PRN   oxyCODONE 10 mg Q4H PRN   oxyCODONE 5 mg Q4H PRN       VTE Pharmacologic Prophylaxis: Heparin  VTE Mechanical Prophylaxis: sequential compression device  Invasive lines and devices:   Invasive Devices     Peripherally Inserted Central Catheter Line            PICC Line 71/12/63 Left Basilic 5 days          Drain            Ventriculostomy/Subdural Ventricular drainage catheter Right Parietal region 7 days

## 2018-04-25 NOTE — PROGRESS NOTES
Progress Note - Neurosurgery   Ata Severe 63 y o  female MRN: [id number]  Unit/Bed#: ICU 05 Encounter: [id number]    Assessment:  1  Subarachnoid hemorrhage involving the suprasellar cistern, sylvian fissures, basal cisterns and in the anterior falx  Bleed day #11  2  HH3, Velazco 3  3  Acute hydrocephalus   4  History of asthma    Plan:  · Exam: GCS15  AAOx3  BARRERA with generalized weakness in BUE and BLE 5-/5  LT intact  No PD, marrufo or clonus  Imaging: personally reviewed and reviewed by attending:  · 4/18 - CT head wo: persistent diffuse subarachnoid hemorrhage, slightly improved with some redistribution of hemorrhage layering in the occipital horns  EVD present extending into the frontal horn of right lateral ventricle  · 4/18 - MRI brain wo: small linear focus of restricted diffusion in left cerebellar hemisphere without associated hemorrhage  Grossly stable subarachnoid throughout basal cistern  Stable mild hydrocephalus  · 4/18 - MRI cspine wo: Chronic disc degenerative changes throughout, resulting in central canal stenosis at C5-6 and C6-7  T2-3 paracentral disc protrusion  · 4/23/18 - IR cerebral angio: right and left internal carotid artery circulation reveals antegrade flow into the middle cerebral and anterior cerebral arteries  No evidence of aneurysm, AVM or vascular malformations appreciated  · 4/24/18 - CT head wo: significant improvement in subarachnoid hemorrhage with near complete resolution  Decreased size of lateral ventricle with slight increase in intraventricular hemorrhage, s/p EVD catheter placement     · STAT CT head without contrast if decline in GCS >2 pts/ 1 hr  Subarachnoid Management:  · Continue Nimodipine 60mg q4h and TCDs for a total of 14 day course  · TCDs on 4/23: right 1 0, left 3 48  · TCDs on 4/24: right 5 68, left 3 0  · TCDs on 4/25: right 0 81, left 1 09  · Completed Keppra 750mg x 1 week  · Na 140, goal 140-150  · SBP goal <160  · Urine output -100 in 12 hours - goal euvolemia +/- 500  Drain management:  · EVD at 15 mmHg above tragus with 215cc output in 24h - drain raised to 20mmHg above tragus on 4/25/18  · Call if output is greater than 20cc in 1 hour  · ICPs ranging 0-17; one time spike to 20+ Average 13+  Call if ICPs >20 sustained without provoking cause  Medical management:   · PT/OT: patient has mobilized 2 out of her 9 day hospital course  She needs mobilization  · Recommending STR   · DVT ppx: SCDs, HSQ  · Pain control  · WBC 10 49, Currently 98 7  · 4/23/18 - CXR stable central vascular congestion   · Hbg 9 5 - transfuse if <8  · Platelets 248  · Bowel regimen  Neurosurgery will continue to closely monitor, call for questions or concerns/change in examination  Subjective/Objective   Chief Complaint: "I'm doing okay"    Subjective: patient states she's doing okay, she admits to having a headache after her TCDs but states she does not feel as bad as she previously had  She admits to a sharp pain behind her eyes that is intermittent and occurs when she moves her eyes or head  She admits to associated photophobia and phonophobia  She also complains of a stiff neck  She denies dizziness, chest pain, SOB, abdominal pain/N/V/ weakness  Objective: laying in bed in a dark room, NAD  I/O       04/23 0701 - 04/24 0700 04/24 0701 - 04/25 0700 04/25 0701 - 04/26 0700    P  O  2027 1850     I V  (mL/kg) 1967 2 (21 8) 418 3 (4 6)     IV Piggyback 50      Total Intake(mL/kg) 4044 2 (44 8) 2268 3 (25 1)     Urine (mL/kg/hr) 3725 (1 7) 1065 (0 5) 150 (0 9)    Drains 229 (0 1) 215 (0 1)     Stool       Total Output 3954 1280 150    Net +90 2 +988 3 -150           Unmeasured Urine Occurrence  2 x           Invasive Devices     Peripherally Inserted Central Catheter Line            PICC Line 75/86/43 Left Basilic 5 days          Drain            Ventriculostomy/Subdural Ventricular drainage catheter Right Parietal region 7 days                Physical Exam:  Vitals: Blood pressure 118/70, pulse 64, temperature 99 3 °F (37 4 °C), temperature source Oral, resp  rate 14, height 5' 3" (1 6 m), weight 90 7 kg (199 lb 15 3 oz), SpO2 95 %  ,Body mass index is 35 42 kg/m²  Hemodynamic Monitoring: MAP: Arterial Line MAP (mmHg): 96 mmHg, CPP: CPP: 72, ICP Mean: ICP Mean (mmHg): 13 mmHg    General appearance: alert, appears stated age, cooperative and no distress  Head: Normocephalic, right frontal EVD placed at 15mmHg above tragus with lightly blood tinged CSF in canister  Eyes: EOMI, PERRL  Lungs: non labored breathing  Heart: regular heart rate  Neurologic:   Mental status: Alert, oriented x4, thought content appropriate  Speech is fluent, clear  Able to repeat a sentence, identify 3/3 immediate an delayed object recall, perform simple math  Cranial nerves: grossly intact (Cranial nerves II-XII)  Facial symmetry at rest and with expression  Tongue is midline  Sensory: normal to LT in bilateral upper and lower extremities  Motor: moving all extremities without focal weakness, strength 5/5 throughout upper and lower extremities   Reflexes: BUE 3+ and BLE 2+  negative marrufo, negative clonus  Coordination: finger to nose normal bilaterally, no drift bilaterally  SANTOSH in hands intact   3/3 JPS intact      Lab Results:    Results from last 7 days  Lab Units 04/25/18  0619 04/24/18  0512 04/23/18  0441   WBC Thousand/uL 10 49* 11 24* 13 67*   HEMOGLOBIN g/dL 9 5* 9 6* 9 3*   HEMATOCRIT % 30 3* 29 2* 28 7*   PLATELETS Thousands/uL 391* 362 316   NEUTROS PCT % 56 62 58   MONOS PCT % 14* 12 11       Results from last 7 days  Lab Units 04/25/18  0619 04/24/18  0511 04/23/18  0441   SODIUM mmol/L 140 140 142   POTASSIUM mmol/L 3 7 3 8 3 0*   CHLORIDE mmol/L 104 107 111*   CO2 mmol/L 30 28 24   BUN mg/dL 11 12 6   CREATININE mg/dL 0 59* 0 60 0 42*   CALCIUM mg/dL 8 8 9 0 8 0*   GLUCOSE RANDOM mg/dL 103 131 95       Results from last 7 days  Lab Units 04/23/18 0441 04/22/18  0554 04/21/18  0442   MAGNESIUM mg/dL 1 8 2 2 2 3       Results from last 7 days  Lab Units 04/23/18  0441 04/22/18  0554 04/21/18  0442   PHOSPHORUS mg/dL 2 8 2 9 2 3*         No results found for: TROPONINT  ABG:No results found for: PHART, BFS9TPV, PO2ART, GQC8DKM, W8BQPUCB, BEART, SOURCE    Imaging Studies: I have personally reviewed pertinent reports  and I have personally reviewed pertinent films in PACS  VAS transcranial doppler, complete study   Final Result      CT head wo contrast   Final Result   1  Significant improvement in subarachnoid hemorrhage with near complete resolution  No acute intracranial abnormality  2   Decreased size of the lateral ventricles with slight increase in the intraventricular hemorrhage, status post external ventricular drainage catheter placement  Workstation performed: [id number]         VAS transcranial doppler, complete study   Final Result      XR chest portable   Final Result      Stable mild central vascular congestion  Workstation performed: [id number]         IR cerebral angiography / intervention   Final Result      VAS transcranial doppler, complete study   Final Result      XR chest portable   Final Result      1  Right basilar consolidation could represent pneumonia or atelectasis  2   Very mild pulmonary vascular congestion without edema  Workstation performed: [id number]         VAS transcranial doppler, complete study   Final Result      VAS transcranial doppler, complete study   Final Result      VAS transcranial doppler, complete study   Final Result      XR chest portable   Final Result   Right IJ catheter tip in the caval atrial junction region  Left PICC line tip also in the caval atrial junction region  Minimal bibasilar atelectasis  Workstation performed: [id number]         XR chest PICC line portable   Final Result   Left upper extremity PICC line projecting into the right atrium    Consider repositioning the PICC line by withdrawing approximately 2 cm  The study was marked in [facility] for immediate notification  Workstation performed: [id number]         MRI cervical spine wo contrast   Final Result         1  Chronic disc degenerative change throughout the cervical spine resulting in severe central canal stenosis at C5-6 and C6-7  Chronic mass effect on the cord without evidence of cord signal abnormality or myelomalacia  2   T2-3 paracentral disc protrusion and right paracentral disc extrusion with probable mild mass effect on the right ventral aspect of the cord  Workstation performed: [id number]         MRI brain wo contrast   Final Result         1  Small linear focus of restricted diffusion in the left cerebellar hemisphere without associated hemorrhage, suggesting an acute or early subacute ischemic infarct  2   Grossly stable subarachnoid hemorrhage throughout the basal cisterns  No significant mass effect  3   Stable mild hydrocephalus and intraventricular blood products  I personally discussed this study with Dr Carl Escamilla on 4/18/2018 at 10:53 PM                Workstation performed: [id number]         VAS transcranial doppler, complete study   Final Result      CT head wo contrast   Final Result      Persistent diffuse subarachnoid hemorrhage, slightly improved  There is some redistribution with hemorrhage layering in the occipital horns of the lateral ventricles  Ventricular drain present extending into the frontal horn of the right lateral ventricle  Workstation performed: [id number]         VAS transcranial doppler, complete study   Final Result      XR chest 1 view portable   Final Result      Right IJ line placement, appears to be in satisfactory position, tip is at the cavoatrial junction  No pneumothorax  Low lung volumes              Workstation performed: [id number]         IR cerebral angiography    (Results Pending)   VAS transcranial doppler, complete study    (Results Pending)   VAS transcranial doppler, complete study    (Results Pending)       EKG, Pathology, and Other Studies: I have personally reviewed pertinent reports        VTE  Prophylaxis: Sequential compression device (Venodyne)  and Heparin

## 2018-04-26 ENCOUNTER — APPOINTMENT (INPATIENT)
Dept: NON INVASIVE DIAGNOSTICS | Facility: HOSPITAL | Age: 58
DRG: 021 | End: 2018-04-26
Payer: COMMERCIAL

## 2018-04-26 LAB
ANION GAP SERPL CALCULATED.3IONS-SCNC: 6 MMOL/L (ref 4–13)
BASOPHILS # BLD AUTO: 0.03 THOUSANDS/ΜL (ref 0–0.1)
BASOPHILS NFR BLD AUTO: 0 % (ref 0–1)
BUN SERPL-MCNC: 11 MG/DL (ref 5–25)
CALCIUM SERPL-MCNC: 9.5 MG/DL (ref 8.3–10.1)
CHLORIDE SERPL-SCNC: 102 MMOL/L (ref 100–108)
CO2 SERPL-SCNC: 29 MMOL/L (ref 21–32)
CREAT SERPL-MCNC: 0.56 MG/DL (ref 0.6–1.3)
EOSINOPHIL # BLD AUTO: 0.39 THOUSAND/ΜL (ref 0–0.61)
EOSINOPHIL NFR BLD AUTO: 4 % (ref 0–6)
ERYTHROCYTE [DISTWIDTH] IN BLOOD BY AUTOMATED COUNT: 14.3 % (ref 11.6–15.1)
GFR SERPL CREATININE-BSD FRML MDRD: 104 ML/MIN/1.73SQ M
GLUCOSE SERPL-MCNC: 127 MG/DL (ref 65–140)
HCT VFR BLD AUTO: 33.5 % (ref 34.8–46.1)
HGB BLD-MCNC: 10.4 G/DL (ref 11.5–15.4)
LYMPHOCYTES # BLD AUTO: 2.31 THOUSANDS/ΜL (ref 0.6–4.47)
LYMPHOCYTES NFR BLD AUTO: 21 % (ref 14–44)
MCH RBC QN AUTO: 29.6 PG (ref 26.8–34.3)
MCHC RBC AUTO-ENTMCNC: 31 G/DL (ref 31.4–37.4)
MCV RBC AUTO: 95 FL (ref 82–98)
MONOCYTES # BLD AUTO: 1.16 THOUSAND/ΜL (ref 0.17–1.22)
MONOCYTES NFR BLD AUTO: 10 % (ref 4–12)
NEUTROPHILS # BLD AUTO: 7.31 THOUSANDS/ΜL (ref 1.85–7.62)
NEUTS SEG NFR BLD AUTO: 65 % (ref 43–75)
NRBC BLD AUTO-RTO: 0 /100 WBCS
PLATELET # BLD AUTO: 456 THOUSANDS/UL (ref 149–390)
PMV BLD AUTO: 9.6 FL (ref 8.9–12.7)
POTASSIUM SERPL-SCNC: 4.1 MMOL/L (ref 3.5–5.3)
RBC # BLD AUTO: 3.51 MILLION/UL (ref 3.81–5.12)
SODIUM SERPL-SCNC: 137 MMOL/L (ref 136–145)
WBC # BLD AUTO: 11.24 THOUSAND/UL (ref 4.31–10.16)

## 2018-04-26 PROCEDURE — 99233 SBSQ HOSP IP/OBS HIGH 50: CPT | Performed by: INTERNAL MEDICINE

## 2018-04-26 PROCEDURE — 99232 SBSQ HOSP IP/OBS MODERATE 35: CPT | Performed by: NEUROLOGICAL SURGERY

## 2018-04-26 PROCEDURE — 93886 INTRACRANIAL COMPLETE STUDY: CPT

## 2018-04-26 PROCEDURE — 97530 THERAPEUTIC ACTIVITIES: CPT

## 2018-04-26 PROCEDURE — 85025 COMPLETE CBC W/AUTO DIFF WBC: CPT | Performed by: EMERGENCY MEDICINE

## 2018-04-26 PROCEDURE — 97116 GAIT TRAINING THERAPY: CPT

## 2018-04-26 PROCEDURE — 80048 BASIC METABOLIC PNL TOTAL CA: CPT | Performed by: EMERGENCY MEDICINE

## 2018-04-26 PROCEDURE — G0515 COGNITIVE SKILLS DEVELOPMENT: HCPCS

## 2018-04-26 RX ORDER — NIMODIPINE 30 MG/1
CAPSULE, LIQUID FILLED ORAL
Status: COMPLETED
Start: 2018-04-26 | End: 2018-04-26

## 2018-04-26 RX ADMIN — DOCUSATE SODIUM 100 MG: 100 CAPSULE, LIQUID FILLED ORAL at 17:40

## 2018-04-26 RX ADMIN — NIMODIPINE 60 MG: 30 CAPSULE, LIQUID FILLED ORAL at 01:15

## 2018-04-26 RX ADMIN — FLUTICASONE FUROATE AND VILANTEROL 1 PUFF: 200; 25 POWDER RESPIRATORY (INHALATION) at 08:49

## 2018-04-26 RX ADMIN — MORPHINE SULFATE 2 MG: 2 INJECTION, SOLUTION INTRAMUSCULAR; INTRAVENOUS at 12:42

## 2018-04-26 RX ADMIN — ACETAMINOPHEN 650 MG: 325 TABLET, FILM COATED ORAL at 17:40

## 2018-04-26 RX ADMIN — HEPARIN SODIUM 5000 UNITS: 5000 INJECTION, SOLUTION INTRAVENOUS; SUBCUTANEOUS at 22:10

## 2018-04-26 RX ADMIN — MELATONIN TAB 3 MG 6 MG: 3 TAB at 22:09

## 2018-04-26 RX ADMIN — MONTELUKAST SODIUM 10 MG: 10 TABLET, FILM COATED ORAL at 08:48

## 2018-04-26 RX ADMIN — PRAVASTATIN SODIUM 40 MG: 40 TABLET ORAL at 17:40

## 2018-04-26 RX ADMIN — MORPHINE SULFATE 2 MG: 2 INJECTION, SOLUTION INTRAMUSCULAR; INTRAVENOUS at 09:52

## 2018-04-26 RX ADMIN — OXYCODONE HYDROCHLORIDE 10 MG: 10 TABLET ORAL at 12:02

## 2018-04-26 RX ADMIN — SENNOSIDES 8.6 MG: 8.6 TABLET, FILM COATED ORAL at 22:09

## 2018-04-26 RX ADMIN — OXYCODONE HYDROCHLORIDE 10 MG: 10 TABLET ORAL at 16:08

## 2018-04-26 RX ADMIN — MORPHINE SULFATE 2 MG: 2 INJECTION, SOLUTION INTRAMUSCULAR; INTRAVENOUS at 20:49

## 2018-04-26 RX ADMIN — ACETAMINOPHEN 650 MG: 325 TABLET, FILM COATED ORAL at 23:35

## 2018-04-26 RX ADMIN — NIMODIPINE 60 MG: 30 CAPSULE ORAL at 08:48

## 2018-04-26 RX ADMIN — NIMODIPINE 60 MG: 30 CAPSULE ORAL at 01:15

## 2018-04-26 RX ADMIN — NIMODIPINE 60 MG: 30 CAPSULE ORAL at 20:13

## 2018-04-26 RX ADMIN — MORPHINE SULFATE 2 MG: 2 INJECTION, SOLUTION INTRAMUSCULAR; INTRAVENOUS at 04:52

## 2018-04-26 RX ADMIN — SERTRALINE HYDROCHLORIDE 25 MG: 25 TABLET ORAL at 08:48

## 2018-04-26 RX ADMIN — HEPARIN SODIUM 5000 UNITS: 5000 INJECTION, SOLUTION INTRAVENOUS; SUBCUTANEOUS at 04:55

## 2018-04-26 RX ADMIN — NIMODIPINE 60 MG: 30 CAPSULE ORAL at 04:53

## 2018-04-26 RX ADMIN — NIMODIPINE 60 MG: 30 CAPSULE ORAL at 12:01

## 2018-04-26 RX ADMIN — ACETAMINOPHEN 650 MG: 325 TABLET, FILM COATED ORAL at 02:25

## 2018-04-26 RX ADMIN — MORPHINE SULFATE 2 MG: 2 INJECTION, SOLUTION INTRAMUSCULAR; INTRAVENOUS at 16:08

## 2018-04-26 RX ADMIN — HEPARIN SODIUM 5000 UNITS: 5000 INJECTION, SOLUTION INTRAVENOUS; SUBCUTANEOUS at 14:19

## 2018-04-26 RX ADMIN — ACETAMINOPHEN 650 MG: 325 TABLET, FILM COATED ORAL at 12:02

## 2018-04-26 RX ADMIN — NIMODIPINE 60 MG: 30 CAPSULE ORAL at 16:13

## 2018-04-26 RX ADMIN — METHOCARBAMOL 500 MG: 500 TABLET ORAL at 08:51

## 2018-04-26 RX ADMIN — OXYCODONE HYDROCHLORIDE 5 MG: 5 TABLET ORAL at 23:35

## 2018-04-26 RX ADMIN — ACETAMINOPHEN 650 MG: 325 TABLET, FILM COATED ORAL at 04:54

## 2018-04-26 RX ADMIN — NIMODIPINE 60 MG: 30 CAPSULE ORAL at 23:36

## 2018-04-26 RX ADMIN — ALPRAZOLAM 0.25 MG: 0.25 TABLET ORAL at 12:14

## 2018-04-26 RX ADMIN — OXYCODONE HYDROCHLORIDE 10 MG: 10 TABLET ORAL at 02:26

## 2018-04-26 RX ADMIN — OXYCODONE HYDROCHLORIDE 10 MG: 10 TABLET ORAL at 20:13

## 2018-04-26 RX ADMIN — ONDANSETRON 4 MG: 2 INJECTION INTRAMUSCULAR; INTRAVENOUS at 20:21

## 2018-04-26 RX ADMIN — ONDANSETRON 4 MG: 2 INJECTION INTRAMUSCULAR; INTRAVENOUS at 12:06

## 2018-04-26 RX ADMIN — METHOCARBAMOL 500 MG: 500 TABLET ORAL at 14:21

## 2018-04-26 RX ADMIN — DOCUSATE SODIUM 100 MG: 100 CAPSULE, LIQUID FILLED ORAL at 08:48

## 2018-04-26 NOTE — CASE MANAGEMENT
Continued Stay Review    Date:   4-26-18      Vital Signs: /75   Pulse 58   Temp 98 7 °F (37 1 °C) (Oral)   Resp (!) 27   Ht 5' 3" (1 6 m)   Wt 90 7 kg (199 lb 15 3 oz)   SpO2 91%   BMI 35 42 kg/m²     Medications:   Scheduled Meds:   Current Facility-Administered Medications:  acetaminophen 650 mg Oral Q6H SAMI   albuterol 2 puff Inhalation Q6H PRN   ALPRAZolam 0 25 mg Oral Daily PRN   bisacodyl 10 mg Rectal Daily PRN   docusate sodium 100 mg Oral BID   fluticasone furoate-vilanterol 1 puff Inhalation Daily   heparin (porcine) 5,000 Units Subcutaneous Q8H [address]   labetalol 10 mg Intravenous Q4H PRN   melatonin 6 mg Oral HS   methocarbamol 500 mg Oral Q6H PRN   montelukast 10 mg Oral Daily   morphine injection 2 mg Intravenous Q4H PRN   niMODipine 60 mg Oral Q4H SAMI   ondansetron 4 mg Intravenous Q6H PRN   oxyCODONE 10 mg Oral Q4H PRN   oxyCODONE 5 mg Oral Q4H PRN   pravastatin 40 mg Oral Daily With Dinner   senna 1 tablet Oral HS   sertraline 25 mg Oral Daily     Continuous Infusions:    PRN Meds: albuterol    ALPRAZolam    bisacodyl    labetalol    methocarbamol    morphine injection    ondansetron    oxyCODONE    oxyCODONE    Abnormal Labs/Diagnostic Results:       Lab Units 04/26/18  0446 04/25/18  0619 04/24/18  0512   WBC Thousand/uL 11 24* 10 49* 11 24*   HEMOGLOBIN g/dL 10 4* 9 5* 9 6*   HEMATOCRIT % 33 5* 30 3* 29 2*   PLATELETS Thousands/uL 456* 391* 362   MONOS PCT % 10 14* 12         Age/Sex: 63 y o  female     Assessment/Plan:      CRITICAL CARE      Subarachnoid hemorrhage:  Patient presented with subarachnoid hemorrhage and acute hydrocephalus   Subarachnoid hemorrhage Raines Thomson grade 3, modified Velazco 3   Currently post bleed day 12   Status post angiogram and right frontal EVD placement, postoperative day 8   Status post repeat  angiogram, postoperative day 3      -Neurological examination normal with no focal deficits   GCS 15, fully alert and oriented      -Repeat CT head 4/24 demonstrated significant improvement in the subarachnoid hemorrhage with interval decrease in the size of the lateral ventricles   There was slight increase in interventricular hemorrhage after EVD placement      -EVD raised to 20 mm Hg yesterday, output 100 over the past 24 hours   Drainage appears blood tinged      -ICP has ranged from 10 to 20      -CPP has ranged from 83 to 111      -Transcranial Doppler yesterday demonstrated that the right Lindegaard ratio was normal at 0 81, prior 5 68  The left Lindegaard ratio was normal at 1 09, prior  3 0  No vasospasm was demonstrated on angiogram   No clinical change in neurological examination      -Targeting euvolemia:  Patient was net 560 mL yesterday   Overall 1563 2 mL since admission  3 unmeasured urine occurrences make this somewhat inaccurate      -Continue nimodipine, 60 mg every 4 hours      -Delirium prevention, regulate sleep-wake cycles        CV:      -Goal systolic blood pressure was under 140 in the setting of presumed untreated aneurysm  With no aneurysm visualized on angiograms, may aim for a more liberal BP goal, systolic blood pressure under 160  -118/89-76 in the past 24 hours      -Maintain MAP above 65      Lung:      Asthma:  Patient has a history of asthma without evidence of acute exacerbation      -Continue with home medications        -Maintain oxygen saturations above 92%   On 2 L via nasal cannula at this time      GI:      -Patient is on regular diet      -Bowel regimen with Colace 100 mg twice daily and Senokot at night      FEN:      Fluids:  Patient is receiving normal saline 50 mL/hr  Targeting euvolemia   Was 560 mL yesterday   Urine output was 1563 2 mL or 0 5 mL/kg/hr  2 unmeasured urine occurrences make this inaccurate      -Replete electrolytes PRN, goal potassium at least 4, goal magnesium at least 2, goal phosphorus at least 3      -Full diet          :      -Urine output at 0 5 mL/kg/hr, less than yesterday   Continue to target neutral fluid balance and euvolemia  3 unmeasured urine occurrences make the accuracy uncertain       -Monitor UOP   Renal function is good      ID:      -Afebrile overnight      -No acute issues   Monitor fever curve and WBC count      Heme:      Anemia:  Patient has normocytic anemia   Hemoglobin is 10 4 this morning, compared to 9 5 yesterday   Continue to monitor      -Slight leukocytosis 11 24 and elevated platelets 131      -DVT prophylaxis with heparin and SCDs  Transfuse if hemoglobin falls below 7       Endo:      -No acute issues   Avoid hyperglycemia; goal blood glucose below 180      Msk/Skin:      -No acute issues   Out of bed as tolerated   Frequent repositioning every 2 hours for pressure ulcer prophylaxis       Disposition:  Continue ICU care      NEURO SURGERY   Assessment:  1  Subarachnoid hemorrhage involving the suprasellar cistern, sylvian fissures, basal cisterns and in the anterior falx  Bleed day #12  2  HH3, Velazco 3  3  Acute hydrocephalus   4  History of asthma     Plan:  · Exam: GCS15  AAOx3  BARRERA with generalized weakness in BUE and BLE 5-/5  LT intact  No PD, marrufo or clonus  Imaging: personally reviewed and reviewed by attending:  ? 4/18 - CT head wo: persistent diffuse subarachnoid hemorrhage, slightly improved with some redistribution of hemorrhage layering in the occipital horns  EVD present extending into the frontal horn of right lateral ventricle  ? 4/18 - MRI brain wo: small linear focus of restricted diffusion in left cerebellar hemisphere without associated hemorrhage  Grossly stable subarachnoid throughout basal cistern  Stable mild hydrocephalus  ? 4/18 - MRI cspine wo: Chronic disc degenerative changes throughout, resulting in central canal stenosis at C5-6 and C6-7  T2-3 paracentral disc protrusion     ? 4/23/18 - IR cerebral angio: right and left internal carotid artery circulation reveals antegrade flow into the middle cerebral and anterior cerebral arteries  No evidence of aneurysm, AVM or vascular malformations appreciated  ? 4/24/18 - CT head wo: significant improvement in subarachnoid hemorrhage with near complete resolution  Decreased size of lateral ventricle with slight increase in intraventricular hemorrhage, s/p EVD catheter placement  · STAT CT head without contrast if decline in GCS >2 pts/ 1 hr  Subarachnoid Management:  · Continue Nimodipine 60mg q4h and TCDs for a total of 14 day course  ? TCDs on 4/25: right 0 81, left 1 09  ? TCDs on 4/26: right 3 72, left 2 63  · Completed Keppra 750mg x 1 week  · Na 137, goal 140-150  · SBP goal <160  · Urine output -690 as of this morning - goal euvolemia +/- 500  Drain management:  · EVD at 20 mmHg above tragus with 100cc output in 24h - drain clamped  · ICPs ranging 2-20; one time spike to 20+ Average 7+  Call if ICPs >20 sustained without provoking cause  Medical management:   · PT/OT: patient has mobilized 2 out of her 9 day hospital course  She needs mobilization  ? Recommending STR   · DVT ppx: SCDs, HSQ  · Pain control  · WBC 11 24, Tmax: 99 2, Currently 98 5  ? 4/23/18 - CXR stable central vascular congestion   · Hbg 10 4 - transfuse if <8  · Platelets 356  · Bowel regimen      Discharge Plan:  PT/OT recommendations for STR at d/c  Pt agreeable to  ARC   CM sent referral

## 2018-04-26 NOTE — OCCUPATIONAL THERAPY NOTE
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    Nondependent alcohol abuse    Nondependent cocaine abuse    Renal colic    Severe episode of recurrent major depressive disorder (HCC)    Disorder of menstrual bleeding    Anxiety state    Bladder spasm    Hypoalbuminemia    Acquired hydrocephalus    Polyuria                 04/26/18 1600   Restrictions/Precautions   Weight Bearing Precautions Per Order No   Other Precautions Fall Risk;Pain;Cognitive;Multiple lines; Bed Alarm   Pain Assessment   Pain Assessment 0-10   Pain Score Worst Possible Pain   Pain Location Head   Pain Orientation Right   Hospital Pain Intervention(s) Repositioned   Response to Interventions POOR TOLERANCE FOR HOB ELEVATION   Transfers   Additional Comments UNABLE TO PARTICIPATE IN ADLS THIS PM DUE TO SIGNIFICANT C/O PAIN  PT UNABLE TO TOLERATE HOB ELEVATION IN BED  Cognition   Overall Cognitive Status Impaired   Arousal/Participation Responsive   Attention Attends with cues to redirect   Orientation Level Oriented X4   Memory Decreased recall of precautions;Decreased short term memory   Following Commands Follows one step commands with increased time or repetition   Comments PT ENGAGED IN [place] COGNITIVE ASSESSMENT THIS PM  PLEASE SEE BELOW ASSESSMENT FOR DETAILED PERFORMANCE INFORMATION      Cognition Assessment Tools MOCA   Score 21   Activity Tolerance   Activity Tolerance Patient limited by pain   Medical Staff Made Aware OKAY TO SEE PER RN LYNDA XIONG PRIOR TO SESSION PER RN   Assessment   Assessment PT SEEN FOR OT TREATMENT SESSION THIS PM FOCUSED ON PARTICIPATION IN FORMAL COGNITIVE ASSESSMENT  PT COMPLETED [place] COGNITIVE ASSESSMENT V 7 1 SCORING 21/30 DEMONSTRATING OVERALL MILD NEUROCOGNITIVE IMPAIRMENTS  ASSESSMENT COMPLETED IN [place] AS PT UNABLE TO TOLERATE HOB ELEVATED DUE TO SEVERE PAIN IN HEAD (10/10)  PT REPORTS POOR ABILITY TO TOLERATE OOB ACTIVITY EARLIER IN DAY WITH PHYSICAL THERAPY  PT NOTED W/ DEFICITS IN THE FOLLOWING AREAS: VISUOSPATIAL/EXECUTIVE, ATTENTION, LANGUAGE AND DELAYED RECALL  DURING ASSESSMENT PT REQUESTING THERAPIST TO REPEAT DIRECTIONS AND REQUIRING INCREASING PROCESSING TIME  PT W/ MILD REPETITIVE SPEECH DURING LANGUAGE PORTION OF ASSESSMENT AS WELL  PT EXPRESSES FRUSTRATION AND ANXIETY OVER ASSESSMENT PERFORMANCE AS WELL AS NOTED MEMORY DEFICITS  ENCOURAGEMENT PROVIDED TO PT  PT HOWEVER DOES REMAIN LIMITED BY IMPAIRED BALANCE, PAIN, ACTIVITY TOLERANCE, ADL IMPAIRMENTS, MEDICAL STATUS, ATTENTION, INSIGHT, PROCESSING, JUDGEMENT AND RECALL  CONTINUE TO RECOMMEND INPATIENT REHAB AT D/C  WILL CONTINUE TO FOLLOW PT ON CASELOAD IN ORDER TO MEET PREVIOUSLY ESTABLISHED GOALS  Plan   Treatment Interventions ADL retraining;Functional transfer training; Endurance training;Cognitive reorientation;Equipment evaluation/education;Patient/family training; Compensatory technique education;Continued evaluation; Energy conservation; Activityengagement   Goal Expiration Date 05/02/18   Treatment Day 3   OT Frequency 3-5x/wk   Recommendation   OT Discharge Recommendation Short Term Rehab   OT - OK to Discharge (TO STR AT THIS TIME)   Barthel Index   Feeding 10   Bathing 0   Grooming Score 5   Dressing Score 5   Bladder Score 10   Bowels Score 10   Toilet Use Score 5   Transfers (Bed/Chair) Score 10   Mobility (Level Surface) Score 0   Stairs Score 0   Barthel Index Score 55   Modified Randall Scale   Modified Randall Scale 4     PT SEEN FOR [place] COGNITIVE ASSESSMENT  SCORED  21/30 INDICATING AN OVERALL MILD NEUROCOGNITIVE IMPAIRMENT FOR AGE/EDUCATION    SCORES ARE AS FOLLOWS:    VISUOSPATIAL/EXECUTIVE FUNCTION: 4/5  Pt was able to complete trail  Pt was able to copy cube  Pt was able to draw a clock, properly place the hands at ten past eleven however unable to accurately place numbers on the clock  NAMING: 3/3  Pt able to name 3/3 animals  MEMORY: (No Points) Pt recalled 4/5 words during one attempt and 5/5 words during second attempt  ATTENTION: 4/6  Pt was able to repeat sequence of numbers forwards and backwards  Pt able to attend to sequence of letters  Pt was able to correctly subtract 7 from 100 1/5 times  LANGUAGE: 2/3  Pt was able to correctly repeat one sentence back to the therapist  Pt was able to produce 12 words starting with the letter F in 1 minute however was mildly repetitive during this period of time  ABSTRACTION: 2/2  Pt was able to identify the similarity of two items x2 trials  DELAYED RECALL: 0/5  Pt recalled 5/5 words without cues  Pt recalled 3/5 words with category cue  Pt recalled 2/5 words with multiple choice options  ORIENTATION: 6/6  Pt is oriented to date, month, year, day, place and city         DOCUMENTATION COMPLETED BY Dayana Bradley MS, OTR/L

## 2018-04-26 NOTE — PROGRESS NOTES
Progress Note - Neurosurgery   Cesar Moore 63 y o  female MRN: [id number]  Unit/Bed#: ICU 05 Encounter: [id number]    Assessment:  1  Subarachnoid hemorrhage involving the suprasellar cistern, sylvian fissures, basal cisterns and in the anterior falx  Bleed day #12  2  HH3, Velazco 3  3  Acute hydrocephalus   4  History of asthma    Plan:  · Exam: GCS15  AAOx3  BARRERA with generalized weakness in BUE and BLE 5-/5  LT intact  No PD, marrufo or clonus  Imaging: personally reviewed and reviewed by attending:  · 4/18 - CT head wo: persistent diffuse subarachnoid hemorrhage, slightly improved with some redistribution of hemorrhage layering in the occipital horns  EVD present extending into the frontal horn of right lateral ventricle  · 4/18 - MRI brain wo: small linear focus of restricted diffusion in left cerebellar hemisphere without associated hemorrhage  Grossly stable subarachnoid throughout basal cistern  Stable mild hydrocephalus  · 4/18 - MRI cspine wo: Chronic disc degenerative changes throughout, resulting in central canal stenosis at C5-6 and C6-7  T2-3 paracentral disc protrusion  · 4/23/18 - IR cerebral angio: right and left internal carotid artery circulation reveals antegrade flow into the middle cerebral and anterior cerebral arteries  No evidence of aneurysm, AVM or vascular malformations appreciated  · 4/24/18 - CT head wo: significant improvement in subarachnoid hemorrhage with near complete resolution  Decreased size of lateral ventricle with slight increase in intraventricular hemorrhage, s/p EVD catheter placement     · STAT CT head without contrast if decline in GCS >2 pts/ 1 hr  Subarachnoid Management:  · Continue Nimodipine 60mg q4h and TCDs for a total of 14 day course  · TCDs on 4/25: right 0 81, left 1 09  · TCDs on 4/26: right 3 72, left 2 63  · Completed Keppra 750mg x 1 week  · Na 137, goal 140-150  · SBP goal <160  · Urine output -690 as of this morning - goal euvolemia +/- 500  Drain management:  · EVD at 20 mmHg above tragus with 100cc output in 24h - drain clamped  · ICPs ranging 2-20; one time spike to 20+ Average 7+  Call if ICPs >20 sustained without provoking cause  Medical management:   · PT/OT: patient has mobilized 2 out of her 9 day hospital course  She needs mobilization  · Recommending STR   · DVT ppx: SCDs, HSQ  · Pain control  · WBC 11 24, Tmax: 99 2, Currently 98 5  · 4/23/18 - CXR stable central vascular congestion   · Hbg 10 4 - transfuse if <8  · Platelets 961  · Bowel regimen  Neurosurgery will continue to closely monitor, call for questions or concerns/change in examination  Subjective/Objective    Chief Complaint: "I'm feeling better"    Subjective: patient states she feels like she is getting better  She admits to still having a headache located bifrontally and behind her eyes  She state the pain is throbbing with intermittent sharp pain  She states the pain worsens with movement of her eyes and head  She admits to associated dizziness with movement and a constant stiff neck rated 5 out of 10    She denies change in vision, chest pain, SOB, abdominal pain/N/V, weakness  Objective: resting in bed  I/O       04/24 0701 - 04/25 0700 04/25 0701 - 04/26 0700 04/26 0701 - 04/27 0700    P  O  1850 2010 60    I V  (mL/kg) 418 3 (4 6)      IV Piggyback       Total Intake(mL/kg) 2268 3 (25 1) 2010 (22 3) 60 (0 7)    Urine (mL/kg/hr) 1065 (0 5) 1500 (0 7) 750 (2 5)    Drains 215 (0 1) 100 (0)     Stool  0 (0)     Total Output 1280 1600 750    Net +988 3 +410 -690           Unmeasured Urine Occurrence 2 x 3 x     Unmeasured Stool Occurrence  1 x           Invasive Devices     Peripherally Inserted Central Catheter Line            PICC Line 72/28/28 Left Basilic 6 days          Drain            Ventriculostomy/Subdural Ventricular drainage catheter Right Parietal region 9 days                Physical Exam:  Vitals: Blood pressure 124/74, pulse 60, temperature 98 5 °F (36 9 °C), temperature source Oral, resp  rate 21, height 5' 3" (1 6 m), weight 90 7 kg (199 lb 15 3 oz), SpO2 92 %  ,Body mass index is 35 42 kg/m²  Hemodynamic Monitoring: MAP: Arterial Line MAP (mmHg): 96 mmHg, CPP: CPP: 86, ICP Mean: ICP Mean (mmHg): 7 mmHg    General appearance: alert, appears stated age, cooperative and no distress  Head: Normocephalic, right frontal EVD placed at 20mmHg above tragus with yellow and light red tinged CSF in canister  Eyes: EOMI, PERRL  Lungs: non labored breathing  Heart: regular heart rate  Neurologic:   Mental status: Alert, oriented x4, thought content appropriate  Speech is fluent, clear  Able to repeat a sentence  3/3 immediate and delayed object recall intact  Able to perform simple math  Able to name the days of week backward  Cranial nerves: grossly intact (Cranial nerves II-XII)  Facial symmetry at rest and with expression  Tongue is midline  Sensory: normal to LT in bilateral upper and lower extremities  DSS intact  Motor: moving all extremities without focal weakness, strength 5-/5 throughout  Reflexes: 3+ brisk and symmetric  negative marrufo, negative clonus  Coordination: finger to nose normal bilaterally, no drift bilaterally  3/3 JPS intact         Lab Results:    Results from last 7 days  Lab Units 04/26/18 0446 04/25/18  0619 04/24/18  0512   WBC Thousand/uL 11 24* 10 49* 11 24*   HEMOGLOBIN g/dL 10 4* 9 5* 9 6*   HEMATOCRIT % 33 5* 30 3* 29 2*   PLATELETS Thousands/uL 456* 391* 362   NEUTROS PCT % 65 56 62   MONOS PCT % 10 14* 12       Results from last 7 days  Lab Units 04/26/18  0446 04/25/18  0619 04/24/18  0511   SODIUM mmol/L 137 140 140   POTASSIUM mmol/L 4 1 3 7 3 8   CHLORIDE mmol/L 102 104 107   CO2 mmol/L 29 30 28   BUN mg/dL 11 11 12   CREATININE mg/dL 0 56* 0 59* 0 60   CALCIUM mg/dL 9 5 8 8 9 0   GLUCOSE RANDOM mg/dL 127 103 131       Results from last 7 days  Lab Units 04/23/18  0441 04/22/18  0554 04/21/18  0442   MAGNESIUM mg/dL 1 8 2 2 2 3       Results from last 7 days  Lab Units 04/23/18  0441 04/22/18  0554 04/21/18  0442   PHOSPHORUS mg/dL 2 8 2 9 2 3*         No results found for: TROPONINT  ABG:No results found for: PHART, RUS6SEI, PO2ART, IKT6RLO, K7GYLZOO, BEART, SOURCE    Imaging Studies: I have personally reviewed pertinent reports  and I have personally reviewed pertinent films in PACS    VAS transcranial doppler, complete study   Final Result      VAS transcranial doppler, complete study   Final Result      CT head wo contrast   Final Result   1  Significant improvement in subarachnoid hemorrhage with near complete resolution  No acute intracranial abnormality  2   Decreased size of the lateral ventricles with slight increase in the intraventricular hemorrhage, status post external ventricular drainage catheter placement  Workstation performed: [id number]         VAS transcranial doppler, complete study   Final Result      XR chest portable   Final Result      Stable mild central vascular congestion  Workstation performed: [id number]         IR cerebral angiography / intervention   Final Result      VAS transcranial doppler, complete study   Final Result      XR chest portable   Final Result      1  Right basilar consolidation could represent pneumonia or atelectasis  2   Very mild pulmonary vascular congestion without edema  Workstation performed: [id number]         VAS transcranial doppler, complete study   Final Result      VAS transcranial doppler, complete study   Final Result      VAS transcranial doppler, complete study   Final Result      XR chest portable   Final Result   Right IJ catheter tip in the caval atrial junction region  Left PICC line tip also in the caval atrial junction region  Minimal bibasilar atelectasis              Workstation performed: [id number]         XR chest PICC line portable   Final Result   Left upper extremity PICC line projecting into the right atrium  Consider repositioning the PICC line by withdrawing approximately 2 cm  The study was marked in [facility] for immediate notification  Workstation performed: [id number]         MRI cervical spine wo contrast   Final Result         1  Chronic disc degenerative change throughout the cervical spine resulting in severe central canal stenosis at C5-6 and C6-7  Chronic mass effect on the cord without evidence of cord signal abnormality or myelomalacia  2   T2-3 paracentral disc protrusion and right paracentral disc extrusion with probable mild mass effect on the right ventral aspect of the cord  Workstation performed: [id number]         MRI brain wo contrast   Final Result         1  Small linear focus of restricted diffusion in the left cerebellar hemisphere without associated hemorrhage, suggesting an acute or early subacute ischemic infarct  2   Grossly stable subarachnoid hemorrhage throughout the basal cisterns  No significant mass effect  3   Stable mild hydrocephalus and intraventricular blood products  I personally discussed this study with Dr Bryce Ospina on 4/18/2018 at 10:53 PM                Workstation performed: [id number]         VAS transcranial doppler, complete study   Final Result      CT head wo contrast   Final Result      Persistent diffuse subarachnoid hemorrhage, slightly improved  There is some redistribution with hemorrhage layering in the occipital horns of the lateral ventricles  Ventricular drain present extending into the frontal horn of the right lateral ventricle  Workstation performed: [id number]         VAS transcranial doppler, complete study   Final Result      XR chest 1 view portable   Final Result      Right IJ line placement, appears to be in satisfactory position, tip is at the cavoatrial junction  No pneumothorax  Low lung volumes              Workstation performed: [id number]         IR cerebral angiography    (Results Pending)   VAS transcranial doppler, complete study    (Results Pending)   VAS transcranial doppler, complete study    (Results Pending)       EKG, Pathology, and Other Studies: I have personally reviewed pertinent reports        VTE  Prophylaxis: Sequential compression device (Venodyne)  and Heparin

## 2018-04-26 NOTE — PLAN OF CARE
Problem: OCCUPATIONAL THERAPY ADULT  Goal: Performs self-care activities at highest level of function for planned discharge setting  See evaluation for individualized goals  Treatment Interventions: ADL retraining, Functional transfer training, Endurance training, Cognitive reorientation, Patient/family training, Equipment evaluation/education, Compensatory technique education, Continued evaluation, Energy conservation, Activityengagement          See flowsheet documentation for full assessment, interventions and recommendations  Outcome: Progressing  Limitation: Decreased ADL status, Decreased Safe judgement during ADL, Decreased cognition, Decreased endurance, Decreased self-care trans, Decreased high-level ADLs (BALANCE, MEDICAL STATUS)  Prognosis: Fair  Assessment: PT SEEN FOR OT TREATMENT SESSION THIS PM FOCUSED ON PARTICIPATION IN FORMAL COGNITIVE ASSESSMENT  PT COMPLETED [place] COGNITIVE ASSESSMENT V 7 1 SCORING 21/30 DEMONSTRATING OVERALL MILD NEUROCOGNITIVE IMPAIRMENTS  ASSESSMENT COMPLETED IN SUPINE AS PT UNABLE TO TOLERATE HOB ELEVATED DUE TO SEVERE PAIN IN HEAD (10/10)  PT REPORTS POOR ABILITY TO TOLERATE OOB ACTIVITY EARLIER IN DAY WITH PHYSICAL THERAPY  PT NOTED W/ DEFICITS IN THE FOLLOWING AREAS: VISUOSPATIAL/EXECUTIVE, ATTENTION, LANGUAGE AND DELAYED RECALL  DURING ASSESSMENT PT REQUESTING THERAPIST TO REPEAT DIRECTIONS AND REQUIRING INCREASING PROCESSING TIME  PT W/ MILD REPETITIVE SPEECH DURING LANGUAGE PORTION OF ASSESSMENT AS WELL  PT EXPRESSES FRUSTRATION AND ANXIETY OVER ASSESSMENT PERFORMANCE AS WELL AS NOTED MEMORY DEFICITS  ENCOURAGEMENT PROVIDED TO PT  PT HOWEVER DOES REMAIN LIMITED BY IMPAIRED BALANCE, PAIN, ACTIVITY TOLERANCE, ADL IMPAIRMENTS, MEDICAL STATUS, ATTENTION, INSIGHT, PROCESSING, JUDGEMENT AND RECALL  CONTINUE TO RECOMMEND INPATIENT REHAB AT D/C  WILL CONTINUE TO FOLLOW PT ON CASELOAD IN ORDER TO MEET PREVIOUSLY ESTABLISHED GOALS        OT Discharge Recommendation: Short Term Rehab  OT - OK to Discharge:  (TO STR AT THIS TIME)

## 2018-04-26 NOTE — PROGRESS NOTES
04/26/18 1300   Stress Factors   Patient Stress Factors Health changes   Coping Responses   Patient Coping Open/discussion; Sadness   Patient Spiritual Assessment   Feelings of Discouragement Pt expressed   Plan of Care   Comments Followed up with pt and discussed current treatment; pt expressed feelings of worthlesness due to not being able to be physically active; explored support; pt received phone call and requested we come back later   Assessment Completed by: Unit visit

## 2018-04-26 NOTE — PLAN OF CARE
Problem: PHYSICAL THERAPY ADULT  Goal: Performs mobility at highest level of function for planned discharge setting  See evaluation for individualized goals  Treatment/Interventions: Functional transfer training, LE strengthening/ROM, Elevations, Therapeutic exercise, Endurance training, Patient/family training, Bed mobility, Equipment eval/education, Gait training, Spoke to nursing, Spoke to case management, OT          See flowsheet documentation for full assessment, interventions and recommendations  Outcome: Progressing  Prognosis: Fair  Problem List: Decreased strength, Decreased endurance, Impaired balance, Decreased mobility, Decreased cognition, Impaired judgement, Decreased safety awareness, Pain  Assessment: Pt seen for session, gait and PT 1:1 activity x 25 min for setup, bed mob, sitting EOB, gait, w/ seated rest on toilet, repositioning  remains in a great deal of pain, which is biggest limiting factor w/ mobility  Note improved WBing, balance w/ mobility tasks  still some continued LOB w/ gait  remains appropriate for rehab at d/c  Barriers to Discharge: Decreased caregiver support     Recommendation:  (recommend rehab at d/c)     PT - OK to Discharge: Yes (to rehab when stable)    See flowsheet documentation for full assessment

## 2018-04-26 NOTE — PROGRESS NOTES
Progress Note - Critical Care   Eddie Reid 63 y o  female MRN: [id number]  Unit/Bed#: ICU 05 Encounter: [id number]  Code Status: Level 1 - Full Code    Assessment and Plan:     Subarachnoid hemorrhage:  Patient presented with subarachnoid hemorrhage and acute hydrocephalus  Subarachnoid hemorrhage Raines Thomson grade 3, modified Velazco 3   Currently post bleed day 12   Status post angiogram and right frontal EVD placement, postoperative day 8   Status post repeat  angiogram, postoperative day 3      -Neurological examination normal with no focal deficits  GCS 15, fully alert and oriented      -Repeat CT head 4/24 demonstrated significant improvement in the subarachnoid hemorrhage with interval decrease in the size of the lateral ventricles   There was slight increase in interventricular hemorrhage after EVD placement      -EVD raised to 20 mm Hg yesterday, output 100 over the past 24 hours   Drainage appears blood tinged      -ICP has ranged from 10 to 20      -CPP has ranged from 83 to 111      -Transcranial Doppler yesterday demonstrated that the right Lindegaard ratio was normal at 0 81, prior 5 68  The left Lindegaard ratio was normal at 1 09, prior  3 0  No vasospasm was demonstrated on angiogram   No clinical change in neurological examination      -Targeting euvolemia:  Patient was net 560 mL yesterday   Overall 1563 2 mL since admission  3 unmeasured urine occurrences make this somewhat inaccurate      -Continue nimodipine, 60 mg every 4 hours      -Delirium prevention, regulate sleep-wake cycles        CV:      -Goal systolic blood pressure was under 140 in the setting of presumed untreated aneurysm  With no aneurysm visualized on angiograms, may aim for a more liberal BP goal, systolic blood pressure under 160  -118/89-76 in the past 24 hours      -Maintain MAP above 65      Lung:      Asthma:  Patient has a history of asthma without evidence of acute exacerbation      -Continue with home medications        -Maintain oxygen saturations above 92%   On 2 L via nasal cannula at this time      GI:      -Patient is on regular diet      -Bowel regimen with Colace 100 mg twice daily and Senokot at night      FEN:      Fluids:  Patient is receiving normal saline 50 mL/hr  Targeting euvolemia  Was [address] output was 1563 2 mL or 0 5 mL/kg/hr  2 unmeasured urine occurrences make this inaccurate      -Replete electrolytes PRN, goal potassium at least 4, goal magnesium at least 2, goal phosphorus at least 3      -Full diet          :      -Urine output at 0 5 mL/kg/hr, less than yesterday  Continue to target neutral fluid balance and euvolemia  3 unmeasured urine occurrences make the accuracy uncertain       -Monitor UOP   Renal function is good      ID:      -Afebrile overnight      -No acute issues   Monitor fever curve and WBC count      Heme:      Anemia:  Patient has normocytic anemia   Hemoglobin is 10 4 this morning, compared to 9 5 yesterday   Continue to monitor     -Slight leukocytosis 11 24 and elevated platelets 493      -DVT prophylaxis with heparin and SCDs  Transfuse if hemoglobin falls below 7       Endo:      -No acute issues   Avoid hyperglycemia; goal blood glucose below 180      Msk/Skin:      -No acute issues   Out of bed as tolerated   Frequent repositioning every 2 hours for pressure ulcer prophylaxis       Disposition:  Continue ICU care ______________________________________________________________________    Chief Complaint: Patient complained of frequent urination yesterday evening  Denied changes in vision as well as extremity numbness, paresthesia, or weakness  No chest pain or new shortness of breath  Tolerating diet  HPI/24hr events: No acute events  EVD raised to 20 mm Hg     ______________________________________________________________________    Physical Exam   Constitutional: She is oriented to person, place, and time  She appears well-developed and well-nourished  No distress  Sleeping comfortably when I entered the room  HENT:   Head: Normocephalic  Right frontal EVD  Eyes: EOM are normal  Pupils are equal, round, and reactive to light  Neck: Normal range of motion  Neck supple  Cardiovascular: Normal rate and regular rhythm  No murmur heard  Pulmonary/Chest: No respiratory distress  She has no wheezes  She has no rales  Abdominal: Soft  Bowel sounds are normal  There is no tenderness  Musculoskeletal: Normal range of motion  She exhibits no edema  Neurological: She is alert and oriented to person, place, and time  No cranial nerve deficit  5/5 strength bilateral upper and lower extremities  Skin: Skin is warm and dry  Psychiatric: She has a normal mood and affect  Her behavior is normal    Nursing note and vitals reviewed  ______________________________________________________________________  Vitals:    18 0530 18 3868 18 0708 18 0800   BP: 149/79 110/72 125/63 114/61   BP Location:       Pulse: 66 68 64 64   Resp: 18 (!) 29 (!) 29 20   Temp:    98 5 °F (36 9 °C)   TempSrc:    Oral   SpO2:       Weight:       Height:         Arterial Line BP: 132/70  Arterial Line MAP (mmHg): 96 mmHg     Temperature:   Temp (24hrs), Av 8 °F (37 1 °C), Min:98 5 °F (36 9 °C), Max:99 2 °F (37 3 °C)    Current Temperature: 98 5 °F (36 9 °C)    Weights:   IBW: 52 4 kg    Body mass index is 35 42 kg/m²  Weight (last 2 days)     None          Hemodynamic Monitoring:  N/A       Non-Invasive/Invasive Ventilation Settings:  Respiratory    Lab Data (Last 4 hours)    None         O2/Vent Data (Last 4 hours)    None              No results found for: PHART, ISW9IBL, PO2ART, PDR2HYL, X5VRHKYO, BEART, SOURCE  SpO2: SpO2: 93 %    Intake and Outputs:  I/O        07 -  0700 701 -  0700    P  O  1850 1760    I V  (mL/kg) 418 3 (4 6)     Total Intake(mL/kg) 2268 3 (25 1) 1760 (19 5)    Urine (mL/kg/hr) 1065 (0 5) 1100 (0 5) Drains 215 (0 1) 100 (0)    Stool  0 (0)    Total Output 1280 1200    Net +988 3 +560          Unmeasured Urine Occurrence 2 x 3 x    Unmeasured Stool Occurrence  1 x        UOP: 0 5 mL/kg/hour     Nutrition:        Diet Orders            Start     Ordered    04/23/18 1321  Diet Regular; Regular House; Regular House  Diet effective midnight     Question Answer Comment   Diet Type Regular    Regular Regular House    Other Restriction(s): Regular House        04/23/18 1321    04/20/18 1900  Dietary nutrition supplements  Once     Question Answer Comment   Select Supplement: Ensure Enlive-Chocolate    Frequency Breakfast, Lunch, Dinner, HS        04/20/18 1900          Labs:     Results from last 7 days  Lab Units 04/26/18  0446 04/25/18  0619 04/24/18  0512   WBC Thousand/uL 11 24* 10 49* 11 24*   HEMOGLOBIN g/dL 10 4* 9 5* 9 6*   HEMATOCRIT % 33 5* 30 3* 29 2*   PLATELETS Thousands/uL 456* 391* 362   NEUTROS PCT % 65 56 62   MONOS PCT % 10 14* 12        Results from last 7 days  Lab Units 04/26/18  0446 04/25/18  0619 04/24/18  0511   SODIUM mmol/L 137 140 140   POTASSIUM mmol/L 4 1 3 7 3 8   CHLORIDE mmol/L 102 104 107   CO2 mmol/L 29 30 28   BUN mg/dL 11 11 12   CREATININE mg/dL 0 56* 0 59* 0 60   CALCIUM mg/dL 9 5 8 8 9 0   GLUCOSE RANDOM mg/dL 127 103 131       Results from last 7 days  Lab Units 04/23/18  0441 04/22/18  0554 04/21/18  0442   MAGNESIUM mg/dL 1 8 2 2 2 3       Results from last 7 days  Lab Units 04/23/18  0441 04/22/18  0554 04/21/18  0442   PHOSPHORUS mg/dL 2 8 2 9 2 3*                    Imaging: I have personally reviewed pertinent films in PACS      Allergies: No Known Allergies    Medications:   Scheduled Meds:    Current Facility-Administered Medications:  acetaminophen 650 mg Oral Q6H [address] Aurora Barragan PA-C   albuterol 2 puff Inhalation Q6H PRN Evelyn Perez DO   ALPRAZolam 0 25 mg Oral Daily PRN Aurora Barragan PA-C   bisacodyl 10 mg Rectal Daily PRN Danya Barragan JOSEPH   docusate sodium 100 mg Oral BID Isaias Perez, DO   fluticasone furoate-vilanterol 1 puff Inhalation Daily Aurora Barragan PA-C   heparin (porcine) 5,000 Units Subcutaneous [facility] Aurora Barragan PA-C   labetalol 10 mg Intravenous Q4H PRN Mary Carty MD   melatonin 6 mg Oral HS Kasey Youssef MD   methocarbamol 500 mg Oral Q6H PRN Aurora Barragan PA-C   montelukast 10 mg Oral Daily Isaias Perez, DO   morphine injection 2 mg Intravenous Q4H PRN Janet Wood MD   niMODipine 60 mg Oral Q4H [address] [place] NINOSKA Perez, DO   ondansetron 4 mg Intravenous Q6H PRN Juliana Khan, DO   oxyCODONE 10 mg Oral Q4H PRN Mary Carty MD   oxyCODONE 5 mg Oral Q4H PRN Ronald Camargo MD   pravastatin 40 mg Oral Daily With Enpocket, DO   senna 1 tablet Oral HS Aurora Barragan PA-C   sertraline 25 mg Oral Daily Mary Carty MD     Continuous Infusions:   PRN Meds:    albuterol 2 puff Q6H PRN   ALPRAZolam 0 25 mg Daily PRN   bisacodyl 10 mg Daily PRN   labetalol 10 mg Q4H PRN   methocarbamol 500 mg Q6H PRN   morphine injection 2 mg Q4H PRN   ondansetron 4 mg Q6H PRN   oxyCODONE 10 mg Q4H PRN   oxyCODONE 5 mg Q4H PRN       VTE Pharmacologic Prophylaxis: Heparin  VTE Mechanical Prophylaxis: sequential compression device  Invasive lines and devices:   Invasive Devices     Peripherally Inserted Central Catheter Line            PICC Line 44/40/91 Left Basilic 6 days          Drain            Ventriculostomy/Subdural Ventricular drainage catheter Right Parietal region 9 days

## 2018-04-27 ENCOUNTER — APPOINTMENT (INPATIENT)
Dept: NON INVASIVE DIAGNOSTICS | Facility: HOSPITAL | Age: 58
DRG: 021 | End: 2018-04-27
Payer: COMMERCIAL

## 2018-04-27 ENCOUNTER — APPOINTMENT (INPATIENT)
Dept: RADIOLOGY | Facility: HOSPITAL | Age: 58
DRG: 021 | End: 2018-04-27
Payer: COMMERCIAL

## 2018-04-27 LAB
ANION GAP SERPL CALCULATED.3IONS-SCNC: 8 MMOL/L (ref 4–13)
BASOPHILS # BLD AUTO: 0.02 THOUSANDS/ΜL (ref 0–0.1)
BASOPHILS NFR BLD AUTO: 0 % (ref 0–1)
BUN SERPL-MCNC: 11 MG/DL (ref 5–25)
CALCIUM SERPL-MCNC: 9.4 MG/DL (ref 8.3–10.1)
CHLORIDE SERPL-SCNC: 99 MMOL/L (ref 100–108)
CO2 SERPL-SCNC: 27 MMOL/L (ref 21–32)
CREAT SERPL-MCNC: 0.58 MG/DL (ref 0.6–1.3)
EOSINOPHIL # BLD AUTO: 0.06 THOUSAND/ΜL (ref 0–0.61)
EOSINOPHIL NFR BLD AUTO: 1 % (ref 0–6)
ERYTHROCYTE [DISTWIDTH] IN BLOOD BY AUTOMATED COUNT: 14.1 % (ref 11.6–15.1)
GFR SERPL CREATININE-BSD FRML MDRD: 103 ML/MIN/1.73SQ M
GLUCOSE SERPL-MCNC: 121 MG/DL (ref 65–140)
HCT VFR BLD AUTO: 35 % (ref 34.8–46.1)
HGB BLD-MCNC: 11 G/DL (ref 11.5–15.4)
LYMPHOCYTES # BLD AUTO: 2.39 THOUSANDS/ΜL (ref 0.6–4.47)
LYMPHOCYTES NFR BLD AUTO: 20 % (ref 14–44)
MCH RBC QN AUTO: 29.8 PG (ref 26.8–34.3)
MCHC RBC AUTO-ENTMCNC: 31.4 G/DL (ref 31.4–37.4)
MCV RBC AUTO: 95 FL (ref 82–98)
MONOCYTES # BLD AUTO: 1.08 THOUSAND/ΜL (ref 0.17–1.22)
MONOCYTES NFR BLD AUTO: 9 % (ref 4–12)
NEUTROPHILS # BLD AUTO: 8.47 THOUSANDS/ΜL (ref 1.85–7.62)
NEUTS SEG NFR BLD AUTO: 70 % (ref 43–75)
NRBC BLD AUTO-RTO: 0 /100 WBCS
PLATELET # BLD AUTO: 461 THOUSANDS/UL (ref 149–390)
PMV BLD AUTO: 9.7 FL (ref 8.9–12.7)
POTASSIUM SERPL-SCNC: 3.8 MMOL/L (ref 3.5–5.3)
RBC # BLD AUTO: 3.69 MILLION/UL (ref 3.81–5.12)
SODIUM SERPL-SCNC: 134 MMOL/L (ref 136–145)
WBC # BLD AUTO: 12.07 THOUSAND/UL (ref 4.31–10.16)

## 2018-04-27 PROCEDURE — 93886 INTRACRANIAL COMPLETE STUDY: CPT | Performed by: SURGERY

## 2018-04-27 PROCEDURE — 80048 BASIC METABOLIC PNL TOTAL CA: CPT | Performed by: EMERGENCY MEDICINE

## 2018-04-27 PROCEDURE — 85025 COMPLETE CBC W/AUTO DIFF WBC: CPT | Performed by: EMERGENCY MEDICINE

## 2018-04-27 PROCEDURE — 93886 INTRACRANIAL COMPLETE STUDY: CPT

## 2018-04-27 PROCEDURE — 99233 SBSQ HOSP IP/OBS HIGH 50: CPT | Performed by: NEUROLOGICAL SURGERY

## 2018-04-27 PROCEDURE — 99232 SBSQ HOSP IP/OBS MODERATE 35: CPT | Performed by: INTERNAL MEDICINE

## 2018-04-27 PROCEDURE — 70450 CT HEAD/BRAIN W/O DYE: CPT

## 2018-04-27 RX ORDER — SODIUM CHLORIDE 9 MG/ML
50 INJECTION, SOLUTION INTRAVENOUS CONTINUOUS
Status: DISCONTINUED | OUTPATIENT
Start: 2018-04-27 | End: 2018-04-28

## 2018-04-27 RX ORDER — MORPHINE SULFATE 2 MG/ML
2 INJECTION, SOLUTION INTRAMUSCULAR; INTRAVENOUS ONCE
Status: COMPLETED | OUTPATIENT
Start: 2018-04-27 | End: 2018-04-27

## 2018-04-27 RX ORDER — LIDOCAINE HYDROCHLORIDE 10 MG/ML
5 INJECTION, SOLUTION INFILTRATION; PERINEURAL ONCE
Status: COMPLETED | OUTPATIENT
Start: 2018-04-27 | End: 2018-04-27

## 2018-04-27 RX ORDER — LIDOCAINE HYDROCHLORIDE 10 MG/ML
INJECTION, SOLUTION EPIDURAL; INFILTRATION; INTRACAUDAL; PERINEURAL
Status: COMPLETED
Start: 2018-04-27 | End: 2018-04-27

## 2018-04-27 RX ADMIN — MONTELUKAST SODIUM 10 MG: 10 TABLET, FILM COATED ORAL at 09:08

## 2018-04-27 RX ADMIN — MORPHINE SULFATE 2 MG: 2 INJECTION, SOLUTION INTRAMUSCULAR; INTRAVENOUS at 05:26

## 2018-04-27 RX ADMIN — SODIUM CHLORIDE 1000 ML: 0.9 INJECTION, SOLUTION INTRAVENOUS at 09:48

## 2018-04-27 RX ADMIN — HEPARIN SODIUM 5000 UNITS: 5000 INJECTION, SOLUTION INTRAVENOUS; SUBCUTANEOUS at 22:09

## 2018-04-27 RX ADMIN — MORPHINE SULFATE 2 MG: 2 INJECTION, SOLUTION INTRAMUSCULAR; INTRAVENOUS at 15:29

## 2018-04-27 RX ADMIN — SODIUM CHLORIDE 125 ML/HR: 0.9 INJECTION, SOLUTION INTRAVENOUS at 06:13

## 2018-04-27 RX ADMIN — PRAVASTATIN SODIUM 40 MG: 40 TABLET ORAL at 15:30

## 2018-04-27 RX ADMIN — NIMODIPINE 60 MG: 30 CAPSULE ORAL at 04:23

## 2018-04-27 RX ADMIN — FLUTICASONE FUROATE AND VILANTEROL 1 PUFF: 200; 25 POWDER RESPIRATORY (INHALATION) at 09:07

## 2018-04-27 RX ADMIN — OXYCODONE HYDROCHLORIDE 5 MG: 5 TABLET ORAL at 22:12

## 2018-04-27 RX ADMIN — ACETAMINOPHEN 650 MG: 325 TABLET, FILM COATED ORAL at 17:49

## 2018-04-27 RX ADMIN — MELATONIN TAB 3 MG 6 MG: 3 TAB at 22:09

## 2018-04-27 RX ADMIN — HEPARIN SODIUM 5000 UNITS: 5000 INJECTION, SOLUTION INTRAVENOUS; SUBCUTANEOUS at 05:22

## 2018-04-27 RX ADMIN — SENNOSIDES 8.6 MG: 8.6 TABLET, FILM COATED ORAL at 22:08

## 2018-04-27 RX ADMIN — MORPHINE SULFATE 2 MG: 2 INJECTION, SOLUTION INTRAMUSCULAR; INTRAVENOUS at 20:24

## 2018-04-27 RX ADMIN — ACETAMINOPHEN 650 MG: 325 TABLET, FILM COATED ORAL at 05:22

## 2018-04-27 RX ADMIN — NIMODIPINE 60 MG: 30 CAPSULE ORAL at 12:02

## 2018-04-27 RX ADMIN — MORPHINE SULFATE 2 MG: 2 INJECTION, SOLUTION INTRAMUSCULAR; INTRAVENOUS at 00:40

## 2018-04-27 RX ADMIN — MORPHINE SULFATE 2 MG: 2 INJECTION, SOLUTION INTRAMUSCULAR; INTRAVENOUS at 08:05

## 2018-04-27 RX ADMIN — OXYCODONE HYDROCHLORIDE 10 MG: 10 TABLET ORAL at 04:24

## 2018-04-27 RX ADMIN — HEPARIN SODIUM 5000 UNITS: 5000 INJECTION, SOLUTION INTRAVENOUS; SUBCUTANEOUS at 15:00

## 2018-04-27 RX ADMIN — METHOCARBAMOL 500 MG: 500 TABLET ORAL at 00:41

## 2018-04-27 RX ADMIN — METHOCARBAMOL 500 MG: 500 TABLET ORAL at 15:06

## 2018-04-27 RX ADMIN — DOCUSATE SODIUM 100 MG: 100 CAPSULE, LIQUID FILLED ORAL at 17:49

## 2018-04-27 RX ADMIN — NIMODIPINE 60 MG: 30 CAPSULE ORAL at 15:29

## 2018-04-27 RX ADMIN — ALPRAZOLAM 0.25 MG: 0.25 TABLET ORAL at 17:50

## 2018-04-27 RX ADMIN — NIMODIPINE 60 MG: 30 CAPSULE ORAL at 09:09

## 2018-04-27 RX ADMIN — ACETAMINOPHEN 650 MG: 325 TABLET, FILM COATED ORAL at 12:02

## 2018-04-27 RX ADMIN — DOCUSATE SODIUM 100 MG: 100 CAPSULE, LIQUID FILLED ORAL at 09:07

## 2018-04-27 RX ADMIN — MORPHINE SULFATE 2 MG: 2 INJECTION, SOLUTION INTRAMUSCULAR; INTRAVENOUS at 12:00

## 2018-04-27 RX ADMIN — LIDOCAINE HYDROCHLORIDE 300 MG: 10 INJECTION, SOLUTION INFILTRATION; PERINEURAL at 15:33

## 2018-04-27 RX ADMIN — METHOCARBAMOL 500 MG: 500 TABLET ORAL at 21:04

## 2018-04-27 RX ADMIN — SERTRALINE HYDROCHLORIDE 25 MG: 25 TABLET ORAL at 09:07

## 2018-04-27 RX ADMIN — LIDOCAINE HYDROCHLORIDE 300 MG: 10 INJECTION, SOLUTION EPIDURAL; INFILTRATION; INTRACAUDAL; PERINEURAL at 15:33

## 2018-04-27 RX ADMIN — NIMODIPINE 60 MG: 30 CAPSULE ORAL at 20:25

## 2018-04-27 RX ADMIN — ONDANSETRON 4 MG: 2 INJECTION INTRAMUSCULAR; INTRAVENOUS at 08:08

## 2018-04-27 NOTE — PROGRESS NOTES
Progress Note - Critical Care   Dane Frey 63 y o  female MRN: [id number]  Unit/Bed#: ICU 05 Encounter: [id number]    Attending Physician: Robin Bhatti DO      ______________________________________________________________________  Principal Problem:    SAH (subarachnoid hemorrhage) ([address] )  Active Problems:    History of asthma    Tobacco use disorder    Severe episode of recurrent major depressive disorder (HCC)    Hypoalbuminemia    Acquired hydrocephalus    Polyuria  Resolved Problems:    Hypokalemia    Hypophosphatemia      Assessment and Plan:     Subarachnoid hemorrhage:  Patient presented with subarachnoid hemorrhage and acute hydrocephalus  Subarachnoid hemorrhage Raines Thomson grade 3, modified Velazco 3  Currently post bleed day 13  Status post angiogram and right frontal EVD placement, postoperative day 9  Status post repeat  angiogram, postoperative day 4   - GCS 15, fully alert and oriented  -EVD clamped 4/26   - Will follow up with Neurosurgery regarding interval head CT  -ICP has ranged from 2-23? overnight  Was positional and wnl when measured again   -Transcranial Doppler yesterday: Taliaegaard R 3 72 (no evidence of vasospasm), L 2 63  No vasospasm was demonstrated on angiogram   No clinical change in neurological examination   -Targeting euvolemia  Net - 1150  Will restart fluids this am    -Continue nimodipine, 60 mg every 4 hours -  For total 21 days? Will clarifiy with neurosurgery   -Delirium prevention, regulate sleep-wake cycles  CV:   - blood pressure goal <160  Largely meeting going past 24 hours  - Maintain MAP above 65  Map meeting goal      Lung:    Asthma:  Patient has a history of asthma without evidence of acute exacerbation    -Continue with home medications  -Maintain oxygen saturations above 92%  On 2 L via nasal cannula as needed  GI:   -Patient is on regular diet   -Bowel regimen with Colace 100 mg twice daily and Senokot at night       FEN: Fluids: Will consider restarting fluids this am     Electrolytes:  Pending a m  labs    Full diet  :   - Urine = 1650  - Net = -115   -Monitor UOP  Renal function is good  ID:   - No acute issues  - Stable WBC ~ 12,     Heme:   - Anemia:  Patient has normocytic anemia  Hemoglobin stable 11    stable platelets however elevated 461    -DVT prophylaxis with heparin and SCDs  Transfuse if hemoglobin falls below 7  Endo:   -No acute issues  Avoid hyperglycemia; goal blood glucose below 180  Msk/Skin:    -No acute issues  Disposition:   Possible transfer for status post EVD removal likely today  Code Status: Level 1 - Full Code    ______________________________________________________________________    Chief Complaint:    Continued critical care stay status post subarachnoid hemorrhage  Bleed day 13    HPI/24 Hour Events:   No acute events overnight  EVD was clamped yesterday     ______________________________________________________________________    Physical Exam:   Physical Exam   Constitutional: She is oriented to person, place, and time  No distress  Sitting comfortably in bed this am    HENT:   Head: Normocephalic  Has EVD in place  Eyes: EOM are normal  Pupils are equal, round, and reactive to light  Cardiovascular: Normal rate, regular rhythm and normal heart sounds  Pulmonary/Chest: Effort normal and breath sounds normal  She has no wheezes  She has no rales  Abdominal: Soft  Bowel sounds are normal  There is no tenderness  There is no rebound  Neurological: She is oriented to person, place, and time  No cranial nerve deficit  GCS 15  Alert and oriented x4  No focal deficits   4-5/5 in all extremities  Tracks past midline,  Negative babinski b/l  Skin: No rash noted  Psychiatric: She has a normal mood and affect  Her behavior is normal    Vitals reviewed      ______________________________________________________________________  Vitals:    04/27/18 0100 18 0200 18 0300 18 0400   BP: 154/85 149/81 144/83 146/82   Pulse: (!) 54 60 58 (!) 54   Resp:  15   Temp:       TempSrc:       SpO2:       Weight:       Height:           Temperature:   Temp (24hrs), Av 6 °F (37 °C), Min:98 3 °F (36 8 °C), Max:98 9 °F (37 2 °C)    Current Temperature: 98 3 °F (36 8 °C)  Weights:   IBW: 52 4 kg    Body mass index is 35 42 kg/m²  Weight (last 2 days)     None        Invasive lines and devices: Invasive Devices     Peripherally Inserted Central Catheter Line            PICC Line  Left Basilic 7 days          Drain            Ventriculostomy/Subdural Ventricular drainage catheter Right Parietal region 9 days                 Non-Invasive/Invasive Ventilation Settings:  Respiratory    Lab Data (Last 4 hours)    None         O2/Vent Data (Last 4 hours)    None              No results found for: PHART, PXY3NPS, PO2ART, GRO0FBO, Y3ZCBFMQ, BEART, SOURCE  RA  Intake and Outputs:  I/O        07 -  0700 701 -  0700    P  O   520    Total Intake(mL/kg)  (22 3) 520 (5 8)    Urine (mL/kg/hr) 1500 (0 7) 1650 (0 8)    Drains 100 (0) 20 (0)    Stool 0 (0) 0 (0)    Total Output 1600 1670    Net +410 -1150          Unmeasured Urine Occurrence 3 x 2 x    Unmeasured Stool Occurrence 1 x 1 x        Nutrition:        Diet Orders            Start     Ordered    18 1321  Diet Regular; Regular House; Regular House  Diet effective midnight     Question Answer Comment   Diet Type Regular    Regular Regular House    Other Restriction(s): Regular House        18 1321    18 1900  Dietary nutrition supplements  Once     Question Answer Comment   Select Supplement: Ensure Enlive-Chocolate    Frequency Breakfast, Lunch, Dinner, HS        18 1900        Hemodynamic Monitoring:  Blood pressure cuff     Labs:     Results from last 7 days  Lab Units 18  0446 18  0619 18  0512   WBC Thousand/uL 11 24* 10 49* 11 24*   HEMOGLOBIN g/dL 10 4* 9 5* 9 6*   HEMATOCRIT % 33 5* 30 3* 29 2*   PLATELETS Thousands/uL 456* 391* 362   NEUTROS PCT % 65 56 62   MONOS PCT % 10 14* 12       Results from last 7 days  Lab Units 04/26/18  0446 04/25/18  0619 04/24/18  0511   SODIUM mmol/L 137 140 140   POTASSIUM mmol/L 4 1 3 7 3 8   CHLORIDE mmol/L 102 104 107   CO2 mmol/L 29 30 28   BUN mg/dL 11 11 12   CREATININE mg/dL 0 56* 0 59* 0 60   CALCIUM mg/dL 9 5 8 8 9 0   GLUCOSE RANDOM mg/dL 127 103 131       Results from last 7 days  Lab Units 04/23/18  0441 04/22/18  0554 04/21/18  0442   MAGNESIUM mg/dL 1 8 2 2 2 3     Lab Results   Component Value Date    PHOS 2 8 04/23/2018    PHOS 2 9 04/22/2018    PHOS 2 3 (L) 04/21/2018            0  Lab Value Date/Time   TROPONINI <0 02 04/16/2018 2342         ABG:No results found for: PHART, VDW3AJT, PO2ART, BIL3YRF, Z4ONZGVL, BEART, SOURCE  EKG:   Micro:  No results found for: [place], SPUTUMCULTUR  Imaging: I have personally reviewed pertinent reports  Cta Head With And Without Contrast    Result Date: 4/16/2018  Impression: No focal stenosis or saccular aneurysm within the [place]  Reidentified acute subarachnoid hemorrhage  Workstation performed: [id number]     Xr Chest Portable    Result Date: 4/22/2018  Impression: 1  Right basilar consolidation could represent pneumonia or atelectasis  2   Very mild pulmonary vascular congestion without edema  Workstation performed: [id number]     Xr Chest Portable    Result Date: 4/19/2018  Impression: Right IJ catheter tip in the caval atrial junction region  Left PICC line tip also in the caval atrial junction region  Minimal bibasilar atelectasis  Workstation performed: [id number]     Xr Chest 1 View Portable    Result Date: 4/17/2018  Impression: Right IJ line placement, appears to be in satisfactory position, tip is at the cavoatrial junction  No pneumothorax  Low lung volumes   Workstation performed: [id number] Ct Head Wo Contrast    Result Date: 4/18/2018  Impression: Persistent diffuse subarachnoid hemorrhage, slightly improved  There is some redistribution with hemorrhage layering in the occipital horns of the lateral ventricles  Ventricular drain present extending into the frontal horn of the right lateral ventricle  Workstation performed: [id number]     Ct Head Without Contrast    Result Date: 4/16/2018  Impression: Large amount of acute subarachnoid hemorrhage involving the suprasellar cistern, sylvian fissures, basal cisterns and in anterior falx  A ruptured aneurysm is suspected  A CTA head is recommended for further evaluation  I personally discussed this study with Sally Robert on 4/16/2018 at 8:37 PM  Workstation performed: [id number]     Mri Brain Wo Contrast    Result Date: 4/18/2018  Impression: 1  Small linear focus of restricted diffusion in the left cerebellar hemisphere without associated hemorrhage, suggesting an acute or early subacute ischemic infarct  2   Grossly stable subarachnoid hemorrhage throughout the basal cisterns  No significant mass effect  3   Stable mild hydrocephalus and intraventricular blood products  I personally discussed this study with Dr Milo Leija on 4/18/2018 at 10:53 PM  Workstation performed: [id number]     Mri Cervical Spine Wo Contrast    Result Date: 4/18/2018  Impression: 1  Chronic disc degenerative change throughout the cervical spine resulting in severe central canal stenosis at C5-6 and C6-7  Chronic mass effect on the cord without evidence of cord signal abnormality or myelomalacia  2   T2-3 paracentral disc protrusion and right paracentral disc extrusion with probable mild mass effect on the right ventral aspect of the cord  Workstation performed: [id number]     Xr Chest Picc Line Portable    Result Date: 4/19/2018  Impression: Left upper extremity PICC line projecting into the right atrium    Consider repositioning the PICC line by withdrawing approximately 2 cm  The study was marked in [facility] for immediate notification  Workstation performed: [id number]     Allergies: No Known Allergies  Medications:   Scheduled Meds:  Current Facility-Administered Medications:  acetaminophen 650 mg Oral Q6H [address] Aurora Barragan PA-C   albuterol 2 puff Inhalation Q6H PRN Erlinda Perez, DO   ALPRAZolam 0 25 mg Oral Daily PRN Aurora Barragan PA-C   bisacodyl 10 mg Rectal Daily PRN Aurora Barragan PA-C   docusate sodium 100 mg Oral BID Erlinda Perez, DO   fluticasone furoate-vilanterol 1 puff Inhalation Daily Aurora Barragan PA-C   heparin (porcine) 5,000 Units Subcutaneous [facility] Aurora Barragan PA-C   labetalol 10 mg Intravenous Q4H PRN Josh Bateman MD   melatonin 6 mg Oral HS Cara Aponte MD   methocarbamol 500 mg Oral Q6H PRN Aurora Barragan PA-C   montelukast 10 mg Oral Daily Erlinda Perez, DO   morphine injection 2 mg Intravenous Q4H PRN Kelsi Mckeon MD   niMODipine 60 mg Oral Q4H [address] Lowellfelipe Perez, DO   ondansetron 4 mg Intravenous Q6H PRN Carlos Javier, DO   oxyCODONE 10 mg Oral Q4H PRN Josh Bateman MD   oxyCODONE 5 mg Oral Q4H PRN Augustina Baca MD   pravastatin 40 mg Oral Daily [email or web address], DO   senna 1 tablet Oral HS Aurora Barragan PA-C   sertraline 25 mg Oral Daily Josh Bateman MD     Continuous Infusions:   PRN Meds:    albuterol 2 puff Q6H PRN   ALPRAZolam 0 25 mg Daily PRN   bisacodyl 10 mg Daily PRN   labetalol 10 mg Q4H PRN   methocarbamol 500 mg Q6H PRN   morphine injection 2 mg Q4H PRN   ondansetron 4 mg Q6H PRN   oxyCODONE 10 mg Q4H PRN   oxyCODONE 5 mg Q4H PRN     VTE Pharmacologic Prophylaxis: Heparin  VTE Mechanical Prophylaxis: sequential compression device    Portions of the record may have been created with voice recognition software  Occasional wrong word or "sound a like" substitutions may have occurred due to the inherent limitations of voice recognition software    Read the chart carefully and recognize, using context, where substitutions have occurred      Paramjit Angela MD

## 2018-04-27 NOTE — SOCIAL WORK
CM reviewed pt during CC rounds today  Pt still has  her EVD and on daily TCD's  CM will follow with pt's d/c needs

## 2018-04-27 NOTE — PLAN OF CARE
Problem: DISCHARGE PLANNING - CARE MANAGEMENT  Goal: Discharge to post-acute care or home with appropriate resources  INTERVENTIONS:  - Conduct assessment to determine patient/family and health care team treatment goals, and need for post-acute services based on payer coverage, community resources, and patient preferences, and barriers to discharge  - Address psychosocial, clinical, and financial barriers to discharge as identified in assessment in conjunction with the patient/family and health care team  - Arrange appropriate level of post-acute services according to patient's   needs and preference and payer coverage in collaboration with the physician and health care team  - Communicate with and update the patient/family, physician, and health care team regarding progress on the discharge plan  - Arrange appropriate transportation to post-acute venues  - Pt to d/c with appropriate resources when medically stable     INTERVENTIONS:  - Conduct assessment to determine patient/family and health care team treatment goals, and need for post-acute services based on payer coverage, community resources, and patient preferences, and barriers to discharge  - Address psychosocial, clinical, and financial barriers to discharge as identified in assessment in conjunction with the patient/family and health care team  - Arrange appropriate level of post-acute services according to patient's   needs and preference and payer coverage in collaboration with the physician and health care team  - Communicate with and update the patient/family, physician, and health care team regarding progress on the discharge plan  - Arrange appropriate transportation to post-acute venues  Outcome: Progressing

## 2018-04-27 NOTE — TREATMENT PLAN
CT scan demonstrated stable ventricles without hydrocephalus with the EVD clamped for over 24 hours  The patient's EVD was removed in the afternoon without complications, patient experienced a lot of pain with placing suture to close drain site  From a neurosurgical standpoint will continue Nimodipine and TCDs for a total of a 14 day course  She is okay to transfer out of the intensive care unit this evening to monitor if clinically remains well  Call with questions or concerns

## 2018-04-27 NOTE — PROGRESS NOTES
Progress Note - Neurosurgery   Jonny Backphoenix 63 y o  female MRN: [id number]  Unit/Bed#: ICU 05 Encounter: [id number]    Assessment:  1  Subarachnoid hemorrhage involving the suprasellar cistern, sylvian fissures, basal cisterns and in the anterior falx  Bleed day #13  2  HH3, Velazco 3  3  Acute hydrocephalus   4  History of asthma    Plan:  · Exam: GCS15  AAOx3  BARRERA with generalized weakness in BUE and BLE 5-/5  LT intact  No PD, marrufo or clonus  Imaging: personally reviewed and reviewed by attending:  · 4/18 - CT head wo: persistent diffuse subarachnoid hemorrhage, slightly improved with some redistribution of hemorrhage layering in the occipital horns  EVD present extending into the frontal horn of right lateral ventricle  · 4/18 - MRI brain wo: small linear focus of restricted diffusion in left cerebellar hemisphere without associated hemorrhage  Grossly stable subarachnoid throughout basal cistern  Stable mild hydrocephalus  · 4/18 - MRI cspine wo: Chronic disc degenerative changes throughout, resulting in central canal stenosis at C5-6 and C6-7  T2-3 paracentral disc protrusion  · 4/23/18 - IR cerebral angio: right and left internal carotid artery circulation reveals antegrade flow into the middle cerebral and anterior cerebral arteries  No evidence of aneurysm, AVM or vascular malformations appreciated  · 4/24/18 - CT head wo: significant improvement in subarachnoid hemorrhage with near complete resolution  Decreased size of lateral ventricle with slight increase in intraventricular hemorrhage, s/p EVD catheter placement     · 4/27 - CT head today for evaluation of ventricle size  · STAT CT head without contrast if decline in GCS >2 pts/ 1 hr  Subarachnoid Management:  · Continue Nimodipine 60mg q4h and TCDs for a total of 14 day course  · TCDs on 4/25: right 0 81, left 1 09  · TCDs on 4/26: right 3 72, left 2 63  · TCDs on 4/27: right 4 38, left 4 5  · Completed Keppra 750mg x 1 week  · Na 134, goal 140-150 - sodium chloride 0 9% bolus 1,000mL   · SBP goal <160  · Urine output -100 as of this morning - goal euvolemia +/- 500  Drain management:  · EVD drain clamped overnight  · ICPs ranging 2-21; two time spike to 21+ at 1400 and 1430  Average 16+  Call if ICPs >20 sustained without provoking cause  Medical management:   · PT/OT: Recommending STR when medically stable  · DVT ppx: SCDs, HSQ  · Pain control  · WBC 12 07, Tmax: 99 2, Currently 98 3  · 4/23/18 - CXR stable central vascular congestion   · Hbg 11 0 - transfuse if <8  · Platelets 083  · Bowel regimen  Neurosurgery will continue to closely monitor, call for questions or concerns/change in examination  Subjective/Objective   Chief Complaint: "I'm feeling okay"    Subjective: patient states she's doing okay, she state the headache is not as intense but still present behind her eyes  She describes the pain as "pressure" and currently rated 8 out of 10  She is having associated dizziness, photophobia and phonophobia  She states her neck is not as stiff as the day prior  She denies chest pain but states she is not taking her inhaler correctly so she feels short of breath but "not true shortness of breath"  She is getting nauseous after taking medications  She denies weakness/numbness  Objective: laying in a dark room, NAD  I/O       04/25 0701 - 04/26 0700 04/26 0701 - 04/27 0700 04/27 0701 - 04/28 0700    P  O  2010 520     I V  (mL/kg)   100 (1 1)    Total Intake(mL/kg) 2010 (22 3) 520 (5 8) 100 (1 1)    Urine (mL/kg/hr) 1500 (0 7) 1650 (0 8)     Drains 100 (0) 20 (0)     Stool 0 (0) 0 (0)     Total Output 1600 1670      Net +410 -1150 +100           Unmeasured Urine Occurrence 3 x 2 x     Unmeasured Stool Occurrence 1 x 1 x           Invasive Devices     Peripherally Inserted Central Catheter Line            PICC Line 43/38/86 Left Basilic 7 days          Drain            Ventriculostomy/Subdural Ventricular drainage catheter Right Parietal region 10 days                Physical Exam:  Vitals: Blood pressure 139/82, pulse (!) 52, temperature 98 3 °F (36 8 °C), resp  rate 14, height 5' 3" (1 6 m), weight 90 7 kg (199 lb 15 3 oz), SpO2 93 %  ,Body mass index is 35 42 kg/m²  Hemodynamic Monitoring: MAP: Arterial Line MAP (mmHg): 96 mmHg, CPP: CPP: 99, ICP Mean: ICP Mean (mmHg): 15 mmHg    General appearance: alert, appears stated age, cooperative and no distress  Head: Normocephalic, right frontal EVD clamped  Eyes: EOMI, PERRL  Neck: decreased ROM of cervical spine  No midline cervical tenderness on palpation + left cervical paraspinous muscle tenderness  Lungs: non labored breathing  Heart: bradycardia  Neurologic:   Mental status: Alert, oriented x4, thought content appropriate  Speech is fluent, clear  Able to repeat a sentence, perform simple math, name objects  Cranial nerves: grossly intact (Cranial nerves II-XII)  Facial symmetry at rest and with expression  Tongue is midline  Sensory: normal to LT in bilateral upper and lower extremities  DSS intact  Motor: moving all extremities without focal weakness, strength 5/5 throughout  Reflexes: BUE 3+ brisk, BLE 2+ and symmetric  + bilateral marrufo, negative clonus  Coordination: finger to nose slight dysmetria bilaterally, no drift bilaterally  3/3 JPS intact  SANTOSH intact         Lab Results:    Results from last 7 days  Lab Units 04/27/18  0522 04/26/18  0446 04/25/18  0619   WBC Thousand/uL 12 07* 11 24* 10 49*   HEMOGLOBIN g/dL 11 0* 10 4* 9 5*   HEMATOCRIT % 35 0 33 5* 30 3*   PLATELETS Thousands/uL 461* 456* 391*   NEUTROS PCT % 70 65 56   MONOS PCT % 9 10 14*       Results from last 7 days  Lab Units 04/27/18  0521 04/26/18  0446 04/25/18  0619   SODIUM mmol/L 134* 137 140   POTASSIUM mmol/L 3 8 4 1 3 7   CHLORIDE mmol/L 99* 102 104   CO2 mmol/L 27 29 30   BUN mg/dL 11 11 11   CREATININE mg/dL 0 58* 0 56* 0 59*   CALCIUM mg/dL 9 4 9 5 8 8   GLUCOSE RANDOM mg/dL 121 127 103       Results from last 7 days  Lab Units 04/23/18  0441 04/22/18  0554 04/21/18  0442   MAGNESIUM mg/dL 1 8 2 2 2 3       Results from last 7 days  Lab Units 04/23/18  0441 04/22/18  0554 04/21/18  0442   PHOSPHORUS mg/dL 2 8 2 9 2 3*         No results found for: TROPONINT  ABG:No results found for: PHART, NJA6CYP, PO2ART, KAR3QBR, B6NQVMPX, BEART, SOURCE    Imaging Studies: I have personally reviewed pertinent reports  and I have personally reviewed pertinent films in PACS  IR cerebral angiography   Final Result      VAS transcranial doppler, complete study   Final Result      VAS transcranial doppler, complete study   Final Result      CT head wo contrast   Final Result   1  Significant improvement in subarachnoid hemorrhage with near complete resolution  No acute intracranial abnormality  2   Decreased size of the lateral ventricles with slight increase in the intraventricular hemorrhage, status post external ventricular drainage catheter placement  Workstation performed: [id number]         VAS transcranial doppler, complete study   Final Result      XR chest portable   Final Result      Stable mild central vascular congestion  Workstation performed: [id number]         IR cerebral angiography / intervention   Final Result      VAS transcranial doppler, complete study   Final Result      XR chest portable   Final Result      1  Right basilar consolidation could represent pneumonia or atelectasis  2   Very mild pulmonary vascular congestion without edema  Workstation performed: [id number]         VAS transcranial doppler, complete study   Final Result      VAS transcranial doppler, complete study   Final Result      VAS transcranial doppler, complete study   Final Result      XR chest portable   Final Result   Right IJ catheter tip in the caval atrial junction region  Left PICC line tip also in the caval atrial junction region  Minimal bibasilar atelectasis  Workstation performed: [id number]         XR chest PICC line portable   Final Result   Left upper extremity PICC line projecting into the right atrium  Consider repositioning the PICC line by withdrawing approximately 2 cm  The study was marked in [facility] for immediate notification  Workstation performed: [id number]         MRI cervical spine wo contrast   Final Result         1  Chronic disc degenerative change throughout the cervical spine resulting in severe central canal stenosis at C5-6 and C6-7  Chronic mass effect on the cord without evidence of cord signal abnormality or myelomalacia  2   T2-3 paracentral disc protrusion and right paracentral disc extrusion with probable mild mass effect on the right ventral aspect of the cord  Workstation performed: [id number]         MRI brain wo contrast   Final Result         1  Small linear focus of restricted diffusion in the left cerebellar hemisphere without associated hemorrhage, suggesting an acute or early subacute ischemic infarct  2   Grossly stable subarachnoid hemorrhage throughout the basal cisterns  No significant mass effect  3   Stable mild hydrocephalus and intraventricular blood products  I personally discussed this study with Dr Army Vasquez on 4/18/2018 at 10:53 PM                Workstation performed: [id number]         VAS transcranial doppler, complete study   Final Result      CT head wo contrast   Final Result      Persistent diffuse subarachnoid hemorrhage, slightly improved  There is some redistribution with hemorrhage layering in the occipital horns of the lateral ventricles  Ventricular drain present extending into the frontal horn of the right lateral ventricle                    Workstation performed: [id number]         VAS transcranial doppler, complete study   Final Result      XR chest 1 view portable   Final Result      Right IJ line placement, appears to be in satisfactory position, tip is at the cavoatrial junction  No pneumothorax  Low lung volumes  Workstation performed: [id number]         VAS transcranial doppler, complete study    (Results Pending)   VAS transcranial doppler, complete study    (Results Pending)   VAS transcranial doppler, complete study    (Results Pending)   CT head wo contrast    (Results Pending)       EKG, Pathology, and Other Studies: I have personally reviewed pertinent reports        VTE  Prophylaxis: Sequential compression device (Venodyne)  and Heparin

## 2018-04-28 ENCOUNTER — APPOINTMENT (INPATIENT)
Dept: NON INVASIVE DIAGNOSTICS | Facility: HOSPITAL | Age: 58
DRG: 021 | End: 2018-04-28
Payer: COMMERCIAL

## 2018-04-28 LAB
ALBUMIN SERPL BCP-MCNC: 3.7 G/DL (ref 3.5–5)
ALP SERPL-CCNC: 99 U/L (ref 46–116)
ALT SERPL W P-5'-P-CCNC: 33 U/L (ref 12–78)
ANION GAP SERPL CALCULATED.3IONS-SCNC: 8 MMOL/L (ref 4–13)
AST SERPL W P-5'-P-CCNC: 13 U/L (ref 5–45)
BASOPHILS # BLD AUTO: 0.02 THOUSANDS/ΜL (ref 0–0.1)
BASOPHILS NFR BLD AUTO: 0 % (ref 0–1)
BILIRUB SERPL-MCNC: 0.41 MG/DL (ref 0.2–1)
BUN SERPL-MCNC: 9 MG/DL (ref 5–25)
CALCIUM SERPL-MCNC: 9.3 MG/DL (ref 8.3–10.1)
CHLORIDE SERPL-SCNC: 103 MMOL/L (ref 100–108)
CO2 SERPL-SCNC: 28 MMOL/L (ref 21–32)
CREAT SERPL-MCNC: 0.59 MG/DL (ref 0.6–1.3)
EOSINOPHIL # BLD AUTO: 0.31 THOUSAND/ΜL (ref 0–0.61)
EOSINOPHIL NFR BLD AUTO: 3 % (ref 0–6)
ERYTHROCYTE [DISTWIDTH] IN BLOOD BY AUTOMATED COUNT: 14.4 % (ref 11.6–15.1)
GFR SERPL CREATININE-BSD FRML MDRD: 102 ML/MIN/1.73SQ M
GLUCOSE SERPL-MCNC: 92 MG/DL (ref 65–140)
HCT VFR BLD AUTO: 33.8 % (ref 34.8–46.1)
HGB BLD-MCNC: 11 G/DL (ref 11.5–15.4)
LYMPHOCYTES # BLD AUTO: 2.44 THOUSANDS/ΜL (ref 0.6–4.47)
LYMPHOCYTES NFR BLD AUTO: 24 % (ref 14–44)
MAGNESIUM SERPL-MCNC: 2.2 MG/DL (ref 1.6–2.6)
MCH RBC QN AUTO: 30.3 PG (ref 26.8–34.3)
MCHC RBC AUTO-ENTMCNC: 32.5 G/DL (ref 31.4–37.4)
MCV RBC AUTO: 93 FL (ref 82–98)
MONOCYTES # BLD AUTO: 1.28 THOUSAND/ΜL (ref 0.17–1.22)
MONOCYTES NFR BLD AUTO: 12 % (ref 4–12)
NEUTROPHILS # BLD AUTO: 6.25 THOUSANDS/ΜL (ref 1.85–7.62)
NEUTS SEG NFR BLD AUTO: 61 % (ref 43–75)
NRBC BLD AUTO-RTO: 0 /100 WBCS
PHOSPHATE SERPL-MCNC: 3.4 MG/DL (ref 2.7–4.5)
PLATELET # BLD AUTO: 529 THOUSANDS/UL (ref 149–390)
PMV BLD AUTO: 10.1 FL (ref 8.9–12.7)
POTASSIUM SERPL-SCNC: 3.9 MMOL/L (ref 3.5–5.3)
PROT SERPL-MCNC: 7 G/DL (ref 6.4–8.2)
RBC # BLD AUTO: 3.63 MILLION/UL (ref 3.81–5.12)
SODIUM SERPL-SCNC: 139 MMOL/L (ref 136–145)
WBC # BLD AUTO: 10.34 THOUSAND/UL (ref 4.31–10.16)

## 2018-04-28 PROCEDURE — 99232 SBSQ HOSP IP/OBS MODERATE 35: CPT | Performed by: PHYSICIAN ASSISTANT

## 2018-04-28 PROCEDURE — 80053 COMPREHEN METABOLIC PANEL: CPT | Performed by: STUDENT IN AN ORGANIZED HEALTH CARE EDUCATION/TRAINING PROGRAM

## 2018-04-28 PROCEDURE — 93886 INTRACRANIAL COMPLETE STUDY: CPT

## 2018-04-28 PROCEDURE — 85025 COMPLETE CBC W/AUTO DIFF WBC: CPT | Performed by: STUDENT IN AN ORGANIZED HEALTH CARE EDUCATION/TRAINING PROGRAM

## 2018-04-28 PROCEDURE — 94760 N-INVAS EAR/PLS OXIMETRY 1: CPT

## 2018-04-28 PROCEDURE — 84100 ASSAY OF PHOSPHORUS: CPT | Performed by: STUDENT IN AN ORGANIZED HEALTH CARE EDUCATION/TRAINING PROGRAM

## 2018-04-28 PROCEDURE — 99233 SBSQ HOSP IP/OBS HIGH 50: CPT | Performed by: INTERNAL MEDICINE

## 2018-04-28 PROCEDURE — 94762 N-INVAS EAR/PLS OXIMTRY CONT: CPT

## 2018-04-28 PROCEDURE — 83735 ASSAY OF MAGNESIUM: CPT | Performed by: STUDENT IN AN ORGANIZED HEALTH CARE EDUCATION/TRAINING PROGRAM

## 2018-04-28 PROCEDURE — 93886 INTRACRANIAL COMPLETE STUDY: CPT | Performed by: SURGERY

## 2018-04-28 RX ORDER — TRAMADOL HYDROCHLORIDE 50 MG/1
50 TABLET ORAL EVERY 4 HOURS PRN
Status: DISCONTINUED | OUTPATIENT
Start: 2018-04-28 | End: 2018-05-02 | Stop reason: HOSPADM

## 2018-04-28 RX ADMIN — HEPARIN SODIUM 5000 UNITS: 5000 INJECTION, SOLUTION INTRAVENOUS; SUBCUTANEOUS at 16:26

## 2018-04-28 RX ADMIN — HEPARIN SODIUM 5000 UNITS: 5000 INJECTION, SOLUTION INTRAVENOUS; SUBCUTANEOUS at 21:50

## 2018-04-28 RX ADMIN — OXYCODONE HYDROCHLORIDE 5 MG: 5 TABLET ORAL at 21:50

## 2018-04-28 RX ADMIN — PRAVASTATIN SODIUM 40 MG: 40 TABLET ORAL at 16:26

## 2018-04-28 RX ADMIN — MORPHINE SULFATE 2 MG: 2 INJECTION, SOLUTION INTRAMUSCULAR; INTRAVENOUS at 05:15

## 2018-04-28 RX ADMIN — NIMODIPINE 60 MG: 30 CAPSULE ORAL at 05:15

## 2018-04-28 RX ADMIN — NIMODIPINE 60 MG: 30 CAPSULE ORAL at 20:17

## 2018-04-28 RX ADMIN — SODIUM CHLORIDE 50 ML/HR: 0.9 INJECTION, SOLUTION INTRAVENOUS at 00:58

## 2018-04-28 RX ADMIN — ACETAMINOPHEN 650 MG: 325 TABLET, FILM COATED ORAL at 05:15

## 2018-04-28 RX ADMIN — METHOCARBAMOL 500 MG: 500 TABLET ORAL at 11:59

## 2018-04-28 RX ADMIN — ACETAMINOPHEN 650 MG: 325 TABLET, FILM COATED ORAL at 16:27

## 2018-04-28 RX ADMIN — MONTELUKAST SODIUM 10 MG: 10 TABLET, FILM COATED ORAL at 08:40

## 2018-04-28 RX ADMIN — MELATONIN TAB 3 MG 6 MG: 3 TAB at 21:50

## 2018-04-28 RX ADMIN — METHOCARBAMOL 500 MG: 500 TABLET ORAL at 21:50

## 2018-04-28 RX ADMIN — DOCUSATE SODIUM 100 MG: 100 CAPSULE, LIQUID FILLED ORAL at 16:26

## 2018-04-28 RX ADMIN — ACETAMINOPHEN 650 MG: 325 TABLET, FILM COATED ORAL at 00:58

## 2018-04-28 RX ADMIN — OXYCODONE HYDROCHLORIDE 5 MG: 5 TABLET ORAL at 20:16

## 2018-04-28 RX ADMIN — SERTRALINE HYDROCHLORIDE 25 MG: 25 TABLET ORAL at 08:40

## 2018-04-28 RX ADMIN — NIMODIPINE 60 MG: 30 CAPSULE ORAL at 11:21

## 2018-04-28 RX ADMIN — SENNOSIDES 8.6 MG: 8.6 TABLET, FILM COATED ORAL at 21:50

## 2018-04-28 RX ADMIN — ACETAMINOPHEN 650 MG: 325 TABLET, FILM COATED ORAL at 11:22

## 2018-04-28 RX ADMIN — FLUTICASONE FUROATE AND VILANTEROL 1 PUFF: 200; 25 POWDER RESPIRATORY (INHALATION) at 08:40

## 2018-04-28 RX ADMIN — NIMODIPINE 60 MG: 30 CAPSULE ORAL at 01:00

## 2018-04-28 RX ADMIN — NIMODIPINE 60 MG: 30 CAPSULE ORAL at 16:26

## 2018-04-28 RX ADMIN — MORPHINE SULFATE 2 MG: 2 INJECTION, SOLUTION INTRAMUSCULAR; INTRAVENOUS at 22:48

## 2018-04-28 RX ADMIN — NIMODIPINE 60 MG: 30 CAPSULE ORAL at 08:40

## 2018-04-28 RX ADMIN — DOCUSATE SODIUM 100 MG: 100 CAPSULE, LIQUID FILLED ORAL at 08:41

## 2018-04-28 RX ADMIN — MORPHINE SULFATE 2 MG: 2 INJECTION, SOLUTION INTRAMUSCULAR; INTRAVENOUS at 11:24

## 2018-04-28 RX ADMIN — MORPHINE SULFATE 2 MG: 2 INJECTION, SOLUTION INTRAMUSCULAR; INTRAVENOUS at 00:58

## 2018-04-28 RX ADMIN — ALPRAZOLAM 0.25 MG: 0.25 TABLET ORAL at 16:26

## 2018-04-28 RX ADMIN — HEPARIN SODIUM 5000 UNITS: 5000 INJECTION, SOLUTION INTRAVENOUS; SUBCUTANEOUS at 05:15

## 2018-04-28 NOTE — PROGRESS NOTES
[facility]ist Service - Internal Medicine Progress Note      PATIENT INFORMATION      Patient: Gabi Hayden 63 y o  female   MRN: [id number]  PCP: Angela Champagne MD  Unit/Bed#: [facility] 702-01 Encounter: [id number]  Date Of Visit: 18       ASSESSMENTS & PLAN        1  Subarachnoid hemorrhage  · day# 14 post bleed - status post right frontal EVD with subsequent clamping and repeat imaging negative for hydrocephalus thereafter - IR-guided cerebral angiograms on  and  were both negative for acute changes/etiology - transferred out of ICU - neurosurgery following - continue Nimodipine for vasospasms w/ serial transcranial dopplers for another week - transcranial doppler yesterday revealed an acute mild right MCA vasospasm  · CT of head yesterday shows improvement of subarachnoid hemorrhaging   · PT/OT input to be appreciated    2  Leukocytosis  · Improving/nearly resolved - likely secondary to [facility] - monitor WBC count    3  Hypokalemia  · Monitor/replete potassium as necessary - serum magnesium within normal limits    4  history of Asthma  · Stable/asymptomatic - continue Breo Ellipta/Singular regimen - PRN Albuterol inhaler on board - maintain oxygenation    5  Cocaine abuse  · tested positive on urine metabolite cream however patient denies use - counseled on cessation any way      DVT Prophylaxis:  Heparin SC      SUBJECTIVE     Seen/examined earlier today with nursing  She continues to complain of intermittent headaches and weakness  Per nursing, she is requesting her IV morphine around the clock  She is somewhat confused  Denies fever/chills or other constitutional symptoms  OBJECTIVE     Vitals:   Temp (24hrs), Av 6 °F (37 °C), Min:98 2 °F (36 8 °C), Max:99 °F (37 2 °C)    HR:  [61-66] 63  Resp:  [16-20] 18  BP: (118-146)/(64-81) 146/81  SpO2:  [90 %-98 %] 98 %  Body mass index is 35 42 kg/m²  Input and Output Summary (last 24 hours):        Intake/Output Summary (Last 24 hours) at 04/28/18 1631  Last data filed at 04/28/18 1124   Gross per 24 hour   Intake          1228 33 ml   Output             1375 ml   Net          -146 67 ml       Physical Exam:     GENERAL:  Well-developed/nourished - intermittent distress due to headache  HEAD:  Normocephalic - atraumatic  EYES: PERRL - EOMI   MOUTH:  Mucosa moist  NECK:  Supple - full range of motion  CARDIAC:  Regular rate/rhythm - S1/S2 positive  PULMONARY:  Clear breath sounds bilaterally - nonlabored respirations  ABDOMEN:  Soft - nontender/nondistended - active bowel sounds  MUSCULOSKELETAL:  Motor strength/range of motion somewhat deconditioned  NEUROLOGIC:  Alert/oriented to name/place only - weak/fatigued   SKIN:  Age-appropriate wrinkles/blemishes    PSYCHIATRIC:  Mood/affect somewhat anxious      ADDITIONAL DATA     Labs & Recent Cultures:       Results from last 7 days  Lab Units 04/28/18  0546   WBC Thousand/uL 10 34*   HEMOGLOBIN g/dL 11 0*   HEMATOCRIT % 33 8*   PLATELETS Thousands/uL 529*   NEUTROS PCT % 61   LYMPHS PCT % 24   MONOS PCT % 12   EOS PCT % 3       Results from last 7 days  Lab Units 04/28/18  0546   SODIUM mmol/L 139   POTASSIUM mmol/L 3 9   CHLORIDE mmol/L 103   CO2 mmol/L 28   BUN mg/dL 9   CREATININE mg/dL 0 59*   CALCIUM mg/dL 9 3   TOTAL PROTEIN g/dL 7 0   BILIRUBIN TOTAL mg/dL 0 41   ALK PHOS U/L 99   ALT U/L 33   AST U/L 13   GLUCOSE RANDOM mg/dL 92         Last 24 Hours Medication List:     Current Facility-Administered Medications:  acetaminophen 650 mg Oral Q6H [facility] Aurora Barragan PA-C    albuterol 2 puff Inhalation Q6H PRN Amado Perez DO    ALPRAZolam 0 25 mg Oral Daily PRN Aurora Barragan PA-C    bisacodyl 10 mg Rectal Daily PRN Aurora Barragan PA-C    docusate sodium 100 mg Oral BID Lowell Perez DO    fluticasone furoate-vilanterol 1 puff Inhalation Daily Aurora Barragan PA-C    heparin (porcine) 5,000 Units Subcutaneous Q8H [facility] Aurora Barragan PA-C labetalol 10 mg Intravenous Q4H PRN Bhavik Grossman MD    melatonin 6 mg Oral HS Demetria Way MD    methocarbamol 500 mg Oral Q6H PRN Aurora Barragan PA-C    montelukast 10 mg Oral Daily Olene Goldberg Bashor, DO    morphine injection 2 mg Intravenous Q4H PRN Daphne Colindres MD    niMODipine 60 mg Oral Q4H [address] Lowell S Bashor, DO    ondansetron 4 mg Intravenous Q6H PRN Olene Goldberg Bashor, DO    oxyCODONE 10 mg Oral Q4H PRN Bhavik Grossman MD    oxyCODONE 5 mg Oral Q4H PRN Omid Kinney MD    pravastatin 40 mg Oral Daily With iNEWiT, DO    senna 1 tablet Oral HS Aurora Barragan PA-C    sertraline 25 mg Oral Daily Bhavik Grossman MD    sodium chloride 50 mL/hr Intravenous Continuous Bhavik Grossman MD Last Rate: 50 mL/hr (04/28/18 0058)          Time Spent for Care: 38 minutes  More than 50% of total time spent on counseling and coordination of care as described above  Current Length of Stay: 12 day(s)      Code Status: Level 1 - Full Code          ** Please Note: This note is constructed using a voice recognition dictation system   **

## 2018-04-28 NOTE — PROGRESS NOTES
Progress Note - Neurosurgery   Ida Small 63 y o  female MRN: [id number]  Unit/Bed#: [facility] 702-01 Encounter: [id number]    Assessment:  1  Subarachnoid hemorrhage involving the suprasellar cistern, sylvian fissures, basal cisterns and in the anterior falx  Bleed day #13  2  HH3, Velazco 3  3  Acute hydrocephalus   4  History of asthma      Plan:  - continue TCD's and nimodipine  - keppra x 1 week  - pain management    Subjective/Objective     Headache  Photophobia    Intake/Output       04/28/18 0701 - 04/29/18 0700      [id number] 2598-4915 Total       Intake    Total Intake -- -- --       Output    Urine  600  -- 600    Urine 600 -- 600    Unmeasured Urine Occurrence 1 x -- 1 x    Total Output 600 -- 600       Net I/O     -600 -- -600          Invasive Devices     Peripherally Inserted Central Catheter Line            PICC Line 85/36/35 Left Basilic 9 days                Physical Exam:     Vitals: Blood pressure 129/67, pulse 63, temperature 98 2 °F (36 8 °C), temperature source Oral, resp  rate 18, height 5' 3" (1 6 m), weight 90 7 kg (199 lb 15 3 oz), SpO2 96 %  ,Body mass index is 35 42 kg/m²  awake and alert  Moves extremities  PERRLA/EOMI  Facial features symmetrical  Tongue in the midline  Good strength  lungs clear  Heart regular  Abdomen soft      Lab Results: I have personally reviewed pertinent results  Imaging Studies: I have personally reviewed pertinent reports          VTE Pharmacologic Prophylaxis: Heparin    VTE Mechanical Prophylaxis: sequential compression device

## 2018-04-28 NOTE — PLAN OF CARE
Activity Intolerance/Impaired Mobility     Mobility/activity is maintained at optimum level for patient Progressing        CARDIOVASCULAR - ADULT     Maintains optimal cardiac output and hemodynamic stability Progressing     Absence of cardiac dysrhythmias or at baseline rhythm Progressing        Communication Impairment     Ability to express needs and understand communication [address] Discharge to post-acute care or home with appropriate resources Progressing        GASTROINTESTINAL - ADULT     Minimal or absence of nausea and/or vomiting Progressing     Maintains or returns to baseline bowel function Progressing     Maintains adequate nutritional intake Progressing        GENITOURINARY - ADULT     Maintains or returns to baseline urinary function Progressing     Absence of urinary retention Progressing        HEMATOLOGIC - ADULT     Maintains hematologic stability Progressing        METABOLIC, FLUID AND ELECTROLYTES - ADULT     Electrolytes maintained within normal limits Progressing     Fluid balance maintained Progressing     Glucose maintained within target range Progressing        MUSCULOSKELETAL - ADULT     Maintain or return mobility to safest level of function Progressing     Maintain proper alignment of affected body part Progressing        Neurological Deficit     Neurological status is stable or improving Progressing        NEUROSENSORY - ADULT     Achieves stable or improved neurological status Progressing     Absence of seizures Progressing     Remains free of injury related to seizures activity Progressing     Achieves maximal functionality and self care Progressing        Nutrition     Nutrition/Hydration status is improving Progressing        Nutrition/Hydration-ADULT     Nutrient/Hydration intake appropriate for improving, restoring or maintaining nutritional needs Progressing        Potential for Aspiration     Non-ventilated patient's risk of aspiration is minimized Progressing        Potential for Falls     Patient will remain free of falls Progressing        Prexisting or High Potential for Compromised Skin Integrity     Skin integrity is maintained or improved Progressing        RESPIRATORY - ADULT     Achieves optimal ventilation and oxygenation Progressing        SKIN/TISSUE INTEGRITY - ADULT     Skin integrity remains intact Progressing     Incision(s), wounds(s) or drain site(s) healing without S/S of infection Progressing     Oral mucous membranes remain intact Progressing

## 2018-04-28 NOTE — PROGRESS NOTES
Critical Care Transfer Note  Fanny Lara 63 y o  female MRN: [id number]  Unit/Bed#: ICU 05 Encounter: [id number]    Code Status: Level 1 - Full Code  POA:    POLST:      Discussed case with Dr Gail Chua of [facility] at 2137  I verbally informed her of ICU course, results of diagnostic tests, consults, and pending tests/consults  Answered questions  Patient will be transferred to [place] [id number]  Reason for ICU Admission:  Subarachnoid hemorrhage, Hunt and Thomson grade 3, modified Velazco grade 3     Active problems:     Subarachnoid hemorrhage:  Patient is post bleed day 13, postoperative day 9 from right frontal EVD and angiogram, and postoperative day 4 from repeat angiogram   Patient's EVD had been clamped for over 24 hours and repeat head CT did not demonstrate any hydrocephalus  EVD was removed today  Continue nimodipine and transcranial Dopplers for total of 21 days post bleed  Neurosurgery will follow the patient  Asthma:  Patient has been continued on her home medications with good response  She is currently on 2 L of oxygen via nasal cannula  She has had no signs or symptoms of acute exacerbation during her ICU course  Anemia:  Patient has normocytic anemia  Her hemoglobin has been largely stable  Resolved problems:  None  Assessment and Plan:  Please see above  History of Present Illness: In brief, patient is a 61-year-old female with past medical history of asthma and migraine headaches who presented to [address] [place] [address] with a headache  Ultimately, CT head was performed and demonstrated subarachnoid hemorrhage  CTA was negative for aneurysm  Patient was transferred to this facility for further management  Subarachnoid hemorrhage Raines Thomson grade 3, modified Velazco grade 3  Clinical Course:  Patient was stable on arrival to this facility  Neurosurgery evaluated the patient and placed an EVD on April 17th  Neurosurgery performed a cerebral angiogram on April 17th which was negative   Repeat angiogram was performed on April 23rd which was also negative  Neurosurgery continued to follow the patient  Throughout this time, patient remained in the ICU for blood pressure control and close monitoring  Over the past several days, patient has EVD was raised from 10 mm Hg to 20 mm Hg  EVD was clamped for over 24 hours and repeat head CT did not demonstrate any hydrocephalus  From Neurosurgery perspective, patient is stable for discharge from the ICU         Consultants:  Neurosurgery and Psychiatry  Results of Diagnostic Tests:     4/16 CT head:  Large amount of acute subarachnoid hemorrhage involving the suprasellar cistern, sylvian fissures, basal cisterns, and anterior falx  4/16 CTA head:  No aneurysm demonstrated  4/17 cerebral angiogram: No aneurysm or AVM demonstrated  4/18 MRI brain:  Small linear focus of restricted diffusion in the left cerebellar hemisphere suggesting acute or early subacute ischemic infarct  Grossly stable subarachnoid hemorrhage  Stable mild hydrocephalus  4/23 cerebral angiogram:  No aneurysm or AVM demonstrated  4/27 CT head:  Continued improvement in subarachnoid hemorrhage with no significant hemorrhage noted  Small amount of intraventricular hemorrhage within the occipital horns, not significantly changed  Stable ventricular size      Pending Diagnostic Tests:  Serial transcranial Dopplers ordered  Recent or Scheduled Procedures:  Patient received a right frontal EVD on April 17th and a cerebral angiogram was performed on April 17th as well  Repeat cerebral angiogram was performed on April 23rd  Nutrition Plan:  Full diet  Portions of the record may have been created with voice recognition software  Occasional wrong word or "sound a like" substitutions may have occurred due to the inherent limitations of voice recognition software    Read the chart carefully and recognize, using context, where substitutions have occurred

## 2018-04-29 ENCOUNTER — APPOINTMENT (INPATIENT)
Dept: NON INVASIVE DIAGNOSTICS | Facility: HOSPITAL | Age: 58
DRG: 021 | End: 2018-04-29
Payer: COMMERCIAL

## 2018-04-29 LAB
ANION GAP SERPL CALCULATED.3IONS-SCNC: 8 MMOL/L (ref 4–13)
BASOPHILS # BLD AUTO: 0.04 THOUSANDS/ΜL (ref 0–0.1)
BASOPHILS NFR BLD AUTO: 0 % (ref 0–1)
BUN SERPL-MCNC: 8 MG/DL (ref 5–25)
CALCIUM SERPL-MCNC: 9.3 MG/DL (ref 8.3–10.1)
CHLORIDE SERPL-SCNC: 103 MMOL/L (ref 100–108)
CO2 SERPL-SCNC: 26 MMOL/L (ref 21–32)
CREAT SERPL-MCNC: 0.52 MG/DL (ref 0.6–1.3)
EOSINOPHIL # BLD AUTO: 0.27 THOUSAND/ΜL (ref 0–0.61)
EOSINOPHIL NFR BLD AUTO: 2 % (ref 0–6)
ERYTHROCYTE [DISTWIDTH] IN BLOOD BY AUTOMATED COUNT: 14.3 % (ref 11.6–15.1)
GFR SERPL CREATININE-BSD FRML MDRD: 106 ML/MIN/1.73SQ M
GLUCOSE SERPL-MCNC: 85 MG/DL (ref 65–140)
HCT VFR BLD AUTO: 36 % (ref 34.8–46.1)
HGB BLD-MCNC: 11.4 G/DL (ref 11.5–15.4)
LYMPHOCYTES # BLD AUTO: 3.05 THOUSANDS/ΜL (ref 0.6–4.47)
LYMPHOCYTES NFR BLD AUTO: 26 % (ref 14–44)
MCH RBC QN AUTO: 30 PG (ref 26.8–34.3)
MCHC RBC AUTO-ENTMCNC: 31.7 G/DL (ref 31.4–37.4)
MCV RBC AUTO: 95 FL (ref 82–98)
MONOCYTES # BLD AUTO: 1.5 THOUSAND/ΜL (ref 0.17–1.22)
MONOCYTES NFR BLD AUTO: 13 % (ref 4–12)
NEUTROPHILS # BLD AUTO: 6.99 THOUSANDS/ΜL (ref 1.85–7.62)
NEUTS SEG NFR BLD AUTO: 59 % (ref 43–75)
NRBC BLD AUTO-RTO: 0 /100 WBCS
PLATELET # BLD AUTO: 603 THOUSANDS/UL (ref 149–390)
PMV BLD AUTO: 10.1 FL (ref 8.9–12.7)
POTASSIUM SERPL-SCNC: 3.7 MMOL/L (ref 3.5–5.3)
RBC # BLD AUTO: 3.8 MILLION/UL (ref 3.81–5.12)
SODIUM SERPL-SCNC: 137 MMOL/L (ref 136–145)
WBC # BLD AUTO: 11.9 THOUSAND/UL (ref 4.31–10.16)

## 2018-04-29 PROCEDURE — 99233 SBSQ HOSP IP/OBS HIGH 50: CPT | Performed by: INTERNAL MEDICINE

## 2018-04-29 PROCEDURE — 85025 COMPLETE CBC W/AUTO DIFF WBC: CPT | Performed by: INTERNAL MEDICINE

## 2018-04-29 PROCEDURE — 94762 N-INVAS EAR/PLS OXIMTRY CONT: CPT

## 2018-04-29 PROCEDURE — 93886 INTRACRANIAL COMPLETE STUDY: CPT | Performed by: SURGERY

## 2018-04-29 PROCEDURE — 93886 INTRACRANIAL COMPLETE STUDY: CPT

## 2018-04-29 PROCEDURE — 99232 SBSQ HOSP IP/OBS MODERATE 35: CPT | Performed by: PHYSICIAN ASSISTANT

## 2018-04-29 PROCEDURE — 80048 BASIC METABOLIC PNL TOTAL CA: CPT | Performed by: INTERNAL MEDICINE

## 2018-04-29 RX ADMIN — ALBUTEROL SULFATE 2 PUFF: 90 AEROSOL, METERED RESPIRATORY (INHALATION) at 13:56

## 2018-04-29 RX ADMIN — HEPARIN SODIUM 5000 UNITS: 5000 INJECTION, SOLUTION INTRAVENOUS; SUBCUTANEOUS at 21:00

## 2018-04-29 RX ADMIN — SENNOSIDES 8.6 MG: 8.6 TABLET, FILM COATED ORAL at 21:00

## 2018-04-29 RX ADMIN — NIMODIPINE 60 MG: 30 CAPSULE ORAL at 16:51

## 2018-04-29 RX ADMIN — NIMODIPINE 60 MG: 30 CAPSULE ORAL at 11:40

## 2018-04-29 RX ADMIN — NIMODIPINE 60 MG: 30 CAPSULE ORAL at 08:32

## 2018-04-29 RX ADMIN — ACETAMINOPHEN 650 MG: 325 TABLET, FILM COATED ORAL at 00:50

## 2018-04-29 RX ADMIN — HYDROMORPHONE HYDROCHLORIDE 0.2 MG: 1 INJECTION, SOLUTION INTRAMUSCULAR; INTRAVENOUS; SUBCUTANEOUS at 21:57

## 2018-04-29 RX ADMIN — FLUTICASONE FUROATE AND VILANTEROL 1 PUFF: 200; 25 POWDER RESPIRATORY (INHALATION) at 13:56

## 2018-04-29 RX ADMIN — HYDROMORPHONE HYDROCHLORIDE 0.5 MG: 1 INJECTION, SOLUTION INTRAMUSCULAR; INTRAVENOUS; SUBCUTANEOUS at 17:53

## 2018-04-29 RX ADMIN — NIMODIPINE 60 MG: 30 CAPSULE ORAL at 00:50

## 2018-04-29 RX ADMIN — TRAMADOL HYDROCHLORIDE 50 MG: 50 TABLET, FILM COATED ORAL at 16:54

## 2018-04-29 RX ADMIN — ACETAMINOPHEN 650 MG: 325 TABLET, FILM COATED ORAL at 11:39

## 2018-04-29 RX ADMIN — NIMODIPINE 60 MG: 30 CAPSULE ORAL at 20:51

## 2018-04-29 RX ADMIN — PRAVASTATIN SODIUM 40 MG: 40 TABLET ORAL at 16:52

## 2018-04-29 RX ADMIN — MELATONIN TAB 3 MG 6 MG: 3 TAB at 21:00

## 2018-04-29 RX ADMIN — HEPARIN SODIUM 5000 UNITS: 5000 INJECTION, SOLUTION INTRAVENOUS; SUBCUTANEOUS at 06:36

## 2018-04-29 RX ADMIN — ACETAMINOPHEN 650 MG: 325 TABLET, FILM COATED ORAL at 16:52

## 2018-04-29 RX ADMIN — HEPARIN SODIUM 5000 UNITS: 5000 INJECTION, SOLUTION INTRAVENOUS; SUBCUTANEOUS at 13:56

## 2018-04-29 RX ADMIN — SERTRALINE HYDROCHLORIDE 25 MG: 25 TABLET ORAL at 08:32

## 2018-04-29 RX ADMIN — MONTELUKAST SODIUM 10 MG: 10 TABLET, FILM COATED ORAL at 08:32

## 2018-04-29 RX ADMIN — DOCUSATE SODIUM 100 MG: 100 CAPSULE, LIQUID FILLED ORAL at 16:52

## 2018-04-29 RX ADMIN — HYDROMORPHONE HYDROCHLORIDE 0.5 MG: 1 INJECTION, SOLUTION INTRAMUSCULAR; INTRAVENOUS; SUBCUTANEOUS at 04:19

## 2018-04-29 RX ADMIN — NIMODIPINE 60 MG: 30 CAPSULE ORAL at 04:19

## 2018-04-29 RX ADMIN — ACETAMINOPHEN 650 MG: 325 TABLET, FILM COATED ORAL at 06:36

## 2018-04-29 RX ADMIN — DOCUSATE SODIUM 100 MG: 100 CAPSULE, LIQUID FILLED ORAL at 08:32

## 2018-04-29 NOTE — PROGRESS NOTES
[facility] Hospitalist Service - Internal Medicine Progress Note      PATIENT INFORMATION      Patient: Radha Whitt 63 y o  female   MRN: [id number]  PCP: Sudeep Oh MD  Unit/Bed#: [facility] 702-01 Encounter: [id number]  Date Of Visit: 18       ASSESSMENTS & PLAN        1  Subarachnoid hemorrhage  · day# 15 post bleed - status post right frontal EVD with subsequent clamping and repeat imaging negative for hydrocephalus thereafter - IR-guided cerebral angiograms on  and  were both negative for acute changes/etiology - transferred out of ICU on  - neurosurgery following - continue Nimodipine for vasospasms w/ serial transcranial dopplers for another week - transcranial doppler on  revealed an acute mild right MCA vasospasm which seemingly resolved on  imaging  · CT of head on  shows improvement of subarachnoid hemorrhaging   · PT/OT input to be appreciated once patient is stable    2  Leukocytosis  · Improving/nearly resolving - likely secondary to [facility] - monitor WBC count    3  Hypokalemia  · Monitor/replete potassium as necessary - serum magnesium within normal limits     4  history of Asthma  · Stable/asymptomatic - continue Breo Ellipta/Singular regimen - PRN Albuterol inhaler on board - maintain oxygenation    5  Cocaine abuse  · tested positive on urine metabolite cream however patient denies use - counseled on cessation any way      DVT Prophylaxis:  Heparin SC      SUBJECTIVE     Seen and examined this afternoon with nursing  Patient notes that her headaches do persist although the frequency has started to less than today  She still remains quite anxious that it may take several more days for full resolution of pain  She also notes that her appetite improved today  She is laying in the dark and is requesting that the lights stay off        OBJECTIVE     Vitals:   Temp (24hrs), Av 9 °F (37 2 °C), Min:98 3 °F (36 8 °C), Max:99 3 °F (37 4 °C)    HR:  [59-72] 72  Resp:  [16-18] 18  BP: (122-147)/(72-81) 141/80  SpO2:  [96 %-99 %] 96 %  Body mass index is 35 42 kg/m²  Input and Output Summary (last 24 hours):        Intake/Output Summary (Last 24 hours) at 04/29/18 1614  Last data filed at 04/29/18 1154   Gross per 24 hour   Intake          2634 83 ml   Output             2800 ml   Net          -165 17 ml       Physical Exam:     GENERAL:  Well-developed/nourished - distress due to waxing/waning headache noted  HEAD:  Normocephalic - atraumatic - right sided sutures postop C/D/I   EYES: PERRL - EOMI   MOUTH:  Mucosa moist  NECK:  Supple - full range of motion  CARDIAC:  Regular rate/rhythm - S1/S2 positive  PULMONARY:  Clear to auscultation bilaterally - nonlabored respirations  ABDOMEN:  Soft - nontender/nondistended - active bowel sounds  MUSCULOSKELETAL:  Motor strength/range of motion remains deconditioned  NEUROLOGIC:  Alert/oriented x 3 today - weak/fatigued   SKIN:  Chronic wrinkles/blemishes    PSYCHIATRIC:  Mood/affect anxious      ADDITIONAL DATA     Labs & Recent Cultures:       Results from last 7 days  Lab Units 04/29/18  0434   WBC Thousand/uL 11 90*   HEMOGLOBIN g/dL 11 4*   HEMATOCRIT % 36 0   PLATELETS Thousands/uL 603*   NEUTROS PCT % 59   LYMPHS PCT % 26   MONOS PCT % 13*   EOS PCT % 2       Results from last 7 days  Lab Units 04/29/18  0434 04/28/18  0546   SODIUM mmol/L 137 139   POTASSIUM mmol/L 3 7 3 9   CHLORIDE mmol/L 103 103   CO2 mmol/L 26 28   BUN mg/dL 8 9   CREATININE mg/dL 0 52* 0 59*   CALCIUM mg/dL 9 3 9 3   TOTAL PROTEIN g/dL  --  7 0   BILIRUBIN TOTAL mg/dL  --  0 41   ALK PHOS U/L  --  99   ALT U/L  --  33   AST U/L  --  13   GLUCOSE RANDOM mg/dL 85 92         Last 24 Hours Medication List:     Current Facility-Administered Medications:  acetaminophen 650 mg Oral Q6H [facility] Aurora Barragan PA-C   albuterol 2 puff Inhalation Q6H PRN Jeffery Perez DO   ALPRAZolam 0 25 mg Oral Daily PRN Aurora Barragan PA-C   bisacodyl 10 mg Rectal Daily PRN Tracy Martha Barragan PA-C   docusate sodium 100 mg Oral BID Amado Perez, DO   fluticasone furoate-vilanterol 1 puff Inhalation Daily Aurora Barragan PA-C   heparin (porcine) 5,000 Units Subcutaneous [facility] Aurora Barragan PA-C   HYDROmorphone 0 2 mg Intravenous Q4H PRN Tess E Held, JOSEPH   HYDROmorphone 0 5 mg Intravenous Q4H PRN Tess E Held, JOSEPH   labetalol 10 mg Intravenous Q4H PRN Elmer Urban MD   melatonin 6 mg Oral HS Yakelin Acosta MD   methocarbamol 500 mg Oral Q6H PRN Aurora Barragan PA-C   montelukast 10 mg Oral Daily Lowell Perez, DO   niMODipine 60 mg Oral Q4H [address] [place] Perez, DO   ondansetron 4 mg Intravenous Q6H PRN Amado Perez, DO   pravastatin 40 mg Oral Daily With [employer], DO   senna 1 tablet Oral HS Aurora Barragan PA-C   sertraline 25 mg Oral Daily Elmer Urban MD   traMADol 50 mg Oral Q4H PRN Tess E HeldJOSEPH          Time Spent for Care: 37 minutes  More than 50% of total time spent on counseling and coordination of care as described above  Current Length of Stay: 13 day(s)      Code Status: Level 1 - Full Code          ** Please Note: This note is constructed using a voice recognition dictation system   **

## 2018-04-29 NOTE — PROGRESS NOTES
Progress Note - Neurosurgery   Catrachito Art 63 y o  female MRN: [id number]  Unit/Bed#: [facility] 702-01 Encounter: [id number]    Assessment:  1  Subarachnoid hemorrhage involving the suprasellar cistern, sylvian fissures, basal cisterns and in the anterior falx  Bleed day #13  2  HH3, [place] 3  3  Acute hydrocephalus   4  History of asthma    Plan:  - continue TCD's and nimodipine  - keppra for one week  - pain control    Subjective/Objective     Headache continues  photophobia      Intake/Output       04/29/18 0701 - 04/30/18 0700      [id number] Total       Intake    P O   640  -- 640    Total Intake 640 -- 640       Output    Urine  1000  -- 1000    Urine 1000 -- 1000    Total Output 1000 -- 1000       Net I/O     -360 -- -360          Invasive Devices     Peripherally Inserted Central Catheter Line            PICC Line 84/99/10 Left Basilic 10 days                Physical Exam:     Vitals: Blood pressure 122/72, pulse 66, temperature 98 7 °F (37 1 °C), temperature source Oral, resp  rate 18, height 5' 3" (1 6 m), weight 90 7 kg (199 lb 15 3 oz), SpO2 96 %  ,Body mass index is 35 42 kg/m²  Awake and alert  Moves all extremities  Facial features symmetric  Tongue midline  Strength intact  Heart regular  Lungs clear  Abdomen soft      Lab Results: I have personally reviewed pertinent results  Imaging Studies: I have personally reviewed pertinent reports          VTE Pharmacologic Prophylaxis: Heparin    VTE Mechanical Prophylaxis: sequential compression device

## 2018-04-30 ENCOUNTER — APPOINTMENT (INPATIENT)
Dept: NON INVASIVE DIAGNOSTICS | Facility: HOSPITAL | Age: 58
DRG: 021 | End: 2018-04-30
Payer: COMMERCIAL

## 2018-04-30 LAB
ANION GAP SERPL CALCULATED.3IONS-SCNC: 9 MMOL/L (ref 4–13)
BASOPHILS # BLD AUTO: 0.05 THOUSANDS/ΜL (ref 0–0.1)
BASOPHILS NFR BLD AUTO: 1 % (ref 0–1)
BUN SERPL-MCNC: 10 MG/DL (ref 5–25)
CALCIUM SERPL-MCNC: 9.6 MG/DL (ref 8.3–10.1)
CHLORIDE SERPL-SCNC: 103 MMOL/L (ref 100–108)
CO2 SERPL-SCNC: 26 MMOL/L (ref 21–32)
CREAT SERPL-MCNC: 0.59 MG/DL (ref 0.6–1.3)
EOSINOPHIL # BLD AUTO: 0.28 THOUSAND/ΜL (ref 0–0.61)
EOSINOPHIL NFR BLD AUTO: 3 % (ref 0–6)
ERYTHROCYTE [DISTWIDTH] IN BLOOD BY AUTOMATED COUNT: 14.4 % (ref 11.6–15.1)
GFR SERPL CREATININE-BSD FRML MDRD: 102 ML/MIN/1.73SQ M
GLUCOSE SERPL-MCNC: 87 MG/DL (ref 65–140)
HCT VFR BLD AUTO: 37.2 % (ref 34.8–46.1)
HGB BLD-MCNC: 12.2 G/DL (ref 11.5–15.4)
LYMPHOCYTES # BLD AUTO: 2.93 THOUSANDS/ΜL (ref 0.6–4.47)
LYMPHOCYTES NFR BLD AUTO: 28 % (ref 14–44)
MCH RBC QN AUTO: 30.3 PG (ref 26.8–34.3)
MCHC RBC AUTO-ENTMCNC: 32.8 G/DL (ref 31.4–37.4)
MCV RBC AUTO: 92 FL (ref 82–98)
MONOCYTES # BLD AUTO: 1.2 THOUSAND/ΜL (ref 0.17–1.22)
MONOCYTES NFR BLD AUTO: 12 % (ref 4–12)
NEUTROPHILS # BLD AUTO: 5.92 THOUSANDS/ΜL (ref 1.85–7.62)
NEUTS SEG NFR BLD AUTO: 56 % (ref 43–75)
NRBC BLD AUTO-RTO: 0 /100 WBCS
PLATELET # BLD AUTO: 653 THOUSANDS/UL (ref 149–390)
PMV BLD AUTO: 10.4 FL (ref 8.9–12.7)
POTASSIUM SERPL-SCNC: 4 MMOL/L (ref 3.5–5.3)
RBC # BLD AUTO: 4.03 MILLION/UL (ref 3.81–5.12)
SODIUM SERPL-SCNC: 138 MMOL/L (ref 136–145)
WBC # BLD AUTO: 10.46 THOUSAND/UL (ref 4.31–10.16)

## 2018-04-30 PROCEDURE — 93886 INTRACRANIAL COMPLETE STUDY: CPT

## 2018-04-30 PROCEDURE — 97530 THERAPEUTIC ACTIVITIES: CPT

## 2018-04-30 PROCEDURE — 97535 SELF CARE MNGMENT TRAINING: CPT

## 2018-04-30 PROCEDURE — 99232 SBSQ HOSP IP/OBS MODERATE 35: CPT | Performed by: NEUROLOGICAL SURGERY

## 2018-04-30 PROCEDURE — 85025 COMPLETE CBC W/AUTO DIFF WBC: CPT | Performed by: INTERNAL MEDICINE

## 2018-04-30 PROCEDURE — 99232 SBSQ HOSP IP/OBS MODERATE 35: CPT | Performed by: INTERNAL MEDICINE

## 2018-04-30 PROCEDURE — 93886 INTRACRANIAL COMPLETE STUDY: CPT | Performed by: SURGERY

## 2018-04-30 PROCEDURE — 80048 BASIC METABOLIC PNL TOTAL CA: CPT | Performed by: INTERNAL MEDICINE

## 2018-04-30 RX ORDER — NIMODIPINE 30 MG/1
60 CAPSULE, LIQUID FILLED ORAL
Status: COMPLETED | OUTPATIENT
Start: 2018-04-30 | End: 2018-05-01

## 2018-04-30 RX ADMIN — SERTRALINE HYDROCHLORIDE 25 MG: 25 TABLET ORAL at 09:01

## 2018-04-30 RX ADMIN — HYDROMORPHONE HYDROCHLORIDE 0.5 MG: 1 INJECTION, SOLUTION INTRAMUSCULAR; INTRAVENOUS; SUBCUTANEOUS at 09:01

## 2018-04-30 RX ADMIN — SENNOSIDES 8.6 MG: 8.6 TABLET, FILM COATED ORAL at 21:51

## 2018-04-30 RX ADMIN — PRAVASTATIN SODIUM 40 MG: 40 TABLET ORAL at 16:15

## 2018-04-30 RX ADMIN — HYDROMORPHONE HYDROCHLORIDE 0.2 MG: 1 INJECTION, SOLUTION INTRAMUSCULAR; INTRAVENOUS; SUBCUTANEOUS at 18:29

## 2018-04-30 RX ADMIN — HEPARIN SODIUM 5000 UNITS: 5000 INJECTION, SOLUTION INTRAVENOUS; SUBCUTANEOUS at 21:51

## 2018-04-30 RX ADMIN — NIMODIPINE 60 MG: 30 CAPSULE ORAL at 20:24

## 2018-04-30 RX ADMIN — NIMODIPINE 60 MG: 30 CAPSULE ORAL at 23:47

## 2018-04-30 RX ADMIN — ALBUTEROL SULFATE 2 PUFF: 90 AEROSOL, METERED RESPIRATORY (INHALATION) at 14:35

## 2018-04-30 RX ADMIN — DOCUSATE SODIUM 100 MG: 100 CAPSULE, LIQUID FILLED ORAL at 16:37

## 2018-04-30 RX ADMIN — NIMODIPINE 60 MG: 30 CAPSULE ORAL at 05:28

## 2018-04-30 RX ADMIN — HYDROMORPHONE HYDROCHLORIDE 0.2 MG: 1 INJECTION, SOLUTION INTRAMUSCULAR; INTRAVENOUS; SUBCUTANEOUS at 23:46

## 2018-04-30 RX ADMIN — MELATONIN TAB 3 MG 6 MG: 3 TAB at 21:51

## 2018-04-30 RX ADMIN — FLUTICASONE FUROATE AND VILANTEROL 1 PUFF: 200; 25 POWDER RESPIRATORY (INHALATION) at 14:33

## 2018-04-30 RX ADMIN — NIMODIPINE 60 MG: 30 CAPSULE ORAL at 16:15

## 2018-04-30 RX ADMIN — ACETAMINOPHEN 650 MG: 325 TABLET, FILM COATED ORAL at 23:47

## 2018-04-30 RX ADMIN — NIMODIPINE 60 MG: 30 CAPSULE ORAL at 00:27

## 2018-04-30 RX ADMIN — ACETAMINOPHEN 650 MG: 325 TABLET, FILM COATED ORAL at 05:28

## 2018-04-30 RX ADMIN — DOCUSATE SODIUM 100 MG: 100 CAPSULE, LIQUID FILLED ORAL at 09:01

## 2018-04-30 RX ADMIN — ACETAMINOPHEN 650 MG: 325 TABLET, FILM COATED ORAL at 12:08

## 2018-04-30 RX ADMIN — HEPARIN SODIUM 5000 UNITS: 5000 INJECTION, SOLUTION INTRAVENOUS; SUBCUTANEOUS at 14:30

## 2018-04-30 RX ADMIN — NIMODIPINE 60 MG: 30 CAPSULE ORAL at 09:01

## 2018-04-30 RX ADMIN — TRAMADOL HYDROCHLORIDE 50 MG: 50 TABLET, FILM COATED ORAL at 12:08

## 2018-04-30 RX ADMIN — HYDROMORPHONE HYDROCHLORIDE 0.5 MG: 1 INJECTION, SOLUTION INTRAMUSCULAR; INTRAVENOUS; SUBCUTANEOUS at 01:08

## 2018-04-30 RX ADMIN — MONTELUKAST SODIUM 10 MG: 10 TABLET, FILM COATED ORAL at 09:01

## 2018-04-30 RX ADMIN — ACETAMINOPHEN 650 MG: 325 TABLET, FILM COATED ORAL at 16:37

## 2018-04-30 RX ADMIN — HEPARIN SODIUM 5000 UNITS: 5000 INJECTION, SOLUTION INTRAVENOUS; SUBCUTANEOUS at 05:34

## 2018-04-30 RX ADMIN — NIMODIPINE 60 MG: 30 CAPSULE ORAL at 12:23

## 2018-04-30 RX ADMIN — ACETAMINOPHEN 650 MG: 325 TABLET, FILM COATED ORAL at 00:27

## 2018-04-30 NOTE — SOCIAL WORK
MCG Guide Used for Initial Round:Subarachnoid Hemorrhage, Nonsurgical Treatment RRG  Optimal GLOS: 4  Hospital Day: 14 days  DC Readiness:   Discharge Readiness  Return to top of Subarachnoid Hemorrhage, Nonsurgical Treatment RRG - ISC  · Discharge readiness is indicated by patient meeting Recovery Milestones, including ALL of the following:  ? Hemodynamic stability  ? Pain absent or managed  ? Mental status at baseline or acceptable for next level of care  ? Neurologic deficit absent or acceptable for next level of care  ? Volume or electrolyte abnormalities absent or acceptable for next level of care  ? Ambulatory[O]  ? Oral hydration, medications, and diet  ? Discharge plans and education understood  ? Increased intracranial pressure  § Anticipate hyperosmolar agents, diuretics, hyperventilation, and ICP monitoring  § Expect moderate stay extension  § Moderate (4 to 7 days),     Identified Barriers: TCDs for 5 more days per neuro surgery    Discussion Date (Time): 04/30/18 with Dr Елена Howe

## 2018-04-30 NOTE — PHYSICAL THERAPY NOTE
Physical Therapy Cancellation Note  Pt refused stating "I already walked earlier "  Explained to pt she walked with restorative  Educated on importance of participation  Will follow as appropriate      Hardeep More, PTA

## 2018-04-30 NOTE — PROGRESS NOTES
[facility] Hospitalist Service - Internal Medicine Progress Note      PATIENT INFORMATION      Patient: Dane Frey 63 y o  female   MRN: [id number]  PCP: Jennifer Ramos MD  Unit/Bed#: [facility] 702-01 Encounter: [id number]  Date Of Visit: 04/30/18       ASSESSMENTS & PLAN        1  Subarachnoid hemorrhage  · day# 16 post bleed - status post right frontal EVD with subsequent clamping and repeat imaging negative for hydrocephalus thereafter - IR-guided cerebral angiograms on 4/17 and 4/23 were both negative for acute changes/etiology - transferred out of ICU on 4/27 - neurosurgery following and after discussion with them today her Nimodipine for vasospasms and serial transcranial dopplers can be stopped now as two cerebral angiograms were negative (no need for full 21 day course as 14 days are sufficient) - transcranial doppler on 4/26 revealed an acute mild right MCA vasospasm which seemingly resolved on 4/28 imaging  · CT of head on 4/27 shows improvement of subarachnoid hemorrhaging   · PT/OT recommending short-term rehab  · Neurosurgery recommending outpatient follow-up in approximately two weeks with a repeat CT of head prior to appointment    2  Leukocytosis  · Improving/nearly resolving - likely secondary to [facility] - monitor WBC count    3  Hypokalemia  · Monitor/replete potassium as necessary (currently normalized) - serum magnesium within normal limits     4  history of Asthma  · Stable/asymptomatic - continue Breo Ellipta/Singular regimen - PRN Albuterol inhaler on board - maintain oxygenation    5  Cocaine abuse  · tested positive on urine metabolite cream however patient denies use - counseled on cessation any way      DVT Prophylaxis:  Heparin SC      SUBJECTIVE     Seen/examined earlier today with nursing  She notes her headaches do persists but I have progressively improved  She seems content that her Nimodipine and transcranial doppler testing can be stopped    She is awaiting acceptance to inpatient rehabilitation hopefully as she prefers this over out of hospital skilled rehab  OBJECTIVE     Vitals:   Temp (24hrs), Av 6 °F (37 °C), Min:98 °F (36 7 °C), Max:99 1 °F (37 3 °C)    HR:  [62-74] 74  Resp:  [18] 18  BP: (141-147)/(80-85) 147/85  SpO2:  [95 %-96 %] 96 %  Body mass index is 35 42 kg/m²  Input and Output Summary (last 24 hours):        Intake/Output Summary (Last 24 hours) at 18 1500  Last data filed at 18 0817   Gross per 24 hour   Intake              240 ml   Output              500 ml   Net             -260 ml       Physical Exam:     GENERAL:  Well-developed/nourished - improving distress due to intermittent headaches   HEAD:  Normocephalic - atraumatic - right sided sutures postop C/D/I with mild residual crusting only  EYES: PERRL - EOMI   MOUTH:  Mucosa moist - dental caries noted  NECK:  Supple - full range of motion  CARDIAC:  Regular rate/rhythm - S1/S2 positive  PULMONARY:  Clear breath sounds bilaterally - nonlabored respirations  ABDOMEN:  Soft - nontender/nondistended - active bowel sounds  MUSCULOSKELETAL:  Motor strength/range of motion somewhat deconditioned  NEUROLOGIC:  Alert/oriented x 3 today - remains weak/fatigued   SKIN:  Chronic wrinkles/blemishes other than as mentioned on head exam  PSYCHIATRIC:  Mood/affect anxious      ADDITIONAL DATA     Labs & Recent Cultures:       Results from last 7 days  Lab Units 18  0455   WBC Thousand/uL 10 46*   HEMOGLOBIN g/dL 12 2   HEMATOCRIT % 37 2   PLATELETS Thousands/uL 653*   NEUTROS PCT % 56   LYMPHS PCT % 28   MONOS PCT % 12   EOS PCT % 3       Results from last 7 days  Lab Units 18  0455  18  0546   SODIUM mmol/L 138  < > 139   POTASSIUM mmol/L 4 0  < > 3 9   CHLORIDE mmol/L 103  < > 103   CO2 mmol/L 26  < > 28   BUN mg/dL 10  < > 9   CREATININE mg/dL 0 59*  < > 0 59*   CALCIUM mg/dL 9 6  < > 9 3   TOTAL PROTEIN g/dL  --   --  7 0   BILIRUBIN TOTAL mg/dL  --   --  0 41   ALK PHOS U/L --   --  99   ALT U/L  --   --  33   AST U/L  --   --  13   GLUCOSE RANDOM mg/dL 87  < > 92   < > = values in this interval not displayed  Last 24 Hours Medication List:     Current Facility-Administered Medications:  acetaminophen 650 mg Oral Q6H [facility] Aurora Barragan, JOSEPH   albuterol 2 puff Inhalation Q6H PRN Blenda Small Bashor, DO   ALPRAZolam 0 25 mg Oral Daily PRN Aurora  [place], JOSEPH   bisacodyl 10 mg Rectal Daily PRN [facility] [place], JOSEPH   docusate sodium 100 mg Oral BID Blenda Small Bashor, DO   fluticasone furoate-vilanterol 1 puff Inhalation Daily Aurora  [place], JOSEPH   heparin (porcine) 5,000 Units Subcutaneous Q8H [facility] Aurora Barragan, JOSEPH   HYDROmorphone 0 2 mg Intravenous Q4H PRN Tess E Held, JOSEPH   HYDROmorphone 0 5 mg Intravenous Q4H PRN Tess E Held, PAARTUR   labetalol 10 mg Intravenous Q4H PRN Sina Man MD   melatonin 6 mg Oral HS Marizol Fu MD   methocarbamol 500 mg Oral Q6H PRN [facility] La[place], OJSEPH   montelukast 10 mg Oral Daily Blenda Small Bashor, DO   niMODipine 60 mg Oral Q4H [facility] Prasanth LopezJOSEPH   ondansetron 4 mg Intravenous Q6H PRN Blenda Small Bashor, DO   pravastatin 40 mg Oral Daily With [employer], DO   senna 1 tablet Oral HS Aurora Barragan, JOSEPH   sertraline 25 mg Oral Daily Sina Man MD   traMADol 50 mg Oral Q4H PRN Tess E HeldJOSEPH          Time Spent for Care: 34 minutes  More than 50% of total time spent on counseling and coordination of care as described above  Current Length of Stay: 14 day(s)      Code Status: Level 1 - Full Code          ** Please Note: This note is constructed using a voice recognition dictation system   **

## 2018-04-30 NOTE — PROGRESS NOTES
Progress Note - Neurosurgery   Kristina Astudillo 63 y o  female MRN: [id number]  Unit/Bed#: [facility] 702-01 Encounter: [id number]      Assessment:  1  Subarachnoid hemorrhage involving the suprasellar cistern, sylvian fissures, basal cisterns and in the anterior falx  Bleed day #16  2  HH3, Velazco 3  3  Acute hydrocephalus   4  History of asthma    Plan:  · Exam: GCS15  AAOx3  BARRERA with generalized weakness in BUE and BLE 5-/5  LT intact  No PD, marrufo or clonus  Imaging: personally reviewed and reviewed by attending:  · 4/18 - CT head wo: persistent diffuse subarachnoid hemorrhage, slightly improved with some redistribution of hemorrhage layering in the occipital horns  EVD present extending into the frontal horn of right lateral ventricle  · 4/18 - MRI brain wo: small linear focus of restricted diffusion in left cerebellar hemisphere without associated hemorrhage  Grossly stable subarachnoid throughout basal cistern  Stable mild hydrocephalus  · 4/18 - MRI cspine wo: Chronic disc degenerative changes throughout, resulting in central canal stenosis at C5-6 and C6-7  T2-3 paracentral disc protrusion  · 4/23/18 - IR cerebral angio: right and left internal carotid artery circulation reveals antegrade flow into the middle cerebral and anterior cerebral arteries  No evidence of aneurysm, AVM or vascular malformations appreciated  · 4/24/18 - CT head wo: significant improvement in subarachnoid hemorrhage with near complete resolution  Decreased size of lateral ventricle with slight increase in intraventricular hemorrhage, s/p EVD catheter placement  · 4/27 - CT head wo: improvement in subarachnoid hemorrhage with no significant hemorrhage noted in subarachnoid space  Small amount of IVH  Ventricular size stable     · STAT CT head without contrast if decline in GCS >2 pts/ 1 hr  Subarachnoid Management:  · Completed Nimodipine 60mg q4h and TCDs for a total of 14 day course (finished on 4/30/18)  · TCDs on 4/30: right 3 11, left 1 33  · Completed Keppra 750mg x 1 week  · Na 138  · SBP goal <160  · Urine output -goal euvolemia +/- 500  Drain management:  · EVD removed on Friday afternoon, suture placed to close drain site  Medical management:   · PT/OT: Recommending STR when medically stable  · DVT ppx: SCDs, HSQ  · Pain control  · WBC 10 46, Tmax: 99 3, Currently 98  · 4/23/18 - CXR stable central vascular congestion   · Hbg 11 0 - transfuse if <8  · Bowel regimen  · Recommend dental consult  NO neurosurgery intervention, signing off  Follow up outpatient in 2 weeks with repeat CTH, call for questions or concerns/change in examination    Subjective/Objective   Chief Complaint: "My teeth hurt so bad"    Subjective: patient states last night she started to get sudden onset of left sided mouth pain  She states she has a history of bad teeth that she's had one pulled in the past  She admits to left > right pain, described as sharp but improved to a dull ache  She currently rates the pain 6 out of 10 but states last evening it was 11 out of 10  She admits to hot and cold sensitivity  She denies having any facial edema  The patient states with her mouth pain it radiates up into her temporal regions and exacerbates her headaches  She also complains of photophobia  Objective: laying in a dark room, NAD  I/O       04/28 0701 - 04/29 0700 04/29 0701 - 04/30 0700 04/30 0701 - 05/01 0700    P  O  954 640 240    I V  (mL/kg) 1040 8 (11 5)      IV Piggyback       Total Intake(mL/kg) 1994 8 (22 1) 640 (7 1) 240 (2 7)    Urine (mL/kg/hr) 2400 (1 1) 1500 (0 7)     Stool 0 (0) 0 (0)     Total Output 2400 1500      Net -405 2 -860 +240           Unmeasured Urine Occurrence 1 x      Unmeasured Stool Occurrence 1 x 1 x           Invasive Devices     Peripherally Inserted Central Catheter Line            PICC Line 50/66/29 Left Basilic 10 days                Physical Exam:  Vitals: Blood pressure 147/85, pulse 74, temperature 98 °F (36 7 °C), temperature source Oral, resp  rate 18, height 5' 3" (1 6 m), weight 90 7 kg (199 lb 15 3 oz), SpO2 96 %  ,Body mass index is 35 42 kg/m²  General appearance: alert, appears stated age, cooperative and no distress  Head: Normocephalic, right frontal sutures placed without edema or erythema   Eyes: EOMI, PERRL  Oral: broken last left inferior tooth and left upper tooth  Missing 2nd from last upper tooth  Neck: C2 midline tenderness on palpation  Back: no kyphosis present, no tenderness to percussion or palpation  Lungs: non labored breathing  Heart: regular heart rate  Neurologic:   Mental status: Alert, oriented x4, thought content appropriate  Speech is fluent, clear  Able to repeat a sentence, add simple math 2+2 and multiple 3*3*3  3/3 immediate and delayed object recognition intact  Cranial nerves: grossly intact (Cranial nerves II-XII)  Facial symmetry at rest and with expression  Tongue is midline  Sensory: normal to LT in bilateral upper and lower extremities  DSS intact  Motor: moving all extremities without focal weakness, strength 5/5 throughout  Reflexes: 2+ brisk and symmetric  +left marrufo, negative clonus  Coordination: finger to nose normal bilaterally, no drift bilaterally  3/3 JPS intact         Lab Results:    Results from last 7 days  Lab Units 04/30/18 0455 04/29/18  0434 04/28/18  0546   WBC Thousand/uL 10 46* 11 90* 10 34*   HEMOGLOBIN g/dL 12 2 11 4* 11 0*   HEMATOCRIT % 37 2 36 0 33 8*   PLATELETS Thousands/uL 653* 603* 529*   NEUTROS PCT % 56 59 61   MONOS PCT % 12 13* 12       Results from last 7 days  Lab Units 04/30/18 0455 04/29/18  0434 04/28/18  0546   SODIUM mmol/L 138 137 139   POTASSIUM mmol/L 4 0 3 7 3 9   CHLORIDE mmol/L 103 103 103   CO2 mmol/L 26 26 28   BUN mg/dL 10 8 9   CREATININE mg/dL 0 59* 0 52* 0 59*   CALCIUM mg/dL 9 6 9 3 9 3   TOTAL PROTEIN g/dL  --   --  7 0   BILIRUBIN TOTAL mg/dL  --   --  0 41   ALK PHOS U/L  --   --  99   ALT U/L  --   --  33   AST U/L  -- --  13   GLUCOSE RANDOM mg/dL 87 85 92       Results from last 7 days  Lab Units 04/28/18  0546   MAGNESIUM mg/dL 2 2       Results from last 7 days  Lab Units 04/28/18  0546   PHOSPHORUS mg/dL 3 4         No results found for: TROPONINT  ABG:No results found for: PHART, YEC4GVI, PO2ART, IOE6OAX, M0WCJVTZ, BEART, SOURCE    Imaging Studies: I have personally reviewed pertinent reports  and I have personally reviewed pertinent films in PACS  VAS transcranial doppler, complete study   Final Result      VAS transcranial doppler, complete study   Final Result      VAS transcranial doppler, complete study   Final Result      CT head wo contrast   Final Result      Continued improvement in the subarachnoid hemorrhage with no significant hemorrhage noted in the subarachnoid space at this time  Small amount of intraventricular hemorrhage within the occipital horns, not significantly changed from the most recent    exam       Stable ventricular size  Workstation performed: [id number]         VAS transcranial doppler, complete study   Final Result      IR cerebral angiography   Final Result      VAS transcranial doppler, complete study   Final Result      VAS transcranial doppler, complete study   Final Result      CT head wo contrast   Final Result   1  Significant improvement in subarachnoid hemorrhage with near complete resolution  No acute intracranial abnormality  2   Decreased size of the lateral ventricles with slight increase in the intraventricular hemorrhage, status post external ventricular drainage catheter placement  Workstation performed: [id number]         VAS transcranial doppler, complete study   Final Result      XR chest portable   Final Result      Stable mild central vascular congestion              Workstation performed: [id number]         IR cerebral angiography / intervention   Final Result      VAS transcranial doppler, complete study   Final Result      XR chest portable   Final Result      1  Right basilar consolidation could represent pneumonia or atelectasis  2   Very mild pulmonary vascular congestion without edema  Workstation performed: [id number]         VAS transcranial doppler, complete study   Final Result      VAS transcranial doppler, complete study   Final Result      VAS transcranial doppler, complete study   Final Result      XR chest portable   Final Result   Right IJ catheter tip in the caval atrial junction region  Left PICC line tip also in the caval atrial junction region  Minimal bibasilar atelectasis  Workstation performed: [id number]         XR chest PICC line portable   Final Result   Left upper extremity PICC line projecting into the right atrium  Consider repositioning the PICC line by withdrawing approximately 2 cm  The study was marked in [facility] for immediate notification  Workstation performed: [id number]         MRI cervical spine wo contrast   Final Result         1  Chronic disc degenerative change throughout the cervical spine resulting in severe central canal stenosis at C5-6 and C6-7  Chronic mass effect on the cord without evidence of cord signal abnormality or myelomalacia  2   T2-3 paracentral disc protrusion and right paracentral disc extrusion with probable mild mass effect on the right ventral aspect of the cord  Workstation performed: [id number]         MRI brain wo contrast   Final Result         1  Small linear focus of restricted diffusion in the left cerebellar hemisphere without associated hemorrhage, suggesting an acute or early subacute ischemic infarct  2   Grossly stable subarachnoid hemorrhage throughout the basal cisterns  No significant mass effect  3   Stable mild hydrocephalus and intraventricular blood products               I personally discussed this study with Dr Cecilia Chirinos on 4/18/2018 at 10:53 PM                Workstation performed: [id number] VAS transcranial doppler, complete study   Final Result      CT head wo contrast   Final Result      Persistent diffuse subarachnoid hemorrhage, slightly improved  There is some redistribution with hemorrhage layering in the occipital horns of the lateral ventricles  Ventricular drain present extending into the frontal horn of the right lateral ventricle  Workstation performed: [id number]         VAS transcranial doppler, complete study   Final Result      XR chest 1 view portable   Final Result      Right IJ line placement, appears to be in satisfactory position, tip is at the cavoatrial junction  No pneumothorax  Low lung volumes  Workstation performed: [id number]         VAS transcranial doppler, complete study    (Results Pending)   VAS transcranial doppler, complete study    (Results Pending)       EKG, Pathology, and Other Studies: I have personally reviewed pertinent reports        VTE  Prophylaxis: Sequential compression device (Venodyne)  and Heparin

## 2018-04-30 NOTE — OCCUPATIONAL THERAPY NOTE
Occupational Therapy Treatment Note     04/30/18 0829   Restrictions/Precautions   Weight Bearing Precautions Per Order No   Pain Assessment   Pain Assessment 0-10   Pain Score 3   Pain Type Acute pain   Pain Location Head   ADL   Grooming Assistance 5  Supervision/Setup   Grooming Comments oral hygiene in stance at bathroom sink    UB Bathing Assistance 5  Supervision/Setup   UB Bathing Comments seated at edge of pt bed    LB Bathing Assistance 5  Supervision/Setup   LB Bathing Comments seated at edge of pt bed    UB Dressing Assistance 4  Minimal Assistance   UB Dressing Comments hopsital gown    LB Dressing Assistance 5  Supervision/Setup   LB Dressing Comments socks and underwear while seated at edge of pt bed   Bed Mobility   Supine to Sit 5  Supervision   Sit to Supine 5  Supervision   Transfers   Sit to Stand 4  Minimal assistance   Stand to Sit 4  Minimal assistance   Cognition   Overall Cognitive Status Impaired   Arousal/Participation Responsive; Alert; Cooperative   Attention Attends with cues to redirect   Orientation Level Oriented X4   Memory Unable to assess   Following Commands Follows one step commands with increased time or repetition   Activity Tolerance   Activity Tolerance Patient limited by pain   Assessment   Assessment Pt participated in occupational therapy with focus on activity tolerance, bed mob, functional transfers/mob,unsupported sitting balance and tolerance for pt engagement in functional self-care task/LB self-care, standing balance and tolerance for UB self-care and energy conservation techniques   Pt cleared by RN/Ancelmo for pt participation in therapy  Pt received HOB raised/supine pt sitting upright awake and alert and agreeable to therapy following pt Identifiers confirmed  Pt reported slight HA 3/10 prior to pt bed mob and that she did anticipate increased HA pain upon functional mob    Pt however tolerated session well despite pt report of increased HA pain to 7/10 and pt nurse informed and involved  Pt instructed on energy conservation techniques however pt declined need for technique/chair set up at sink this session  Pt however may have benefited 2* pt report of "exhausted" following grooming task at bathroom sink  Pt will require in-pt rehab to continue to address pt deficits with decreased overall strength and pt activity tolerance which currently impair pt ADL and functional mob     Plan   Treatment Interventions ADL retraining   Goal Expiration Date 05/02/18   Treatment Day 4   OT Frequency 3-5x/wk   Recommendation   OT Discharge Recommendation Short Term Rehab   Barthel Index   Feeding 10   Bathing 5   Grooming Score 5   Dressing Score 5   Bladder Score 10   Bowels Score 10   Toilet Use Score 5   Transfers (Bed/Chair) Score 10   Mobility (Level Surface) Score 0   Stairs Score 0   Barthel Index Score 60   Modified Phoenix Scale   Modified Екатерина Scale 4       Cherene Flatter  WILSON/L

## 2018-04-30 NOTE — PLAN OF CARE
Problem: OCCUPATIONAL THERAPY ADULT  Goal: Performs self-care activities at highest level of function for planned discharge setting  See evaluation for individualized goals  Treatment Interventions: ADL retraining, Functional transfer training, Endurance training, Cognitive reorientation, Patient/family training, Equipment evaluation/education, Compensatory technique education, Continued evaluation, Energy conservation, Activityengagement          See flowsheet documentation for full assessment, interventions and recommendations  Outcome: Progressing  Limitation: Decreased ADL status, Decreased Safe judgement during ADL, Decreased cognition, Decreased endurance, Decreased self-care trans, Decreased high-level ADLs (BALANCE, MEDICAL STATUS)  Prognosis: Fair  Assessment: Pt participated in occupational therapy with focus on activity tolerance, bed mob, functional transfers/mob,unsupported sitting balance and tolerance for pt engagement in functional self-care task/LB self-care, standing balance and tolerance for UB self-care and energy conservation techniques   Pt cleared by RN/Ancelmo for pt participation in therapy  Pt received HOB raised/supine pt sitting upright awake and alert and agreeable to therapy following pt Identifiers confirmed  Pt reported slight HA 3/10 prior to pt bed mob and that she did anticipate increased HA pain upon functional mob  Pt however tolerated session well despite pt report of increased HA pain to 7/10 and pt nurse informed and involved  Pt instructed on energy conservation techniques however pt declined need for technique/chair set up at sink this session  Pt however may have benefited 2* pt report of "exhausted" following grooming task at bathroom sink  Pt will require in-pt rehab to continue to address pt deficits with decreased overall strength and pt activity tolerance which currently impair pt ADL and functional mob       OT Discharge Recommendation: Short Term Rehab  OT - OK to Discharge:  (TO STR AT THIS TIME)

## 2018-05-01 LAB
ANION GAP SERPL CALCULATED.3IONS-SCNC: 8 MMOL/L (ref 4–13)
BASOPHILS # BLD AUTO: 0.05 THOUSANDS/ΜL (ref 0–0.1)
BASOPHILS NFR BLD AUTO: 1 % (ref 0–1)
BUN SERPL-MCNC: 13 MG/DL (ref 5–25)
CALCIUM SERPL-MCNC: 9.5 MG/DL (ref 8.3–10.1)
CHLORIDE SERPL-SCNC: 103 MMOL/L (ref 100–108)
CO2 SERPL-SCNC: 27 MMOL/L (ref 21–32)
CREAT SERPL-MCNC: 0.61 MG/DL (ref 0.6–1.3)
EOSINOPHIL # BLD AUTO: 0.28 THOUSAND/ΜL (ref 0–0.61)
EOSINOPHIL NFR BLD AUTO: 3 % (ref 0–6)
ERYTHROCYTE [DISTWIDTH] IN BLOOD BY AUTOMATED COUNT: 14.4 % (ref 11.6–15.1)
GFR SERPL CREATININE-BSD FRML MDRD: 101 ML/MIN/1.73SQ M
GLUCOSE SERPL-MCNC: 84 MG/DL (ref 65–140)
HCT VFR BLD AUTO: 37.2 % (ref 34.8–46.1)
HGB BLD-MCNC: 11.7 G/DL (ref 11.5–15.4)
LYMPHOCYTES # BLD AUTO: 3.19 THOUSANDS/ΜL (ref 0.6–4.47)
LYMPHOCYTES NFR BLD AUTO: 29 % (ref 14–44)
MCH RBC QN AUTO: 29.8 PG (ref 26.8–34.3)
MCHC RBC AUTO-ENTMCNC: 31.5 G/DL (ref 31.4–37.4)
MCV RBC AUTO: 95 FL (ref 82–98)
MONOCYTES # BLD AUTO: 1.46 THOUSAND/ΜL (ref 0.17–1.22)
MONOCYTES NFR BLD AUTO: 13 % (ref 4–12)
NEUTROPHILS # BLD AUTO: 5.78 THOUSANDS/ΜL (ref 1.85–7.62)
NEUTS SEG NFR BLD AUTO: 54 % (ref 43–75)
NRBC BLD AUTO-RTO: 0 /100 WBCS
PLATELET # BLD AUTO: 605 THOUSANDS/UL (ref 149–390)
PMV BLD AUTO: 9.7 FL (ref 8.9–12.7)
POTASSIUM SERPL-SCNC: 3.8 MMOL/L (ref 3.5–5.3)
RBC # BLD AUTO: 3.92 MILLION/UL (ref 3.81–5.12)
SODIUM SERPL-SCNC: 138 MMOL/L (ref 136–145)
WBC # BLD AUTO: 10.87 THOUSAND/UL (ref 4.31–10.16)

## 2018-05-01 PROCEDURE — G0515 COGNITIVE SKILLS DEVELOPMENT: HCPCS

## 2018-05-01 PROCEDURE — 97535 SELF CARE MNGMENT TRAINING: CPT

## 2018-05-01 PROCEDURE — 97116 GAIT TRAINING THERAPY: CPT

## 2018-05-01 PROCEDURE — 80048 BASIC METABOLIC PNL TOTAL CA: CPT | Performed by: INTERNAL MEDICINE

## 2018-05-01 PROCEDURE — 85025 COMPLETE CBC W/AUTO DIFF WBC: CPT | Performed by: INTERNAL MEDICINE

## 2018-05-01 PROCEDURE — 99232 SBSQ HOSP IP/OBS MODERATE 35: CPT | Performed by: INTERNAL MEDICINE

## 2018-05-01 RX ADMIN — MELATONIN TAB 3 MG 6 MG: 3 TAB at 21:16

## 2018-05-01 RX ADMIN — DOCUSATE SODIUM 100 MG: 100 CAPSULE, LIQUID FILLED ORAL at 17:33

## 2018-05-01 RX ADMIN — ACETAMINOPHEN 650 MG: 325 TABLET, FILM COATED ORAL at 11:58

## 2018-05-01 RX ADMIN — ACETAMINOPHEN 650 MG: 325 TABLET, FILM COATED ORAL at 17:33

## 2018-05-01 RX ADMIN — NIMODIPINE 60 MG: 30 CAPSULE ORAL at 05:12

## 2018-05-01 RX ADMIN — HEPARIN SODIUM 5000 UNITS: 5000 INJECTION, SOLUTION INTRAVENOUS; SUBCUTANEOUS at 13:58

## 2018-05-01 RX ADMIN — SENNOSIDES 8.6 MG: 8.6 TABLET, FILM COATED ORAL at 21:16

## 2018-05-01 RX ADMIN — HYDROMORPHONE HYDROCHLORIDE 0.2 MG: 1 INJECTION, SOLUTION INTRAMUSCULAR; INTRAVENOUS; SUBCUTANEOUS at 05:14

## 2018-05-01 RX ADMIN — ALPRAZOLAM 0.25 MG: 0.25 TABLET ORAL at 20:11

## 2018-05-01 RX ADMIN — FLUTICASONE FUROATE AND VILANTEROL 1 PUFF: 200; 25 POWDER RESPIRATORY (INHALATION) at 13:54

## 2018-05-01 RX ADMIN — PRAVASTATIN SODIUM 40 MG: 40 TABLET ORAL at 15:51

## 2018-05-01 RX ADMIN — SERTRALINE HYDROCHLORIDE 25 MG: 25 TABLET ORAL at 08:29

## 2018-05-01 RX ADMIN — DOCUSATE SODIUM 100 MG: 100 CAPSULE, LIQUID FILLED ORAL at 08:29

## 2018-05-01 RX ADMIN — HYDROMORPHONE HYDROCHLORIDE 0.5 MG: 1 INJECTION, SOLUTION INTRAMUSCULAR; INTRAVENOUS; SUBCUTANEOUS at 01:27

## 2018-05-01 RX ADMIN — HEPARIN SODIUM 5000 UNITS: 5000 INJECTION, SOLUTION INTRAVENOUS; SUBCUTANEOUS at 21:16

## 2018-05-01 RX ADMIN — MONTELUKAST SODIUM 10 MG: 10 TABLET, FILM COATED ORAL at 08:29

## 2018-05-01 RX ADMIN — HYDROMORPHONE HYDROCHLORIDE 0.5 MG: 1 INJECTION, SOLUTION INTRAMUSCULAR; INTRAVENOUS; SUBCUTANEOUS at 17:13

## 2018-05-01 RX ADMIN — HYDROMORPHONE HYDROCHLORIDE 0.5 MG: 1 INJECTION, SOLUTION INTRAMUSCULAR; INTRAVENOUS; SUBCUTANEOUS at 21:16

## 2018-05-01 RX ADMIN — HEPARIN SODIUM 5000 UNITS: 5000 INJECTION, SOLUTION INTRAVENOUS; SUBCUTANEOUS at 05:13

## 2018-05-01 RX ADMIN — ACETAMINOPHEN 650 MG: 325 TABLET, FILM COATED ORAL at 05:13

## 2018-05-01 RX ADMIN — HYDROMORPHONE HYDROCHLORIDE 0.5 MG: 1 INJECTION, SOLUTION INTRAMUSCULAR; INTRAVENOUS; SUBCUTANEOUS at 08:31

## 2018-05-01 RX ADMIN — ALBUTEROL SULFATE 2 PUFF: 90 AEROSOL, METERED RESPIRATORY (INHALATION) at 13:55

## 2018-05-01 RX ADMIN — HYDROMORPHONE HYDROCHLORIDE 0.5 MG: 1 INJECTION, SOLUTION INTRAMUSCULAR; INTRAVENOUS; SUBCUTANEOUS at 12:05

## 2018-05-01 RX ADMIN — NIMODIPINE 60 MG: 30 CAPSULE ORAL at 08:28

## 2018-05-01 NOTE — PHYSICAL THERAPY NOTE
Physical Therapy Treatment Note     05/01/18 1300   Pain Assessment   Pain Assessment 0-10   Pain Score 8   Pain Type Acute pain   Pain Location Head   Patient's Stated Pain Goal No pain   Hospital Pain Intervention(s) Ambulation/increased activity   Response to Interventions worse w/ mobility, better at rest   Restrictions/Precautions   Weight Bearing Precautions Per Order No   Other Precautions Cognitive; Chair Alarm; Bed Alarm;Pain; Fall Risk;Multiple lines   General   Chart Reviewed Yes   Family/Caregiver Present No   Cognition   Overall Cognitive Status Impaired   Arousal/Participation Responsive   Attention Attends with cues to redirect   Orientation Level Oriented X4   Memory Unable to assess   Following Commands Follows one step commands without difficulty   Subjective   Subjective states she continues to have HA   cooperative w/ minimal encouragement   Bed Mobility   Supine to Sit 5  Supervision   Additional items Increased time required  (sat x 5 min EOB prior to gait due to HA/dizziness)   Sit to Supine 4  Minimal assistance   Additional items Assist x 1   Transfers   Sit to Stand 4  Minimal assistance   Additional items Assist x 1   Stand to Sit 4  Minimal assistance   Additional items Assist x 1   Ambulation/Elevation   Gait pattern (slow, increased RW advancement, mild ataxia)   Gait Assistance 4  Minimal assist   Additional items Assist x 1   Assistive Device Rolling walker   Distance 60'x1, 35'x1, 25'x1, w/ 3-4 min standing rests between trials   Balance   Static Sitting Fair +   Dynamic Sitting Fair   Static Standing Fair -   Dynamic Standing Fair -   Ambulatory Fair -   Endurance Deficit   Endurance Deficit Yes   Endurance Deficit Description fatigue, weakness, pain   Activity Tolerance   Activity Tolerance Patient limited by fatigue;Patient limited by pain;Treatment limited secondary to medical complications (Comment)   Nurse Made Aware yes   Assessment   Prognosis Fair   Problem List Decreased strength;Decreased endurance; Impaired balance;Decreased mobility; Decreased coordination;Decreased cognition; Impaired judgement;Decreased safety awareness;Pain   Assessment Pt seen for session, gait training x 25 min including setup, sitting time, rest time, and repositioning  Pt awake and cooperative, continues to be limited by severe HA  Note improved overall balance and mobility tolerance  needs cues for safe RW mgmt, especially when fatigued  LOB worsers when fatigued  remains appropriate for rehab at d/c   Goals   Patient Goals none stated   STG Expiration Date 05/15/18   Short Term Goal #1 1-2 wks: bed mob and transfers w/ indep, standing balance to good w/ device, ambulate 200 ft w/ RW vs least restrictive device and mod I, increase B LE strength by 1/2 -1 grade, ambulate 1 flight of stairs w/ S   Treatment Day 3   Plan   Treatment/Interventions Functional transfer training;LE strengthening/ROM; Elevations; Therapeutic exercise; Endurance training;Patient/family training;Equipment eval/education; Bed mobility;Gait training   Progress Progressing toward goals   PT Frequency 5x/wk   Recommendation   Recommendation (recommend rehab at d/c)   Equipment Recommended Gali Mcintyre   PT - OK to Discharge (to rehab when stable)   Zia Mehta PT, DPT CSRS

## 2018-05-01 NOTE — CASE MANAGEMENT
Continued Stay Review    Date: 5-1-18     Vital Signs: /74 (BP Location: Right arm)   Pulse 71   Temp 98 6 °F (37 °C) (Oral)   Resp 18   Ht 5' 3" (1 6 m)   Wt 90 7 kg (199 lb 15 3 oz)   SpO2 96%   BMI 35 42 kg/m²     Medications:   Scheduled Meds:   Current Facility-Administered Medications:  acetaminophen 650 mg Oral Q6H SAMI   albuterol 2 puff Inhalation Q6H PRN   ALPRAZolam 0 25 mg Oral Daily PRN   bisacodyl 10 mg Rectal Daily PRN   docusate sodium 100 mg Oral BID   fluticasone furoate-vilanterol 1 puff Inhalation Daily   heparin (porcine) 5,000 Units Subcutaneous Q8H [facility]   HYDROmorphone 0 5 mg Intravenous Q4H PRN   HYDROmorphone 1 mg Intravenous Q4H PRN   labetalol 10 mg Intravenous Q4H PRN   melatonin 6 mg Oral HS   methocarbamol 500 mg Oral Q6H PRN   montelukast 10 mg Oral Daily   ondansetron 4 mg Intravenous Q6H PRN   pravastatin 40 mg Oral Daily With Dinner   senna 1 tablet Oral HS   sertraline 25 mg Oral Daily   traMADol 50 mg Oral Q4H PRN     Continuous Infusions:    PRN Meds: albuterol    ALPRAZolam    bisacodyl    HYDROmorphone    HYDROmorphone    labetalol    methocarbamol    ondansetron    traMADol    Abnormal Labs/Diagnostic Results    Lab Units 05/01/18  0527   WBC Thousand/uL 10 87*   PLATELETS Thousands/uL 605*   MONOS PCT % 13*          Age/Sex: 63 y o  female     Assessment/Plan:     Subarachnoid hemorrhage  - Day# 17 post bleed - status post right frontal EVD with subsequent clamping and repeat imaging negative for hydrocephalus thereafter   - IR-guided cerebral angiograms on 4/17 and 4/23 were both negative for acute changes/etiology - transferred out of ICU on 4/27 -- - Neurosurgery following    - Nimodipine for vasospasms and serial transcranial dopplers discontinued on 04/30, as two cerebral angiograms were negative (no need for full 21 day course as 14 days are sufficient)   - Transcranial doppler on 4/26 revealed an acute mild right MCA vasospasm which seemingly resolved on 4/28 imaging  · CT of head on 4/27 shows improvement of subarachnoid hemorrhaging   · PT/OT recommending short-term rehab  · Neurosurgery recommending outpatient follow-up in approximately two weeks with a repeat CT of head prior to appointment   remains weak/fatigued    Tooth pain  - Medical management -- increase dose of dilaudid  - Avoid NSAIDs   - As per OFMS -- no immediate surgical intervention      Leukocytosis  - Improving/nearly resolving - likely secondary to [facility] - monitor WBC count     Hypokalemia  - Resolved       history of Asthma  - Stable/asymptomatic - continue Breo Ellipta/Singular regimen - PRN Albuterol inhaler on board - maintain oxygenation      Cocaine abuse  - tested positive on urine metabolite cream however patient denies use - counseled on cessation any way        DVT Prophylaxis:  Heparin SC      Discharge Plan:  SHORT TERM REHAB

## 2018-05-01 NOTE — PLAN OF CARE
Problem: PHYSICAL THERAPY ADULT  Goal: Performs mobility at highest level of function for planned discharge setting  See evaluation for individualized goals  Treatment/Interventions: Functional transfer training, LE strengthening/ROM, Elevations, Therapeutic exercise, Endurance training, Patient/family training, Bed mobility, Equipment eval/education, Gait training, Spoke to nursing, Spoke to case management, OT          See flowsheet documentation for full assessment, interventions and recommendations  Prognosis: Fair  Problem List: Decreased strength, Decreased endurance, Impaired balance, Decreased mobility, Decreased coordination, Decreased cognition, Impaired judgement, Decreased safety awareness, Pain  Assessment: Pt seen for session, gait training x 25 min including setup, sitting time, rest time, and repositioning  Pt awake and cooperative, continues to be limited by severe HA  Note improved overall balance and mobility tolerance  needs cues for safe RW mgmt, especially when fatigued  LOB worsers when fatigued  remains appropriate for rehab at d/c  Barriers to Discharge: Decreased caregiver support     Recommendation:  (recommend rehab at d/c)     PT - OK to Discharge: Yes (to rehab when stable)    See flowsheet documentation for full assessment

## 2018-05-01 NOTE — PLAN OF CARE
Problem: OCCUPATIONAL THERAPY ADULT  Goal: Performs self-care activities at highest level of function for planned discharge setting  See evaluation for individualized goals  Treatment Interventions: ADL retraining, Functional transfer training, Endurance training, Cognitive reorientation, Patient/family training, Equipment evaluation/education, Compensatory technique education, Continued evaluation, Energy conservation, Activityengagement          See flowsheet documentation for full assessment, interventions and recommendations  Outcome: Progressing  Limitation: Decreased ADL status, Decreased Safe judgement during ADL, Decreased cognition, Decreased endurance, Decreased self-care trans, Decreased high-level ADLs (BALANCE, MEDICAL STATUS)  Prognosis: Fair  Assessment: Patient participated in skilled OT with focus on cognitive assessment through administration of the ACLS and functional asssessment of ADL tasks  Patient scored a 5 0 on the ACLS with detailed results as attached below  Patient motivated to perform affected mostly by "headache" which nursing is aware of  Patient requires assist for higher level dynamic tasks secondary to intermittent impairments related to "headaches and residual effects"  Patient would benefit from short term rehab with focus on increasing functional strength, functional balance, increasing independence with ADL skills, and cognitive reorientation as needed and education in compensatory techniques  SEE ACLS RESULTS BELOW>      OT Discharge Recommendation: Short Term Rehab  OT - OK to Discharge: Yes (when medically cleared)   5 0    Administered Keith Emms Cognitive Level Screen (ACLS)  The patient scored 5 0/6 0 indicating that they may live alone with weekly checks to monitor safety and check problem solving effectiveness  May be able to work in supported employment with   Behavior:  Stops working to talk  Questions need to do activity    Knows need to keep scheduled appointments however may be apt for forget or miss appointments  Tangential   Manipulative  May blame others when mistakes are made  May argue with suggestions  May be inconsistent with carryover  May resist attempts to provide assistance  Inflexible  May insist upon using an inefficient way of doing things  Conversations may be concrete and self centered  Abstract reasoning may not be understood  Grooming: Completes grooming activity independently  May forget newly learned techniques  May fail to anticipate need to purchase grooming supplies  May not read directions before using new products  Dressing:  May not consider social standards  May argue with suggestions to change selections  Bathing:  Completes bathing routine independently  May solve problems  May fail to anticipate secondary effects of new products like allergies  Walking/Exercising:  May forget newly discovered routes and may have to rediscover them  May refuse to comply with exercise program   Eating:  Eats and talks with others  May not see crumbs or small spills  May need help to comply with dietary restrictions  Toileting:  Independently performs all toileting  Can explore a new environment to locate a bathroom without assistance  May not anticipate needs for new toileting solutions  Medications:  Opens new containers  May understand medication schedules but has trouble taking them on time  May choose to discontinue medications  May recognize side effects like tremors or sedation  Utilize daily pill box  Use of adaptive equipment:  May choose not to use equipment  May utilize in unsafe manner  Housekeeping:  Able to learn new sequence of actions  Does not change or alter pace  May set and follow a weekly schedule  Identifies potential hazards posed by electric, gas, toxins, poisons or improper storage/disposal of items  Food preparation:  May make reservations to eat out but may fail to remember and arrive late  Passes heavy serving dishes safely  May forget discovered solutions to problems  Spending money:  May be able to identify monthly budget of routine expenses but may have trouble adhering to it  May run up bills or overextend credit  May have trouble balancing checkbook  Does not plan for long term financial security  Shopping:  May make daily or weekly schedule of shopping needs with trouble adhering to it  May be impulsive in spending  May discriminate between stores, labels and brands  Laundry:  May set and follow weekly schedule with difficulty adhering to it  Learns new sequence of actions  Uses iron effectively  Traveling: Can make a schedule for traveling to new locations but has difficulty adhering to it  May miss bus  May need assistance to read and understand new schedules  Telephone:  Learns new telephone procedures by initiating several steps at a time  May look up new numbers  May not use yellow pages  May become confused by new options or ignore call waiting  May run up large phone bills  Driving:  Should NOT operate a motor vehicle    KATIE Pathak

## 2018-05-01 NOTE — OCCUPATIONAL THERAPY NOTE
Occupational Therapy Treatment Note:       05/01/18 1458   Pain Assessment   Pain Assessment 0-10   Pain Score 8   Pain Type Acute pain   Pain Location Head   ADL   Where Assessed Supine, bed   Grooming Assistance 5  Supervision/Setup   Grooming Deficit Setup; Increased time to complete   UB Bathing Assistance 5  Supervision/Setup   UB Bathing Deficit Setup; Increased time to complete   LB Bathing Assistance 5  Supervision/Setup   LB Bathing Deficit Setup;Verbal cueing;Supervision/safety; Increased time to complete;Right lower leg including foot; Left lower leg including foot   LB Bathing Comments supervision levels vary according to "migraine, head pain, functional balance"   UB Dressing Assistance 4  Minimal Assistance   UB Dressing Deficit Setup;Supervision/safety; Thread RUE; Thread LUE   UB Dressing Comments (over IV lines)   Toileting Assistance  4  Minimal Assistance   Toileting Deficit Verbal cueing;Supervison/safety; Increased time to complete   Cognition   Overall Cognitive Status Impaired   Arousal/Participation Alert   Attention Attends with cues to redirect   Orientation Level Oriented X4   Memory Decreased recall of recent events;Decreased recall of precautions   Following Commands Follows one step commands with increased time or repetition   Cognition Assessment Tools ACLS   Score 5   Activity Tolerance   Activity Tolerance Patient limited by pain   Medical Staff Made Aware ok to see per RN   Assessment   Assessment Patient participated in skilled OT with focus on cognitive assessment through administration of the ACLS and functional asssessment of ADL tasks  Patient scored a 5 0 on the ACLS with detailed results as attached below  Patient motivated to perform affected mostly by "headache" which nursing is aware of  Patient requires assist for higher level dynamic tasks secondary to intermittent impairments related to "headaches and residual effects"   Patient would benefit from short term rehab with focus on increasing functional strength, functional balance, increasing independence with ADL skills, and cognitive reorientation as needed and education in compensatory techniques  SEE ACLS RESULTS BELOW>    Plan   Treatment Interventions ADL retraining;Functional transfer training;Cognitive reorientation; Compensatory technique education   Goal Expiration Date 05/02/18   Treatment Day 5   Recommendation   OT Discharge Recommendation Short Term Rehab   OT - OK to Discharge Yes  (when medically cleared)   Barthel Index   Feeding 10   Bathing 0   Grooming Score 5   Dressing Score 5   Bladder Score 10   Bowels Score 10   Toilet Use Score 5   Transfers (Bed/Chair) Score 10   Mobility (Level Surface) Score 10   Stairs Score 0   Barthel Index Score 65   Modified Marquette Scale   Modified Marquette Scale 4   5 0    Administered [place] Cognitive Level Screen (ACLS)  The patient scored 5 0/6 0 indicating that they may live alone with weekly checks to monitor safety and check problem solving effectiveness  May be able to work in supported employment with   Behavior:  Stops working to talk  Questions need to do activity  Knows need to keep scheduled appointments however may be apt for forget or miss appointments  Tangential   Manipulative  May blame others when mistakes are made  May argue with suggestions  May be inconsistent with carryover  May resist attempts to provide assistance  Inflexible  May insist upon using an inefficient way of doing things  Conversations may be concrete and self centered  Abstract reasoning may not be understood  Grooming: Completes grooming activity independently  May forget newly learned techniques  May fail to anticipate need to purchase grooming supplies  May not read directions before using new products  Dressing:  May not consider social standards  May argue with suggestions to change selections  Bathing:  Completes bathing routine independently  May solve problems    May fail to anticipate secondary effects of new products like allergies  Walking/Exercising:  May forget newly discovered routes and may have to rediscover them  May refuse to comply with exercise program   Eating:  Eats and talks with others  May not see crumbs or small spills  May need help to comply with dietary restrictions  Toileting:  Independently performs all toileting  Can explore a new environment to locate a bathroom without assistance  May not anticipate needs for new toileting solutions  Medications:  Opens new containers  May understand medication schedules but has trouble taking them on time  May choose to discontinue medications  May recognize side effects like tremors or sedation  Utilize daily pill box  Use of adaptive equipment:  May choose not to use equipment  May utilize in unsafe manner  Housekeeping:  Able to learn new sequence of actions  Does not change or alter pace  May set and follow a weekly schedule  Identifies potential hazards posed by electric, gas, toxins, poisons or improper storage/disposal of items  Food preparation:  May make reservations to eat out but may fail to remember and arrive late  Passes heavy serving dishes safely  May forget discovered solutions to problems  Spending money:  May be able to identify monthly budget of routine expenses but may have trouble adhering to it  May run up bills or overextend credit  May have trouble balancing checkbook  Does not plan for long term financial security  Shopping:  May make daily or weekly schedule of shopping needs with trouble adhering to it  May be impulsive in spending  May discriminate between stores, labels and brands  Laundry:  May set and follow weekly schedule with difficulty adhering to it  Learns new sequence of actions  Uses iron effectively  Traveling: Can make a schedule for traveling to new locations but has difficulty adhering to it  May miss bus    May need assistance to read and understand new schedules  Telephone:  Learns new telephone procedures by initiating several steps at a time  May look up new numbers  May not use yellow pages  May become confused by new options or ignore call waiting  May run up large phone bills  Driving:  Should NOT operate a motor vehicle    KATIE uLna

## 2018-05-01 NOTE — PROGRESS NOTES
[facility] - Internal Medicine Progress Note      PATIENT INFORMATION      Patient: Leonarda Dolan 63 y o  female   MRN: [id number]  PCP: Karol Castellanos MD  Unit/Bed#: [facility] 702-01 Encounter: [id number]  Date Of Visit: 05/01/18       ASSESSMENTS & PLAN        Subarachnoid hemorrhage  - Day# 17 post bleed - status post right frontal EVD with subsequent clamping and repeat imaging negative for hydrocephalus thereafter  - IR-guided cerebral angiograms on 4/17 and 4/23 were both negative for acute changes/etiology - transferred out of ICU on 4/27 -- - Neurosurgery following    - Nimodipine for vasospasms and serial transcranial dopplers discontinued on 04/30, as two cerebral angiograms were negative (no need for full 21 day course as 14 days are sufficient)   - Transcranial doppler on 4/26 revealed an acute mild right MCA vasospasm which seemingly resolved on 4/28 imaging  · CT of head on 4/27 shows improvement of subarachnoid hemorrhaging   · PT/OT recommending short-term rehab  · Neurosurgery recommending outpatient follow-up in approximately two weeks with a repeat CT of head prior to appointment     Tooth pain  - Medical management -- increase dose of dilaudid  - Avoid NSAIDs   - As per [facility] -- no immediate surgical intervention  Leukocytosis  - Improving/nearly resolving - likely secondary to [facility] - monitor WBC count    Hypokalemia  - Resolved  history of Asthma  - Stable/asymptomatic - continue Breo Ellipta/Singular regimen - PRN Albuterol inhaler on board - maintain oxygenation     Cocaine abuse  - tested positive on urine metabolite cream however patient denies use - counseled on cessation any way      DVT Prophylaxis:  Heparin SC      SUBJECTIVE     Patient seen and examined today, C/O tooth pain, and headaches, which have improved  Tolerating PO diet  I have explained regarding pain management for toothache  As per [facility] -- no immediate surgical intervention      OBJECTIVE Vitals:   Temp (24hrs), Av 5 °F (36 9 °C), Min:98 1 °F (36 7 °C), Max:98 7 °F (37 1 °C)    HR:  [65-74] 71  Resp:  [18] 18  BP: (122-152)/(74-76) 122/74  SpO2:  [96 %-97 %] 96 %  Body mass index is 35 42 kg/m²  Input and Output Summary (last 24 hours): Intake/Output Summary (Last 24 hours) at 18 1235  Last data filed at 18 1111   Gross per 24 hour   Intake              460 ml   Output             1425 ml   Net             -965 ml       Physical Exam:   GENERAL:  Well-developed/nourished   HEAD:  Normocephalic - atraumatic - right sided sutures postop C/D/I with mild residual crusting only  EYES: PERRL - EOMI   MOUTH:  Mucosa moist - dental caries noted, poor dental hygiene    NECK:  Supple - full range of motion  CARDIAC:  Regular rate/rhythm - S1/S2 positive  PULMONARY:  Clear breath sounds bilaterally - nonlabored respirations  ABDOMEN:  Soft - nontender/nondistended - active bowel sounds  MUSCULOSKELETAL:  Motor strength/range of motion somewhat deconditioned  NEUROLOGIC:  Alert/oriented x 3 today - remains weak/fatigued   SKIN:  Chronic wrinkles/blemishes other than as mentioned on head exam  PSYCHIATRIC:  Mood/affect anxious      ADDITIONAL DATA     Labs & Recent Cultures:       Results from last 7 days  Lab Units 18  0527   WBC Thousand/uL 10 87*   HEMOGLOBIN g/dL 11 7   HEMATOCRIT % 37 2   PLATELETS Thousands/uL 605*   NEUTROS PCT % 54   LYMPHS PCT % 29   MONOS PCT % 13*   EOS PCT % 3       Results from last 7 days  Lab Units 18  0527  18  0546   SODIUM mmol/L 138  < > 139   POTASSIUM mmol/L 3 8  < > 3 9   CHLORIDE mmol/L 103  < > 103   CO2 mmol/L 27  < > 28   BUN mg/dL 13  < > 9   CREATININE mg/dL 0 61  < > 0 59*   CALCIUM mg/dL 9 5  < > 9 3   TOTAL PROTEIN g/dL  --   --  7 0   BILIRUBIN TOTAL mg/dL  --   --  0 41   ALK PHOS U/L  --   --  99   ALT U/L  --   --  33   AST U/L  --   --  13   GLUCOSE RANDOM mg/dL 84  < > 92   < > = values in this interval not displayed  Last 24 Hours Medication List:     Current Facility-Administered Medications:  acetaminophen 650 mg Oral Q6H [facility] Aurora Barragan PA-C   albuterol 2 puff Inhalation Q6H PRN Lou Para Bashor, DO   ALPRAZolam 0 25 mg Oral Daily PRN Aurora Barragan PA-C   bisacodyl 10 mg Rectal Daily PRN Aurora Barragan PA-C   docusate sodium 100 mg Oral BID Lowell S Bashor, DO   fluticasone furoate-vilanterol 1 puff Inhalation Daily Aurora Barragan PA-C   heparin (porcine) 5,000 Units Subcutaneous [facility] Aurora Barragan PA-C   HYDROmorphone 0 5 mg Intravenous Q4H PRN Teena Dugan MD   HYDROmorphone 1 mg Intravenous Q4H PRN Teena Dugan MD   labetalol 10 mg Intravenous Q4H PRN Shane Chambers MD   melatonin 6 mg Oral HS Doug Johnson MD   methocarbamol 500 mg Oral Q6H PRN Aurora Barragan PA-C   montelukast 10 mg Oral Daily Lowell S Bashor, DO   ondansetron 4 mg Intravenous Q6H PRN Lou Para Bashor, DO   pravastatin 40 mg Oral Daily With FIGMD, DO   senna 1 tablet Oral HS Aurora Barragan PA-C   sertraline 25 mg Oral Daily Shane Chambers MD   traMADol 50 mg Oral Q4H PRN Tess Mayorga PA-C          Time Spent for Care: 28 minutes  More than 50% of total time spent on counseling and coordination of care as described above  Current Length of Stay: 15 day(s)      Code Status: Level 1 - Full Code         PLEASE NOTE:  This encounter was completed utilizing the [employer] Direct Speech Voice Recognition Software  Grammatical errors, random word insertions, pronoun errors and incomplete sentences are occasional consequences of the system due to software limitations, ambient noise and hardware issues  These may be missed by proof reading prior to affixing electronic signature   Any questions or concerns about the content, text or information contained within the body of this dictation should be directly addressed to the physician for clarification  Please do not hesitate to call me directly if you have any any questions or concerns

## 2018-05-01 NOTE — RESTORATIVE TECHNICIAN NOTE
Restorative Specialist Mobility Note       Activity: Ambulate in lerma, Ambulate in room, Bathroom privileges, Dangle, Stand at bedside (Educated/encouraged pt to ambulate with assistance 3-4 x's/day  Bed alarm on   Pt callbell, phone/tray within reach )     Assistive Device: Front wheel walker          Kyler DAVIDSON, Restorative Technician, [employer]

## 2018-05-01 NOTE — PROGRESS NOTES
Dr Teo Purvis with [facility] called to inquire about the patient's dental pain  The patient is currently afebrile and does not have any active signs of dental infection  Dr Teo Purvis recommended that the primary physician order analgesics for the dental pain, cancel the consult, and have the patient follow up with [facility] as an outpatient  Dr Kaye PATTERSON was made aware of the recommendations made by Dr Teo Purvis with [facility]

## 2018-05-02 ENCOUNTER — HOSPITAL ENCOUNTER (INPATIENT)
Facility: HOSPITAL | Age: 58
LOS: 8 days | DRG: 058 | End: 2018-05-10
Attending: PHYSICAL MEDICINE & REHABILITATION | Admitting: PHYSICAL MEDICINE & REHABILITATION
Payer: COMMERCIAL

## 2018-05-02 VITALS
HEIGHT: 63 IN | WEIGHT: 199.96 LBS | BODY MASS INDEX: 35.43 KG/M2 | TEMPERATURE: 98.7 F | HEART RATE: 69 BPM | DIASTOLIC BLOOD PRESSURE: 72 MMHG | SYSTOLIC BLOOD PRESSURE: 123 MMHG | OXYGEN SATURATION: 97 % | RESPIRATION RATE: 18 BRPM

## 2018-05-02 DIAGNOSIS — I10 BENIGN ESSENTIAL HYPERTENSION: Primary | ICD-10-CM

## 2018-05-02 DIAGNOSIS — G91.9 ACQUIRED HYDROCEPHALUS (HCC): ICD-10-CM

## 2018-05-02 DIAGNOSIS — N32.89 BLADDER SPASM: ICD-10-CM

## 2018-05-02 DIAGNOSIS — E88.09 HYPOALBUMINEMIA: ICD-10-CM

## 2018-05-02 DIAGNOSIS — F06.30 MOOD DISORDER DUE TO KNOWN PHYSIOLOGICAL CONDITION: ICD-10-CM

## 2018-05-02 DIAGNOSIS — I60.9 SAH (SUBARACHNOID HEMORRHAGE) (HCC): ICD-10-CM

## 2018-05-02 DIAGNOSIS — R35.89 POLYURIA: ICD-10-CM

## 2018-05-02 LAB
ANION GAP SERPL CALCULATED.3IONS-SCNC: 6 MMOL/L (ref 4–13)
BASOPHILS # BLD AUTO: 0.04 THOUSANDS/ΜL (ref 0–0.1)
BASOPHILS NFR BLD AUTO: 0 % (ref 0–1)
BUN SERPL-MCNC: 12 MG/DL (ref 5–25)
CALCIUM SERPL-MCNC: 9.6 MG/DL (ref 8.3–10.1)
CHLORIDE SERPL-SCNC: 106 MMOL/L (ref 100–108)
CO2 SERPL-SCNC: 26 MMOL/L (ref 21–32)
CREAT SERPL-MCNC: 0.66 MG/DL (ref 0.6–1.3)
EOSINOPHIL # BLD AUTO: 0.31 THOUSAND/ΜL (ref 0–0.61)
EOSINOPHIL NFR BLD AUTO: 3 % (ref 0–6)
ERYTHROCYTE [DISTWIDTH] IN BLOOD BY AUTOMATED COUNT: 14.6 % (ref 11.6–15.1)
GFR SERPL CREATININE-BSD FRML MDRD: 98 ML/MIN/1.73SQ M
GLUCOSE SERPL-MCNC: 87 MG/DL (ref 65–140)
HCT VFR BLD AUTO: 35.8 % (ref 34.8–46.1)
HGB BLD-MCNC: 11.5 G/DL (ref 11.5–15.4)
LYMPHOCYTES # BLD AUTO: 3.49 THOUSANDS/ΜL (ref 0.6–4.47)
LYMPHOCYTES NFR BLD AUTO: 38 % (ref 14–44)
MCH RBC QN AUTO: 30.2 PG (ref 26.8–34.3)
MCHC RBC AUTO-ENTMCNC: 32.1 G/DL (ref 31.4–37.4)
MCV RBC AUTO: 94 FL (ref 82–98)
MONOCYTES # BLD AUTO: 1.18 THOUSAND/ΜL (ref 0.17–1.22)
MONOCYTES NFR BLD AUTO: 13 % (ref 4–12)
NEUTROPHILS # BLD AUTO: 4.18 THOUSANDS/ΜL (ref 1.85–7.62)
NEUTS SEG NFR BLD AUTO: 46 % (ref 43–75)
NRBC BLD AUTO-RTO: 0 /100 WBCS
PLATELET # BLD AUTO: 665 THOUSANDS/UL (ref 149–390)
PMV BLD AUTO: 9.4 FL (ref 8.9–12.7)
POTASSIUM SERPL-SCNC: 4.2 MMOL/L (ref 3.5–5.3)
RBC # BLD AUTO: 3.81 MILLION/UL (ref 3.81–5.12)
SODIUM SERPL-SCNC: 138 MMOL/L (ref 136–145)
WBC # BLD AUTO: 9.27 THOUSAND/UL (ref 4.31–10.16)

## 2018-05-02 PROCEDURE — 85025 COMPLETE CBC W/AUTO DIFF WBC: CPT | Performed by: INTERNAL MEDICINE

## 2018-05-02 PROCEDURE — 99223 1ST HOSP IP/OBS HIGH 75: CPT | Performed by: PHYSICAL MEDICINE & REHABILITATION

## 2018-05-02 PROCEDURE — 80048 BASIC METABOLIC PNL TOTAL CA: CPT | Performed by: INTERNAL MEDICINE

## 2018-05-02 PROCEDURE — 99239 HOSP IP/OBS DSCHRG MGMT >30: CPT | Performed by: INTERNAL MEDICINE

## 2018-05-02 PROCEDURE — 97163 PT EVAL HIGH COMPLEX 45 MIN: CPT | Performed by: PHYSICAL THERAPIST

## 2018-05-02 PROCEDURE — 97530 THERAPEUTIC ACTIVITIES: CPT | Performed by: PHYSICAL THERAPIST

## 2018-05-02 PROCEDURE — 97116 GAIT TRAINING THERAPY: CPT | Performed by: PHYSICAL THERAPIST

## 2018-05-02 RX ORDER — SERTRALINE HYDROCHLORIDE 25 MG/1
25 TABLET, FILM COATED ORAL DAILY
Qty: 10 TABLET | Refills: 0 | Status: SHIPPED | OUTPATIENT
Start: 2018-05-03 | End: 2018-05-25 | Stop reason: HOSPADM

## 2018-05-02 RX ORDER — TRAMADOL HYDROCHLORIDE 50 MG/1
50 TABLET ORAL EVERY 4 HOURS PRN
Qty: 10 TABLET | Refills: 0 | Status: SHIPPED | OUTPATIENT
Start: 2018-05-02 | End: 2018-05-25 | Stop reason: HOSPADM

## 2018-05-02 RX ORDER — ALBUTEROL SULFATE 90 UG/1
2 AEROSOL, METERED RESPIRATORY (INHALATION) EVERY 6 HOURS PRN
Status: DISCONTINUED | OUTPATIENT
Start: 2018-05-02 | End: 2018-05-10 | Stop reason: HOSPADM

## 2018-05-02 RX ORDER — BISACODYL 10 MG
10 SUPPOSITORY, RECTAL RECTAL DAILY PRN
Status: CANCELLED | OUTPATIENT
Start: 2018-05-02

## 2018-05-02 RX ORDER — DOCUSATE SODIUM 100 MG/1
100 CAPSULE, LIQUID FILLED ORAL 2 TIMES DAILY
Status: CANCELLED | OUTPATIENT
Start: 2018-05-02

## 2018-05-02 RX ORDER — ONDANSETRON 4 MG/1
4 TABLET, ORALLY DISINTEGRATING ORAL EVERY 6 HOURS PRN
Status: DISCONTINUED | OUTPATIENT
Start: 2018-05-02 | End: 2018-05-10 | Stop reason: HOSPADM

## 2018-05-02 RX ORDER — LABETALOL HYDROCHLORIDE 5 MG/ML
10 INJECTION, SOLUTION INTRAVENOUS EVERY 4 HOURS PRN
Status: CANCELLED | OUTPATIENT
Start: 2018-05-02

## 2018-05-02 RX ORDER — ACETAMINOPHEN 325 MG/1
325 TABLET ORAL EVERY 6 HOURS PRN
Status: DISCONTINUED | OUTPATIENT
Start: 2018-05-02 | End: 2018-05-10 | Stop reason: HOSPADM

## 2018-05-02 RX ORDER — PANTOPRAZOLE SODIUM 20 MG/1
20 TABLET, DELAYED RELEASE ORAL
Status: DISCONTINUED | OUTPATIENT
Start: 2018-05-03 | End: 2018-05-10 | Stop reason: HOSPADM

## 2018-05-02 RX ORDER — HEPARIN SODIUM 5000 [USP'U]/ML
5000 INJECTION, SOLUTION INTRAVENOUS; SUBCUTANEOUS EVERY 8 HOURS SCHEDULED
Status: CANCELLED | OUTPATIENT
Start: 2018-05-02

## 2018-05-02 RX ORDER — SERTRALINE HYDROCHLORIDE 25 MG/1
25 TABLET, FILM COATED ORAL DAILY
Status: DISCONTINUED | OUTPATIENT
Start: 2018-05-03 | End: 2018-05-03

## 2018-05-02 RX ORDER — PANTOPRAZOLE SODIUM 20 MG/1
20 TABLET, DELAYED RELEASE ORAL
Status: DISCONTINUED | OUTPATIENT
Start: 2018-05-03 | End: 2018-05-02

## 2018-05-02 RX ORDER — PRAVASTATIN SODIUM 40 MG
40 TABLET ORAL
Status: DISCONTINUED | OUTPATIENT
Start: 2018-05-02 | End: 2018-05-10 | Stop reason: HOSPADM

## 2018-05-02 RX ORDER — SERTRALINE HYDROCHLORIDE 25 MG/1
25 TABLET, FILM COATED ORAL DAILY
Status: CANCELLED | OUTPATIENT
Start: 2018-05-03

## 2018-05-02 RX ORDER — LANOLIN ALCOHOL/MO/W.PET/CERES
6 CREAM (GRAM) TOPICAL
Status: DISCONTINUED | OUTPATIENT
Start: 2018-05-02 | End: 2018-05-10 | Stop reason: HOSPADM

## 2018-05-02 RX ORDER — TRAMADOL HYDROCHLORIDE 50 MG/1
50 TABLET ORAL EVERY 4 HOURS PRN
Status: DISCONTINUED | OUTPATIENT
Start: 2018-05-02 | End: 2018-05-03

## 2018-05-02 RX ORDER — PRAVASTATIN SODIUM 40 MG
40 TABLET ORAL
Status: CANCELLED | OUTPATIENT
Start: 2018-05-02

## 2018-05-02 RX ORDER — HEPARIN SODIUM 5000 [USP'U]/ML
5000 INJECTION, SOLUTION INTRAVENOUS; SUBCUTANEOUS EVERY 8 HOURS SCHEDULED
Status: DISCONTINUED | OUTPATIENT
Start: 2018-05-02 | End: 2018-05-10 | Stop reason: HOSPADM

## 2018-05-02 RX ORDER — METHOCARBAMOL 500 MG/1
500 TABLET, FILM COATED ORAL EVERY 6 HOURS PRN
Status: CANCELLED | OUTPATIENT
Start: 2018-05-02

## 2018-05-02 RX ORDER — MONTELUKAST SODIUM 10 MG/1
10 TABLET ORAL DAILY
Status: DISCONTINUED | OUTPATIENT
Start: 2018-05-03 | End: 2018-05-10 | Stop reason: HOSPADM

## 2018-05-02 RX ORDER — ALPRAZOLAM 0.25 MG/1
0.25 TABLET ORAL DAILY PRN
Status: CANCELLED | OUTPATIENT
Start: 2018-05-02

## 2018-05-02 RX ORDER — SENNOSIDES 8.6 MG
1 TABLET ORAL
Status: DISCONTINUED | OUTPATIENT
Start: 2018-05-02 | End: 2018-05-10 | Stop reason: HOSPADM

## 2018-05-02 RX ORDER — ALPRAZOLAM 0.25 MG/1
0.25 TABLET ORAL DAILY PRN
Status: DISCONTINUED | OUTPATIENT
Start: 2018-05-02 | End: 2018-05-10 | Stop reason: HOSPADM

## 2018-05-02 RX ORDER — FLUTICASONE FUROATE AND VILANTEROL 200; 25 UG/1; UG/1
1 POWDER RESPIRATORY (INHALATION) DAILY
Status: DISCONTINUED | OUTPATIENT
Start: 2018-05-02 | End: 2018-05-10 | Stop reason: HOSPADM

## 2018-05-02 RX ORDER — MONTELUKAST SODIUM 10 MG/1
10 TABLET ORAL DAILY
Status: CANCELLED | OUTPATIENT
Start: 2018-05-03

## 2018-05-02 RX ORDER — SENNOSIDES 8.6 MG
1 TABLET ORAL
Status: CANCELLED | OUTPATIENT
Start: 2018-05-02

## 2018-05-02 RX ORDER — HYDROMORPHONE HYDROCHLORIDE 2 MG/1
2 TABLET ORAL EVERY 6 HOURS PRN
Status: DISCONTINUED | OUTPATIENT
Start: 2018-05-02 | End: 2018-05-08

## 2018-05-02 RX ORDER — ALBUTEROL SULFATE 90 UG/1
2 AEROSOL, METERED RESPIRATORY (INHALATION) EVERY 6 HOURS PRN
Status: CANCELLED | OUTPATIENT
Start: 2018-05-02

## 2018-05-02 RX ORDER — FLUTICASONE FUROATE AND VILANTEROL 200; 25 UG/1; UG/1
1 POWDER RESPIRATORY (INHALATION) DAILY
Status: CANCELLED | OUTPATIENT
Start: 2018-05-02

## 2018-05-02 RX ORDER — TRAMADOL HYDROCHLORIDE 50 MG/1
50 TABLET ORAL EVERY 4 HOURS PRN
Status: CANCELLED | OUTPATIENT
Start: 2018-05-02

## 2018-05-02 RX ORDER — ACETAMINOPHEN 325 MG/1
650 TABLET ORAL EVERY 6 HOURS SCHEDULED
Status: DISCONTINUED | OUTPATIENT
Start: 2018-05-02 | End: 2018-05-10 | Stop reason: HOSPADM

## 2018-05-02 RX ORDER — BISACODYL 10 MG
10 SUPPOSITORY, RECTAL RECTAL DAILY PRN
Status: DISCONTINUED | OUTPATIENT
Start: 2018-05-02 | End: 2018-05-10 | Stop reason: HOSPADM

## 2018-05-02 RX ORDER — DOCUSATE SODIUM 100 MG/1
100 CAPSULE, LIQUID FILLED ORAL 2 TIMES DAILY
Status: DISCONTINUED | OUTPATIENT
Start: 2018-05-02 | End: 2018-05-10 | Stop reason: HOSPADM

## 2018-05-02 RX ORDER — LANOLIN ALCOHOL/MO/W.PET/CERES
6 CREAM (GRAM) TOPICAL
Status: CANCELLED | OUTPATIENT
Start: 2018-05-02

## 2018-05-02 RX ORDER — ONDANSETRON 2 MG/ML
4 INJECTION INTRAMUSCULAR; INTRAVENOUS EVERY 6 HOURS PRN
Status: CANCELLED | OUTPATIENT
Start: 2018-05-02

## 2018-05-02 RX ORDER — ACETAMINOPHEN 325 MG/1
650 TABLET ORAL EVERY 6 HOURS PRN
Status: DISCONTINUED | OUTPATIENT
Start: 2018-05-02 | End: 2018-05-02

## 2018-05-02 RX ORDER — METHOCARBAMOL 500 MG/1
500 TABLET, FILM COATED ORAL EVERY 6 HOURS PRN
Status: DISCONTINUED | OUTPATIENT
Start: 2018-05-02 | End: 2018-05-10 | Stop reason: HOSPADM

## 2018-05-02 RX ORDER — ACETAMINOPHEN 325 MG/1
650 TABLET ORAL EVERY 6 HOURS SCHEDULED
Status: CANCELLED | OUTPATIENT
Start: 2018-05-02

## 2018-05-02 RX ADMIN — HYDROMORPHONE HYDROCHLORIDE 1 MG: 1 INJECTION, SOLUTION INTRAMUSCULAR; INTRAVENOUS; SUBCUTANEOUS at 06:44

## 2018-05-02 RX ADMIN — MONTELUKAST SODIUM 10 MG: 10 TABLET, FILM COATED ORAL at 09:52

## 2018-05-02 RX ADMIN — TRAMADOL HYDROCHLORIDE 50 MG: 50 TABLET, FILM COATED ORAL at 12:07

## 2018-05-02 RX ADMIN — SENNOSIDES 8.6 MG: 8.6 TABLET ORAL at 22:23

## 2018-05-02 RX ADMIN — ALBUTEROL SULFATE 2 PUFF: 90 AEROSOL, METERED RESPIRATORY (INHALATION) at 17:15

## 2018-05-02 RX ADMIN — ACETAMINOPHEN 650 MG: 325 TABLET, FILM COATED ORAL at 11:11

## 2018-05-02 RX ADMIN — HYDROMORPHONE HYDROCHLORIDE 2 MG: 2 TABLET ORAL at 20:20

## 2018-05-02 RX ADMIN — ACETAMINOPHEN 650 MG: 325 TABLET, FILM COATED ORAL at 06:36

## 2018-05-02 RX ADMIN — METHOCARBAMOL 500 MG: 500 TABLET ORAL at 12:07

## 2018-05-02 RX ADMIN — HYDROMORPHONE HYDROCHLORIDE 1 MG: 1 INJECTION, SOLUTION INTRAMUSCULAR; INTRAVENOUS; SUBCUTANEOUS at 09:57

## 2018-05-02 RX ADMIN — PRAVASTATIN SODIUM 40 MG: 40 TABLET ORAL at 17:15

## 2018-05-02 RX ADMIN — ACETAMINOPHEN 650 MG: 325 TABLET ORAL at 17:19

## 2018-05-02 RX ADMIN — DOCUSATE SODIUM 100 MG: 100 CAPSULE, LIQUID FILLED ORAL at 09:52

## 2018-05-02 RX ADMIN — DOCUSATE SODIUM 100 MG: 100 CAPSULE, LIQUID FILLED ORAL at 17:15

## 2018-05-02 RX ADMIN — HEPARIN SODIUM 5000 UNITS: 5000 INJECTION, SOLUTION INTRAVENOUS; SUBCUTANEOUS at 06:36

## 2018-05-02 RX ADMIN — FLUTICASONE FUROATE AND VILANTEROL 1 PUFF: 200; 25 POWDER RESPIRATORY (INHALATION) at 20:17

## 2018-05-02 RX ADMIN — Medication 10 ML: at 20:25

## 2018-05-02 RX ADMIN — SERTRALINE HYDROCHLORIDE 25 MG: 25 TABLET ORAL at 09:52

## 2018-05-02 RX ADMIN — HEPARIN SODIUM 5000 UNITS: 5000 INJECTION, SOLUTION INTRAVENOUS; SUBCUTANEOUS at 22:23

## 2018-05-02 RX ADMIN — ACETAMINOPHEN 650 MG: 325 TABLET, FILM COATED ORAL at 00:11

## 2018-05-02 RX ADMIN — MELATONIN TAB 3 MG 6 MG: 3 TAB at 22:23

## 2018-05-02 RX ADMIN — ACETAMINOPHEN 650 MG: 325 TABLET ORAL at 23:22

## 2018-05-02 RX ADMIN — HEPARIN SODIUM 5000 UNITS: 5000 INJECTION, SOLUTION INTRAVENOUS; SUBCUTANEOUS at 17:14

## 2018-05-02 RX ADMIN — HYDROMORPHONE HYDROCHLORIDE 1 MG: 1 INJECTION, SOLUTION INTRAMUSCULAR; INTRAVENOUS; SUBCUTANEOUS at 01:41

## 2018-05-02 NOTE — DISCHARGE INSTRUCTIONS
Neurosurgery Discharge Instructions:  Do not take any blood thinning medications (ie  No Advil  No motrin  No ibuprofen  No Aleve  No Aspirin  No fishoil  No heparin  No antiplatelet / no anticoagulation medication)  Refrain from activity that increases chance of trauma to head or falls  Recommend you take fall precaution  No strenuous activity or sports  Follow up with Neurosurgery in 2 weeks  2-3 days prior to your appointment complete CT scan of head at any [facility]  To schedule your CT please call [phone]    Return to hospital Emergency Room if you experience worsening / new headache, nausea/vomiting, speech/vision change, seizure, confusion / mental status change, weakness, or other neurological changes  Today, you underwent a diagnostic cerebral angiogram under the care of Dr Jaswant Bey for evaluation of ***  ? The following instructions will help you care for yourself, or be cared for upon your return home today  These are guidelines for your care right after your surgery only  ? Notify Your Doctor or Nurse if you have any of the following:  ? SYMPTOMS OF WOUND INFECTION--   Increased pain in or around the incision   Swelling around the incision  Any drainage from the incision  Incision separates or opens up  Warmth in the tissues around the incision  Redness or tenderness on the skin near the incision   Fever (temperature greater than 101 degrees F)   ? NEUROLOGICAL CHANGES--  Change in alertness  Increased sleepiness   Nausea and vomiting   New onset of numbness or weakness in arms or legs   New problems with your bowels or bladder  New or worse problems with balance or walking  Seizures, new or worsening  ? UNRELIEVED HEADACHE PAIN--  New or increased pain unrelieved with pain medications   Pain associated with nausea and vomiting   Pain associated with other symptoms  ?   QUESTIONS OR PROBLEMS--  Any questions or problems that you are unsure about  Wound Care:  Keep Incision Clean and Dry   You may shower daily, but do not soak incision  Pat dry after showering  No tub baths, soaking, swimming for 1 week after angiogram    You do not need to cover the incision  Mild to moderate bruising and tenderness to the site is expected and may last up to 1-2 weeks after your procedure  ?  A closure device was placed at the catheter insertion site  This is MRI compatible  Remove the dressing 24 hours after your procedure  If your groin site is bleeding, apply firm pressure for 10 minutes  Reinforce dressing rather than removing and checking frequently  If continues to bleed through the dressing after 1 hour, contact your neurosurgeon's office  Anticipatory Education:  ? PAIN MED W/ Acetaminophen (Tylenol)  --IF a prescription for pain medicine has been sent home with you:  --Narcotic pain medication may cause constipation  Be sure to take stool softeners or laxatives while you are on narcotic pain medication  --Do not drive after taking prescription pain medicine  ?  If this medicine is too strong, or no longer necessary, or we did NOT recommend/prescribe oral narcotics, you may take:   - Tylenol Extra-strength/Acetaminophen, 2 tablets every 4-6 hours as needed for mild pain  DO NOT TAKE MORE THAN 4000MG PER DAY from combined sources  NOTE: Remember to eat when taking pain medicines in order to avoid nausea  Watch for constipation  Eat plenty of fruits, vegetables, juices, and drink 6-8 glasses of water each day  Constipation: Stay active and drink at least 6-8 cups of fluid each day to prevent constipation  If you need a laxative or stool softener follow the package directions or consult with your local pharmacists if you have questions  ? After anesthesia, rest for 24 hours  Do not drive, drink alcohol beverages or make any important decisions during this time  General anesthesia may cause sore throat, jaw discomfort or muscle aches   These symptoms can last for one or two days  Activity: Please follow these instructions:  Advance your activity as you can tolerate  You may do light house work; nothing strenuous   You may walk all you want  You may go up and down the steps  Use the railing for support  Do not do excessive bending, straining or heavy lifting for 48 hours after your procedure  Do not drive or return to work until you are instructed   It is normal for your energy level and sleep patterns to change after surgery  Get extra sleep at night and take naps during the day to help you feel less tired  Take rest periods during the day  Complete recovery may take several weeks  ?  You may resume driving after 50-64 hours recovery  You may return to work after 48 hours of recovery  ?  Diet:  Your doctor has recommended that you follow these diet instructions at home  Refer to the patient education materials you received during your hospital stay  If you would like more nutrition counseling, ask your doctor about making an appointment with an outpatient dietitian  Resume your home diet  ? Medications:  Please resume your home medications as instructed  ? Home Supplies and Equipment:  none  Additional Contacts:  ? CONTACTS FOR NEUROSURGERY: You may call your neurosurgeons office if you have questions between 8:30 am and 4:30 pm  You may request to speak to the nurse practitioner who is available Monday through Friday  ?  For off hours or the weekend you may call your neurosurgeon's office to leave a message

## 2018-05-02 NOTE — CONSULTS
Consultation - Saintclair Cooter 63 y o  female MRN: [id number]    Unit/Bed#: -56 Encounter: [id number]        History of Present Illness     HPI: Saintclair Cooter is a 63y o  year old female With a history of asthma, nicotine abuse and splenectomy who presented to the hospital with a severe headache, neck pain, photophobia and nasea  She apparently had gone outside to have a cigarette and developed sudden severe head pain  She was able to call 911 and then let them into her apartment when they arrived  CT of the head initially showed a large SAH involving the suprasellar cistern, sylvian fissures, basal cisterns and anterior falx  A ruptured aneurysm was suspected  CTA was negative for aneurysmal source  She was placed on Keppra prophylaxis and Nimodipine  She developed hydrocephalus and had an EVD placed  Initial arteriogram 4/17/18 was negative for an aneurysm/AVM as well as followup study done 4/23/18  She expressed suicidal ideation/depression and was seen by Dr Marine Meyers from Psychiatry  She was placed on Zoloft  She completed 14 days Nimotop/TCDs per Neurosurgery on 4/30/18  She is for a followup 14 [facility] 5/8/18  She developed a fever 4/21/18 but without clear source  This did resolve  Her drug screen on admission was positive for cocaine but she denied use  Primary service did  her anyway  She was complaining of left tooth pain and OMFS felt no immediate intervention was needed  Currently has no CP, SOB, nausea or vomiting  She says always has a headache ranging from #5/10 to >#10/10 that involve bifrontal area and eyes mostly  Yesterday, she she had left eye vision changes and describes zigzags and decreased acuity left side of eye  Headache at the time was a #6/10  The vision issues did resolve after pain medication  She admits to positional dizziness  Last BM was 4/29/18      ROS:  As in HPI, otherwise negative 12 point ROS      Historical Information   Past Medical History:   Diagnosis Date    Asthma      Past Surgical History:   Procedure Laterality Date     SECTION      SPLENECTOMY       Social History   History   Alcohol Use No     Comment: patient denies, despite findings of Alcohol in system     History   Drug Use No     Comment: per patient: history of cocaine abuse      History   Smoking Status    Current Every Day Smoker    Packs/day: 0 25   Smokeless Tobacco    Never Used     History reviewed  No pertinent family history      Meds/Allergies   current meds:  Current Facility-Administered Medications   Medication Dose Route Frequency    acetaminophen (TYLENOL) tablet 650 mg  650 mg Oral Q6H [address]    albuterol (PROVENTIL HFA,VENTOLIN HFA) inhaler 2 puff  2 puff Inhalation Q6H PRN    ALPRAZolam (XANAX) tablet 0 25 mg  0 25 mg Oral Daily PRN    bisacodyl (DULCOLAX) rectal suppository 10 mg  10 mg Rectal Daily PRN    docusate sodium (COLACE) capsule 100 mg  100 mg Oral BID    fluticasone furoate-vilanterol (BREO ELLIPTA) 200-25 MCG/INH inhaler 1 puff  1 puff Inhalation Daily    heparin (porcine) subcutaneous injection 5,000 Units  5,000 Units Subcutaneous Q8H [address]    melatonin tablet 6 mg  6 mg Oral HS    methocarbamol (ROBAXIN) tablet 500 mg  500 mg Oral Q6H PRN    [START ON 5/3/2018] montelukast (SINGULAIR) tablet 10 mg  10 mg Oral Daily    ondansetron (ZOFRAN-ODT) dispersible tablet 4 mg  4 mg Oral Q6H PRN    pravastatin (PRAVACHOL) tablet 40 mg  40 mg Oral Daily With Dinner    senna (SENOKOT) tablet 8 6 mg  1 tablet Oral HS    [START ON 5/3/2018] sertraline (ZOLOFT) tablet 25 mg  25 mg Oral Daily    traMADol (ULTRAM) tablet 50 mg  50 mg Oral Q4H PRN       PTA meds:   Prescriptions Prior to Admission   Medication    albuterol (PROVENTIL HFA,VENTOLIN HFA) 90 mcg/act inhaler    fluticasone furoate-vilanterol (BREO ELLIPTA) 200-25 MCG/INH inhaler    montelukast (SINGULAIR) 10 mg tablet    [START ON 5/3/2018] sertraline (ZOLOFT) 25 mg tablet    traMADol (ULTRAM) 50 mg tablet     No Known Allergies    Objective   Vitals: Blood pressure 118/64, pulse 70, temperature 97 8 °F (36 6 °C), temperature source Oral, resp  rate 20, height 5' 3" (1 6 m), weight 87 4 kg (192 lb 10 9 oz), SpO2 96 %  Physical Exam     Constitutional:  NAD; pleasant; nontoxic  HEENT:  AT/NC; oropharynx negative for thrush on tongue  Neck: negative for JVD   CV:  +S1, S2;  RRR; no rub/murmur  Pulmonary:  BBS without crackles/wheeze/rhonci; resp are unlabored  Abdominal:  soft, +BS, ND/NT; no mass  Musculoskeletal:  no edema LEs; PP bilaterally +3  :  no fulton  Skin:  no rashes  Neuro AAO; speech is clear; tongue midline; BARRERA 5/5 = ?slight left pronator drift    Lab Results:   Results from last 7 days  Lab Units 05/02/18  0641 05/01/18  0527   WBC Thousand/uL 9 27 10 87*   HEMOGLOBIN g/dL 11 5 11 7   HEMATOCRIT % 35 8 37 2   PLATELETS Thousands/uL 665* 605*       Results from last 7 days  Lab Units 05/02/18  0641 05/01/18  0527   SODIUM mmol/L 138 138   POTASSIUM mmol/L 4 2 3 8   CHLORIDE mmol/L 106 103   CO2 mmol/L 26 27   BUN mg/dL 12 13   CREATININE mg/dL 0 66 0 61   GLUCOSE RANDOM mg/dL 87 84   CALCIUM mg/dL 9 6 9 5               Glucose (mg/dL)   Date Value   05/02/2018 87   05/01/2018 84   04/30/2018 87   04/29/2018 85     Glucose, i-STAT (mg/dl)   Date Value   04/16/2018 95       Labs reviewed    Imaging: reviewed  EKG, Pathology, and Other Studies: I have personally reviewed pertinent reports  VTE Prophylaxis: Heparin    Code Status: Level 1 - Full Code       Assessment/Plan      1  SAH/hydrocephalus: etio of bleed? = she had 2 negative a-grams and a negative CTA  She completed course of Nimotop on 4/30/18  She is for followup CT head 5/8/18  2   Asthma:  stable on her home dose of Breo and Singulair = will continue    3  Nicotine abuse:  on no patch currently    4  Depression:  continue Zoloft which was added in the hospital after being seen by Dr Romario Arthur from Psychiatry    5    Hx cocaine abuse: drug screen was positive for such but she denied  Primary service in the hospital counseled her anyway  6   Tooth pain:  per SLIM, OMFS said no immediate intervention needed; she will need followup    7  Leukocytosis:  resolved    8  ABLA:  Resolved      Counseling / Coordination of Care  Total floor / unit time spent today 60 minutes  Greater than 50% of total time was spent with the patient and / or family counseling and / or coordination of care        HUSAM Cuevas

## 2018-05-02 NOTE — H&P
Physical Medicine & Rehabilitation History & Physical with Post-Admission Physician Evaluation    Aubrie Chambers 63 y o  female MRN: [id number]  Unit/Bed#: [facility] 459-01 Encounter: [id number]    Primary Rehabilitation Diagnosis: Spontaneous Subarachnoid hemorrhage (HH3, Velazco 3)    History of Present Illness   Aubrie Chambers is a 63 y o  right-handed female with past medical history significant for asthma and past splenectomy who presented to [address] [place] [address] on 4/16/18 after having a worsening headache associated with nausea which occurred after smoking a cigarette  Tox screen was positive for cocaine, patient denies use  Initial CTH showing a large SAH of the suprasellar cistern, sylvian fissures, basal cisterns, and anterior falx  CTA without new findings  Patient transferred to [facility] and Neurosurgery evaluated patient  Patient had ventriculostomy placement for hydrocelphalus and angiogram performed on 4/17/18 by Dr Anitra Salazar without AVM/aneursym seen  Patient was placed on Keppra for seizure prophylaxis and monitored closely in ICU  Patient was seen by Psychiatry for suicidal ideation/depression and started on Zoloft  Patient developed vasospasm of L MCA seen on TCD on 4/19/18 and was started on Nimodipine  MRI brain showing small left cerebellar restricted diffusion suggesting an acute or subacute infarct  MRI C Spine showing DJD and a T2/T3 paracentral disc protrusion and right paracentral disc extrusion with mild mass effect on the right ventral aspect of cord  Neurosurgery aware of findings  Patient had a repeat angiogram on 4/23/18 by Dr Anitra Salazar without notable AVMs/aneurysms  Patient completed Keppra and Nimodipine courses  Patient cleared for SQ Heparin for DVT ppx by Neurosurgery  Subsequent CTH showing improvement of SAH  Patient transferred out of the ICU on 4/27/18     Medical issues included left dental pain, patient was seen by [facility] without immediate intervention; patient needs to be seen by her dentist to have treatment for several teeth bilaterally in her mouth  Headaches were ongoing and located bifrontal   Also some associated visual changes which are resolving  Patient with acute functional decline from baseline  Patient was evaluated by PT/OT/SLP and recommended for IRF level of care  After medical stabilization and clearance, patient was admitted to the [facility] for inpatient rehabilitation 18  Review Of Systems:   General: Negative  Head, Eyes, Ears, Nose, Throat: Headache, dental pain  Pulmonary: Negative  Cardiovascular: Negative  Gastrointestinal: Negative  Genitourinary: Negative  Musculoskeletal: Negative  Neurologic: Negative  Skin: Negative  Psychiatric: Negative    Past Medical History:   Diagnosis Date    Asthma        Past Surgical History:   Procedure Laterality Date     SECTION      SPLENECTOMY         History reviewed  No pertinent family history  Social History:   PRIOR LEVEL OF FUNCTION:  She lives in [facility] single family home  Humphrey Carver is  and will be living with her sister at discharge  Self Care: Independent, Indoor Mobility: Independent, Stairs (in/outdoor): Independent and Cognition: Independent     HOME ENVIRONMENT:  The living area: bedroom on second floor and bathroom on second floor  There are no steps to enter the home from the garage              The patient will not have 24 hour supervision/physical assistance available upon discharge      Current level of functioning: Min A with transfers and ambulation with RW, Min A for ADLs   (I have reviewed the patient's functional and medical status at the time of the preadmission screening and they are the same as on the day of this admission )    No Known Allergies    Scheduled Meds:    Current Facility-Administered Medications:  acetaminophen 650 mg Oral Q6H [address], CRNP   albuterol 2 puff Inhalation Q6H PRN HUSAM Ball   ALPRAZolam 0 25 mg Oral Daily PRN Enrrique Opoka, CRNP   bisacodyl 10 mg Rectal Daily PRN Enrrique Opoka, CRNP   docusate sodium 100 mg Oral BID Enrrique Opoka, CRNP   fluticasone furoate-vilanterol 1 puff Inhalation Daily [place] Op[place], CRNP   heparin (porcine) 5,000 Units Subcutaneous [facility] Enrrique Opoka, CRNP   melatonin 6 mg Oral HS Enrrique Opoka, CRNP   methocarbamol 500 mg Oral Q6H PRN [place] Opoka, CRNP   [START ON 5/3/2018] montelukast 10 mg Oral Daily Enrrique Opoka, CRNP   ondansetron 4 mg Oral Q6H PRN Enrrique Opoka, CRNP   pravastatin 40 mg Oral Daily With [place] Banter, CRNP   senna 1 tablet Oral HS [place] Opoka, CRNP   [START ON 5/3/2018] sertraline 25 mg Oral Daily Enrrique Opoka, CRNP   traMADol 50 mg Oral Q4H PRN [place] Opoka, CRNP       PRN Meds  albuterol    ALPRAZolam    bisacodyl    methocarbamol    ondansetron    traMADol    Laboratory:    Results from last 7 days  Lab Units 05/02/18  0641 05/01/18  0527 04/30/18  0455   WBC Thousand/uL 9 27 10 87* 10 46*   HEMOGLOBIN g/dL 11 5 11 7 12 2   HEMATOCRIT % 35 8 37 2 37 2   PLATELETS Thousands/uL 665* 605* 653*       Results from last 7 days  Lab Units 05/02/18  0641 05/01/18  0527 04/30/18  0455   SODIUM mmol/L 138 138 138   POTASSIUM mmol/L 4 2 3 8 4 0   CHLORIDE mmol/L 106 103 103   CO2 mmol/L 26 27 26   BUN mg/dL 12 13 10   CREATININE mg/dL 0 66 0 61 0 59*   GLUCOSE RANDOM mg/dL 87 84 87   CALCIUM mg/dL 9 6 9 5 9 6             Glucose (mg/dL)   Date Value   05/02/2018 87   05/01/2018 84   04/30/2018 87   04/29/2018 85     Glucose, i-STAT (mg/dl)   Date Value   04/16/2018 95       Pertinent Imaging: personally reviewed    Physical Examination:  Vitals:   Vitals:    05/02/18 1330 05/02/18 1618   BP: 118/64 130/69   BP Location: Right arm Right arm   Pulse: 70 68   Resp: 20 18   Temp: 97 8 °F (36 6 °C) 98 3 °F (36 8 °C)   TempSrc: Oral Oral   SpO2: 96% 97%   Weight: 87 4 kg (192 lb 10 9 oz)    Height: 5' 3" (1 6 m)        GENERAL: No acute distress    HEENT:  Right frontal scalp with sutures present, PERRL, EOMI, mucous membranes moist   CARDIOVASCULAR: regular rate rhythm, no murmurs  LUNGS: clear to ausculation bilaterally, good inspiratory effort, no wheezes, rales, rhonchi   ABDOMEN: soft, non-tender, non-distended, no guarding  Bowel sounds x 4 normal   EXTREMITIES: no pedal edema bilaterally, negative Sujata's bilaterally  MUSCULOSKELETAL:  Range of motion functional x4  NEUROLOGICAL:   Awake, alert, oriented to person and place but unaware of the date  No she had bleeding in her brain  Speech fluent  Able to follow simple commands  CN II-XII grossly intact  Deep tendon reflexes: 3+ bilateral biceps brachioradialis, triceps, patella, 2+ Achilles  Bilateral mild Rogers's present  Sensation to light touch is symmetric and preserved x 4  Motor BUE 4/5 throughout  Bilateral lower extremity:  2/5 proximally at the hip flexors, 3/5 at the knee extensors and knee flexors, 4/5 at the dorsiflexors plantar flexors      Assessment:  Humphrey Carver is a 63 y o  female in stable condition status post spontaneous subarachnoid hemorrhage located in the suprasellar cistern, sylvian fissure, basal cisterns and the anterior falx  Status post EVD placement close monitoring without further intervention  Rehabilitation Plan:    -Rehabilitation of subarachnoid hemorrhage: PT/OT/SLP therapies    Rehabilitation Problems and Goals:  · Mobility dysfunction: Modified Independent  · ADL dysfunction: Modified Independent  · Speech, swallow, cognitive dysfunction: Modified Independent    Estimated length of stay: 10-14 days  Anticipated discharge setting:  Home with supervision  Functional Prognosis:  Fair to good    Acute inpatient rehabilitation is necessary due to the medical impact on function as a result of impaired functional mobility, ambulation, bed mobility, transfers, self-care/ADL's, strength, endurance, range of motion/flexibility, coordination and impaired gait/balance  Treatment plan will include a comprehensive rehabilitation program with intense therapies for 3 hours/day, 5 days/week  Furthermore treatment plan includes:   · 24-hour availability of a physician specializing in rehabilitation who will coordinate the rehabilitation disciplines, manage the comorbid conditions, monitor the patient's functional improvement, and maximize the rehabilitation outcome  · Physical therapy to address bed mobidility, car and mat transfer, progressive ambulation with use of least restrictive assistive device, optimization of gait biomechanics, truncal strengthening, lower extremity strengthening, coordination, range of motion/flexibility, wheelchair and cushion evaluation, durable medical equipment evaluation, patient and family instruction and endurance training  · Occupational therapy to address feeding, grooming, upper and lower body dressing and bathing, toileting, tub/toilet/bed transfers, durable medical equipment evaluation, upper extremity strengthening, range of motion/flexibility, dexterity, coordination and patient and family instruction  · Speech-Language Pathology to address cognitive impairments including problem solving and reasoning, judgment and insight into deficits, as well as speech production and communication including language therapy, phonation and articulation  Also, for swallowing therapy if needed    · Rehabilitation nursing 24 hours per day to monitor bowel and bladder function, work on bowel routine, voiding trials/fulton catheter management as per bladder management protocol, assess falls risk upon admission and periodically thereafter, implement and revise falls prevention strategies, maintain skin integrity through initial and daily pressure sore risk assessment (Iglesia scale), implement and revise pressure sore prevention strategies, educate patient and family members regarding medication administration, ADL's, transfers, and mobility and continue therapy carryover with ADL's, transfers, and mobility  · Social work and case management consults for discharge planning/disposition issues, as well as coordination and communication of patient progress between family and providers  · Rehab psychology- as needed for adjustment, coping      -Consult to Internal Medicine    -Subarachnoid hemorrhage: HH#,Velazco 3 type  Located in the suprasellar cistern, sylvian fissures, basal cistern and the anterior falx:  Status post EVD placement and 2 angiograms with negative findings for AV malformation or aneurysm  Patient's repeat head CT and showing some improvement  Patient to be followed closely by Neurosurgery with repeat CT of the head in 2 weeks  -MRI brain showing a small area of restricted diffusion in the left cerebellar hemisphere suspicious for possible acute to subacute infarct:  Patient managed on Pravachol for CVA prophylaxis and may need to follow with Neurology as an outpatient furthermore  -MRI cervical spine showing T2/T3 paracentral disc protrusion and right paracentral disc extrusion with mild mass effect on the right ventral aspect of cord:  Physical examination of bilateral upper extremities within normal limits and current subjective symptoms negative  Will likely require follow-up imaging if examination were symptoms change  Of note patient does have a bilateral equivalent Rogers's     -headaches:  Managed conservatively with Tylenol scheduled, p r n  Tramadol, p r n  Robaxin  Encouraged low light low sound and rest when needed between therapies  -dental pain:  Patient reports she will require several teeth pulled and 1 tooth repaired on and she has a dentist that she follows with  Order placed her Magic mouthwash, in the fall, Dilaudid 2 mg Q 6 hr p r n  for severe pain as she was on IV Dilaudid coming from acute which was helpful    I have counseled and educated her that we will plan to wean the Dilaudid off during her rehabilitative stay  -depression/anxiety:  No active suicidal ideation  Patient was placed on Zoloft 25 mg daily however was previously on Wellbutrin interested in switching to that - will consult Psychiatry Services to discuss this with her furthermore  Continue low-dose Xanax p r n  daily for anxiety  Neuropsychology also consulted for adjustment to follow     -asthma:  Managed on Breo as well as Singulair    -insomnia:  Managed on melatonin 6 mg at bedtime    -GI prophylaxis managed on Protonix 20 mg daily    -DVT prophylaxis managed on SCDs, subacute heparin which was cleared by Neurosurgery     -Code status:  Full code      Post-Admission Physician Evaluation:  The patient has the potential to make improvement and is in need of at least 2 of the following multidisciplinary therapies including, but not limited to physical, occupational, speech and respiratory  The patient may also need nutritional services, wound care and prosthetics/orthotics prescription  Given the patient's complex medical condition and risk of further medical complications, rehabilitative services cannot be safely provided at a lower level of care, such as a skilled nursing facility  I have reviewed the patient's functional and medical status at the time of the preadmission screening and they are the same as on the day of this admission  I acknowledge that I have personally performed a full physical examination on this patient within 24 hours of admission  I have determined that the patient is able to tolerate the above course of treatment, at the appropriate level of intensity, for a reasonable period of time  Therefore, it is my professional opinion that acute intensive inpatient rehabilitation services that this patient needs, are reasonable and necessary  The patient demonstrated understanding the rehabilitation program and the discharge process after we discussed them    Agree in entirety: yes  Minor adaptions: none   Major changes: none      Michael James MD  PM[facility] Primary Service

## 2018-05-02 NOTE — PLAN OF CARE
Activity Intolerance/Impaired Mobility     Mobility/activity is maintained at optimum level for patient Progressing        CARDIOVASCULAR - ADULT     Maintains optimal cardiac output and hemodynamic stability Progressing     Absence of cardiac dysrhythmias or at baseline rhythm Progressing        Communication Impairment     Ability to express needs and understand communication [address] Discharge to post-acute care or home with appropriate resources Progressing        GASTROINTESTINAL - ADULT     Minimal or absence of nausea and/or vomiting Progressing     Maintains or returns to baseline bowel function Progressing     Maintains adequate nutritional intake Progressing        GENITOURINARY - ADULT     Maintains or returns to baseline urinary function Progressing     Absence of urinary retention Progressing        HEMATOLOGIC - ADULT     Maintains hematologic stability Progressing        METABOLIC, FLUID AND ELECTROLYTES - ADULT     Electrolytes maintained within normal limits Progressing     Fluid balance maintained Progressing     Glucose maintained within target range Progressing        MUSCULOSKELETAL - ADULT     Maintain or return mobility to safest level of function Progressing     Maintain proper alignment of affected body part Progressing        Neurological Deficit     Neurological status is stable or improving Progressing        NEUROSENSORY - ADULT     Achieves stable or improved neurological status Progressing     Absence of seizures Progressing     Remains free of injury related to seizures activity Progressing     Achieves maximal functionality and self care Progressing        Nutrition     Nutrition/Hydration status is improving Progressing        Nutrition/Hydration-ADULT     Nutrient/Hydration intake appropriate for improving, restoring or maintaining nutritional needs Progressing        Potential for Aspiration     Non-ventilated patient's risk of aspiration is minimized Progressing        Potential for Falls     Patient will remain free of falls Progressing        Prexisting or High Potential for Compromised Skin Integrity     Skin integrity is maintained or improved Progressing        RESPIRATORY - ADULT     Achieves optimal ventilation and oxygenation Progressing        SKIN/TISSUE INTEGRITY - ADULT     Skin integrity remains intact Progressing     Incision(s), wounds(s) or drain site(s) healing without S/S of infection Progressing     Oral mucous membranes remain intact Progressing

## 2018-05-02 NOTE — SOCIAL WORK
Late Note 5/1 4pm: CM was informed by [facility] liaison that physicians have approved patient for the [facility]  Kylee to submit for insurance authorization and follow up  Physician has stated that the patient may become to functional to inpatient rehab before we receive auth

## 2018-05-02 NOTE — SOCIAL WORK
CM was informed by Kylee Patient is approved for the [facility] by insurance  Pt is setup for a 12:30pm  to room 459  CM notified pt and pts bedside RN Clarissa gonzales PR time  Pt states she does not want CM to contact family to inform

## 2018-05-02 NOTE — RESTORATIVE TECHNICIAN NOTE
Restorative Specialist Mobility Note       Activity: Ambulate in lerma, Ambulate in room, Bathroom privileges, Dangle, Stand at bedside (Educated/encouraged pt to ambulate with assistance 3-4 x's/day  Bed alarm on   Pt callbell, phone/tray within reach )     Assistive Device: Front wheel walker          Nataliia DAVIDSON, Restorative Technician, [employer] [facility]

## 2018-05-02 NOTE — PROGRESS NOTES
PT Evaluation       05/02/18 1440   Patient Data   Rehab Impairment Brain dysfunction non-traumatic   Etiologic Diagnosis Subarachnoid hemorrhage involving the suprasellar cistern, sylvian fissures, basal cisterns and in the anterior falx   Date of Onset 04/16/18   Home Setup   Type of Home Multi Level   Method of Entry (0 ANG)   Number of Stairs in Home (FF to 2nd floor)   In Home Hand Rail Right   First Floor Setup Available Yes   Home Modifications Necessary? No   Available Equipment (none)   Prior Level of Function   Self-Care 3  Independent - Patient completed the activities by him/herself, with or without an assistive device, with no assistance from a helper  Indoor-Mobility (Ambulation) 3  Independent - Patient completed the activities by him/herself, with or without an assistive device, with no assistance from a helper  Stairs 3  Independent - Patient completed the activities by him/herself, with or without an assistive device, with no assistance from a helper  Functional Cognition 3  Independent - Patient completed the activities by him/herself, with or without an assistive device, with no assistance from a helper  Restrictions/Precautions   Precautions Fall Risk;Bed/chair alarms   Pain Assessment   Pain Assessment 0-10   Pain Score 6   Pain Type Acute pain   Pain Location Head;Sacrum   Pain Descriptors Headache;Sharp   Pain Frequency Constant/continuous   Hospital Pain Intervention(s) Medication (See MAR); Ambulation/increased activity; Rest   Response to Interventions pt reports constant HA and also increased sacral pain with LAQ or increased activity    QI: Roll Left and Right   Assistance Needed Supervision   Assistance Provided by [place] No physical assistance   Roll Left and Right CARE Score 4   QI: Sit to [address] Provided by [place] No physical assistance   Sit to Lying CARE Score 4   QI: Lying to Sitting on Side of Bed   Assistance Needed Supervision Assistance Provided by [place] No physical assistance   Lying to Sitting on Side of Bed CARE Score 4   QI: Sit to Stand   Assistance Needed Physical assistance   Assistance Provided by [place] Less than 25%   Sit to Stand CARE Score 3   QI: Chair/Bed-to-Chair Transfer   Assistance Needed Physical assistance   Assistance Provided by [place] Less than 25%   Chair/Bed-to-Chair Transfer CARE Score 3   QI: Car Transfer   Reason if not Attempted Activity not applicable   Car Transfer CARE Score 9   Transfer Bed/Chair/Wheelchair   Limitations Noted In Balance; Coordination;Problem Solving; Sequencing;LE Strength   Adaptive Equipment Roller Walker   Stand Pivot Minimal Assist   Sit to Stand Minimal   Stand to Sit Minimal  (CG)   Supine to Sit Supervision   Sit to Supine Supervision   Bed, Chair, Wheelchair Transfer (FIM) 3 - [place] needs to lift, boost or assist to stand OR sit   QI: Toilet Transfer   Assistance Needed Physical assistance   Assistance Provided by [place] Less than 25%   Toilet Transfer CARE Score 3   Toilet Transfer   Surface Assessed Standard Toilet   Transfer Technique Stand Pivot   Limitations Noted In Balance; Sequencing   Adaptive Equipment Grab Bar   Toilet Transfer (FIM) 3 - [place] needs to lift, boost or assist to stand OR sit   QI: Walk 10 Feet   Assistance Needed Physical assistance   Assistance Provided by [place] Total assistance   Walk 10 Feet CARE Score 1   QI: Walk 50 Feet with Two Turns   Assistance Needed Physical assistance; Adaptive equipment   Assistance Provided by [place] Less than 25%   Walk 50 Feet with Two Turns CARE Score 3   QI: Walk 150 Feet   Reason if not Attempted Safety concerns   Walk 150 Feet CARE Score 88   QI: Walking 10 Feet on Uneven Surfaces   Reason if not Attempted Safety concerns   Walking 10 Feet on Uneven Surfaces CARE Score 88   Ambulation   Does the patient walk? 2   Yes   Primary Discharge Mode of Locomotion Walk   Walk Assist Level Moderate Assist;Chair Follow   Gait Pattern Ataxic; Inconsistant Melissa; Slow Melissa;Decreased foot clearance; Improper weight shift   Assist Device Hand Hold   Distance Walked (feet) 30 ft   Limitations Noted In Balance; Coordination; Endurance; Heel Strike;Speed;Strength;Swing   Findings also practiced with RW min A and w/c follow for 100'    Walking (FIM) 1 - Patient requires assist of two people   QI: Wheel 50 Feet with Two Turns   Reason if not Attempted Activity not applicable   Wheel 50 Feet with Two Turns CARE Score 9   QI: Wheel 150 Feet   Reason if not Attempted Activity not applicable   Wheel 455 Feet CARE Score 9   Wheelchair mobility   QI: Does the patient use a wheelchair? 0  No   QI: 1 Step (Curb)   Assistance Needed Physical assistance   Assistance Provided by [place] Total assistance   1 Step (Curb) CARE Score 1   QI: 4 Steps   Assistance Needed Physical assistance   Assistance Provided by [place] Total assistance   4 Steps CARE Score 1   QI: 12 Steps   Reason if not Attempted Safety concerns   12 Steps CARE Score 88   Stairs   Type Dewar Steps   # of Steps 4   Assist Devices Bilateral Rail   Findings mod A x1 with 2nd person present providing CG    Stairs (FIM) 1 - Patient requires assist of two people   Comprehension   QI: Comprehension 3  Usually Understands: Understands most conversations, but misses some part/intent of message  Requires cues at times to understand  Comprehension (FIM) 5 - Understands basic directions and conversation   Expression   QI: Expression 3   Exhibits some difficulty with expressing needs and ideas (e g , some words or finishing thoughts) or speech is not clear   Expression (FIM) 5 - Needs help/cues only RARELY (< 10% of the time)   Social Interaction   Social Interaction (FIM) 5 - Interacts appropriately with others 90% of time   Problem Solving   Problem solving (FIM) 4 - Solves basic problems 75-89% of time   Memory   Memory (FIM) 4 - Recognizes/recalls/performs 75-89%   RLE Assessment   RLE Assessment X  (grossly 3/5 limited by pain )   LLE Assessment   LLE Assessment X  (grossly 3/5 limited by pain )   Coordination   Movements are Fluid and Coordinated 0   Coordination and Movement Description dysmetria with finger to nose testing; dysdiadochokinesia    Sensation   Light Touch No apparent deficits   Cognition   Arousal/Participation Cooperative   Vision   Vision Comments no apparent deficits    Perception   Inattention/Neglect Appears intact   Discharge Information   Patient's Discharge Plan home w/ sister   Patient's Rehab Expectations to walk better    Barriers to Discharge Home Decreased Cognitive Function;Decreased Strength;Decreased Endurance;Pain; Safety Considerations   Impressions Pt is 64 y/o female admitted to [facility] s/p SAH  PMH includes HTN, migraines, cocaine and tobacco abuse  PTA pt was functioning independently  Pt lives with her sister; 0 ANG; reports she can reside on 1st floor if needed otherwise her bedroom is on 2nd floor  Pt does not own any DME  Pt presents with impaired coordination, impaired balance, dec BLE strength, dec safety awareness, dec activity tolerance, pain; overall decline in functional mobility from baseline  Assessed transfers and ambulation with RW (min A/CG) and without RW (mod A)  Noted absent heel strike on the L; with vc's pt able to correct  Initially pt only able to tolerate ambulating 40' and needed rest break, then able to ambulate another 100' (using RW)  Pt reports she is limited by HA pain  Also reports dizziness with horizontal head turns when ambulating in hallway  Pt demo G rehab potential to meet mod I level goals using LRAD; estimated LOS 2 wks  Pt will benefit from skilled PT to address above impairments and maximize I and safety with all functional mobility      PT Therapy Minutes   PT Time In 1440   PT Time Out 1520   PT Total Time (minutes) 40   PT Mode of treatment - Individual (minutes) 40   PT Mode of treatment - Concurrent (minutes) 0   PT Mode of treatment - Group (minutes) 0   PT Mode of treatment - Co-treat (minutes) 0   PT Mode of Teatment - Total time(minutes) 40 minutes

## 2018-05-02 NOTE — DISCHARGE SUMMARY
1 [facility]    NAME: Saintclair Cooter  AGE: 63 y o  SEX: female   MRN: [id number]   Encounter: [id number]   Unit/Bed: [place] [address]     Admitting Provider:  Prasad Christie MD  Discharge Provider:  Jerry Jordan MD  Admission Date: 4/16/2018       Discharge Date: 05/02/18   LOS: 16  Primary Care Physician at Discharge: Jaclyn Hicks [phone]    DISCHARGE DIAGNOSES  · Subarachnoid hemorrhage status post right frontal EVD with subsequent clamping  · Acute on chronic tooth pain secondary to dental caries  · History of chronic stable asthma  · Cocaine use/tobacco use  · Transient leukocytosis    HOSPITAL COURSE:  Saintclair Cooter is a 63 y o  female With history of smoking and asthma who presented to [facility]  for evaluation of a headache  She states she ran out of her home medications so presented to the ED for evaluation and treatment  She reports she was smoking a cigarette earlier today and had a sudden onset of frontal headache, nonradiating  10/10 in severity and constant throbbing  Had associated photophobia nausea without vomiting  No focal numbness tingling weakness  States the headache was similar to previous migraines  She states she has not had to use an abortive migraine medication in the past year  She reports discomfort of the right mastoid 2 days ago but no other descriptions of headache leading up to today  The patient was noted to have a subarachnoid hemorrhage on CT head  Follow-up CTA imaging showed no focal stenosis or saccular aneurysm within the [place]  Patient was given 10 mg total labetalol prior to arrival as well as 4 of morphine for headache  Patient was given DDAVP as she was administered Toradol in the emergency department and took an NSAID earlier in the day   The patient was transferred to [facility] for critical care monitoring and neurosurgical evaluation  Upon arrival to [facility] patient had a GCS of 15  but continued to complain of 8/10 frontal nonradiating headache  Below is the short summary of hospital stay:  Subarachnoid hemorrhage  - Day# 18 post bleed - status post right frontal EVD with subsequent clamping and repeat imaging negative for hydrocephalus thereafter  - IR-guided cerebral angiograms on 4/17/18 and 4/23/18 were both negative for acute changes/etiology - transferred out of ICU on 4/27/2018  - Nimodipine for vasospasms and serial transcranial dopplers discontinued on 04/30/2018, as two cerebral angiograms were negative (no need for full 21 day course as 14 days are sufficient)   - Transcranial doppler on 4/26 revealed an acute mild right MCA vasospasm which seemingly resolved on 4/28 imaging  · CT of head on 4/27/2018 shows improvement of subarachnoid hemorrhaging   · Transfer to [facility] today  · Neurosurgery recommending outpatient follow-up in approximately two weeks  · She will need repeat CT of head prior to appointment  · Discussed with Neurosurgery at time of transfer -- no further intervention for now       Tooth pain  - Medical management -- continue current doses of Dilaudid  - Avoid NSAIDs   - As per OFMS -- no immediate surgical intervention      Leukocytosis  - This has resolved  - Most likely secondary to [facility] - monitor WBC count     Hypokalemia  - Resolved  history of Asthma  - Stable/asymptomatic - continue Breo Ellipta/Singular regimen - PRN Albuterol inhaler on board - maintain oxygenation      Cocaine abuse  - Tested positive on urine metabolite cream however patient denies use - counseled on cessation any way    DVT Prophylaxis:  Heparin SC    CONSULTING PROVIDERS   · Neurosurgery  · Behavioral health    PROCEDURES PERFORMED  Cta Head With And Without Contrast    Result Date: 4/16/2018  Narrative: CTA BRAIN - WITH CONTRAST INDICATION: Subarachnoid hemorrhage  COMPARISON:   CT brain without contrast performed less than one hour prior  TECHNIQUE: Axial 0 625 mm imaging after administration of intravenous contrast   MIP and 3D reconstructions performed  3D rendering was performed on an independent workstation  Radiation dose length product (DLP) for this visit:  165 mGy-cm   This examination, like all CT scans performed in the [facility], was performed utilizing techniques to minimize radiation dose exposure, including the use of iterative reconstruction and automated exposure control  IV Contrast:  85 mL of iohexol (OMNIPAQUE)  IMAGE QUALITY:  Diagnostic  FINDINGS: Acute subarachnoid hemorrhage is reidentified  DISTAL INTERNAL CAROTID ARTERIES:  Normal enhancement of the distal cervical internal carotid arteries and intracranial portions of the internal carotid arteries  Normal ophthalmic artery origins  Normal ICA terminus  ANTERIOR CIRCULATION:  Symmetric A1 segments  There is an azygos A2 segment with normal enhancement  Normal anterior communicating artery  MIDDLE CEREBRAL ARTERY CIRCULATION:  M1 segment and middle cerebral artery branches demonstrate normal enhancement bilaterally  DISTAL VERTEBRAL ARTERIES:  Normal distal vertebral arteries  Posterior inferior cerebellar artery origins are normal  Normal vertebral basilar junction  BASILAR ARTERY:  Basilar artery is normal in caliber  Normal superior cerebellar arteries  POSTERIOR CEREBRAL ARTERIES: Both posterior cerebral arteries arises from the basilar tip  Both arteries demonstrate normal enhancement  Normal posterior communicating arteries  DURAL VENOUS SINUSES:  Normal  BONY STRUCTURES:  Unremarkable bony structures  Impression: No focal stenosis or saccular aneurysm within the [place]  Reidentified acute subarachnoid hemorrhage   Workstation performed: [id number]     Xr Chest Portable    Result Date: 4/23/2018  Narrative: CHEST INDICATION:   Evaluate for volume overload  COMPARISON:  4/21/2018 EXAM PERFORMED/VIEWS:  XR CHEST PORTABLE FINDINGS:  Left-sided PICC line projects over the mid SVC  Stable cardiomediastinal structures  No effusions  No pneumothorax  Mild central pulmonary vascular congestion  Stable bony structures  Impression: Stable mild central vascular congestion  Workstation performed: [id number]     Xr Chest Portable    Result Date: 4/22/2018  Narrative: CHEST INDICATION:   Fever  COMPARISON:  4/19/2018 EXAM PERFORMED/VIEWS:  XR CHEST PORTABLE FINDINGS: Mild right hemithorax diaphragmatic elevation appears unchanged and accentuated by patient rotation  No evidence for layering effusion  No pneumothorax  Right lower lobe consolidation  Cardiomediastinal silhouette is unchanged and within normal limits  Pulmonary vasculature appears indistinct, but not frankly cephalized  Bones are unremarkable  Impression: 1  Right basilar consolidation could represent pneumonia or atelectasis  2   Very mild pulmonary vascular congestion without edema  Workstation performed: [id number]     Xr Chest Portable    Result Date: 4/19/2018  Narrative: CHEST INDICATION:   Left PICC line pulled back 2 cm  COMPARISON:  4/19/2018 EXAM PERFORMED/VIEWS:  XR CHEST PORTABLE FINDINGS:  Right IJ catheter tip in the caval atrial junction region  Left PICC line tip also in the caval atrial junction region  Cardiomediastinal silhouette appears unremarkable  Minimal bibasilar atelectasis  No pneumothorax or pleural effusion  Osseous structures appear within normal limits for patient age  Impression: Right IJ catheter tip in the caval atrial junction region  Left PICC line tip also in the caval atrial junction region  Minimal bibasilar atelectasis  Workstation performed: [id number]     Xr Chest 1 View Portable    Result Date: 4/17/2018  Narrative: CHEST INDICATION:     Central line placement   COMPARISON:  Chest x-ray of 6/13/2007 EXAM PERFORMED/VIEWS:  XR CHEST PORTABLE FINDINGS:  Right IJ line has been placed and the tip overlies the cavoatrial junction  Heart size is not well evaluated on this single portable AP view  Crowding of the central pulmonary vasculature may be related to low lung volumes  No pneumothorax or pleural effusion  Degenerative changes are noted at both shoulders  Impression: Right IJ line placement, appears to be in satisfactory position, tip is at the cavoatrial junction  No pneumothorax  Low lung volumes  Workstation performed: [id number]     Ct Head Wo Contrast    Result Date: 4/27/2018  Narrative: CT BRAIN - WITHOUT CONTRAST INDICATION:   clamped EVD overnight, eval for hydro  Follow-up subarachnoid hemorrhage  Ventricular drain clamped  COMPARISON:  Multiple, most recently 4/24  TECHNIQUE:  CT examination of the brain was performed  In addition to axial images, coronal 2D reformatted images were created and submitted for interpretation  Radiation dose length product (DLP) for this visit:  1046 69 mGy-cm   This examination, like all CT scans performed in the [facility], was performed utilizing techniques to minimize radiation dose exposure, including the use of iterative reconstruction and automated exposure control  IMAGE QUALITY:  Diagnostic  FINDINGS: PARENCHYMA:  Right frontal ventricular drain unchanged in position  No mass, mass effect or midline shift  No evidence of acute infarction  Normal basal ganglia, brainstem and cerebellum  Continued improvement of the subarachnoid hemorrhage seen initially on 4/16  Small amount of intraventricular hemorrhage remains within the occipital horns of the lateral ventricles  No new hemorrhage identified  VENTRICLES:  No ventriculomegaly  Intraventricular hemorrhage as described above, unchanged  VISUALIZED ORBITS AND PARANASAL SINUSES:  Retention cyst within the left maxillary sinus  Left mastoid effusion   CALVARIUM AND EXTRACRANIAL SOFT TISSUES:  Normal      Impression: Continued improvement in the subarachnoid hemorrhage with no significant hemorrhage noted in the subarachnoid space at this time  Small amount of intraventricular hemorrhage within the occipital horns, not significantly changed from the most recent exam  Stable ventricular size  Workstation performed: [id number]     Ct Head Wo Contrast    Result Date: 4/24/2018  Narrative: CT BRAIN - WITHOUT CONTRAST INDICATION:   s/p nontraumatic, nonaneurysmal SAH  Follow-up subarachnoid hemorrhage  COMPARISON:  CT brain April 16, 2018, CTA neck and brain April 16, 2018, cerebral arteriogram April 17, 2018 and April 23, 2018, MR brain April 18, 2018 and CT brain April 18, 2018  TECHNIQUE:  CT examination of the brain was performed  In addition to axial images, coronal 2D reformatted images were created and submitted for interpretation  Radiation dose length product (DLP) for this visit:  [phone] mGy-cm   This examination, like all CT scans performed in the [facility], was performed utilizing techniques to minimize radiation dose exposure, including the use of iterative reconstruction and automated exposure control  IMAGE QUALITY:  Diagnostic  FINDINGS: PARENCHYMA:  There is significant improvement in subarachnoid hemorrhage  Minimal amount of hemorrhage persists in the sylvian fissures bilaterally as well as suprasellar cistern and interpeduncular cistern  No new areas of subarachnoid hemorrhage are identified  No acute intraparenchymal hemorrhages  No acute infarctions  VENTRICLES AND EXTRA-AXIAL SPACES:  Right frontal external ventricular drainage catheter is present with tip in the anterior body of the right lateral ventricle  The overall size of the ventricles is normal and improved from the prior study  Persistent  layering blood products in the occipital horns bilaterally, left side greater than right, slightly progressed   VISUALIZED ORBITS AND PARANASAL SINUSES:  No acute abnormality involving the orbits  Mild scattered sinus mucosal thickening is noted  No fluid levels are seen  Mucous retention cyst formation left maxillary sinus  CALVARIUM AND EXTRACRANIAL SOFT TISSUES:  Right frontal baylee hole  Impression: 1  Significant improvement in subarachnoid hemorrhage with near complete resolution  No acute intracranial abnormality  2   Decreased size of the lateral ventricles with slight increase in the intraventricular hemorrhage, status post external ventricular drainage catheter placement  Workstation performed: [id number]     Ct Head Wo Contrast    Result Date: 4/18/2018  Narrative: CT BRAIN - WITHOUT CONTRAST INDICATION:   Follow-up subarachnoid hemorrhage    COMPARISON:  4/16/2018  TECHNIQUE:  CT examination of the brain was performed  In addition to axial images, coronal 2D reformatted images were created and submitted for interpretation  Radiation dose length product (DLP) for this visit:  1045 72 mGy-cm   This examination, like all CT scans performed in the [facility], was performed utilizing techniques to minimize radiation dose exposure, including the use of iterative reconstruction and automated exposure control  IMAGE QUALITY:  Diagnostic  FINDINGS: PARENCHYMA:  An EVD has been placed via right frontal approach terminating in the anterior body of the right lateral ventricle  Once again identified is extensive subarachnoid hemorrhage filling the suprasellar cistern and extending into the sylvian fissures, anterior interhemispheric fissure and quadrigeminal plate cistern  This extends inferiorly, anterior to the brainstem, to  the skull base similar to the prior examination  There has been some redistribution with intraventricular hemorrhage layering in the occipital horns of the lateral ventricles  No new hemorrhage  No subdural or parenchymal hemorrhage  Stable parenchyma  Stable brainstem and cerebellum   VENTRICLES:  As described above there is new intraventricular hemorrhage layering in the occipital horns of the lateral ventricles  Minimal interval enlargement of the temporal horns of the lateral ventricles  Stable 3rd and 4th ventricles  VISUALIZED ORBITS AND PARANASAL SINUSES:  Retention cysts are noted within the left maxillary sinus  CALVARIUM AND EXTRACRANIAL SOFT TISSUES:  Normal      Impression: Persistent diffuse subarachnoid hemorrhage, slightly improved  There is some redistribution with hemorrhage layering in the occipital horns of the lateral ventricles  Ventricular drain present extending into the frontal horn of the right lateral ventricle  Workstation performed: [id number]     Ct Head Without Contrast    Result Date: 4/16/2018  Narrative: CT BRAIN - WITHOUT CONTRAST INDICATION:   headache  COMPARISON:  CT brain dated June 13, 2007  TECHNIQUE:  CT examination of the brain was performed  In addition to axial images, coronal 2D reformatted images were created and submitted for interpretation  Radiation dose length product (DLP) for this visit:  1031 mGy-cm   This examination, like all CT scans performed in the [facility], was performed utilizing techniques to minimize radiation dose exposure, including the use of iterative reconstruction and automated exposure control  IMAGE QUALITY:  Diagnostic  FINDINGS: PARENCHYMA:  No intracranial mass, mass effect or midline shift  No CT signs of acute infarction  No acute intracranial hemorrhage  VENTRICLES AND EXTRA-AXIAL SPACES:  There is a large amount of acute subarachnoid hemorrhage noted within the suprasellar cistern, sylvian fissures, anterior falx and basal cisterns  There is no hydrocephalus or intraventricular hemorrhage  VISUALIZED ORBITS AND PARANASAL SINUSES:  There is a mucous retention cyst versus mucosal polyp at the floor of the left maxillary sinus which is only partially visualized   CALVARIUM AND EXTRACRANIAL SOFT TISSUES:  Normal      Impression: Large amount of acute subarachnoid hemorrhage involving the suprasellar cistern, sylvian fissures, basal cisterns and in anterior falx  A ruptured aneurysm is suspected  A CTA head is recommended for further evaluation  I personally discussed this study with Angelina Moffett on 4/16/2018 at 8:37 PM  Workstation performed: [id number]     Mri Brain Wo Contrast    Result Date: 4/18/2018  Narrative: MRI BRAIN WITHOUT CONTRAST INDICATION:  Intracranial hemorrhage  COMPARISON:   4/18/2018  TECHNIQUE:  Sagittal T1, axial T2, axial FLAIR, axial T1, axial Weott and axial diffusion imaging  IMAGE QUALITY:  Diagnostic  FINDINGS: BRAIN PARENCHYMA:  Susceptibility artifact throughout the basal cisterns consistent with subacute subarachnoid hemorrhage, grossly stable  Susceptibility artifact in the right frontal lobe along the external ventricular drain catheter tract suggesting postoperative blood products  Small linear focus of restricted diffusion in the left cerebellar hemisphere without evidence of corresponding susceptibility artifact suggesting an acute or early subacute infarct  Punctate focus of restricted diffusion in the right subinsular white matter associated with susceptibility artifact suggesting subarachnoid hemorrhage  No significant mass effect  VENTRICLES:  The blood products layering in the occipital horns  Stable mild hydrocephalus  SELLA AND PITUITARY GLAND:  Pituitary gland upper limits of normal with mild convexity of the superior border  No clear evidence of lesion  ORBITS:  No retro-orbital inflammation or hematoma  [place] PARANASAL SINUSES:  Mucous retention cyst in the left maxillary sinus  No significant paranasal sinus disease  Fluid in left mastoid air cells suggesting an effusion  VASCULATURE:  Evaluation of the major intracranial vasculature demonstrates appropriate flow voids  CALVARIUM AND SKULL BASE:  No visualized lesion EXTRACRANIAL SOFT TISSUES:  No visualized mass or collection     Impression: 1  Small linear focus of restricted diffusion in the left cerebellar hemisphere without associated hemorrhage, suggesting an acute or early subacute ischemic infarct  2   Grossly stable subarachnoid hemorrhage throughout the basal cisterns  No significant mass effect  3   Stable mild hydrocephalus and intraventricular blood products  I personally discussed this study with Dr Armond Kaminski on 4/18/2018 at 10:53 PM  Workstation performed: [id number]     Mri Cervical Spine Wo Contrast    Result Date: 4/18/2018  Narrative: MRI CERVICAL SPINE WITHOUT CONTRAST INDICATION:  Headache  COMPARISON:  None  TECHNIQUE:  Sagittal T1, sagittal T2, sagittal inversion recovery, axial T2, axial  2D merge IMAGE QUALITY:  Severe patient motion artifact results in suboptimal evaluation  FINDINGS: ALIGNMENT:  Normal alignment of the cervical spine  MARROW SIGNAL:  Normal marrow signal is identified within the visualized bony structures  No discrete marrow lesion  CERVICAL AND VISUALIZED THORACIC CORD:  Grossly normal caliber and signal of the cord  PREVERTEBRAL AND PARASPINAL SOFT TISSUES:  No evidence of paraspinal mass or collection  VISUALIZED POSTERIOR FOSSA:  Limited visualization  CERVICAL DISC SPACES: C2-C3:  Minimal posterior disc osteophytes  No central canal stenosis or neural foraminal narrowing  C3-C4:  Posterior disc and uncovertebral osteophytes  Moderate central canal stenosis with minimal flattening of the ventral aspect of the cord  Moderate to severe bilateral neural foraminal narrowing  C4-C5:  Mild posterior disc and uncovertebral osteophytes and facet arthropathy  Mild to moderate central canal stenosis  Minimal flattening of the ventral aspect of the cord  Moderate right and mild left neural foraminal narrowing  C5-C6:  Posterior disc and uncovertebral osteophytes result in severe central canal stenosis with flattening of the ventral aspect of the cord  Severe bilateral neural foraminal narrowing   C6-C7: Posterior disc and uncovertebral osteophytes result in severe central canal stenosis with flattening of the cord  Severe bilateral neural foraminal narrowing  C7-T1:  Minimal posterior disc osteophytes  No central canal stenosis or neural foraminal narrowing  UPPER THORACIC DISC SPACES:  Mild, chronic disc degenerative change  T2-3 paracentral disc protrusion and right paracentral disc extrusion measuring 6 x 7 mm  Mass effect on the right ventral aspect of the cord and mild to moderate right neural foraminal narrowing  Impression: 1  Chronic disc degenerative change throughout the cervical spine resulting in severe central canal stenosis at C5-6 and C6-7  Chronic mass effect on the cord without evidence of cord signal abnormality or myelomalacia  2   T2-3 paracentral disc protrusion and right paracentral disc extrusion with probable mild mass effect on the right ventral aspect of the cord  Workstation performed: [id number]     Ir Cerebral Angiography    Result Date: 2018  Narrative: OPERATIVE REPORT PATIENT NAME: Aly Hurst  :  1960 MRN: [id number] Pt Location: IR Preop Diagnosis: 1  HH3F3 SAH, Bleed Day 9 2  Acute hydrocephalus   Postop Diagnosis 1  HH3F3 SAH 2  Acute hydrocephalus   Procedure: Right Internal Carotid Arteriogram Left Internal Carotid Arteriogram Right Vertebral Artery Arteriogram Limited Right Femoral Arteriogram   Surgeon: Austin Pino MD   Specimen(s): None   Estimated Blood Loss:  None   Drains: None   Anesthesia Type:  General   Complications: None   Operative Indications: Stephanie Miranda a 63 y  o  female who is bleed day 9 Raines Thomson 3 Velazco 3 SAH from unknown source   Given her hemorrhage pattern and negative first angiogram we discussed delayed repeat angiogram   After discussing the risks and benefits of the procedure including bleeding, stroke, groin hematoma, and death they elected to go ahead and proceed    Procedure Details:   Percutaneous access with a 5-Faroese micropuncture kit was obtained into the right femoral artery  A 5-Faroese introducer sheath was placed and a 5-Faroese angled glide catheter was then advanced into the aorta, and over the aortic arch over a Glidewire    The catheter was then advanced and the right internal carotid artery was then catheterized  AP lateral and magnified oblique images of the right intracranial carotid circulation were obtained    A 3D rotational angiogram of the ASHLEY artery was then done  Post-processed images were reviewed at a separate work station     The catheter was then withdrawn into the brachiocephalic artery and advanced into  the right vertebral artery  Transfascial lateral and oblique images of the right vertebral artery intracranial circulation were obtained    The catheter was then advanced and the left internal carotid artery was then catheterized  AP lateral and magnified oblique images of the left intracranial carotid circulation were obtained    The catheter was then withdrawn from the body and a limited right femoral arteriogram was run  Puncture site was found to be compatible with a Mynx Closure device and this was done successfully  There was appropriate hemostasis  The patient was then awoken from his monitored anesthesia care and found to be in his neurologic baseline  All sponge and needle counts were correct      INTERPRETATION OF ANGIOGRAPHIC FINDINGS: 1  The Right internal carotid artery circulation reveals antegrade flow into the middle cerebral and anterior cerebral arteries  There is an azygous A2  There is a large posterior communicating artery, which appears fetal  There is no evidence of aneurysm, AVM, or vascular malformation  The capillary and venous phases are unremarkable  The 3d rotational angiogram does not reveal any aneurysm, there are several infindibulums that match on diagnostic arteriography    2   The Left internal carotid artery circulation reveals antegrade flow into the middle cerebral and anterior cerebral arteries  There is a patent posterior communicating artery  There is no evidence of aneurysm, AVM, or vascular malformation  The capillary and venous phases are unremarkable    3  The Right vertebral intracranial circulation reveals retrograde reflux down the contralateral vertebral artery  There is a small right P1 consistent with a fetal origin  The basilar apex is appears broad, but no aneurysmal dilitation  Both PICAs are well visualized  Antegrade flow is present into all the posterior circulation branches  There are no AVMs or aneurysms  The capillary and venous phases are unremarkable    Limited Right femoral arteriogram reveals normal puncture site anatomy    3D CT reveals catheter placement in the right lateral ventricle    Impression: Angio negative SAH  Azygous A2   Patient Disposition: Critical Care Unit   Charla Levy MD DATE: 18  TIME: 1000   Electronically signed by Austin Pino MD at 2018 10:00 AM [2018 1:44:22 PM - Carol Ann RODRIGUEZ]     Ir Cerebral Angiography / Intervention    Result Date: 2018  Narrative: OPERATIVE REPORT PATIENT NAME: Aly Hurst   :  1960 MRN: [id number] Pt Location: Interventional radiology   SURGERY DATE: 18   Preop Diagnosis: 1  HH3F3 SAH 2  Acute hydrocephalus   Postop Diagnosis 1  HH3F3 SAH 2  Acute hydrocephalus   Procedure: Right Internal Carotid Arteriogram Left Internal Carotid Arteriogram Right Vertebral Artery Arteriogram Limited Right Femoral Arteriogram   Surgeon: Austin Pino MD   Specimen(s): None   Estimated Blood Loss: None   Drains: None   Anesthesia Type: General   Complications: None   Operative Indications: Aly Hurst  is a 63 y o  female who is bleed day 2 Raines Thomson 3 Velazco 3 SAH from unknown source  Given her hemorrhage and signs/symptoms of hydrocephalus the family ws consented for ventriculostomy placement and angiogram with possible interventions   After discussing the risks and benefits of the procedure including bleeding, stroke, groin hematoma, and death they elected to go ahead and proceed    Procedure Details:   Percutaneous access with a 5-Greenlandic micropuncture kit was obtained into the right femoral artery  A 5-Greenlandic introducer sheath was placed and a 5-Greenlandic angled glide catheter was then advanced into the aorta, and over the aortic arch over a Glidewire    The catheter was then advanced and the right internal carotid artery was then catheterized  AP lateral and magnified oblique images of the right intracranial carotid circulation were obtained    The catheter was then withdrawn into the brachiocephalic artery and advanced into  the right vertebral artery  Transfascial lateral and oblique images of the right vertebral artery intracranial circulation were obtained  The catheter was then advanced and the left internal carotid artery was then catheterized  AP lateral and magnified oblique images of the left intracranial carotid circulation were obtained  The catheter was then withdrawn from the body and a limited right femoral arteriogram was run  Puncture site was found to be compatible with a Mynx Closure device and this was done successfully  There was appropriate hemostasis  The patient was then awoken from his monitored anesthesia care and found to be in his neurologic baseline  All sponge and needle counts were correct    A sai CT was preformed to check catheter placement    INTERPRETATION OF ANGIOGRAPHIC FINDINGS: 1  The Right internal carotid artery circulation reveals antegrade flow into the middle cerebral and anterior cerebral arteries  There is an azygous A2  There is a large posterior communicating artery, which appears fetal  There is no evidence of aneurysm, AVM, or vascular malformation  The capillary and venous phases are unremarkable    2   The Left internal carotid artery circulation reveals antegrade flow into the middle cerebral and anterior cerebral arteries  There is a patent posterior communicating artery  There is no evidence of aneurysm, AVM, or vascular malformation  The capillary and venous phases are unremarkable    3  The Right vertebral intracranial circulation reveals retrograde reflux down the contralateral vertebral artery  There is a small right P1 consistent with a fetal origin  The basilar apex is appears broad, but no aneurysmal dilitation  Both PICAs are well visualized  Antegrade flow is present into all the posterior circulation branches  There are no AVMs or aneurysms  The capillary and venous phases are unremarkable    Limited Right femoral arteriogram reveals normal puncture site anatomy    3D CT reveals catheter placement in the right lateral ventricle    Impression: Angio negative SAH  Azygous A2   Patient Disposition: Critical Care Unit   SIGNATURE: Saul Perez MD DATE: 04/17/18 TIME: 1000      Vas Transcranial Doppler, Complete Study    Result Date: 4/30/2018  Narrative:  [facility]: Indications:  Disease w/o CVA [I65 29]  PT C/O ED yesterday with severe frontal headache, photophobia, and mild nausea  CT scan showed SAH  Serial TCD to rule out vasospasm  Operative History Splenectomy Risk Factors The patient has history of obesity, migraine headaches, asthma, and smoking (current) 0 25 ppd  The patient's current BMI is 35 25, Weight (lb) is 199 lb and Height (in) is 63 in  Brachial BP:   Right:  147/85 mmHg  FINDINGS:  Segment         Right     Left                            Mean PSV  Mean PSV  Mid  ICA           35 00     35 00  Mid Cerebral      109 00     48 00  Ant  Cerebral      29 00     48 00  Post  Cerebral     33 00     54 00     CONCLUSION:  Impression  RIGHT SIDE:  TCD ABNORMAL: Evaluation shows mild vasospasm in the MCA  The Lindegaard ratio is abnormal, 3 11  Prior: 1 3  Evaluation shows no evidence of vasospasm in the LUBA or PCA    LEFT SIDE:  TCD NORMAL: Evaluation shows no evidence of vasospasm in the MCA, LUBA, or PCA  The Lindegaard ratio is normal, 1 33  Prior: 2 72  Technically difficult bedside study  PT unable to tolerate any probe pressure  In comparison to the study of 4/29/18, the mild vasospasm in the right MCA is a new finding  Technical findings given to TAI Nixon at 09:00  Preliminary report faxed ICU  SIGNATURE: Electronically Signed by: Tere Jessica MD, RPVI on 2018-04-30 10:30:16 PM    Vas Transcranial Doppler, Complete Study    Result Date: 4/29/2018  Narrative:  [facility]: Indications:  Serial TCD to rule-out vasospasm  Operative History Splenectomy  FINDINGS:  Segment         Right     Left                            Mean PSV  Mean PSV  Mid  ICA           37 00     29 00  Mid Cerebral       48 00     79 00  Ant  Cerebral      57 00            Post  Cerebral     28 00     32 00     CONCLUSION: Impression RIGHT SIDE:  TCD: NORMAL: Evaluation shows no significant evidence of vasospasm in the MCA  The Lindegaard ratio is: 1 30 and normal ( Prior: 1 61  ) Evaluation shows no evidence of vasospasm in the LUBA or PCA  LEFT SIDE:  TCD ABNORMAL: Evaluation shows no evidence of significant vasospasm in the MCA  The Lindegaard ratio is: 2 72  ( Prior: 2 24 ) Evaluation shows no evidence of vasospasm in the PCA  In comparison to the study of 4/27/18, there is not significant change in the ratios  Technically challenging exam secondary to patient movement and snoring  Preliminary report faxed to the ICU  SIGNATURE: Electronically Signed by: Saran Burkett MD on 2018-04-29 05:38:12 PM    Vas Transcranial Doppler, Complete Study    Result Date: 4/28/2018  Narrative:  [facility]PORT [facility]: Indications:  Disease w/o CVA [I65 29]  Serial TCD to rule-out vasospasm  Status post SAH  Operative History Splenectomy Clinical: Brachial Pressure:  129/67 mm Hg    FINDINGS:  Segment         Right     Left                            Mean PSV  Mean PSV  Mid  ICA           36 00     34 00  Mid Cerebral       59 00     76 00  Ant  Cerebral      73 00     45 00  Post  Cerebral     72 00               CONCLUSION: Impression RIGHT SIDE:  TCD: NORMAL: Evaluation shows no significant evidence of vasospasm in the MCA  The Lindegaard ratio is: 1 61 and normal ( Prior: 4 38  ) Evaluation shows no evidence of vasospasm in the LUBA or PCA  LEFT SIDE:  TCD ABNORMAL: Evaluation shows no significant evidence of vasospasm in the MCA  The Lindegaard ratio is: 2 24 and normal  ( Prior: 4 5 ) Evaluation shows no evidence of vasospasm in the LUBA  In comparison to the study of 4/27/18, the mild vasospasm previously discovered in the MCA on the right and left could not be determined and appears to have resolved  Technically challenging exam secondary to movement and snoring  Preliminary report faxed to the ICU  SIGNATURE: Electronically Signed by: Jeremi Torre MD on 2018-04-28 01:03:30 PM    Vas Transcranial Doppler, Complete Study    Result Date: 4/27/2018  Narrative:  [facility]: Indications:  Serial TCD to rule out vasospasm  Operative History Splenectomy  FINDINGS:  Segment         Right     Left                            Mean PSV  Mean PSV  Mid  ICA           34 00     26 00  Mid Cerebral      149 00    117 00  Ant  Cerebral      40 00            Post  Cerebral     60 00     73 00     CONCLUSION:  Impression RIGHT SIDE:  TCD  ABNORMAL: Evaluation shows mild vasospasm in the MCA  The Lindegaard ratio is abnormal, 4 38  Prior: 3 72  Evaluation shows no evidence of vasospasm in the LUBA or PCA  LEFT SIDE:  TCD ABNORMAL: Evaluation shows mild vasospasm in the MCA  The Lindegaard ratio is abnormal, 4 5  Prior: 2 63  Evaluation shows no evidence of vasospasm in the PCA  In comparison to the study of 4/26/18, the mild vasospasm in the left MCA is a new finding  Technical findings given to TAI Coburn at 9:00 AM  Preliminary report faxed to the ICU  SIGNATURE: Electronically Signed by: Any Vargas MD on 2018-04-27 10:31:59 PM    Vas Transcranial Doppler, Complete Study    Result Date: 4/27/2018  Narrative:  [facility]PORT [facility]: Indications:  Disease w/o CVA [I65 29]  PT C/O ED yesterday with severe frontal headache, photophobia, and mild nausea  CT scan showed SAH  Serial TCD to rule out vasospasm  Operative History Splenectomy Risk Factors The patient has history of obesity, migraine headaches, asthma, and smoking (current) 0 25 ppd  The patient's current BMI is 35 25, Weight (lb) is 199 lb and Height (in) is 63 in  Brachial BP:   Right: 114/61 mmHg  FINDINGS:  Segment         Right     Left                            Mean PSV  Mean PSV  Mid  ICA           32 00     30 00  Mid Cerebral      119 00     79 00  Ant  Cerebral      29 00     37 00  Post  Cerebral     65 00     31 00     CONCLUSION:  Impression  RIGHT SIDE:  TCD  ABNORMAL: Evaluation shows mild vasospasm in the MCA  The Lindegaard ratio is abnormal, 3 72  Prior: 0 81  Evaluation shows no evidence of vasospasm in the LUBA or PCA  LEFT SIDE:  TCD NORMAL: Evaluation shows no evidence of vasospasm in the MCA, LUBA, or PCA  The Lindegaard ratio is normal, 2 63  Prior: 1 09  Technically difficult bedside study  In comparison to the study of 4/25/18, the mild vasospasm in the right MCA is a new finding  Technical findings given to [employer] at 08:48  Preliminary report faxed ICU  SIGNATURE: Electronically Signed by: Zev Fowler on 2018-04-27 11:29:20 AM    Vas Transcranial Doppler, Complete Study    Result Date: 4/25/2018  Narrative:  [facility]: Indications:  Disease w/o CVA [I65 29]  PT C/O ED yesterday with severe frontal headache, photophobia, and mild nausea  CT scan showed SAH  Serial TCD to rule out vasospasm   Operative History Splenectomy Risk Factors The patient has history of obesity, migraine headaches, asthma, and smoking (current) 0 25 ppd  The patient's current BMI is 35 25, Weight (lb) is 199 lb and Height (in) is 63 in  Brachial BP:   Right: 136/80   mmHg  FINDINGS:  Segment         Right     Left                            Mean PSV  Mean PSV  Mid  ICA           37 00     32 00  Mid Cerebral       30 00     35 00  Ant  Cerebral      42 00     50 00  Post  Cerebral     34 00     33 00     CONCLUSION:  Impression  RIGHT SIDE:  TCD NORMAL: Evaluation shows no evidence of vasospasm in the MCA, LUBA, or PCA  The Lindegaard ratio is normal, 0 81, Prior: 5 68  LEFT SIDE:  TCD NORMAL: Evaluation shows no evidence of vasospasm in the MCA, LUBA, or PCA  The Lindegaard ratio is normal, 1 09  Prior: 3 0  Technically difficult bedside study  PT unable to tolerate any probe pressure  In comparison to the study of 4/24/18, the prior vasospasm appears to have resolved  Technical findings given to RN Eleuterio Sicard at 09:05  Preliminary report faxed ICU  SIGNATURE: Electronically Signed by: Christina Amaya on 2018-04-25 03:54:09 PM    Vas Transcranial Doppler, Complete Study    Result Date: 4/24/2018  Narrative:  [facility]PORT [facility]: Indications:  Disease w/o CVA [I65 29]  PT C/O ED yesterday with severe frontal headache, photophobia, and mild nausea  CT scan showed SAH  Serial TCD to rule out vasospasm  Operative History Splenectomy Risk Factors The patient has history of obesity, migraine headaches, asthma, and smoking (current) 0 25 ppd  The patient's current BMI is 35 25, Weight (lb) is 199 lb and Height (in) is 63 in  Brachial BP:   Right:  145/91 mmHg  FINDINGS:  Segment         Right     Left                            Mean PSV  Mean PSV  Mid  ICA           28 00     28 00  Mid Cerebral      159 00     84 00  Ant  Cerebral      47 00     42 00  Post  Cerebral    158 00     34 00     CONCLUSION:  Impression  RIGHT SIDE:  TCD:  ABNORMAL-  SEVERE VASOSPASM Evaluation shows severe vasospasm in the MCA and PCA   The Lindegaard ratio is abnormal, 5 68  Prior: 1 0  Evaluation shows no significant evidence of vasospasm in the LUBA  LEFT SIDE:  TCD: ABNORMAL-MILD VASOSPASM There is mild spasm noted in the MCA  The Lindegaard ratio is abnormal, 3 0  Prior: 3 48  Evaluation shows no significant evidence of vasospasm in the PCA or LUBA  In comparison to the study of 4/23/18, the severe vasospasm in the right MCA and PCA is a new finding  There is no significant change on the left  Technical findings given to RN Jefferson Brito at 08:30  Preliminary report faxed ICU  SIGNATURE: Electronically Signed by: Camelia Madrid on 2018-04-24 09:58:47 AM    Vas Transcranial Doppler, Complete Study    Result Date: 4/23/2018  Narrative:  [facility]: Indications:  Indications:  Indications:  Indications:  Disease w/o CVA [I65 29]  PT C/O ED  with severe frontal headache, photophobia, and mild nausea  CT scan showed SAH  Serial TCD to rule out vasospasm  Operative History Splenectomy Risk Factors The patient has history of HTN and smoking (current) 0 ppd  Clinical Right Pressure:  141/82 mm Hg, Left Pressure:  / mm Hg  FINDINGS:  Segment         Right     Left                            Mean PSV  Mean PSV  Mid  ICA           34 00     33 00  Mid Cerebral       34 00    115 00  Ant  Cerebral      43 00     66 00  Post  Cerebral     43 00     33 00     CONCLUSION: Impression RIGHT SIDE:  TCD:  NORMAL- NO VASOSPASM Evaluation shows no significant evidence of vasospasm in the MCA, PCA, or LUBA  The Lindegaard ratio is normal ( 1 )  Prior 0 7  LEFT SIDE:  TCD: ABNORMAL-Mild VASOSPASM There is mild spasm noted in the MCA  Evaluation shows no significant evidence of vasospasm in the PCA, or LUBA  The Lindegaard ratio is abnormal (3 48)  Prior 5 5 In comparison to the study of 4/22/18, there is mild vasospasm noted on the left side   Technical findings given to RN  SIGNATURE: Electronically Signed by: Juan Landrum MD on 2018-04-23 08:22:29 PM    Vas Transcranial Doppler, Complete Study    Result Date: 4/22/2018  Narrative:  [facility]: Indications:  Indications:  Indications:  Disease w/o CVA [I65 29]  PT C/O ED with severe frontal headache, photophobia, and mild nausea  CT scan showed SAH  Serial TCD to rule out vasospasm  Operative History Splenectomy Risk Factors The patient has history of HTN and smoking (current) 0 ppd  Clinical Right Pressure:  146/76 mm Hg, Left Pressure:  / mm Hg  FINDINGS:  Segment         Right     Left                            Mean PSV  Mean PSV  Mid  ICA           43 00     23 00  Mid Cerebral       30 00    133 00  Ant  Cerebral      47 00    107 00  Post  Cerebral     41 00     76 00     CONCLUSION:  Impression RIGHT SIDE:  TCD:  NORMAL- NO VASOSPASM Evaluation shows no significant evidence of vasospasm in the MCA, PCA, or LUBA  The Lindegaard ratio is normal ( 0 7 )  Prior 2 36  LEFT SIDE:  TCD: ABNORMAL- SEVERE VASOSPASM There is severe spasm noted in the MCA  Evaluation shows no significant evidence of vasospasm in the PCA, or LUBA  The Lindegaard ratio is abnormal ( 5 5)  Prior 2 87  In comparison to the study of 4/21/18, there is severe vasospasm noted on the left side  Technical findings given to RN  SIGNATURE: Electronically Signed by: Angelic Smiley on 2018-04-22 04:45:57 PM    Vas Transcranial Doppler, Complete Study    Result Date: 4/21/2018  Narrative:  [facility]: Indications:  Indications:  Disease w/o CVA [I65 29]  PT C/O ED yesterday with severe frontal headache, photophobia, and mild nausea  CT scan showed SAH  Serial TCD to rule out vasospasm  Operative History Splenectomy Risk Factors The patient has history of HTN and smoking (current)  Clinical Right Pressure:  137/69 mm Hg  FINDINGS:  Segment         Right     Left                            Mean PSV  Mean PSV  Mid   ICA           39 00     31 00  Mid Cerebral       92 00 89 00  Ant  Cerebral      35 00     71 00  Post  Cerebral     33 00               CONCLUSION:  Impression RIGHT SIDE:  TCD:  NORMAL- NO VASOSPASM Evaluation shows no significant evidence of vasospasm in the MCA, PCA, or LUBA  The Lindegaard ratio is normal ( 2 36 )  Prior 2 9  LEFT SIDE:  TCD: NORMAL- NO VASOSPASM Evaluation shows no significant evidence of vasospasm in the MCA, or LUBA  PCA not identified The Lindegaard ratio is normal ( 2 87 )  Prior 3 7  In comparison to the study of 4/20/18, there is no vasospasm noted on the left side  SIGNATURE: Electronically Signed by: Elena Jackson on 2018-04-21 09:52:46 AM    Vas Transcranial Doppler, Complete Study    Result Date: 4/20/2018  Narrative:  [facility] REPORT [facility]: Indications:  Disease w/o CVA [I65 29]  PT C/O ED yesterday with severe frontal headache, photophobia, and mild nausea  CT scan showed SAH  Serial TCD to rule out vasospasm  Operative History Splenectomy Risk Factors The patient has no history of obesity  FINDINGS:  Segment         Right     Left                            Mean PSV  Mean PSV  Mid  ICA           39 00     29 00  Mid Cerebral      111 00    106 00  Ant  Cerebral      66 00     85 00  Post  Cerebral     66 00    110 00     CONCLUSION:  Impression RIGHT SIDE:  TCD:  NORMAL:  NO VASOSPASM Evaluation shows no significant evidence of vasospasm in the MCA, LUBA,, or PCA  The Lindegaard ratio is normal, 2 9  Prior: 2 3  LEFT SIDE:  TCD: ABNORMAL- ,MILD VASOSPASM Evaluation shows evidence of mild vasospasm in the MCA  There is no evidence of significant vasospasm in the LUBA, or PCA  The Lindegaard ratio is elevated at 3 7  Prior: 3 03  In comparison to the study of 04-19-18, there is an increase in b/l ratio however the MCA velocities do not appear to be elevated    SIGNATURE: Electronically Signed by: Elena Jackson on 2018-04-20 03:01:36 PM    Vas Transcranial Doppler, Complete Study    Result Date: 4/20/2018  Narrative:  [facility]PORT [facility]: Indications:  Disease w/o CVA [I65 29]  PT C/O ED yesterday with severe frontal headache, photophobia, and mild nausea  CT scan showed SAH  Serial TCD to rule out vasospasm  Operative History Splenectomy Risk Factors The patient has history of obesity and smoking (current) 0 ppd  The patient current BMI is 35 25  FINDINGS:  Segment Right Left   Mean PSV Mean PSV  Mid  ICA 39 34  Mid Cerebral 91 103  Ant  Cerebral 91 86  Post  Cerebral 36 23     CONCLUSION:  Impression RIGHT SIDE:  TCD:  NORMAL:  NO VASOSPASM Evaluation shows no significant evidence of vasospasm in the MCA, LUBA,, or PCA  The Lindegaard ratio is normal, 2 33  Prior: 1 6  LEFT SIDE:  TCD: ABNORMAL- ,MILD VASOSPASM Evaluation shows evidence of mild vasospasm in the MCA  There is no evidence of significant vasospasm in the LUBA, or PCA  The Lindegaard ratio is elevated at 3 03  Prior: 1 92  In comparison to the study of 04-18-18, there is now mild vasospasm on the left  SIGNATURE: Electronically Signed by: Angelic Smiley on 2018-04-20 07:18:07 AM    Vas Transcranial Doppler, Complete Study    Result Date: 4/18/2018  Narrative:  [facility]PORT [facility]: Indications:  Disease w/o CVA [I65 29]  PT C/O ED yesterday with severe frontal headache, photophobia, and mild nausea  CT scan showed SAH  Serial TCD to rule out vasospasm  Operative History Splenectomy Risk Factors The patient has history of obesity, migraine headaches, asthma, and smoking (current) 0 25 ppd  The patient's current BMI is 35 25, Weight (lb) is 199 lb and Height (in) is 63 in  Brachial BP:   Left:  112/64  mmHg  FINDINGS:  Segment         Right     Left                            Mean PSV  Mean PSV  Mid  ICA           47 00     39 00  Mid Cerebral       75 00     75 00  Ant   Cerebral      47 00     33 00  Post  Cerebral     51 00     39 00     CONCLUSION:  Impression  RIGHT SIDE:  TCD:  NORMAL:  NO VASOSPASM Evaluation shows no significant evidence of vasospasm in the MCA, LUBA,, or PCA  The Lindegaard ratio is normal, 1 6  Prior: 1 67  LEFT SIDE:  TCD: NORMAL- NO VASOSPASM Evaluation shows no significant evidence of vasospasm in the MCA, LUBA,, or PCA  The Lindegaard ratio is normal, 1 92  Prior: 1 79  In comparison to the study of 04/17/2018, there is no significant B/L interval change  Technical findings given to [employer] at 07:30  SIGNATURE: Electronically Signed by: Rosita Rios MD on 2018-04-18 09:14:11 PM    Vas Transcranial Doppler, Complete Study    Result Date: 4/17/2018  Narrative:  [facility]: Indications:  Disease w/o CVA [I65 29]  PT C/O ED yesterday with severe frontal headache, photophobia, and mild nausea  CT scan showed SAH  Serial TCD to rule out vasospasm  Operative History Splenectomy Risk Factors The patient has history of obesity, migraine headaches, asthma, and smoking (current) 0 25 ppd  The patient's current BMI is 35 25, Weight (lb) is 199 lb and Height (in) is 63 in  Brachial BP:   Left:  144/75 mmHg  FINDINGS:  Segment         Right     Left                            Mean PSV  Mean PSV  Mid  ICA           36 00     34 00  Mid Cerebral       60 00     61 00  Ant  Cerebral      36 00     55 00  Post  Cerebral     36 00     51 00     CONCLUSION:  Impression  RIGHT SIDE:  TCD:  NORMAL:  NO VASOSPASM Evaluation shows no significant evidence of vasospasm in the MCA, LUBA,, or PCA  The Lindegaard ratio is normal, 1 67  LEFT SIDE:  TCD: NORMAL- NO VASOSPASM Evaluation shows no significant evidence of vasospasm in the MCA, LUBA,, or PCA  The Lindegaard ratio is normal, 1 79  Technical findings given to TAI Griffith at 13:30  SIGNATURE: Electronically Signed by: Nikole Contreras on 2018-04-17 07:02:05 PM    Xr Chest Picc Line Portable    Result Date: 4/19/2018  Narrative: CHEST INDICATION:   picc placement   COMPARISON:  Chest radiographs April 17, 2018 EXAM PERFORMED/VIEWS:  XR CHEST PICC LINE PORTABLE  AP portable semierect view in the conventional and line placement techniques  Images: 2 FINDINGS: The heart is mildly enlarged  Atherosclerotic changes in the aorta  Left upper extremity PICC line present with tip in right atrium  Consider repositioning  Right internal jugular triple-lumen catheter with tip at cavoatrial junction  The lungs are clear  No pneumothorax or pleural effusion  Osseous structures appear within normal limits for patient age  Impression: Left upper extremity PICC line projecting into the right atrium  Consider repositioning the PICC line by withdrawing approximately 2 cm  The study was marked in [facility] for immediate notification  Workstation performed: [id number]       PERTINENT LAB DATAS  Results for Mattie Gruber (MRN [id number]) as of 5/2/2018 11:58   5/2/2018 06:41   eGFR 98   Sodium 138   Potassium 4 2   Chloride 106   CO2 26   Anion Gap 6   BUN 12   Creatinine 0 66   Glucose 87   Calcium 9 6   WBC 9 27   RBC 3 81   Hemoglobin 11 5   Hematocrit 35 8   MCV 94   MCH 30 2   MCHC 32 1   RDW 14 6   Platelets 550 (H)   MPV 9 4   nRBC 0   Neutrophils Relative 46   Lymphocytes Relative 38   Monocytes Relative 13 (H)   Eosinophils Relative 3   Basophils Relative 0   Neutrophils Absolute 4 18   Monocytes Absolute 1 18   Eosinophils Absolute 0 31   Basophils Absolute 0 04         PHYSICAL EXAM:  Vitals:   Blood Pressure: 123/72 (05/02/18 0728)  Pulse: 69 (05/02/18 0728)  Temperature: 98 7 °F (37 1 °C) (05/02/18 0728)  Temp Source: Oral (05/02/18 0728)  Respirations: 18 (05/02/18 0728)  Height: 5' 3" (160 cm) (04/16/18 2220)  Weight - Scale: 90 7 kg (199 lb 15 3 oz) (04/16/18 2220)  SpO2: 97 % (05/02/18 0728)    GENERAL: AAO x 3  HEENT: atraumatic, normocephalic  Right sided sutures postop C/D/I with mild residual crusting only Oral mucosa moist, no icterus, pallor  PERRLA +  Poor dental hygiene    Neck supple, no JVD, no lymphadenopathy, no thryomegaly  CHEST: B/L breath sounds heard, occasional wheezing  CVS: S1, S2  No cyanosis/clubbing or edema  ABDOMEN: Soft/obese/NT/BS heard  NEUROLOGICAL: CN II -XI grossly intact  No focal motor or sensory deficits  No signs of meningeal irritation or cerebellar dysfunction  EXTREMITIES: No cyanosis/clubbing or edema  Discharge Disposition: [address] [place]: [address] [facility]    Test Results Pending at Discharge:     Medications   Please see After Visit Summary for reconciled discharge medications provided to patient and family  Diet restrictions:       Diet Orders            Start     Ordered    05/02/18 1149  Dietary nutrition supplements  Once     Question Answer Comment   Select Supplement: Ensure Enlive-Chocolate    Frequency Breakfast, Lunch, Dinner        05/02/18 1149    04/23/18 1321  Diet Regular; Regular House; Regular House  Diet effective midnight     Question Answer Comment   Diet Type Regular    Regular Regular House    Other Restriction(s): Regular House        04/23/18 1321        Activity restrictions: No strenuous activity  Discharge Condition: good  Code Status: Level 1 - Full Code    Discharge Statement   I spent 33 minutes discharging the patient  This time was spent on the day of discharge  Greater than 50% of total time was spent with the patient and / or family counseling and / or coordination of care  Bob Lucia MD  HOSPITALIST SERVICES  5/2/2018    PLEASE NOTE:  This encounter was completed utilizing the [employer] Direct Speech Voice Recognition Software  Grammatical errors, random word insertions, pronoun errors and incomplete sentences are occasional consequences of the system due to software limitations, ambient noise and hardware issues  These may be missed by proof reading prior to affixing electronic signature   Any questions or concerns about the content, text or information contained within the body of this dictation should be directly addressed to the physician for clarification  Please do not hesitate to call me directly if you have any any questions or concerns

## 2018-05-02 NOTE — PROGRESS NOTES
PHYSICAL MEDICINE AND REHABILITATION   PREADMISSION ASSESSMENT     Projected [facility] Diagnoses:  Impairment of mobility, safety, Activities of Daily Living (ADLs), and cognitive/communication skills due to Brain Dysfunction:  02 1  Non-Traumatic   Etiology: Subarachnoid hemorrhage involving the suprasellar cistern, sylvian fissures, basal cisterns and in the anterior falx  Date of Onset: 4/16/18   Date of surgery: 4/17/18: Right Internal Carotid Arteriogram         Left Internal Carotid Arteriogram         Right Vertebral Artery Arteriogram         Limited Right Femoral Arteriogram        4/17/18:Right  frontal ventriculostomy        4/23/18: Right Internal Carotid Arteriogram         Left Internal Carotid Arteriogram         Right Vertebral Artery Arteriogram         Limited Right Femoral Arteriogram    PATIENT INFORMATION  Name: Cristine Garza Phone #: [phone] (home)   Address: [address] [place] [address],[address] B: 1960 Age: 63 y o  SS#   Marital Status: Legally   Ethnicity:   Employment Status: unemployed  Extended Emergency Contact Information  Primary Emergency Contact: Philly Baca   [place]  Work Phone: [phone]  Relation: Sister  Secondary Emergency Contact: [address] Phone: [phone]  Relation: Mother  Advance Directive: Level 1 Full Code, AD Unknown    INSURANCE/COVERAGE:     Primary Payor: Jessica Needle / Plan: Jessica Needle / Product Type: Medicaid HMO /   Secondary Payer:Private Pay    Payer Contact: [facility]   Payer Contact:   Contact Fax: [phone] Contact Phone:   Contact Phone: [phone]    Authorization #: [id number]  Coverage Dates:5/2-5/8   LCD: 5/8 with review due on 5/9  Medicare Days:  Medical Record #: [id number]    REFERRAL SOURCE:   Referring provider: Llewellyn Crigler, MD  Referring facility: [address] [place] [address], [place]  [facility]  PCP: Linda Szymanski MD PCP phone number: [phone]    MEDICAL INFORMATION  HPI: Patient is a 63year old female with past medical history of HTN, migraine and cocaine and tobacco abuse who presented to [place] [address] on 4/16 with complaints of migraine headache  She was emotionally labile and presented with right facial droop  Blood pressures were elevated and the patient was given IV labetalol with improvement  Toaradol was administered for headache and a CT of the head was completed that showed a large acute subarachnoid hemorrhage involving the suprasellar cistern, sylvian fissures, basal cisterns and anterior falx  DDAVP was administered  A CTA of the neck was recommended that was negative for aneurysm and stenosis  Patient was transferred to [facility] and admitted to ICU  Patient was with a GCS of 15 on admission  She was started on a cardene drip for blood pressure, IV keppra (for 1 week), and IV nimodipine  Neurosurgery was consulted on 4/17 and angiogram was recommended as well as ventriculostomy for acquired hydrocephalus  Right frontal ventriculostomy drain was placed on 4/17  Angiogram on 4/17 was negative for aneurysm with recommendation of it being repeated a week later  Patient was monitored with transcranial dopplers for vasospasm for 14 days  On 4/18 pydanielle was consulted due to depression with suicidal ideation  SSRI was recommended and zoloft was started with improvement  Repeat angiogram on 4/23 was also negative for aneurysm or acute changes  TCD on 4/26 revealed an acute right vasospasm  This resolved as TCD on 4/27 was negative  CT of the head on 4/27 showed improvement in hemorrhage  Patient was stable and was transferred out of the ICU at that time  During hospital course patient had low grade temps and elevated WBCs  This was felt to be reactive  Patient also had complaint of mouth paint    [facility] [place] and recommended analgesics with no immediate surgical intervention  Neurosurgery has signed off and recommends that the patient follow up as an outpatient with CT of the head to be completed prior to visit  On  ACLS was completed as patient is to discharge home with her sister and will be alone at times  She scored a 5 out of 6 stating that she can be home alone with weekly checks  PT and OT evaluated the patient and recommended inpatient rehab as well as the attending physician  Patient is medically stable for transfer at this time  Past Medical History:   Past Surgical History: Allergies:     Past Medical History:   Diagnosis Date    Asthma     Past Surgical History:   Procedure Laterality Date     SECTION      SPLENECTOMY       No Known Allergies      Comorbidities:  Acquired hydrocephalus,leukocytosis, hypokalemia, HTN, history of asthma, cocaine abuse, cephalgia, polyuria, major depressive disorder, tooth pain, and tobacco abuse     CURRENT VITAL SIGNS:   Temp:  [98 6 °F (37 °C)-98 8 °F (37 1 °C)] 98 7 °F (37 1 °C)  HR:  [69-83] 69  Resp:  [18] 18  BP: (118-142)/(63-77) 123/72   Intake/Output Summary (Last 24 hours) at 18 1034  Last data filed at 18 0201   Gross per 24 hour   Intake              360 ml   Output             1175 ml   Net             -815 ml        LABORATORY RESULTS:      Lab Results   Component Value Date    HGB 11 5 2018    HCT 35 8 2018    WBC 9 27 2018     Lab Results   Component Value Date    BUN 12 2018     2018    K 4 2 2018     2018    GLUCOSE 87 2018    GLUCOSE 95 2018    CREATININE 0 66 2018     Lab Results   Component Value Date    PROTIME 12 5 2018    INR 0 93 2018        DIAGNOSTIC STUDIES:  Cta Head With And Without Contrast    Result Date: 2018  Impression: No focal stenosis or saccular aneurysm within the [place]   Reidentified acute subarachnoid hemorrhage  Workstation performed: [id number]     Xr Chest Portable    Result Date: 4/22/2018  Impression: 1  Right basilar consolidation could represent pneumonia or atelectasis  2   Very mild pulmonary vascular congestion without edema  Workstation performed: [id number]     Xr Chest Portable    Result Date: 4/19/2018  Impression: Right IJ catheter tip in the caval atrial junction region  Left PICC line tip also in the caval atrial junction region  Minimal bibasilar atelectasis  Workstation performed: [id number]     Xr Chest 1 View Portable    Result Date: 4/17/2018  Impression: Right IJ line placement, appears to be in satisfactory position, tip is at the cavoatrial junction  No pneumothorax  Low lung volumes  Workstation performed: [id number]     Ct Head Wo Contrast    Result Date: 4/18/2018  Impression: Persistent diffuse subarachnoid hemorrhage, slightly improved  There is some redistribution with hemorrhage layering in the occipital horns of the lateral ventricles  Ventricular drain present extending into the frontal horn of the right lateral ventricle  Workstation performed: [id number]     Ct Head Without Contrast    Result Date: 4/16/2018  Impression: Large amount of acute subarachnoid hemorrhage involving the suprasellar cistern, sylvian fissures, basal cisterns and in anterior falx  A ruptured aneurysm is suspected  A CTA head is recommended for further evaluation  I personally discussed this study with Chavez Archuleta on 4/16/2018 at 8:37 PM  Workstation performed: [id number]     Mri Brain Wo Contrast    Result Date: 4/18/2018  Impression: 1  Small linear focus of restricted diffusion in the left cerebellar hemisphere without associated hemorrhage, suggesting an acute or early subacute ischemic infarct  2   Grossly stable subarachnoid hemorrhage throughout the basal cisterns  No significant mass effect  3   Stable mild hydrocephalus and intraventricular blood products    I personally discussed this study with Dr Gagan Mccracken on 4/18/2018 at 10:53 PM  Workstation performed: [id number]     Mri Cervical Spine Wo Contrast    Result Date: 4/18/2018  Impression: 1  Chronic disc degenerative change throughout the cervical spine resulting in severe central canal stenosis at C5-6 and C6-7  Chronic mass effect on the cord without evidence of cord signal abnormality or myelomalacia  2   T2-3 paracentral disc protrusion and right paracentral disc extrusion with probable mild mass effect on the right ventral aspect of the cord  Workstation performed: [id number]     Xr Chest Picc Line Portable    Result Date: 4/19/2018  Impression: Left upper extremity PICC line projecting into the right atrium  Consider repositioning the PICC line by withdrawing approximately 2 cm  The study was marked in [facility] for immediate notification   Workstation performed: [id number]       PRECAUTIONS/SPECIAL NEEDS:  Substance Abuse: Cocaine, Tobacco:   History   Smoking Status    Current Every Day Smoker    Packs/day: 0 25   Smokeless Tobacco    Never Used   , Alcohol:    History   Alcohol Use No   , Anticoagulation:  heparin, Edema Management, Safety Concerns, Pain Management and Fall Precautions    MEDICATIONS:     Current Facility-Administered Medications:     acetaminophen (TYLENOL) tablet 650 mg, 650 mg, Oral, Q6H [address], Aurora RISHI Barragan PA-C, 650 mg at 05/02/18 0636    albuterol (PROVENTIL HFA,VENTOLIN HFA) inhaler 2 puff, 2 puff, Inhalation, Q6H PRN, Lobo Perez, DO, 2 puff at 05/01/18 1355    ALPRAZolam Velta Sears) tablet 0 25 mg, 0 25 mg, Oral, Daily PRN, Estela Barragan PA-C, 0 25 mg at 05/01/18 2011    alteplase (CATHFLO) injection 2 mg, 2 mg, Intracatheter, Once, Malathi Forbes PA-C    bisacodyl (DULCOLAX) rectal suppository 10 mg, 10 mg, Rectal, Daily PRN, Estela Barragan PA-C, 10 mg at 04/22/18 0806    docusate sodium (COLACE) capsule 100 mg, 100 mg, Oral, BID, Lowell XIAO Bashor, DO, 100 mg at 05/02/18 0952    fluticasone furoate-vilanterol (BREO ELLIPTA) 200-25 MCG/INH inhaler 1 puff, 1 puff, Inhalation, Daily, Aurora Barragan PA-C, 1 puff at 05/01/18 1354    heparin (porcine) subcutaneous injection 5,000 Units, 5,000 Units, Subcutaneous, Q8H [facility] & assisted, Aurora Barragan PA-C, 5,000 Units at 05/02/18 0636    HYDROmorphone (DILAUDID) injection 0 5 mg, 0 5 mg, Intravenous, Q4H PRN, Yuki Reese MD, 0 5 mg at 05/01/18 2116    HYDROmorphone (DILAUDID) injection 1 mg, 1 mg, Intravenous, Q4H PRN, Yuki Reese MD, 1 mg at 05/02/18 0957    labetalol (NORMODYNE) injection 10 mg, 10 mg, Intravenous, Q4H PRN, Bhavik Grossman MD    melatonin tablet 6 mg, 6 mg, Oral, HS, Demetria Way MD, 6 mg at 05/01/18 2116    methocarbamol (ROBAXIN) tablet 500 mg, 500 mg, Oral, Q6H PRN, Aurora Barragan PA-C, 500 mg at 04/28/18 2150    montelukast (SINGULAIR) tablet 10 mg, 10 mg, Oral, Daily, Olene Goldberg Bashor, DO, 10 mg at 05/02/18 8215    ondansetron (ZOFRAN) injection 4 mg, 4 mg, Intravenous, Q6H PRN, Olene Goldberg Bashor, DO, 4 mg at 04/27/18 0808    pravastatin (PRAVACHOL) tablet 40 mg, 40 mg, Oral, Daily With Dinner, Olene Goldberg Bashor DO, 40 mg at 05/01/18 1551    senna (SENOKOT) tablet 8 6 mg, 1 tablet, Oral, HS, Aurora Barragan PA-C, 8 6 mg at 05/01/18 2116    sertraline (ZOLOFT) tablet 25 mg, 25 mg, Oral, Daily, Bhavik Grossman MD, 25 mg at 05/02/18 4630    traMADol (ULTRAM) tablet 50 mg, 50 mg, Oral, Q4H PRN, Tess Mayorga PA-C, Stopped at 04/30/18 1702    SKIN INTEGRITY:   Brusing to right arm, scattered bruising to abdomen, right groin incison DONTRELL and C/D/I, right head incision with sutures DONTRELL and C/D/I    PRIOR LEVEL OF FUNCTION:  She lives in a(n) single family home  Charli Caballero is  and will be living with her sister at discharge  Self Care: Independent, Indoor Mobility: Independent, Stairs (in/outdoor):  Independent and Cognition: Independent    HOME ENVIRONMENT:  The living area: bedroom on second floor and bathroom on second floor  There are no steps to enter the home from the garage  The patient will not have 24 hour supervision/physical assistance available upon discharge  PREVIOUS DME:  Equipment in home (previous DME): None    FUNCTIONAL STATUS:  Physical Therapy Occupational Therapy Speech Therapy   As per PT:      05/01/18 1300   Pain Assessment   Pain Assessment 0-10   Pain Score 8   Pain Type Acute pain   Pain Location Head   Patient's Stated Pain Goal No pain   Hospital Pain Intervention(s) Ambulation/increased activity   Response to Interventions worse w/ mobility, better at rest   Restrictions/Precautions   Weight Bearing Precautions Per Order No   Other Precautions Cognitive; Chair Alarm; Bed Alarm;Pain; Fall Risk;Multiple lines   General   Chart Reviewed Yes   Family/Caregiver Present No   Cognition   Overall Cognitive Status Impaired   Arousal/Participation Responsive   Attention Attends with cues to redirect   Orientation Level Oriented X4   Memory Unable to assess   Following Commands Follows one step commands without difficulty   Subjective   Subjective states she continues to have HA   cooperative w/ minimal encouragement   Bed Mobility   Supine to Sit 5  Supervision   Additional items Increased time required  (sat x 5 min EOB prior to gait due to HA/dizziness)   Sit to Supine 4  Minimal assistance   Additional items Assist x 1   Transfers   Sit to Stand 4  Minimal assistance   Additional items Assist x 1   Stand to Sit 4  Minimal assistance   Additional items Assist x 1   Ambulation/Elevation   Gait pattern (slow, increased RW advancement, mild ataxia)   Gait Assistance 4  Minimal assist   Additional items Assist x 1   Assistive Device Rolling walker   Distance 60'x1, 35'x1, 25'x1, w/ 3-4 min standing rests between trials   Balance   Static Sitting Fair +   Dynamic Sitting Fair   Static Standing Fair -   Dynamic Standing Fair -   Ambulatory Fair -   Endurance Deficit   Endurance Deficit Yes   Endurance Deficit Description fatigue, weakness, pain   Activity Tolerance   Activity Tolerance Patient limited by fatigue;Patient limited by pain;Treatment limited secondary to medical complications (Comment)   Nurse Made Aware yes   Assessment   Prognosis Fair   Problem List Decreased strength;Decreased endurance; Impaired balance;Decreased mobility; Decreased coordination;Decreased cognition; Impaired judgement;Decreased safety awareness;Pain   Assessment Pt seen for session, gait training x 25 min including setup, sitting time, rest time, and repositioning  Pt awake and cooperative, continues to be limited by severe HA  Note improved overall balance and mobility tolerance  needs cues for safe RW mgmt, especially when fatigued  LOB worsers when fatigued  remains appropriate for rehab at d/c      Per WILSON:    05/01/18 1573    Pain Assessment   Pain Assessment 0-10   Pain Score 8   Pain Type Acute pain   Pain Location Head   ADL   Where Assessed Supine, bed   Grooming Assistance 5  Supervision/Setup   Grooming Deficit Setup; Increased time to complete   UB Bathing Assistance 5  Supervision/Setup   UB Bathing Deficit Setup; Increased time to complete   LB Bathing Assistance 5  Supervision/Setup   LB Bathing Deficit Setup;Verbal cueing;Supervision/safety; Increased time to complete;Right lower leg including foot; Left lower leg including foot   LB Bathing Comments supervision levels vary according to "migraine, head pain, functional balance"   UB Dressing Assistance 4  Minimal Assistance   UB Dressing Deficit Setup;Supervision/safety; Thread RUE; Thread LUE   UB Dressing Comments (over IV lines)   Toileting Assistance  4  Minimal Assistance   Toileting Deficit Verbal cueing;Supervison/safety; Increased time to complete   Cognition   Overall Cognitive Status Impaired   Arousal/Participation Alert   Attention Attends with cues to redirect   Orientation Level Oriented X4   Memory Decreased recall of recent events;Decreased recall of precautions   Following Commands Follows one step commands with increased time or repetition   Cognition Assessment Tools ACLS   Score 5   Activity Tolerance   Activity Tolerance Patient limited by pain   Medical Staff Made Aware ok to see per RN   Assessment   Assessment Patient participated in skilled OT with focus on cognitive assessment through administration of the ACLS and functional asssessment of ADL tasks  Patient scored a 5 0 on the ACLS with detailed results as attached below  Patient motivated to perform affected mostly by "headache" which nursing is aware of  Patient requires assist for higher level dynamic tasks secondary to intermittent impairments related to "headaches and residual effects"  Patient would benefit from short term rehab with focus on increasing functional strength, functional balance, increasing independence with ADL skills, and cognitive reorientation as needed and education in compensatory techniques  5 0     Administered Elisabeth Hair Cognitive Level Screen (ACLS)  The patient scored 5 0/6 0 indicating that they may live alone with weekly checks to monitor safety and check problem solving effectiveness  May be able to work in supported employment with       Behavior:  Stops working to talk  Questions need to do activity  Knows need to keep scheduled appointments however may be apt for forget or miss appointments  Tangential   Manipulative  May blame others when mistakes are made  May argue with suggestions  May be inconsistent with carryover  May resist attempts to provide assistance  Inflexible  May insist upon using an inefficient way of doing things  Conversations may be concrete and self centered  Abstract reasoning may not be understood  Grooming: Completes grooming activity independently  May forget newly learned techniques  May fail to anticipate need to purchase grooming supplies    May not read directions before using new products  Dressing:  May not consider social standards  May argue with suggestions to change selections  Bathing:  Completes bathing routine independently  May solve problems  May fail to anticipate secondary effects of new products like allergies  Walking/Exercising:  May forget newly discovered routes and may have to rediscover them  May refuse to comply with exercise program   Eating:  Eats and talks with others  May not see crumbs or small spills  May need help to comply with dietary restrictions  Toileting:  Independently performs all toileting  Can explore a new environment to locate a bathroom without assistance  May not anticipate needs for new toileting solutions  Medications:  Opens new containers  May understand medication schedules but has trouble taking them on time  May choose to discontinue medications  May recognize side effects like tremors or sedation  Utilize daily pill box  Use of adaptive equipment:  May choose not to use equipment  May utilize in unsafe manner  Housekeeping:  Able to learn new sequence of actions  Does not change or alter pace  May set and follow a weekly schedule  Identifies potential hazards posed by electric, gas, toxins, poisons or improper storage/disposal of items  Food preparation:  May make reservations to eat out but may fail to remember and arrive late  Passes heavy serving dishes safely  May forget discovered solutions to problems  Spending money:  May be able to identify monthly budget of routine expenses but may have trouble adhering to it  May run up bills or overextend credit  May have trouble balancing checkbook  Does not plan for long term financial security  Shopping:  May make daily or weekly schedule of shopping needs with trouble adhering to it  May be impulsive in spending  May discriminate between stores, labels and brands  Laundry:  May set and follow weekly schedule with difficulty adhering to it  Learns new sequence of actions  Uses iron effectively  Traveling: Can make a schedule for traveling to new locations but has difficulty adhering to it  May miss bus  May need assistance to read and understand new schedules  Telephone:  Learns new telephone procedures by initiating several steps at a time  May look up new numbers  May not use yellow pages  May become confused by new options or ignore call waiting  May run up large phone bills  Driving:  Should NOT operate a motor vehicle  CURRENT GAP IN FUNCTION     Prior to Admission:     Functional Status: Patient was independent with mobility/ambulation, transfers, ADL's, IADL's  Estimated length of stay: 7 to 10 days    Anticipated Post-Discharge Disposition/Treatment  Disposition: Family member's home   Outpatient Services: Physical Therapy (PT), Occupational Therapy (OT) and Speech Therapy    BARRIERS TO DISCHARGE  Weakness, Pain, Leukocytosis (elevated white blood count), Diminished cognition/Mentation change, Balance Difficulty, Fatigue, Home Accessibility, Caregiver Accessibility, Financial Resources, Equipment Needs and Resource Availability    INTERVENTIONS FOR DISCHARGE  Adaptive equipment, Patient/Family/Caregiver Education, Community Resources, Financial Assistance, Arrange DME needs, Medication Changes as per MD and Therapy exercises    REQUIRED THERAPY:  Patient will require PT, OT and ST 60 minutes each per day, five days per week to achieve rehab goals  REQUIRED FUNCTIONAL AND MEDICAL MANAGEMENT FOR INPATIENT REHABILITATION:  Pain Management: Overall pain is moderately controlled, Deep Vein Thrombosis (DVT) Prophylaxis:  heparin and SCD's while in bed, PM&R management, Internal Medicine, nursing management, PT/OT/ST intervention, patient and family education and training, and any additional consults as needed  RECOMMENDED LEVEL OF CARE:  [place]  [facility] [place] with complaints of migraine headache  CT of the head showed a large acute subarachnoid hemorrhage  DDAVP was administered  A CTA of the neck was recommended that was negative for aneurysm and stenosis  Patient was transferred to [facility] and was started on a cardene drip for blood pressure, IV keppra (for 1 week), and IV nimodipine  Neurosurgery was consulted on 4/17  Angiogram was recommended as well as ventriculostomy for acquired hydrocephalus  Right frontal ventriculostomy drain was placed on 4/17  Angiogram on 4/17 was negative for aneurysm  Patient was monitored with transcranial dopplers for vasospasm for 14 days  Repeat angiogram on 4/23 was also negative for aneurysm or acute changes  TCD on 4/26 revealed an acute right vasospasm  This resolved as TCD on 4/27 was negative  CT of the head on 4/27 showed improvement in hemorrhage  Prior to admission patient was completely independent with functional mobility, ADLs and IADLs including driving  Nursing management is being recommended for education on medication changes, internal medicine to monitor and manage medical conditions, PM&R to maximize over all functional abilities, and inpatient rehab to maximize strength, mobility, and cognition upon discharge to home with her sister at a Mod I level

## 2018-05-03 PROCEDURE — 97116 GAIT TRAINING THERAPY: CPT | Performed by: PHYSICAL THERAPIST

## 2018-05-03 PROCEDURE — 97530 THERAPEUTIC ACTIVITIES: CPT | Performed by: PHYSICAL THERAPIST

## 2018-05-03 PROCEDURE — 97167 OT EVAL HIGH COMPLEX 60 MIN: CPT

## 2018-05-03 PROCEDURE — 99254 IP/OBS CNSLTJ NEW/EST MOD 60: CPT | Performed by: PSYCHIATRY & NEUROLOGY

## 2018-05-03 PROCEDURE — 99232 SBSQ HOSP IP/OBS MODERATE 35: CPT | Performed by: PHYSICAL MEDICINE & REHABILITATION

## 2018-05-03 PROCEDURE — 92523 SPEECH SOUND LANG COMPREHEN: CPT

## 2018-05-03 PROCEDURE — 97535 SELF CARE MNGMENT TRAINING: CPT

## 2018-05-03 PROCEDURE — 97112 NEUROMUSCULAR REEDUCATION: CPT | Performed by: PHYSICAL THERAPIST

## 2018-05-03 RX ORDER — LORATADINE 10 MG/1
10 TABLET ORAL DAILY
Status: DISCONTINUED | OUTPATIENT
Start: 2018-05-03 | End: 2018-05-10 | Stop reason: HOSPADM

## 2018-05-03 RX ORDER — FLUTICASONE PROPIONATE 50 MCG
1 SPRAY, SUSPENSION (ML) NASAL 2 TIMES DAILY
Status: DISCONTINUED | OUTPATIENT
Start: 2018-05-03 | End: 2018-05-10 | Stop reason: HOSPADM

## 2018-05-03 RX ADMIN — HYDROMORPHONE HYDROCHLORIDE 2 MG: 2 TABLET ORAL at 09:57

## 2018-05-03 RX ADMIN — PANTOPRAZOLE SODIUM 20 MG: 20 TABLET, DELAYED RELEASE ORAL at 09:56

## 2018-05-03 RX ADMIN — LORATADINE 10 MG: 10 TABLET ORAL at 14:41

## 2018-05-03 RX ADMIN — FLUTICASONE FUROATE AND VILANTEROL 1 PUFF: 200; 25 POWDER RESPIRATORY (INHALATION) at 14:39

## 2018-05-03 RX ADMIN — FLUTICASONE PROPIONATE 1 SPRAY: 50 SPRAY, METERED NASAL at 17:00

## 2018-05-03 RX ADMIN — Medication 10 ML: at 17:00

## 2018-05-03 RX ADMIN — MONTELUKAST SODIUM 10 MG: 10 TABLET, FILM COATED ORAL at 09:57

## 2018-05-03 RX ADMIN — PRAVASTATIN SODIUM 40 MG: 40 TABLET ORAL at 17:00

## 2018-05-03 RX ADMIN — DOCUSATE SODIUM 100 MG: 100 CAPSULE, LIQUID FILLED ORAL at 17:00

## 2018-05-03 RX ADMIN — MELATONIN TAB 3 MG 6 MG: 3 TAB at 22:09

## 2018-05-03 RX ADMIN — DOCUSATE SODIUM 100 MG: 100 CAPSULE, LIQUID FILLED ORAL at 09:57

## 2018-05-03 RX ADMIN — ACETAMINOPHEN 650 MG: 325 TABLET ORAL at 05:24

## 2018-05-03 RX ADMIN — HEPARIN SODIUM 5000 UNITS: 5000 INJECTION, SOLUTION INTRAVENOUS; SUBCUTANEOUS at 22:09

## 2018-05-03 RX ADMIN — HEPARIN SODIUM 5000 UNITS: 5000 INJECTION, SOLUTION INTRAVENOUS; SUBCUTANEOUS at 05:24

## 2018-05-03 RX ADMIN — Medication 10 ML: at 12:29

## 2018-05-03 RX ADMIN — HEPARIN SODIUM 5000 UNITS: 5000 INJECTION, SOLUTION INTRAVENOUS; SUBCUTANEOUS at 14:33

## 2018-05-03 RX ADMIN — Medication 10 ML: at 22:10

## 2018-05-03 RX ADMIN — HYDROMORPHONE HYDROCHLORIDE 2 MG: 2 TABLET ORAL at 22:10

## 2018-05-03 RX ADMIN — ACETAMINOPHEN 650 MG: 325 TABLET ORAL at 17:01

## 2018-05-03 RX ADMIN — SENNOSIDES 8.6 MG: 8.6 TABLET ORAL at 22:09

## 2018-05-03 RX ADMIN — SERTRALINE HYDROCHLORIDE 25 MG: 25 TABLET ORAL at 09:57

## 2018-05-03 RX ADMIN — ACETAMINOPHEN 650 MG: 325 TABLET ORAL at 12:27

## 2018-05-03 NOTE — PROGRESS NOTES
Physical Medicine & Rehabilitation Progress Note:    Primary Rehabilitation Diagnosis: Subarachnoid hemorrhage (HH3, Velazco 3)    Subjective: Patient seen face to face  Had a good therapy evaluation day  Patient continues to have intermittent head as well as tooth pain  Medications trialed yesterday were helpful she reports  Patient would like her tramadol discontinued she feels nauseated with this medication    Review Of Systems:   A 10 point review of systems was performed  Pertinent positives above    Functional Update:  Functional evaluations in progress    Objective:  /82 (BP Location: Right arm)   Pulse 84   Temp 98 3 °F (36 8 °C) (Oral)   Resp 18   Ht 5' 3" (1 6 m)   Wt 87 4 kg (192 lb 10 9 oz)   SpO2 95%   BMI 34 13 kg/m²     GENERAL: No acute distress    HEENT:  Right frontal scalp with sutures present, PERRL, EOMI, mucous membranes moist   CARDIOVASCULAR: regular rate rhythm, no murmurs  LUNGS: clear to ausculation bilaterally, good inspiratory effort, no wheezes, rales, rhonchi   ABDOMEN: soft, non-tender, non-distended, no guarding  Bowel sounds x 4 normal   EXTREMITIES: no pedal edema bilaterally, negative Sujata's bilaterally  MUSCULOSKELETAL:  Range of motion functional x4  NEUROLOGICAL:   Awake, alert  Motor BUE 4/5 throughout  Bilateral lower extremity:  2/5 proximally at the hip flexors, 3/5 at the knee extensors and knee flexors, 4/5 at the dorsiflexors plantar flexors  Psych:  Flat affect    Assessment:    Fanny Lara is a 63 y o  female in stable condition status post spontaneous subarachnoid hemorrhage located in the suprasellar cistern, sylvian fissure, basal cisterns and the anterior falx  Status post EVD placement close monitoring without further intervention  Rehabilitation Plan:      -Rehabilitation of subarachnoid hemorrhage: PT/OT/SLP therapies      -Appreciate Internal Medicine following - Dr Xiomara Calvo to Internal Medicine     -Subarachnoid hemorrhage: HH#,Velazco 3 type  Located in the suprasellar cistern, sylvian fissures, basal cistern and the anterior falx:  Status post EVD placement and 2 angiograms with negative findings for AV malformation or aneurysm  Patient's repeat head CT and showing some improvement  Patient to be followed closely by Neurosurgery with repeat CT of the head in 2 weeks      -MRI brain showing a small area of restricted diffusion in the left cerebellar hemisphere suspicious for possible acute to subacute infarct:  Patient managed on Pravachol for CVA prophylaxis and may need to follow with Neurology as an outpatient furthermore      -MRI cervical spine showing T2/T3 paracentral disc protrusion and right paracentral disc extrusion with mild mass effect on the right ventral aspect of cord:  Physical examination of bilateral upper extremities within normal limits and current subjective symptoms negative  Will likely require follow-up imaging if examination were symptoms change  Of note patient does have a bilateral equivalent Rogers's      -headaches:  Managed conservatively with Tylenol scheduled,  p r n  Robaxin  Encouraged low light low sound and rest when needed between therapies      -dental pain:  Patient reports she will require several teeth pulled and 1 tooth repaired on and she has a dentist that she follows with  Order placed her Magic mouthwash, in the fall, Dilaudid 2 mg Q 6 hr p r n  for severe pain;   I have counseled and educated her that we will plan to wean the Dilaudid off during her rehabilitative stay      -depression/anxiety:   appreciate psychiatry evaluation  Patient perseverative about being on Wellbutrin however this could reduce the seizure threshold as was counseled to the patient by Psychiatry today  Patient was agreeable to increase Zoloft therefore this was increased to 50 mg daily      -asthma:  Managed on Breo as well as Singulair     -insomnia:  Managed on melatonin 6 mg at bedtime     -GI prophylaxis managed on Protonix 20 mg daily     -DVT prophylaxis managed on SCDs, subacute heparin which was cleared by Neurosurgery          Lab Results   Component Value Date    WBC 9 27 05/02/2018    HGB 11 5 05/02/2018    HCT 35 8 05/02/2018    MCV 94 05/02/2018     (H) 05/02/2018     Lab Results   Component Value Date    GLUCOSE 87 05/02/2018    CALCIUM 9 6 05/02/2018     05/02/2018    K 4 2 05/02/2018    CO2 26 05/02/2018     05/02/2018    BUN 12 05/02/2018    CREATININE 0 66 05/02/2018       Current Facility-Administered Medications:     acetaminophen (TYLENOL) tablet 325 mg, 325 mg, Oral, Q6H PRN, Elyse Beltrán MD    acetaminophen (TYLENOL) tablet 650 mg, 650 mg, Oral, Q6H [address], Sunday Fritter, CRNP, 650 mg at 05/03/18 1227    al mag oxide-diphenhydramine-lidocaine viscous (MAGIC MOUTHWASH) suspension 10 mL, 10 mL, Swish & Spit, 4x Daily (AC & HS), Elyse Beltrán MD, 10 mL at 05/03/18 1229    albuterol (PROVENTIL HFA,VENTOLIN HFA) inhaler 2 puff, 2 puff, Inhalation, Q6H PRN, Sunday Fritter, CRNP, 2 puff at 05/02/18 1715    ALPRAZolam Priscille Peach) tablet 0 25 mg, 0 25 mg, Oral, Daily PRN, Sunday Fritter, CRNP    benzocaine (ANBESOL) 10 % mucosal liquid, , Mucosal, 4x Daily PRN, Elyse Beltrán MD    bisacodyl (DULCOLAX) rectal suppository 10 mg, 10 mg, Rectal, Daily PRN, Sunday Fritter, CRNP    docusate sodium (COLACE) capsule 100 mg, 100 mg, Oral, BID, Sunday Fritter, CRNP, 100 mg at 05/03/18 0957    fluticasone furoate-vilanterol (BREO ELLIPTA) 200-25 MCG/INH inhaler 1 puff, 1 puff, Inhalation, Daily, Sunday Fritter, CRNP, 1 puff at 05/02/18 2017    heparin (porcine) subcutaneous injection 5,000 Units, 5,000 Units, Subcutaneous, Q8H [address], Sunday HUSAM Singleton, 5,000 Units at 05/03/18 0524    HYDROmorphone (DILAUDID) tablet 2 mg, 2 mg, Oral, Q6H PRN, Elyse Beltrán MD, 2 mg at 05/03/18 0957    melatonin tablet 6 mg, 6 mg, Oral, HS, Sunday HUSAM Singleton, 6 mg at 05/02/18 2883   methocarbamol (ROBAXIN) tablet 500 mg, 500 mg, Oral, Q6H PRN, HUSAM Verma    montelukast (SINGULAIR) tablet 10 mg, 10 mg, Oral, Daily, HUSAM Verma, 10 mg at 05/03/18 0957    ondansetron (ZOFRAN-ODT) dispersible tablet 4 mg, 4 mg, Oral, Q6H PRN, HUSAM Verma    pantoprazole (PROTONIX) EC tablet 20 mg, 20 mg, Oral, Early Morning, Deshaun Ogden MD, 20 mg at 05/03/18 0956    pravastatin (PRAVACHOL) tablet 40 mg, 40 mg, Oral, Daily With HUSAM Cook, 40 mg at 05/02/18 1715    senna (SENOKOT) tablet 8 6 mg, 1 tablet, Oral, HS, HUSAM Verma, 8 6 mg at 05/02/18 2223    [START ON 5/4/2018] sertraline (ZOLOFT) tablet 50 mg, 50 mg, Oral, Daily, MD Deshaun Price MD  [facility] Primary Service

## 2018-05-03 NOTE — PROGRESS NOTES
SLP Cognitive Assessment       05/03/18 0900   Pain Assessment   Pain Assessment 0-10   Pain Score Worst Possible Pain   Pain Location (per pt description, all over but primarily back)   Hospital Pain Intervention(s) Repositioned;Distraction   Restrictions/Precautions   Precautions Bed/chair alarms; Fall Risk;Impulsive;Contact/isolation;Pain   Cognitive Linguisitic Assessments   Cognitive Linquistic Quick Test (CLQT) Pt completed the CLQT during session  Overall severity rating when compared to age matched peers between the ages of 18-69 yrs places pt to demonstrate cognitive skills to be [facility]  The following cognitive domain scores are as follows: Attention: WNL; Memory: WNL; Executive Function: WNL; Langauge: WNL; Visuospatial [place]; Clock Drawing: WNL  Of note, pt was below criterion cut off score on 1 out of 10 tasks  During that task, pt was able to self correct errors,but unable to improve completing activity to cut off score  Pt was noted to be intermittently impulsive when completing tasks, but improvement noted  as session progressed  No overt language deficits observed during assessment  Memory Skills   Memory (FIM) 5 - Recalls/performs request 90% of time   Social Interaction (FIM) 5 - Interacts appropriately with others 90% of time   Speech/Language/Cognition Assessmetn   Treatment Assessment Pt completed the CLQT, where overall score when compared to age matched peers between 18-69 yrs deems pt's cognitive linguistic skills to be WNL  Overall, pt will demonstrate difficulty w/ new learning as per alarms, calling for assistance, etc  Additionally, pt will be moving in w/ sister upon d/c where supervision will occur upon d/c  No further SLP services warranted at this time,  but will benefit from OT/PT services to maximize overall functional abilities      SLP Therapy Minutes   SLP Time In 0900   SLP Time Out 0945   SLP Total Time (minutes) 45   SLP Mode of treatment - Individual (minutes) 45   SLP Mode of treatment - Concurrent (minutes) 0   SLP Mode of treatment - Group (minutes) 0   SLP Mode of treatment - Co-treat (minutes) 0   SLP Mode of Teatment - Total time(minutes) 45 minutes   Therapy Time missed   Time missed?  No   Daily FIM Score   Problem solving (FIM) 5 - Solves complex problems But requires cues from helper   Comprehension (FIM) 6 - Has only MILD difficulty with complex/abstract info   Expression (FIM) 6 - Expresses complex/abstract but requires:  more time

## 2018-05-03 NOTE — PCC SPEECH THERAPY
Pt completed formalized cognitive linguistic assessment (CLQT)  Upon completing assessment, pt's overall cognitive skills when compared to age matched peers between 18-69 yrs, demonstrates cognitive skills to be WNL at this time  No further SLP services warranted at this time but will benefit from OT/PT services to maximize functional abilities

## 2018-05-03 NOTE — TREATMENT PLAN
[facility] [address] 63 [facility]  [facility] MRN: [id number]  Unit/Bed#: -01 Encounter: [id number]     PATIENT INFORMATION  ADMISSION DATE: 5/2/2018  1:23 PM KATHY CATEGORY:SAH   ADMISSION DIAGNOSIS: Subarachnoid hemorrhage ([facility] [address] ) [I60 9]  EXPECTED LOS: 10-14 days     MEDICAL/FUNCTIONAL PROGNOSIS  Based on my assessment of the patient's medical conditions and current functional status, the prognosis for attaining medical and functional goals or the IRF stay is:  Fair    Medical Goals: Patient will be medically stable for discharge to [facility] [place], operated by [facility] upon completion of rehab program and Patient will be able to manage medical conditions and comorbid conditions with medications and follow up upon completion of rehab program    [address]: Home - Assistance or supervision     ANTICIPATED FOLLOW-UP SERVICE:   Outpatient Therapy Services: PT, OT and SLP        DISCIPLINE SPECIFIC PLANS:  Required Disciplines & Services: Rehabillitation Nursing, Case Management and Psychology    REQUIRED THERAPY:  Therapy Hours per Day Days per Week Total Days   Physical Therapy 1 5-6 7   Occupational Therapy 1 5-6 7   Speech/Language Therapy 1 5-6 7   NOTE: Additional therapy time(s) may be added as appropriate to meet patient needs and to achieve functional goals      ANTICIPATED FUNCTIONAL OUTCOMES:  ADL:  Supervision - Mod I with LRAD   Bladder/Bowel: Patient will be independent with bladder/bowel management with least restrictive device upon completion of rehab program   Transfers: Patient will require supervision with transfers with least restrictive device upon completion of rehab program   Locomotion: Patient will require supervision with locomotion with least restrictive device upon completion of rehab program   Cognitive:       DISCHARGE PLANNING NEEDS  Equipment needs: Discharge needs to be reviewed with team      REHAB ANTICIPATED PARTICIPATION RESTRICTIONS:  Assist with Mobility, Decreased Insight to Deficits, Rquires Assist with ADLS and Requires Assit with Steps, cognitoin

## 2018-05-03 NOTE — CONSULTS
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    Dane Frey is a 63 y o  female with a medical history of asthma and splenectomy  She was at [address] [id number] on April 16, 2018 with a worsening headache, she have a SAH and was transferred to [place] for neurosurgery care  She had been treated with ventricolostomy placement  for hydrocephalus and angiogram performed 04/17/2018  She was in the hospital for 16 days and transferred to acute rehabilitation  Patient continued complaining of feeling depressed  She states that she was in  Wellbutrin in the past and she want to start this medication again  I saw this patient when she was in ICU and I explained to her that Wellbutrin is contraindicated in her case because it decrease the seizure threshold and she have high risk for seizures  She states she cannot remember the conversation but she understand why she was placed on Zoloft and not Wellbutrin  She states that she feels depressed, she had a sleep disturbances, and sometimes she has anxiety  She denies any issue with appetite  She denies any suicidal thoughts plans or intent, she denies any psychotic symptoms  Psychiatric Review Of Systems:  sleep: yes  appetite changes: no  weight changes: no  energy/anergy: no  interest/pleasure/anhedonia: no  somatic symptoms: no  anxiety/panic: yes  eboni: no  guilty/hopeless: no  self injurious behavior/risky behavior: no    Historical Information   Past Psychiatric History:   She had 1 inpatient psychiatric admission at [facility] couple years ago  Currently in treatment with none    Past Suicide attempts:  None  Past Violent behavior:  None  Past Psychiatric medication trial:  Wellbutrin,  citalopram, and Xanax    Substance Abuse History:  Patient have a history of crack cocaine and alcohol  I have assessed this patient for substance use within the past 12 months     History of IP/OP rehabilitation program:  She denies  Smoking history:  She was smoking a pack day prior to admission  Family Psychiatric History:   Brother  of overdose    Social History  Education: 11th grade  Learning Disabilities: None  Marital history:   Living arrangement, social support: She was living with her daughter, was planning to move with her sister  Occupational History: unemployed  Functioning Relationships: good support system  Other Pertinent History: None    Traumatic History:   Abuse: She denies  Other Traumatic Events: None    Past Medical History:   Diagnosis Date    Asthma        Medical Review Of Systems:  Review of Systems - Negative except headache, back pain, difficulty ambulating, depression   All other systems reviewed were negative    Meds/Allergies   current meds:   Current Facility-Administered Medications   Medication Dose Route Frequency    acetaminophen (TYLENOL) tablet 325 mg  325 mg Oral Q6H PRN    acetaminophen (TYLENOL) tablet 650 mg  650 mg Oral Q6H [address]    al mag oxide-diphenhydramine-lidocaine viscous (MAGIC MOUTHWASH) suspension 10 mL  10 mL Swish & Spit 4x Daily (AC & HS)    albuterol (PROVENTIL HFA,VENTOLIN HFA) inhaler 2 puff  2 puff Inhalation Q6H PRN    ALPRAZolam (XANAX) tablet 0 25 mg  0 25 mg Oral Daily PRN    benzocaine (ANBESOL) 10 % mucosal liquid   Mucosal 4x Daily PRN    bisacodyl (DULCOLAX) rectal suppository 10 mg  10 mg Rectal Daily PRN    docusate sodium (COLACE) capsule 100 mg  100 mg Oral BID    fluticasone furoate-vilanterol (BREO ELLIPTA) 200-25 MCG/INH inhaler 1 puff  1 puff Inhalation Daily    heparin (porcine) subcutaneous injection 5,000 Units  5,000 Units Subcutaneous Q8H [address]    HYDROmorphone (DILAUDID) tablet 2 mg  2 mg Oral Q6H PRN    melatonin tablet 6 mg  6 mg Oral HS    methocarbamol (ROBAXIN) tablet 500 mg  500 mg Oral Q6H PRN    montelukast (SINGULAIR) tablet 10 mg  10 mg Oral Daily    ondansetron (ZOFRAN-ODT) dispersible tablet 4 mg  4 mg Oral Q6H PRN    pantoprazole (PROTONIX) EC tablet 20 mg  20 mg Oral Early Morning    pravastatin (PRAVACHOL) tablet 40 mg  40 mg Oral Daily With Dinner    senna (SENOKOT) tablet 8 6 mg  1 tablet Oral HS    [START ON 5/4/2018] sertraline (ZOLOFT) tablet 50 mg  50 mg Oral Daily    traMADol (ULTRAM) tablet 50 mg  50 mg Oral Q4H PRN     No Known Allergies    Objective   Vital signs in last 24 hours:  Temp:  [97 8 °F (36 6 °C)-98 3 °F (36 8 °C)] 98 3 °F (36 8 °C)  HR:  [68-84] 84  Resp:  [18-20] 18  BP: (118-141)/(64-82) 134/82      Intake/Output Summary (Last 24 hours) at 05/03/18 1241  Last data filed at 05/03/18 0930   Gross per 24 hour   Intake              540 ml   Output                0 ml   Net              540 ml       Mental Status Evaluation:  Appearance:  age appropriate   Behavior:  cooperative   Speech:  soft   Mood:  depressed   Affect:  mood-congruent   Language: naming objects and repeating phrases   Thought Process:  goal directed   Associations: intact associations   Thought Content:  normal   Perceptual Disturbances: None   Risk Potential: She denies any suicidal homicidal ideation plan or intent   Sensorium:  person, place, time/date, situation and day of week   Memory:  recent and remote memory grossly intact   Cognition:  grossly intact   Consciousness:  alert and awake    Attention: attention span appeared shorter than expected for age   Intellect: normal   Fund of Knowledge: awareness of current events: Fair   Insight:  fair   Judgment: fair   Muscle Strength and Tone: Within normal limits   Gait/Station: Unable to assess   Motor Activity: no abnormal movements     Lab Results:    Lab Results   Component Value Date    WBC 9 27 05/02/2018    HGB 11 5 05/02/2018    HCT 35 8 05/02/2018    MCV 94 05/02/2018     (H) 05/02/2018     Lab Results   Component Value Date    GLUCOSE 87 05/02/2018    CALCIUM 9 6 05/02/2018     05/02/2018    K 4 2 05/02/2018    CO2 26 05/02/2018     05/02/2018    BUN 12 05/02/2018    CREATININE 0 66 05/02/2018         Code Status: )Level 1 - Full Code    Assessment/Plan     Assessment:  Leonarda Dolan is a 63 y o   ho have a [facility] ,  was treated and transferred to inpatient rehabilitation, patient is depressed and wanted Wellbutrin because this medication helped her in the past   Discussed with the patient about the risks of seizures  She agreed to continue with Zoloft for her depression  Diagnosis:  Major depressive disorder moderate without psychotic features F 33 1  Plan:   Continue medical management  Increase Zoloft 50 mg p o  Daily  Individual therapy would benefit  Discussed with primary team  No other intervention at this moment  Risks, benefits and possible side effects of Medications:   Risks, benefits, and possible side effects of medications explained to patient and patient verbalizes understanding             Mauro Santillan MD

## 2018-05-03 NOTE — PROGRESS NOTES
SLP TAA       05/03/18 0900   Patient Data   Rehab Impairment Non-Traumatic Brain Dysfunction   Etiologic Diagnosis SAH involving the surpasellar cistern, sylvian fissures, basal cisterns and in the anterior falx   Date of Onset 04/16/18   Prior Level of Function   Self-Care 3  Independent - Patient completed the activities by him/herself, with or without an assistive device, with no assistance from a helper  Indoor-Mobility (Ambulation) 3  Independent - Patient completed the activities by him/herself, with or without an assistive device, with no assistance from a helper  Stairs 3  Independent - Patient completed the activities by him/herself, with or without an assistive device, with no assistance from a helper  Functional Cognition 3  Independent - Patient completed the activities by him/herself, with or without an assistive device, with no assistance from a helper  Restrictions/Precautions   Precautions Bed/chair alarms; Fall Risk;Impulsive;Contact/isolation;Pain   Pain Assessment   Pain Assessment 0-10   Pain Score Worst Possible Pain   Pain Location (per pt description, all over but primarily back)   Hospital Pain Intervention(s) Repositioned;Distraction   Comprehension   Auditory Complex;Basic   Visual Basic;Complex   Findings Pt completed formalized cognitive linguistic assessment  Refer to SLP rehab note for details  QI: Comprehension 4  Undestands: Clear comprehension without cues or repetitions   Comprehension (FIM) 6 - Has only MILD difficulty with complex/abstract info   Expression   Verbal Basic;Complex   Non-Verbal Basic;Complex   Intelligibility Sentence   Findings Pt completed formalized cognitive linguistic assessment  Refer to SLP rehab note for details  QI: Expression 4   Express complex messages without difficulty and with speech that is clear and easy to [place]   Expression (FIM) 6 - Expresses complex/abstract but requires:  more time   Social Interaction   Cooperation with staff Participation Individual   Behaviors observed Appropriate; Restless   Findings Pt completed formalized cognitive linguistic assessment  Refer to SLP rehab note for details  Social Interaction (FIM) 5 - Interacts appropriately with others 90% of time   Problem Solving   Complex Manages discharge planning   Routine Manages call bell   Findings Pt completed formalized cognitive linguistic assessment  Refer to SLP rehab note for details  Problem solving (FIM) 5 - Solves complex problems But requires cues from helper   Memory   Initiates Tasks Yes   Short-Term Intact   Long Term Intact   Findings Pt completed formalized cognitive linguistic assessment  Refer to SLP rehab note for details  Memory (FIM) 5 - Recalls/performs request 90% of time   Discharge Information   Patient's Discharge Plan Home w/ family support   Patient's Rehab Expectations "To get home asap"   Impressions Pt completed formalized cognitive linguistic assessment, where at this time cognitive linguistic skills are deemed to be WNL upon review  Pt will be receiving supervision upon d/c w/ family and no further SLP serivces warranted at this time  Pt to continue to benefit from OT/PT services to maximize functional abilities at this time     SLP Therapy Minutes   SLP Time In 0900   SLP Time Out 0945   SLP Total Time (minutes) 45   SLP Mode of treatment - Individual (minutes) 45   SLP Mode of treatment - Concurrent (minutes) 0   SLP Mode of treatment - Group (minutes) 0   SLP Mode of treatment - Co-treat (minutes) 0   SLP Mode of Teatment - Total time(minutes) 45 minutes

## 2018-05-03 NOTE — PROGRESS NOTES
Internal Medicine Progress Note  Patient: Jonny Munoz  Age/sex: 63 y o  female  Medical Record #: [id number]      ASSESSMENT/PLAN:  Jonny Munoz is seen and examined and mangement for following issues:    1  SAH/hydrocephalus: etio of bleed? = she had 2 negative a-grams and a negative CTA  She completed course of Nimotop on 4/30/18  She is for followup CT head 5/8/18      2  Asthma:  stable on her home dose of Breo and Singulair = will continue     3  Nicotine abuse:  on no patch currently     4  Depression/Anxiety:  currently on Zoloft which was added in the hospital after being seen by Dr Gracie Silverio from Psychiatry  She prefers to be on Wellbutrin  Primary team has asked psych to see her again      5  Hx cocaine abuse: drug screen was positive for such but she denied  Primary service in the hospital counseled her anyway      6  Tooth pain:  per SLIM, OMFS said no immediate intervention needed; she will need followup     7   Leukocytosis:  resolved     8  ABLA:  Resolved      Subjective: Patient seen and examined  Reports she just finished up with therapy and she is having worsening of her headache due to tracking dots with her eyes  Denies visual changes of blurred vision  No N/V  Tolerating diet       ROS:   GI: denies abdominal pain, change bowel habits or reflux symptoms  Neuro: No new neurologic changes  Respiratory: No Cough, SOB  Cardiovascular: No CP, palpitations     Scheduled Meds:    Current Facility-Administered Medications:  acetaminophen 325 mg Oral Q6H PRN Kenyatta Mccray MD   acetaminophen 650 mg Oral [facility] HUSAM Lamas   al mag oxide-diphenhydramine-lidocaine viscous 10 mL Swish & Spit 4x Daily (AC & HS) Kenyatta Mccray MD   albuterol 2 puff Inhalation Q6H PRN HUSAM Lamas   ALPRAZolam 0 25 mg Oral Daily PRN HUSAM Lamas   benzocaine  Mucosal 4x Daily PRN Kenyatta Mccray MD   bisacodyl 10 mg Rectal Daily PRN HUSAM Lamas   docusate sodium 100 mg Oral BID [facility] Byrd[facility]USAM   fluticasone furoate-vilanterol 1 puff Inhalation Daily Josesommer Chavescristina, HUSAM   heparin (porcine) 5,000 Units Subcutaneous [facility] Josesommer Antonio, HUSAM   HYDROmorphone 2 mg Oral Q6H PRN Ana Sinha MD   melatonin 6 mg Oral HS Josesommer Chavese, HUSAM   methocarbamol 500 mg Oral Q6H PRN Jose Andiee, HUSAM   montelukast 10 mg Oral Daily Jose Andiee, HUSAM   ondansetron 4 mg Oral Q6H PRN Jose Andiee, HUSAM   pantoprazole 20 mg Oral Early Morning Ana Sinha MD   pravastatin 40 mg Oral Daily With Omar Ahumada, CRNP   senna 1 tablet Oral HS Jose Andiee, HUSAM   sertraline 25 mg Oral Daily Jose Andiee, HUSAM   traMADol 50 mg Oral Q4H PRN HUSAM Hickman       Labs:       Results from last 7 days  Lab Units 05/02/18  0641 05/01/18  0527   WBC Thousand/uL 9 27 10 87*   HEMOGLOBIN g/dL 11 5 11 7   HEMATOCRIT % 35 8 37 2   PLATELETS Thousands/uL 665* 605*       Results from last 7 days  Lab Units 05/02/18  0641 05/01/18  0527   SODIUM mmol/L 138 138   POTASSIUM mmol/L 4 2 3 8   CHLORIDE mmol/L 106 103   CO2 mmol/L 26 27   BUN mg/dL 12 13   CREATININE mg/dL 0 66 0 61   GLUCOSE RANDOM mg/dL 87 84   CALCIUM mg/dL 9 6 9 5                Glucose (mg/dL)   Date Value   05/02/2018 87   05/01/2018 84   04/30/2018 87   04/29/2018 85     Glucose, i-STAT (mg/dl)   Date Value   04/16/2018 95       Labs reviewed    Physical Examination:  Vitals:   Vitals:    05/02/18 1330 05/02/18 1618 05/03/18 0038 05/03/18 0737   BP: 118/64 130/69 141/68 134/82   BP Location: Right arm Right arm Left arm Right arm   Pulse: 70 68 70 84   Resp: 20 18 18 18   Temp: 97 8 °F (36 6 °C) 98 3 °F (36 8 °C) 98 3 °F (36 8 °C) 98 3 °F (36 8 °C)   TempSrc: Oral Oral Oral Oral   SpO2: 96% 97% 95% 95%   Weight: 87 4 kg (192 lb 10 9 oz)      Height: 5' 3" (1 6 m)          GEN: NAD  HEENT: NC/AT, EOMI  RESP: CTAB, no R/R/W  CV: +S1 S2, regular rate, no rubs  ABD: soft, NT, ND, normal BS   : no f[place]on  EXT: no LE edema  Skin: no rashes  Neuro: Awake and alert, BUE 4+/5, B/L LE 4/5      [x ] Total time spent: 30 Mins and greater than 50% of this time was spent counseling/coordinating care        MELVI McintyreC  Internal Medicine

## 2018-05-03 NOTE — PROGRESS NOTES
Occupational Therapy Evaluation     05/03/18 0700   Patient Data   Rehab Impairment Non-traumatic brain dysfunction    Etiologic Diagnosis Subarachnoid hemorrhage involving the suprasellar cistern, sylvian fissures, basal cisterns and in the anterior falx   Date of Onset 04/16/18   Support System   Name Pt lives with her sister at home, pt reports she is mostly home during the day    Home Setup   Type of Home Multi Level   Method of Entry (0 vincent)   Number of Stairs in Home (FULL FLIGHT)   Second Floor Bathroom Full; Shower;Built-in shower seat   Available Equipment (none)   Baseline Information   Vocation (unemployed)   Transportation    Prior Device(s) Used (none)   Prior IADL Participation   Money Management Identify Money;Estimate Costs; [employer] Preparation Full Participation   Laundry Full Participation   Home Cleaning Full Participation   Prior Level of Function   Self-Care 3  Independent - Patient completed the activities by him/herself, with or without an assistive device, with no assistance from a helper  Indoor-Mobility (Ambulation) 3  Independent - Patient completed the activities by him/herself, with or without an assistive device, with no assistance from a helper  Stairs 3  Independent - Patient completed the activities by him/herself, with or without an assistive device, with no assistance from a helper  Functional Cognition 3  Independent - Patient completed the activities by him/herself, with or without an assistive device, with no assistance from a helper  Prior Device Used (none)   Psychosocial   Psychosocial (WDL) X   Patient Behaviors/Mood Flat affect; Tearful   Restrictions/Precautions   Precautions Bed/chair alarms; Fall Risk;Contact/isolation;Pain   Pain Assessment   Pain Assessment 0-10   Pain Score 9   Pain Type Chronic pain   Pain Location Back   Hospital Pain Intervention(s) Ambulation/increased activity   Response to Interventions no  change   QI: Eating Assistance Needed Independent   Eating CARE Score 6   Eating Assessment   Findings Breakfast OOB    Eating (FIM) 7 - Patient completely independent   QI: Oral Hygiene   Assistance Needed Incidental touching   Oral Hygiene CARE Score 4   Grooming   Able To Brush/Clean Teeth;Wash/Dry Hands; Wash/Dry Face   Limitation Noted In Strength; Coordination   Findings in stance at sink    Grooming (FIM) 4 - Patient requires steadying assist or light touching   QI: Shower/Bathe Self   Assistance Needed Physical assistance   Assistance Provided by [place] Less than 25%   Shower/Bathe Self CARE Score 3   Bathing   Assessed Bath Style Shower   Anticipated D/C Bath Style Shower   Able to [place] No   Able to [employer] Temperature Yes   Able to Wash/Rinse/Dry (body part) Left Arm;Right Arm;L Upper Leg;R Upper Leg;L Lower Leg/Foot;R Lower Leg/Foot;Chest;Abdomen   Limitations Noted in Balance; Endurance;Strength;Timeliness   Positioning Seated;Standing   Adaptive Equipment Shower Bars; Shower Seat;[employer]  pt requires steadying assist in stance during ADLS  Pt completes UB/LE bathing while seated using cross leg technique    Bathing ([facility]) 4 - Patient completes 8/10 or 9/10 parts   QI: Upper Body Dressing   Assistance Needed Supervision   Comment seated   Upper Body Dressing CARE Score 4   QI: Lower Body Dressing   Assistance Needed Physical assistance   Assistance Provided by [place] 25%-49%   Lower Body Dressing CARE Score 3   QI: Putting On/Taking Off Footwear   Assistance Needed Supervision   Putting On/Taking Off Footwear CARE Score 4   QI: Picking Up Object   Reason if not Attempted Safety concerns   Picking Up Object CARE Score 88   Dressing/Undressing Clothing   Remove LB Clothes [address]; Undergarment;Socks   Limitations Noted In Balance; Coordination; Endurance;Problem Solving; Safety;Strength;Timeliness   Positioning Supported Sit;Standing   Findings UB dressing while seated, pt reports at baseline she sits to get dressed  Pt requires steadying assist in stance    UB Dressing (FIM) 5 - Patient requires supervision/monitoring   LB Dressing (FIM) 4 - Patient completes 75% of all tasks   QI: [address] Needed Physical assistance   Assistance Provided by [place] 25%-49%   [address] Score 3   Toileting   Able to [address] down yes, up yes  Manage Hygiene Bladder   Limitations Noted In Balance; Coordination; Safety;LE Strength;UE Strength   Toileting (FIM) 4 - Patient requires steadying assist or light touching   QI: Roll Left and Right   Assistance Needed Supervision   Roll Left and Right CARE Score 4   QI: Lying to Sitting on Side of Bed   Assistance Needed Supervision   Lying to Sitting on Side of Bed CARE Score 4   QI: Sit to Stand   Assistance Needed Physical assistance   Assistance Provided by [place] 50%-74%   Sit to Stand CARE Score 2   QI: Chair/Bed-to-Chair Transfer   Assistance Needed Physical assistance   Assistance Provided by [place] 50%-74%   Chair/Bed-to-Chair Transfer CARE Score 2   Transfer Bed/Chair/Wheelchair   Limitations Noted In Balance; Endurance;UE Strength;LE Strength   Adaptive Equipment Roller Walker;None   Sit to Stand Moderate Assist;Assist x 1   Stand to Sit Minimal;Assist x 1   Supine to Sit Supervision   Findings Pt requires mod assist x1 for sit to stand and mod assist hand hold assist into bathroom  Pt then completes functional mobility within room with min assist x1 hand hold assist     Bed, Chair, Wheelchair Transfer (FIM) 3 - Patient completes 50 - 74% of all tasks   QI: Toilet Transfer   Assistance Needed Physical assistance   Assistance Provided by [place] 50%-74%   Toilet Transfer CARE Score 2   Toilet Transfer   Surface Assessed Standard Toilet   Transfer Technique Standard   Limitations Noted In Balance; Endurance;UE Strength;LE Strength   Toilet Transfer (FIM) 3 - Patient completes 50 - 74% of all tasks   Tub/Shower Transfer   Limitations Noted In Balance; Endurance;Problem Solving;LE Strength;UE Strength   Adaptive Equipment Seat with Back   Assessed Shower   Shower Transfer (FIM) 4 - Patient requires steadying assist or light touching   Comprehension   QI: Comprehension 4  Undestands: Clear comprehension without cues or repetitions   Comprehension (FIM) 5 - Understands basic directions and conversation   Expression   Verbal Basic   QI: Expression 4  Express complex messages without difficulty and with speech that is clear and easy to [place]   Expression (FIM) 5 - Needs help/cues only RARELY (< 10% of the time)   Social Interaction   Cooperation with staff   Social Interaction (FIM) 5 - Interacts appropriately with others 90% of time   Problem Solving   Problem solving (FIM) 4 - Solves basic problems 75-89% of time   Memory   Memory (FIM) 5 - [place] cues patient   RUE Assessment   RUE Assessment WFL   LUE Assessment   LUE Assessment WFL   Coordination   Movements are Fluid and Coordinated 0   Coordination and Movement Description dysmetria and dysdiadochokinesia noted    Sensation   Light Touch No apparent deficits   Cognition   Overall Cognitive Status Impaired   Arousal/Participation Alert   Attention Attends with cues to redirect   Orientation Level Oriented to person;Oriented to place;Oriented to situation   Memory Decreased short term memory   Following Commands Follows one step commands without difficulty   Comments pt able to immediately recall 3/3 words, however recalls only 1/5 s/p 5 minute delay   Pt will benefit from further cognitive assessment during OT tx sessions    Vision   Occulomotor Tracking   Vision Comments pt presents with B/L nystagmus, reports increased headache during visual testing    Discharge Information   Vocational Plan Retired/not working   Patient's Discharge Plan home with family support    Patient's Rehab Expectations "to get stronger"    Barriers to Discharge Home Limited Family Support;Decreased Cognitive Function;Decreased Strength;Decreased Endurance;Pain; Safety Considerations;Depression   Impressions Pt is a 63 y o  female who was admitted to [address] [place] [address] on 2018  Pt's current problem list includes: Subarachnoid hemorrhage involving the suprasellar cistern, sylvian fissures, basal cisterns and in the anterior falx s/p multiple B/L atreriograms on  and   Additionally pt has a PMH of HTN, migraine, cocaine abuse  Pt initially presented to hospital with c/o headache  In hospital pt also had psychology consulted for depression with suicidal ideation  During evaluation pt did mention "I should have just ", Dr Cedrick Sanchez was made aware and confirms that pt is being followed by psychology  Pt's precautions include: seizure, contact, alarms  At baseline pt was completing ADLS/IADLS independently and lives at home with sister  Currently pt requires min assist for overall ADLS and min assist for functional mobility/transfers  Pt currently presents with impairments in the following categories - activity tolerance, endurance, standing balance/tolerance, UE strength, memory, attention  and visual perceptual skills   These impairments, as well as pt's fatigue, WARD, pain, (R) UE dysmetria, (L) UE dysmetria , nystagmus, risk for falls, home environment and mood  limit pt's ability to safely engage in all baseline areas of occupation, including grooming, bathing, dressing, toileting, functional mobility/transfers, community mobility, laundry , driving, house maintenance, medication management, meal prep, cleaning, work/volunteer work , social participation  and leisure activities    Pt presents with good rehab potential  Pt is unsafe to D/C home at this time, recommending ELOS 2 weeks to achieve mod I level goals     OT Therapy Minutes   OT Time In 0700   OT Time Out 0835   OT Total Time (minutes) 95   OT Mode of treatment - Individual (minutes) 95   OT Mode of treatment - Concurrent (minutes) 0   OT Mode of treatment - Group (minutes) 0   OT Mode of treatment - Co-treat (minutes) 0   OT Mode of Teatment - Total time(minutes) 95 minutes       Beatrice Gonzalez MS, OTR/L , CBIS

## 2018-05-03 NOTE — PROGRESS NOTES
05/03/18 1230   Pain Assessment   Pain Assessment 0-10   Pain Score 8   Pain Type Acute pain   Pain Location Back   Pain Orientation Lower   Hospital Pain Intervention(s) Medication (See MAR); Ambulation/increased activity   Response to Interventions pain constant but pt able to tolerate throughout session   Restrictions/Precautions   Precautions Bed/chair alarms; Fall Risk;Contact/isolation;Pain   Cognition   Arousal/Participation Alert; Cooperative   Subjective   Subjective Pt reports feeling tired and also c/o LBP (specifically tailbone)    QI: Sit to [address] Provided by [place] No physical assistance   Sit to Lying CARE Score 4   QI: Lying to Sitting on Side of Bed   Assistance Needed Supervision   Assistance Provided by [place] No physical assistance   Lying to Sitting on Side of Bed CARE Score 4   QI: Sit to Stand   Assistance Needed Incidental touching   Assistance Provided by [place] No physical assistance   Sit to Stand CARE Score 4   QI: Chair/Bed-to-Chair Transfer   Assistance Needed Incidental touching; Adaptive equipment   Assistance Provided by [place] No physical assistance   Chair/Bed-to-Chair Transfer CARE Score 4   Transfer Bed/Chair/Wheelchair   Limitations Noted In Balance; Coordination; Endurance;Pain Management;Problem Solving;LE Strength   Adaptive Equipment Roller [employer], Chair, Wheelchair Transfer (FIM) 4 - Patient requires steadying assist or light touching   QI: Walk 10 Feet   Assistance Needed Incidental touching; Adaptive equipment   Assistance Provided by [place] No physical assistance   Walk 10 Feet CARE Score 4   QI: Walk 50 Feet with Two Turns   Assistance Needed Incidental touching; Adaptive equipment   Assistance Provided by [place] No physical assistance   Walk 50 Feet with Two Turns CARE Score 4   QI: Walk 150 Feet   Assistance Needed Incidental touching; Adaptive equipment   Assistance Provided by [place] No physical assistance   Walk 150 Feet CARE Score 4   Ambulation   Does the patient walk? 2  Yes   Primary Discharge Mode of Locomotion Walk   Walk Assist Level Contact Guard   Gait Pattern Inconsistant Melissa;Decreased foot clearance;Shuffle;Improper weight shift   Assist Device Roller Walker   Distance Walked (feet) 400 ft   Limitations Noted In Balance; Coordination; Heel Strike; Endurance;Speed;Strength;Swing   Walking (FIM) 4 - Patient requires steadying assist or light touching AND distance 150 feet or more, no rest   Therapeutic Interventions   Flexibility seated HS and gastroc stretch B/L   Balance standing unsupported engaged in ball toss (CG)   Equipment Use   NuStep L1 x 10 min (excessively slow)   Other Comments   Comments Whittaker Balance score 19/56 indicating high fall risk    Assessment   Treatment Assessment Pt participated in skilled PT session with focus on ambulation and balance  Pt able to tolerate walking further distances today using RW although noted dec heel strike and increased shuffling as pt fatigues  Pt able to tolerate 10 min on Nustep but at excessively slow speed  Performed Whittaker Balance test with pt (no AD) and she scored 19 indicating high fall risk @ this time  Pt required lengthy rest break in between each task on Whittaker; dec standing tolerance  Pt also c/o tailbone pain, as well as B/L shoulders; also reports having heel spurs in the past; pt discussed her past employment which have all been physically demanding jobs  Noted significant gastroc tightness during stretching; pt reports she self stretches @ home  Pt also reports using her sisters' pool over the summer to manage her LBP  Pt will benefit from continued skilled PT to improve balance and maximize I with all functional mobility using LRAD  Barriers to Discharge Comments pt reports her daughter will be home with her 24/7    PT Barriers   Physical Impairment Decreased strength;Decreased endurance; Impaired balance;Decreased mobility; Decreased coordination;Decreased safety awareness;Pain   Functional Limitation Car transfers; Ramp negotiation;Stair negotiation;Standing;Transfers; Walking   Plan   Treatment/Interventions Functional transfer training;LE strengthening/ROM; Elevations; Therapeutic exercise; Endurance training;Patient/family training;Equipment eval/education; Bed mobility;Gait training   Progress Progressing toward goals   Recommendation   Recommendation Home with family support; Outpatient PT   Equipment Recommended Walker  (TBD)   PT Therapy Minutes   PT Time In 1230   PT Time Out 1400   PT Total Time (minutes) 90   PT Mode of treatment - Individual (minutes) 90   PT Mode of treatment - Concurrent (minutes) 0   PT Mode of treatment - Group (minutes) 0   PT Mode of treatment - Co-treat (minutes) 0   PT Mode of Teatment - Total time(minutes) 90 minutes   Therapy Time missed   Time missed?  No

## 2018-05-04 PROCEDURE — 97530 THERAPEUTIC ACTIVITIES: CPT

## 2018-05-04 PROCEDURE — 97116 GAIT TRAINING THERAPY: CPT | Performed by: PHYSICAL THERAPIST

## 2018-05-04 PROCEDURE — 97530 THERAPEUTIC ACTIVITIES: CPT | Performed by: PHYSICAL THERAPIST

## 2018-05-04 PROCEDURE — 99232 SBSQ HOSP IP/OBS MODERATE 35: CPT | Performed by: PHYSICAL MEDICINE & REHABILITATION

## 2018-05-04 PROCEDURE — 97535 SELF CARE MNGMENT TRAINING: CPT

## 2018-05-04 PROCEDURE — 97110 THERAPEUTIC EXERCISES: CPT | Performed by: PHYSICAL THERAPIST

## 2018-05-04 RX ADMIN — Medication 10 ML: at 21:08

## 2018-05-04 RX ADMIN — DOCUSATE SODIUM 100 MG: 100 CAPSULE, LIQUID FILLED ORAL at 18:30

## 2018-05-04 RX ADMIN — FLUTICASONE FUROATE AND VILANTEROL 1 PUFF: 200; 25 POWDER RESPIRATORY (INHALATION) at 14:39

## 2018-05-04 RX ADMIN — PRAVASTATIN SODIUM 40 MG: 40 TABLET ORAL at 18:30

## 2018-05-04 RX ADMIN — LORATADINE 10 MG: 10 TABLET ORAL at 10:19

## 2018-05-04 RX ADMIN — FLUTICASONE PROPIONATE 1 SPRAY: 50 SPRAY, METERED NASAL at 18:30

## 2018-05-04 RX ADMIN — ACETAMINOPHEN 650 MG: 325 TABLET ORAL at 00:15

## 2018-05-04 RX ADMIN — PANTOPRAZOLE SODIUM 20 MG: 20 TABLET, DELAYED RELEASE ORAL at 05:01

## 2018-05-04 RX ADMIN — SERTRALINE HYDROCHLORIDE 50 MG: 50 TABLET ORAL at 10:19

## 2018-05-04 RX ADMIN — HYDROMORPHONE HYDROCHLORIDE 2 MG: 2 TABLET ORAL at 21:05

## 2018-05-04 RX ADMIN — MELATONIN TAB 3 MG 6 MG: 3 TAB at 21:05

## 2018-05-04 RX ADMIN — HEPARIN SODIUM 5000 UNITS: 5000 INJECTION, SOLUTION INTRAVENOUS; SUBCUTANEOUS at 21:06

## 2018-05-04 RX ADMIN — HEPARIN SODIUM 5000 UNITS: 5000 INJECTION, SOLUTION INTRAVENOUS; SUBCUTANEOUS at 14:20

## 2018-05-04 RX ADMIN — MONTELUKAST SODIUM 10 MG: 10 TABLET, FILM COATED ORAL at 10:18

## 2018-05-04 RX ADMIN — Medication 10 ML: at 14:00

## 2018-05-04 RX ADMIN — DOCUSATE SODIUM 100 MG: 100 CAPSULE, LIQUID FILLED ORAL at 10:19

## 2018-05-04 RX ADMIN — HEPARIN SODIUM 5000 UNITS: 5000 INJECTION, SOLUTION INTRAVENOUS; SUBCUTANEOUS at 05:01

## 2018-05-04 RX ADMIN — ACETAMINOPHEN 650 MG: 325 TABLET ORAL at 14:00

## 2018-05-04 RX ADMIN — ACETAMINOPHEN 650 MG: 325 TABLET ORAL at 19:39

## 2018-05-04 RX ADMIN — FLUTICASONE PROPIONATE 1 SPRAY: 50 SPRAY, METERED NASAL at 10:20

## 2018-05-04 RX ADMIN — HYDROMORPHONE HYDROCHLORIDE 2 MG: 2 TABLET ORAL at 10:25

## 2018-05-04 RX ADMIN — SENNOSIDES 8.6 MG: 8.6 TABLET ORAL at 21:05

## 2018-05-04 RX ADMIN — ACETAMINOPHEN 650 MG: 325 TABLET ORAL at 05:01

## 2018-05-04 NOTE — PROGRESS NOTES
Physical Medicine & Rehabilitation Progress Note:    Primary Rehabilitation Diagnosis: Subarachnoid hemorrhage (HH3, Velazco 3)    Subjective: Patient seen face to face  Reporting she had difficulty sleeping, but then reported it was due to anxiety and pain  In much better spirits today  Review Of Systems:   A 10 point review of systems was performed  Pertinent positives above    Functional Update:  Ambulating hand held assist today Min A   Supervision with dressing UB  Min A with dressing LB      Objective:  /74 (BP Location: Left arm)   Pulse 64   Temp 98 2 °F (36 8 °C) (Oral)   Resp 20   Ht 5' 3" (1 6 m)   Wt 87 4 kg (192 lb 10 9 oz)   SpO2 96%   BMI 34 13 kg/m²     GENERAL: No acute distress    HEENT:  Right frontal scalp with sutures present, PERRL, EOMI, mucous membranes moist   CARDIOVASCULAR: regular rate rhythm, no murmurs  LUNGS: clear to ausculation bilaterally, good inspiratory effort, no wheezes, rales, rhonchi   ABDOMEN: soft, non-tender, non-distended, no guarding  Bowel sounds x 4 normal   EXTREMITIES: no pedal edema bilaterally, negative Sujata's bilaterally  MUSCULOSKELETAL:  Range of motion functional x4  NEUROLOGICAL:   Awake, alert  Motor BUE 4/5 throughout  Bilateral lower extremity:  3+/5 proximally at the hip flexors, 4/5 at the knee extensors and knee flexors, 4/5 at the dorsiflexors plantar flexors  Psych:  Flat affect    Assessment:    Fanny Lara is a 63 y o  female in stable condition status post spontaneous subarachnoid hemorrhage located in the suprasellar cistern, sylvian fissure, basal cisterns and the anterior falx  Status post EVD placement close monitoring without further intervention  Rehabilitation Plan:      -Rehabilitation of subarachnoid hemorrhage: PT/OT/SLP therapies      -Appreciate Internal Medicine following - Dr Xiomara Calvo to Internal Medicine     -Subarachnoid hemorrhage: HH#,Velazco 3 type  Located in the suprasellar cistern, sylvian fissures, basal cistern and the anterior falx:  Status post EVD placement and 2 angiograms with negative findings for AV malformation or aneurysm  Patient's repeat head CT and showing some improvement  Patient to be followed closely by Neurosurgery with repeat CT of the head in 2 weeks      -MRI brain showing a small area of restricted diffusion in the left cerebellar hemisphere suspicious for possible acute to subacute infarct:  Patient managed on Pravachol for CVA prophylaxis and may need to follow with Neurology as an outpatient furthermore      -MRI cervical spine showing T2/T3 paracentral disc protrusion and right paracentral disc extrusion with mild mass effect on the right ventral aspect of cord:  NSGY aware  Physical examination of bilateral upper extremities within normal limits and current subjective symptoms negative       -headaches:  Managed conservatively with Tylenol scheduled,  p r n  Robaxin  Encouraged low light low sound and rest when needed between therapies      -dental pain: Patient reports she will require several teeth pulled and 1 tooth repaired on and she has a dentist that she follows with    Continue Magic mouthwash, in the fall, Dilaudid 2 mg Q 6 hr p r n  for severe pain;   I have counseled and educated her that we will plan to wean the Dilaudid off during her rehabilitative stay      -depression/anxiety: continue Zoloft 50mg daily - psychiatry on board if needed     -asthma:  Managed on Breo as well as Singulair     -insomnia:  Managed on melatonin 6 mg at bedtime     -GI prophylaxis managed on Protonix 20 mg daily     -DVT prophylaxis managed on SCDs, subacute heparin which was cleared by Neurosurgery     -Insomnia: managed on Melatonin        Lab Results   Component Value Date    WBC 9 27 05/02/2018    HGB 11 5 05/02/2018    HCT 35 8 05/02/2018    MCV 94 05/02/2018     (H) 05/02/2018     Lab Results   Component Value Date    GLUCOSE 87 05/02/2018    CALCIUM 9 6 05/02/2018     05/02/2018    K 4 2 05/02/2018    CO2 26 05/02/2018     05/02/2018    BUN 12 05/02/2018    CREATININE 0 66 05/02/2018       Current Facility-Administered Medications:     acetaminophen (TYLENOL) tablet 325 mg, 325 mg, Oral, Q6H PRN, Celestina Spivey MD    acetaminophen (TYLENOL) tablet 650 mg, 650 mg, Oral, Q6H [facility] [place] HUSAM Ospina, 650 mg at 05/04/18 1400    al mag oxide-diphenhydramine-lidocaine viscous (MAGIC MOUTHWASH) suspension 10 mL, 10 mL, Swish & Spit, 4x Daily (AC & HS), Celestina Spivey MD, 10 mL at 05/03/18 2210    albuterol (PROVENTIL HFA,VENTOLIN HFA) inhaler 2 puff, 2 puff, Inhalation, Q6H PRN, [place], 2 puff at 05/02/18 1715    ALPRAZolam Beula Yunier) tablet 0 25 mg, 0 25 mg, Oral, Daily PRN, HUSAM Ellis    benzocaine (ANBESOL) 10 % mucosal liquid, , Mucosal, 4x Daily PRN, Celestina Spivey MD    bisacodyl (DULCOLAX) rectal suppository 10 mg, 10 mg, Rectal, Daily PRN, HUSAM Ellis    docusate sodium (COLACE) capsule 100 mg, 100 mg, Oral, BID, [place], 100 mg at 05/04/18 1019    fluticasone (FLONASE) 50 mcg/act nasal spray 1 spray, 1 spray, Each Nare, BID, Kevon David PA-C, 1 spray at 05/04/18 1020    fluticasone furoate-vilanterol (BREO ELLIPTA) 200-25 MCG/INH inhaler 1 puff, 1 puff, Inhalation, Daily, HUSAM Ellis, 1 puff at 05/04/18 1439    heparin (porcine) subcutaneous injection 5,000 Units, 5,000 Units, Subcutaneous, Q8H [facility] [place], 5,000 Units at 05/04/18 0501    HYDROmorphone (DILAUDID) tablet 2 mg, 2 mg, Oral, Q6H PRN, Celestina Spivey MD, 2 mg at 05/04/18 1025    loratadine (CLARITIN) tablet 10 mg, 10 mg, Oral, Daily, Kevon David PA-C, 10 mg at 05/04/18 1019    melatonin tablet 6 mg, 6 mg, Oral, HS, HUSAM Ellis, 6 mg at 05/03/18 2209    methocarbamol (ROBAXIN) tablet 500 mg, 500 mg, Oral, Q6H PRN, HUSAM Ellis    montelukast (SINGULAIR) tablet 10 mg, 10 mg, Oral, Daily, HUSAM Aguilar, 10 mg at 05/04/18 1018    ondansetron (ZOFRAN-ODT) dispersible tablet 4 mg, 4 mg, Oral, Q6H PRN, HUSAM Aguilar    pantoprazole (PROTONIX) EC tablet 20 mg, 20 mg, Oral, Early Morning, Glen Hickey MD, 20 mg at 05/04/18 0501    pravastatin (PRAVACHOL) tablet 40 mg, 40 mg, Oral, Daily With HUSAM Kimble, 40 mg at 05/03/18 1700    senna (SENOKOT) tablet 8 6 mg, 1 tablet, Oral, HS, HUSAM Aguilar, 8 6 mg at 05/03/18 2209    sertraline (ZOLOFT) tablet 50 mg, 50 mg, Oral, Daily, Jaden Caputo MD, 50 mg at 05/04/18 1019          Glen Hickey MD  [facility]

## 2018-05-04 NOTE — PROGRESS NOTES
05/04/18 1000   Pain Assessment   Pain Assessment 0-10   Pain Score 8  (Simultaneous filing  User may not have seen previous data )   Pain Type Acute pain  (Simultaneous filing  User may not have seen previous data )   Pain Location Eye;Face   Pain Orientation Bilateral   Pain Descriptors Headache   Pain Frequency Intermittent   Pain Onset Ongoing   Clinical Progression Gradually improving   Patient's Stated Pain Goal No pain   Pain Rating: FLACC (Rest) - Face 1   Pain Rating: FLACC (Rest) - Legs 0   Pain Rating: FLACC (Rest) - Activity 0   Pain Rating: FLACC (Rest) - Cry 1   Pain Rating: FLACC (Rest) - Consolability 0   Score: FLACC (Rest) 2   Restrictions/Precautions   Precautions Fall Risk;Bed/chair alarms;Contact/isolation   QI: Sit to Stand   Assistance Needed Supervision   Assistance Provided by [place] No physical assistance   Sit to Stand CARE Score 4   QI: Chair/Bed-to-Chair Transfer   Assistance Needed Incidental touching   Assistance Provided by [place] No physical assistance   Chair/Bed-to-Chair Transfer CARE Score 4   Transfer Bed/Chair/Wheelchair   Limitations Noted In Balance; Endurance;LE Strength   Adaptive Equipment Roller [employer], Chair, Wheelchair Transfer (FIM) 4 - Patient requires steadying assist or light touching   QI: [address] Needed Incidental touching   Assistance Provided by [place] No physical assistance   Comment pt requires balance support while stance   Toileting Hygiene CARE Score 4   Toileting   Able to [address] down yes, up yes  Able to Manage Clothing Closures Yes   Manage Hygiene Bladder   Limitations Noted In Balance;LE Strength   Toileting (FIM) 4 - Patient requires steadying assist or light touching   QI: Toilet Transfer   Assistance Needed Incidental touching   Toilet Transfer CARE Score 4   Toilet Transfer   Surface Assessed Standard Toilet   Transfer Technique Standard   Limitations Noted In Balance; Endurance   Findings pt walked into bathroom and transferred onto toielt with CGA for balance support   Toilet Transfer (FIM) 4 - Patient requires steadying assist or light touching   Kitchen Mobility   Kitchen-Mobility Level Walker   Kitchen Activity Retrieve items;Transport items   Kitchen Mobility Comments Pt retireved items from [employer] and pantry  Pt requires CGA with turns with RW for safety  Pt educated on sliding items along countertop for item retrieval   Cognition   Overall Cognitive Status Impaired   Arousal/Participation Alert; Cooperative   Orientation Level Oriented X4   Memory Decreased short term memory   Comments Pt emmie to recall all 5 items in kitchen after being shown items  Pt requires cueing to [employer] to locate items  Activity Tolerance   Activity Tolerance Patient limited by fatigue   Assessment   Treatment Assessment Pt engaged in OT session wtih focus on fxnl cognition, balance and endurance  Pt engaged in Connect 4 game  Pt with good new learning and able to problem solve to anticipate therapists moves to make appopriate decision to block therapists move  Pt with good problem solving  Pt able to walk  into bathroom using RW at [facility] level  Pt requires verbal cueing for increased safety during transfers from sit to stand  Pt engaged in [employer] activity for recipe following  Pt able to recall 4/5 ingredients and 3/6 ingredients with increased cueing to locate items in kitchen  Pt states "I dont cook a lot" however unable to identify common ingredients found in fruit salad  Pt overall tolerated session fair  Continue with focus on functional IADLs including medication management  Prognosis Fair   Problem List Decreased strength;Decreased endurance; Impaired balance;Decreased mobility; Decreased cognition; Impaired judgement;Decreased safety awareness   Plan   Treatment/Interventions ADL retraining;Functional transfer training;LE strengthening/ROM; Endurance training;Cognitive reorientation   Progress Progressing toward goals   OT Therapy Minutes   OT Time In 1000   OT Time Out 1100   OT Total Time (minutes) 60   OT Mode of treatment - Individual (minutes) 60   OT Mode of treatment - Concurrent (minutes) 0   OT Mode of treatment - Group (minutes) 0   OT Mode of treatment - Co-treat (minutes) 0   OT Mode of Teatment - Total time(minutes) 60 minutes   Therapy Time missed   Time missed?  No

## 2018-05-04 NOTE — PROGRESS NOTES
05/04/18 0930   Pain Assessment   Pain Assessment 0-10   Pain Score 8   Pain Type Acute pain   Pain Location Back   Pain Orientation Lower   Hospital Pain Intervention(s) Medication (See MAR); Repositioned  (stretching)   Response to Interventions pt able to tolerate session after stretching   Restrictions/Precautions   Precautions Bed/chair alarms; Fall Risk;Pain;Contact/isolation   Cognition   Arousal/Participation Cooperative   Subjective   Subjective Pt reports she did not sleep well last night and feels very tired    QI: Lying to Sitting on Side of Bed   Assistance Needed Supervision   Assistance Provided by [place] No physical assistance   Lying to Sitting on Side of Bed CARE Score 4   QI: Sit to Stand   Assistance Needed Supervision; Adaptive equipment   Assistance Provided by [place] No physical assistance   Sit to Stand CARE Score 4   QI: Chair/Bed-to-Chair Transfer   Assistance Needed Incidental touching; Adaptive equipment   Assistance Provided by [place] No physical assistance   Chair/Bed-to-Chair Transfer CARE Score 4   Transfer Bed/Chair/Wheelchair   Limitations Noted In Balance; Coordination; Endurance;Pain Management;Problem Solving;LE Strength   Adaptive Equipment Roller [employer], Chair, Wheelchair Transfer (FIM) 4 - Patient requires steadying assist or light touching   QI: Walk 10 Feet   Assistance Needed Incidental touching; Adaptive equipment   Assistance Provided by [place] No physical assistance   Walk 10 Feet CARE Score 4   QI: Walk 50 Feet with Two Turns   Assistance Needed Incidental touching; Adaptive equipment   Assistance Provided by [place] No physical assistance   Walk 50 Feet with Two Turns CARE Score 4   QI: Walk 150 Feet   Assistance Needed Incidental touching; Adaptive equipment   Assistance Provided by [place] No physical assistance   Walk 150 Feet CARE Score 4   Ambulation   Does the patient walk? 2   Yes   Primary Discharge Mode of Locomotion Walk   Walk Assist Level Contact Guard   Gait Pattern Inconsistant Melissa; Slow Melissa; Forward Flexion;Decreased foot clearance; Improper weight shift   Assist Device Roller Kimberlee Stewart Walked (feet) 400 ft   Limitations Noted In Balance; Coordination; Heel Strike; Endurance;Speed;Strength;Swing   Walking (FIM) 4 - Patient requires steadying assist or light touching AND distance 150 feet or more, no rest   QI: 1 Step (Curb)   Assistance Needed Incidental touching; Adaptive equipment   Assistance Provided by [place] No physical assistance   1 Step (Curb) CARE Score 4   QI: 4 Steps   Assistance Needed Incidental touching; Adaptive equipment   Assistance Provided by [place] No physical assistance   4 Steps CARE Score 4   QI: 12 Steps   Assistance Needed Incidental touching; Adaptive equipment   Assistance Provided by [place] No physical assistance   12 Steps CARE Score 4   Stairs   Type Stairs   # of Steps 12   Assist Devices Bilateral Rail   Stairs (FIM) 4 - Patient requires steadying assist or light touching AND patient goes up and down full flight (12- 14 stairs)   Therapeutic Interventions   Flexibility supine HS, gastroc, SKTC stretch    Assessment   Treatment Assessment Pt participated in skilled PT session focusing on ambulation and stair negotiation  Pt supine in bed @ start of session; reports feeling very tired and c/o LBP  Began session with LE stretching which pt tolerated well; noted significant gastroc and HS tightness  Pt demonstrated increase in activity tolerance today; pt ambulated and performed stairs without any rest break throughout entire session  Practiced ambulating with RW, also along hallway railing with unilateral support; CG for both methods  Pt cont to present with dec foot clearance and increased trunk flexion; pt relies heavily leaning forward on RW  Pt left in OT gym with OT after PT session  Pt will cont to benefit from skilled PT to maximize I and safety with all functional mobility      Barriers to Discharge Decreased caregiver support   PT Barriers   Physical Impairment Decreased strength;Decreased endurance; Impaired balance;Decreased mobility; Decreased coordination;Decreased safety awareness;Pain   Functional Limitation Car transfers; Ramp negotiation;Stair negotiation;Standing;Transfers; Walking   Plan   Treatment/Interventions Functional transfer training;LE strengthening/ROM; Elevations; Therapeutic exercise; Endurance training;Patient/family training;Equipment eval/education; Bed mobility;Gait training   Progress Progressing toward goals   Recommendation   Recommendation Home with family support; Outpatient PT   Equipment Recommended Walker   PT Therapy Minutes   PT Time In 0930   PT Time Out 1000   PT Total Time (minutes) 30   PT Mode of treatment - Individual (minutes) 30   PT Mode of treatment - Concurrent (minutes) 0   PT Mode of treatment - Group (minutes) 0   PT Mode of treatment - Co-treat (minutes) 0   PT Mode of Teatment - Total time(minutes) 30 minutes   Therapy Time missed   Time missed?  No

## 2018-05-04 NOTE — PROGRESS NOTES
Internal Medicine Progress Note  Patient: Humphrey Carver  Age/sex: 63 y o  female  Medical Record #: [id number]      ASSESSMENT/PLAN:  Humphrey Carver is seen and examined and mangement for following issues:    1  SAH/hydrocephalus: etio of bleed? = she had 2 negative a-grams and a negative CTA  She completed course of Nimotop on 4/30/18  She is for followup CT head 5/8/18      2  Asthma:  stable on her home dose of Breo and Singulair = will continue     3  Nicotine abuse:  on no patch currently     4  Depression/Anxiety:  currently on Zoloft which was added in the hospital after being seen by Dr Augustina Richards from Psychiatry  She prefers to be on Wellbutrin but it decreases the seizure threshold  Pt will remain on Zoloft  Appreciate Psychiatry input      5  Hx cocaine abuse: drug screen was positive for such but she denied  Primary service in the hospital counseled her anyway      6  Tooth pain:  per SLIM, OMFS said no immediate intervention needed; she will need followup     7   Leukocytosis:  resolved     8  ABLA:  Resolved      Subjective: Patient seen and examined in OT  She reports that she did not sleep well and is very tired  She reports a mild HA as well  She states that the food does not taste good because it is sprayed with a chemical   She denies any other complaints       ROS:   GI: denies abdominal pain, change bowel habits or reflux symptoms  Neuro: No new neurologic changes  Respiratory: No Cough, SOB  Cardiovascular: No CP, palpitations     Scheduled Meds:    Current Facility-Administered Medications:  acetaminophen 325 mg Oral Q6H PRN Brigid Denny MD   acetaminophen 650 mg Oral [facility] HUSAM Ball   al mag oxide-diphenhydramine-lidocaine viscous 10 mL Swish & Spit 4x Daily (AC & HS) Brigid Denny MD   albuterol 2 puff Inhalation Q6H PRN HUSAM Ball   ALPRAZolam 0 25 mg Oral Daily PRN HUSAM Ball   benzocaine  Mucosal 4x Daily PRN Brigid Denny MD   bisacodyl 10 mg Rectal Daily PRN Dorean Cutter, CRNP   docusate sodium 100 mg Oral BID Dorean Cutter, CRNP   fluticasone 1 spray Each Nare BID Kalpesh Huertas PA-C   fluticasone furoate-vilanterol 1 puff Inhalation Daily Dorean Cutter, CRNP   heparin (porcine) 5,000 Units Subcutaneous [facility] Dorean Cutter, CRLAMINE   HYDROmorphone 2 mg Oral Q6H PRN Efrain Campos, MD   loratadine 10 mg Oral Daily Kalpesh Huertas PA-C   melatonin 6 mg Oral HS Dorean Cutter, CRNP   methocarbamol 500 mg Oral Q6H PRN Dorean Cutter, CRNP   montelukast 10 mg Oral Daily Dorean Cutter, CRNP   ondansetron 4 mg Oral Q6H PRN Dorean Cutter, CRNP   pantoprazole 20 mg Oral Early Morning Efrain Campos, MD   pravastatin 40 mg Oral Daily With Solomon Cornejo, HUSAM   senna 1 tablet Oral HS Dorean Cutter, CRNP   sertraline 50 mg Oral Daily Elias Núñez MD       Labs:       Results from last 7 days  Lab Units 05/02/18  0641 05/01/18  0527   WBC Thousand/uL 9 27 10 87*   HEMOGLOBIN g/dL 11 5 11 7   HEMATOCRIT % 35 8 37 2   PLATELETS Thousands/uL 665* 605*       Results from last 7 days  Lab Units 05/02/18  0641 05/01/18  0527   SODIUM mmol/L 138 138   POTASSIUM mmol/L 4 2 3 8   CHLORIDE mmol/L 106 103   CO2 mmol/L 26 27   BUN mg/dL 12 13   CREATININE mg/dL 0 66 0 61   GLUCOSE RANDOM mg/dL 87 84   CALCIUM mg/dL 9 6 9 5                  Glucose (mg/dL)   Date Value   05/02/2018 87   05/01/2018 84   04/30/2018 87   04/29/2018 85     Glucose, i-STAT (mg/dl)   Date Value   04/16/2018 95       Labs reviewed    Physical Examination:  Vitals:   Vitals:    05/03/18 0737 05/03/18 1600 05/04/18 0015 05/04/18 0741   BP: 134/82 136/79 135/70 119/74   BP Location: Right arm Right arm Right arm Left arm   Pulse: 84 82 83 64   Resp: 18 16 18 20   Temp: 98 3 °F (36 8 °C) 98 1 °F (36 7 °C) 98 4 °F (36 9 °C) 98 2 °F (36 8 °C)   TempSrc: Oral Oral Oral Oral   SpO2: 95% 94% 94% 96%   Weight:       Height:           GEN: NAD  HEENT: NC/AT, EOMI  RESP: CTAB, no R/R/W  CV: +S1 S2, regular rate, no rubs  ABD: soft, NT, ND, normal BS   : no f[place]  EXT: no LE edema  Skin: no rashes  Neuro: Awake and alert, BUE 4+/5, B/L LE 4/5      [ X ] Total time spent: 30 Mins and greater than 50% of this time was spent counseling/coordinating care        Beckie Tellez PA-C  Internal Medicine

## 2018-05-04 NOTE — PROGRESS NOTES
05/04/18 1245   Pain Assessment   Pain Assessment 0-10   Pain Score 5   Pain Type Acute pain   Pain Location Back   Pain Orientation Lower   Hospital Pain Intervention(s) Medication (See MAR); Ambulation/increased activity   Response to Interventions pt able to tolerate session   Restrictions/Precautions   Precautions Bed/chair alarms; Fall Risk;Pain;Contact/isolation   Cognition   Arousal/Participation Cooperative   Subjective   Subjective Pt agreeable to PT session; reports she used to work nights and sleep during the day so her sleep schedule is "off"    QI: Sit to [address] Provided by [place] No physical assistance   Sit to Lying CARE Score 4   QI: Lying to Sitting on Side of Bed   Assistance Needed Supervision   Assistance Provided by [place] No physical assistance   Lying to Sitting on Side of Bed CARE Score 4   QI: Sit to Stand   Assistance Needed Supervision; Adaptive equipment   Assistance Provided by [place] No physical assistance   Sit to Stand CARE Score 4   QI: Chair/Bed-to-Chair Transfer   Assistance Needed Supervision; Adaptive equipment   Assistance Provided by [place] No physical assistance   Chair/Bed-to-Chair Transfer CARE Score 4   Transfer Bed/Chair/Wheelchair   Limitations Noted In Balance; Coordination; Endurance;Pain Management;Problem Solving;LE Strength   Adaptive Equipment Roller Walker   Findings CS with RW    Bed, Chair, Wheelchair Transfer (FIM) 5 - Patient requires supervision/monitoring   QI: Walk 10 Feet   Assistance Needed Incidental touching   Assistance Provided by [place] No physical assistance   Walk 10 Feet CARE Score 4   QI: Walk 50 Feet with Two Turns   Assistance Needed Incidental touching   Assistance Provided by [place] No physical assistance   Walk 50 Feet with Two Turns CARE Score 4   QI: Walk 150 Feet   Assistance Needed Incidental touching   Assistance Provided by [place] No physical assistance   Walk 150 Feet CARE Score 4   Ambulation Does the patient walk? 2  Yes   Primary Discharge Mode of Locomotion Walk   Walk Assist Level Contact Guard;Close Supervision   Gait Pattern Inconsistant Melissa;Decreased foot clearance;Shuffle;Improper weight shift; Forward Flexion   Assist Device Other;Roller Walker   Distance Walked (feet) 500 ft   Limitations Noted In Balance; Coordination; Heel Strike;Speed;Strength;Swing   Findings CS with RW; CG without AD    Walking (FIM) 4 - Patient requires steadying assist or light touching AND distance 150 feet or more, no rest   QI: Toilet Transfer   Assistance Needed Incidental touching   Assistance Provided by [place] No physical assistance   Toilet Transfer CARE Score 4   Toilet Transfer   Surface Assessed Standard Toilet   Toilet Transfer (FIM) 4 - Patient requires steadying assist or light touching   Equipment Use   NuStep L1 x 10 min  (maintaining SPM in the 30's)   Assessment   Treatment Assessment Pt participated in PT session focusing on endurance and gait training without AD  Pt able to maintain increased speed on Nustep today as compared to yesterday  Initiated gait training by using RW as warm-up; pt still lacks heel strike despite vc's; reports she always shuffled her whole life  Progressed to gait training along hallway railing, and then without any AD (with use of gait belt for steadying assist)  Pt still demo lack of heel strike, forward flexed posture, and occ grabbing for furniture/walls to steady herself  However with increased practice pt assumed more upright posture and less grabbing for objects to steady herself  Pt engaged in PT/OT cotreat session for 30 min with PT focus on ambulation without AD and dynamic balance while OT focused on functional reach and cognition  Pt demo increase in activity tolerance as she was able to ambulate up/down hallway while searching for letters in alphabetical order; no need for seated rest break   Pt did c/o slight increase in LBP without use of RW for support however she reports it was "not bad" and able to tolerate  Recommend continued gait training without AD to maximize I and safety with all functional mobility  Barriers to Discharge Decreased caregiver support   PT Barriers   Physical Impairment Decreased strength;Decreased endurance; Impaired balance;Decreased mobility; Decreased coordination;Decreased safety awareness;Pain   Functional Limitation Car transfers; Ramp negotiation;Stair negotiation;Standing;Transfers; Walking   Plan   Treatment/Interventions Functional transfer training;LE strengthening/ROM; Elevations; Therapeutic exercise; Endurance training;Patient/family training;Equipment eval/education; Bed mobility;Gait training   Progress Progressing toward goals   Recommendation   Recommendation Home with family support; Outpatient PT   Equipment Recommended Walker  (TBD)   PT Therapy Minutes   PT Time In 1245   PT Time Out 1400   PT Total Time (minutes) 75   PT Mode of treatment - Individual (minutes) 45   PT Mode of treatment - Concurrent (minutes) 0   PT Mode of treatment - Group (minutes) 0   PT Mode of treatment - Co-treat (minutes) 30   PT Mode of Teatment - Total time(minutes) 75 minutes   Therapy Time missed   Time missed?  No

## 2018-05-04 NOTE — SOCIAL WORK
Met w/pt and reviewed rehab routine and cm role  Pt states her address and contact info is incorrect on admission screen  Pt states she is going to her sister Tsering's home at [address], she thinks her cell is [phone]  Pt states she does not wish for her sister Stacia Sanchez or her mother Gela Ely to be contact for her at this time  Pt also states she has a friend name elle shah that she wishes to be a contact but she would have to look his number up  Pt reports not having any previous dme hhc or outpt therapy services and no prior therapy for mental health or drug and alcohol use  Informed of team mtg process and dc planning services, made pt aware she may need to continue with outpt therapy services  Confirmed pts insurance is [employer] and update is due 5/9  Following for contd stay review and dc planning recommendations

## 2018-05-04 NOTE — PROGRESS NOTES
05/04/18 1330   Pain Assessment   Pain Assessment 0-10   Pain Score No Pain   Restrictions/Precautions   Precautions Bed/chair alarms; Fall Risk   QI: Chair/Bed-to-Chair Transfer   Assistance Needed Incidental touching   Assistance Provided by [place] No physical assistance   Chair/Bed-to-Chair Transfer CARE Score 4   Transfer Bed/Chair/Wheelchair   Stand Pivot Contact Guard   Sit to Stand Supervision   Stand to Sit Minimal   Supine to Sit Supervision   Sit to Supine Supervision   QI: [address] Needed Incidental touching   Assistance Provided by [place] No physical assistance   Toileting Hygiene CARE Score 4   Toileting   Able to [address] down yes, up yes  Able to Manage Clothing Closures Yes   Manage Hygiene Bladder   Toileting (FIM) 4 - Patient requires steadying assist or light touching   QI: Toilet Transfer   Assistance Needed Incidental touching   Assistance Provided by [place] No physical assistance   Toilet Transfer CARE Score 4   Toilet Transfer   Surface Assessed Standard Toilet   Transfer Technique Standard   Limitations Noted In Balance   Toilet Transfer (FIM) 4 - Patient requires steadying assist or light touching   Functional Standing Tolerance   Time 15 minutes   Cognition   Overall Cognitive Status Impaired   Arousal/Participation Alert; Cooperative   Attention Attends with cues to redirect   Orientation Level Oriented X4   Memory Decreased recall of recent events   Activity Tolerance   Activity Tolerance Patient tolerated treatment well   Assessment   Treatment Assessment Pt engaged in 30 min cotx with PT with PT focus on fxnl mobility without device and overall balance with OT focus on fxnl reach and cognition  Pt engaged in [facility] [address] task to retrieve alphabet letters A-K in order and able to identify animal for each letter requring cueing for letters N and I  Pt given descriptive clues in addition to already thought of animals to guess therapists animal choice   Pt with good problem solving during task  pt with overall improved endurance and problem solving since AM session  Pt returend to bed at end of session and able to complete alphabet with animals with 100% accuracy  Plan   Treatment/Interventions ADL retraining;Functional transfer training;LE strengthening/ROM; Endurance training;Cognitive reorientation   Progress Progressing toward goals   OT Therapy Minutes   OT Time In 1330   OT Time Out 1400   OT Total Time (minutes) 30   OT Mode of treatment - Individual (minutes) 0   OT Mode of treatment - Concurrent (minutes) 0   OT Mode of treatment - Group (minutes) 0   OT Mode of treatment - Co-treat (minutes) 30   OT Mode of Teatment - Total time(minutes) 30 minutes   Therapy Time missed   Time missed?  No

## 2018-05-05 PROCEDURE — 97535 SELF CARE MNGMENT TRAINING: CPT | Performed by: OCCUPATIONAL THERAPY ASSISTANT

## 2018-05-05 PROCEDURE — 97116 GAIT TRAINING THERAPY: CPT

## 2018-05-05 PROCEDURE — 97530 THERAPEUTIC ACTIVITIES: CPT

## 2018-05-05 PROCEDURE — 97110 THERAPEUTIC EXERCISES: CPT

## 2018-05-05 RX ADMIN — ACETAMINOPHEN 650 MG: 325 TABLET ORAL at 23:44

## 2018-05-05 RX ADMIN — HYDROMORPHONE HYDROCHLORIDE 2 MG: 2 TABLET ORAL at 06:51

## 2018-05-05 RX ADMIN — Medication 10 ML: at 17:23

## 2018-05-05 RX ADMIN — MELATONIN TAB 3 MG 6 MG: 3 TAB at 21:44

## 2018-05-05 RX ADMIN — SERTRALINE HYDROCHLORIDE 50 MG: 50 TABLET ORAL at 09:01

## 2018-05-05 RX ADMIN — HEPARIN SODIUM 5000 UNITS: 5000 INJECTION, SOLUTION INTRAVENOUS; SUBCUTANEOUS at 14:05

## 2018-05-05 RX ADMIN — FLUTICASONE PROPIONATE 1 SPRAY: 50 SPRAY, METERED NASAL at 17:25

## 2018-05-05 RX ADMIN — ACETAMINOPHEN 650 MG: 325 TABLET ORAL at 14:05

## 2018-05-05 RX ADMIN — HEPARIN SODIUM 5000 UNITS: 5000 INJECTION, SOLUTION INTRAVENOUS; SUBCUTANEOUS at 21:44

## 2018-05-05 RX ADMIN — PRAVASTATIN SODIUM 40 MG: 40 TABLET ORAL at 17:23

## 2018-05-05 RX ADMIN — Medication 10 ML: at 06:40

## 2018-05-05 RX ADMIN — ACETAMINOPHEN 650 MG: 325 TABLET ORAL at 17:22

## 2018-05-05 RX ADMIN — HEPARIN SODIUM 5000 UNITS: 5000 INJECTION, SOLUTION INTRAVENOUS; SUBCUTANEOUS at 06:40

## 2018-05-05 RX ADMIN — LORATADINE 10 MG: 10 TABLET ORAL at 09:02

## 2018-05-05 RX ADMIN — DOCUSATE SODIUM 100 MG: 100 CAPSULE, LIQUID FILLED ORAL at 09:01

## 2018-05-05 RX ADMIN — DOCUSATE SODIUM 100 MG: 100 CAPSULE, LIQUID FILLED ORAL at 17:23

## 2018-05-05 RX ADMIN — HYDROMORPHONE HYDROCHLORIDE 2 MG: 2 TABLET ORAL at 17:23

## 2018-05-05 RX ADMIN — PANTOPRAZOLE SODIUM 20 MG: 20 TABLET, DELAYED RELEASE ORAL at 06:40

## 2018-05-05 RX ADMIN — MONTELUKAST SODIUM 10 MG: 10 TABLET, FILM COATED ORAL at 09:01

## 2018-05-05 RX ADMIN — Medication 10 ML: at 21:47

## 2018-05-05 RX ADMIN — ACETAMINOPHEN 650 MG: 325 TABLET ORAL at 06:40

## 2018-05-05 RX ADMIN — FLUTICASONE PROPIONATE 1 SPRAY: 50 SPRAY, METERED NASAL at 09:03

## 2018-05-05 RX ADMIN — SENNOSIDES 8.6 MG: 8.6 TABLET ORAL at 21:44

## 2018-05-05 NOTE — PROGRESS NOTES
Occupational Therapy Treatment Note:     05/05/18 0700   Pain Assessment   Pain Assessment 0-10   Pain Score Worst Possible Pain  (Upon initiation; 0/10 upon completion)   Pain Location Head   Pain Descriptors Headache   Hospital Pain Intervention(s) Medication (See MAR); Distraction; Rest   Restrictions/Precautions   Precautions Bed/chair alarms;Cognitive; Fall Risk;Pain   Weight Bearing Restrictions No   ROM Restrictions No   QI: [address] Provided by [place] No physical assistance   Eating CARE Score 6   Eating Assessment   Food To Mouth Yes   Able To Cut Yes   Positioning Upright;Out of Bed   Meal Assessed Breakfast   Intake Mode PO;Self   Finishes Timely Yes   Opens Packages Yes   Eating (FIM) 6 - Patient requires increased time or safety concern   QI: Oral Hygiene   Assistance Needed Set-up / clean-up;Supervision   Assistance Provided by [place] No physical assistance   Comment Seated  Oral Hygiene CARE Score 4   Grooming   Able To Initiate Tasks;Comb/Brush Hair;Wash/Dry Face;Brush/Clean Teeth;Wash/Dry Hands   Limitation Noted In Problem Solving; Safety;Strength   Findings Seated  Grooming (FIM) 5 - Patient requires supervision/monitoring   QI: Shower/Bathe Self   Assistance Needed Adaptive equipment;Set-up / Bhavya Cordon; Verbal cues; Incidental touching   Assistance Provided by [place] No physical assistance   Comment Pt able to bathe lower legs/feet while seated using method of crossing 1 LE over opposing knee with S and VCs to initiat technique as pt c/o increased back and head pain with forward reach  Pt completed ashleigh-care while in stance with CGA and VCs to maintain unilateral hand support at grab bar for optimal balance and safety  VCs also warranted to complete standing portion while directly in front of shower chair vs backside facing shower head wall      Shower/Bathe Self CARE Score 4   Bathing   Assessed Bath Style Shower   Anticipated D/C Bath Style Shower   Able to Gather/Transport No   Able to [employer] Temperature Yes   Able to Wash/Rinse/Dry (body part) Left Arm;Right Arm;L Upper Leg;R Upper Leg;L Lower Leg/Foot;R Lower Leg/Foot;Chest;Abdomen;Perineal Area; Buttocks   Limitations Noted in Balance; Endurance;Problem Solving; Safety;Strength   Positioning Seated;Standing   Adaptive Equipment Shower Bars; Shower Seat;Hand Rodriguez's   Bathing (FIM) 4 - Patient requires steadying assist or light touching   Tub/Shower Transfer   Limitations Noted In Balance; Endurance;Problem Solving; Safety;LE Strength   Adaptive Equipment Grab Bars;Seat with Back; Other  (RW)   Assessed Shower   Findings VCs for technique in order to ensure good safety  Pt reported she will be D/Cing to sister's home, in which there is a built in seat and hand held shower head, however no grab bars; pt verbalized that she prefers to not request sister to have fixed grab bar installed, therefore discussed option of suction cup grab bars  Shower Transfer (FIM) 4 - Patient requires steadying assist or light touching   QI: Upper Body Dressing   Assistance Needed Set-up / clean-up;Supervision   Assistance Provided by [place] No physical assistance   Comment Seated  Upper Body Dressing CARE Score 4   QI: Lower Body Dressing   Assistance Needed Adaptive equipment;Set-up / clean-up; Verbal cues; Incidental touching   Assistance Provided by [place] Less than 25%   Comment Instructed pt on EC technique of donning undergarment/pants to knees at once in order to reduce number of sit to stands required for clothing mgmt over hips  Pt able to thread undergarment/pants over feet while seated with S  CGA while pt standing briefly unsupported for clothing mgmt over hips with no noted LOB; VCs warranted to initially gain balance at RW prior in order to ensure good safety      Lower Body Dressing CARE Score 3   QI: Putting On/Taking Off Footwear   Assistance Needed Adaptive equipment;Set-up / clean-up;Supervision;Verbal cues Assistance Provided by [place] No physical assistance   Comment Pt able to doff/don socks and slip on style shoes using method of crossing 1 LE over opposing knee with S and VCs to initiate technique as pt c/o increased pain in back and head with forward reach  Instructed pt on using reacher LHAE to retrieve doffed socks and shoes from floor level, which pt completed with S after initial demonstration  Putting On/Taking Off Footwear CARE Score 4   Dressing/Undressing Clothing   Remove UB Clothes Other  (Night gown  )   Remove LB Clothes Socks   Don UB Clothes Pullover Shirt   Don LB Clothes Pants; Undergarment;Socks; Shoes   Limitations Noted In Balance; Endurance;Problem Solving; Safety;Strength   Adaptive Equipment Reacher; Other  (RW)   Positioning Supported Sit;Standing   UB Dressing (FIM) 5 - Patient requires supervision/monitoring   LB Dressing (FIM) 4 - Patient requires steadying assist or light touching   QI: Lying to Sitting on Side of Bed   Assistance Needed Supervision   Assistance Provided by [place] No physical assistance   Comment HOB flat without use of bed rails  Lying to Sitting on Side of Bed CARE Score 4   QI: Sit to Stand   Assistance Needed Adaptive equipment;Set-up / clean-up;Supervision;Verbal cues   Assistance Provided by [place] No physical assistance   Comment RW; VCs for hand placement in order to ensure good safety  Sit to Stand CARE Score 4   QI: Chair/Bed-to-Chair Transfer   Assistance Needed Adaptive equipment;Set-up / clean-up;Supervision   Assistance Provided by [place] No physical assistance   Comment RW  Chair/Bed-to-Chair Transfer CARE Score 4   Transfer Bed/Chair/Wheelchair   Positioning Concerns Cognition   Limitations Noted In Balance; Endurance;Problem Solving;LE Strength   Adaptive Equipment Roller Walker   Stand Pivot Supervision  (CS)   Sit to Stand Supervision   Stand to Sit Supervision   Supine to Sit Supervision   Findings Pt completed functional mobility short distances using RW with CS  Bed, Chair, Wheelchair Transfer (FIM) 5 - Patient requires supervision/monitoring  (CS)   QI: [address] Needed Adaptive equipment;Set-up / Normajean Crumb; Verbal cues; Incidental touching   Assistance Provided by [place] No physical assistance   Comment Pt completed frontal ashleigh-care while seated with S and clothing mgmt over hips while standing briefly unsupported with CGA and no noted LOB  VCs warranted to initially gain balance at RW prior in order to ensure good safety  Toileting Hygiene CARE Score 4   Toileting   Able to [address] down yes, up yes  Able to Manage Clothing Closures Other  (None needed; elastic waisted pants  )   Manage Hygiene Bladder   Limitations Noted In Balance;Problem Solving; Safety;LE Strength   Adaptive Equipment Other  (RW  )   Toileting (FIM) 4 - Patient requires steadying assist or light touching   QI: Toilet Transfer   Assistance Needed Adaptive equipment;Set-up / clean-up;Supervision;Verbal cues   Assistance Provided by [place] No physical assistance   Comment Using RW and commode over toilet  VCs for hand placement in order to ensure good safety  Pt reported she plans to D/C to sister's home in which there is a standard height toilet with no grab bars, and that she does not want to request sister to install grab bar, therefore recommending pt place commode over toilet to raise height and use arm rests to reach back for/push off of  Toilet Transfer CARE Score 4   Toilet Transfer   Surface Assessed Raised Toilet  (Commode over toilet  )   Transfer Technique Standard   Limitations Noted In Balance; Endurance;Problem Solving; Safety;LE Strength   Adaptive Equipment (RW; commode over toilet  )   Toilet Transfer (FIM) 5 - Patient requires supervision/monitoring  (CS)   Cognition   Overall Cognitive Status Impaired   Arousal/Participation Alert; Cooperative   Attention Attends with cues to redirect   Orientation Level Oriented to person;Oriented to place;Oriented to situation   Memory Decreased short term memory;Decreased recall of recent events;Decreased recall of precautions   Following Commands Follows one step commands with increased time or repetition   Comments Pt would benefit from formal cognitive assessment  Activity Tolerance   Activity Tolerance Patient tolerated treatment well   Assessment   Treatment Assessment OT tx sessions focused on bed mobility, transfers, standing balance, functional mobility, toileting, ADL skills, functional cognition, and overall activity tolerance/endurance  Refer above for details on pt performance  Pt would benefit from continued skilled OT services in order to achieve highest functional abilities  Prognosis Fair   Problem List Decreased strength;Decreased range of motion;Decreased endurance; Impaired balance;Decreased mobility; Decreased cognition; Impaired judgement;Decreased safety awareness;Pain   Barriers to Discharge Decreased caregiver support   Plan   Treatment/Interventions ADL retraining;Functional transfer training; Endurance training;Cognitive reorientation;Patient/family training;Equipment eval/education; Bed mobility; Compensatory technique education;OT   Progress Progressing toward goals   OT Therapy Minutes   OT Time In 0700   OT Time Out 0830   OT Total Time (minutes) 90   OT Mode of treatment - Individual (minutes) 90   OT Mode of treatment - Concurrent (minutes) 0   OT Mode of treatment - Group (minutes) 0   OT Mode of treatment - Co-treat (minutes) 0   OT Mode of Teatment - Total time(minutes) 90 minutes   Therapy Time missed   Time missed?  No   Leatha Ashford, [address]

## 2018-05-05 NOTE — PROGRESS NOTES
Internal Medicine Progress Note  Patient: Rebeca Warren  Age/sex: 63 y o  female  Medical Record #: [id number]      ASSESSMENT/PLAN:  Rebeca Warren is seen and examined and mangement for following issues:    1  SAH/hydrocephalus: etio of bleed? = she had 2 negative a-grams and a negative CTA  She completed course of Nimotop on 4/30/18  She is for followup CT head 5/8/18      2  Asthma:  stable on her home dose of Breo and Singulair = will continue     3  Nicotine abuse:  on no patch currently; says no desire to smoke currently      4   Depression/Anxiety:  currently on Zoloft which was added in the hospital after being seen by Dr Britney Harris from Psychiatry  She prefers to be on Wellbutrin but it decreases the seizure threshold  Pt will remain on Zoloft  Appreciate Psychiatry input      5  Hx cocaine abuse: drug screen was positive for such but she denied  Primary service in the hospital counseled her anyway      6  Tooth pain:  per SLIM, OMFS said no immediate intervention needed; she will need followup     7   Leukocytosis:  resolved     8  ABLA:  Resolved      Subjective: Patient seen and examined  She denies any complaints today       ROS:   GI: denies abdominal pain, change bowel habits or reflux symptoms  Neuro: No new neurologic changes  Respiratory: No Cough, SOB  Cardiovascular: No CP, palpitations     Scheduled Meds:    Current Facility-Administered Medications:  acetaminophen 325 mg Oral Q6H PRN Ilya Castellon MD   acetaminophen 650 mg Oral Q6H [facility] HUSAM [place]   al mag oxide-diphenhydramine-lidocaine viscous 10 mL Swish & Spit 4x Daily (AC & HS) Ilya Castellon MD   albuterol 2 puff Inhalation Q6H PRN HUSAM [place]   ALPRAZolam 0 25 mg Oral Daily PRN HUSAM [place]   benzocaine  Mucosal 4x Daily PRN Ilya Castellon MD   bisacodyl 10 mg Rectal Daily PRN HUSAM [place]   docusate sodium 100 mg Oral BID HUSAM [place]   fluticasone 1 spray Each Nare BID MELVI JimC fluticasone furoate-vilanterol 1 puff Inhalation Daily Algie Senate, CRNP   heparin (porcine) 5,000 Units Subcutaneous [facility] Algie Senate, CRLAMINE   HYDROmorphone 2 mg Oral Q6H PRN Kylie Ruggiero MD   loratadine 10 mg Oral Daily Henri Stokes PA-C   melatonin 6 mg Oral HS Algie Senate, CRLAMINE   methocarbamol 500 mg Oral Q6H PRN Algie Senate, CRNP   montelukast 10 mg Oral Daily Algie Senate, CRLAMINE   ondansetron 4 mg Oral Q6H PRN Algie Senate, CRNP   pantoprazole 20 mg Oral Early Morning Kylie Ruggiero MD   pravastatin 40 mg Oral Daily With HUSAM Haynes   senna 1 tablet Oral HS Algie Senate, CRLAMINE   sertraline 50 mg Oral Daily Pratibha Luque MD       Labs:       Results from last 7 days  Lab Units 05/02/18  0641 05/01/18  0527   WBC Thousand/uL 9 27 10 87*   HEMOGLOBIN g/dL 11 5 11 7   HEMATOCRIT % 35 8 37 2   PLATELETS Thousands/uL 665* 605*       Results from last 7 days  Lab Units 05/02/18  0641 05/01/18  0527   SODIUM mmol/L 138 138   POTASSIUM mmol/L 4 2 3 8   CHLORIDE mmol/L 106 103   CO2 mmol/L 26 27   BUN mg/dL 12 13   CREATININE mg/dL 0 66 0 61   GLUCOSE RANDOM mg/dL 87 84   CALCIUM mg/dL 9 6 9 5                  Glucose (mg/dL)   Date Value   05/02/2018 87   05/01/2018 84   04/30/2018 87   04/29/2018 85     Glucose, i-STAT (mg/dl)   Date Value   04/16/2018 95       Labs reviewed    Physical Examination:  Vitals:   Vitals:    05/04/18 0741 05/04/18 1515 05/04/18 2254 05/05/18 0742   BP: 119/74 131/79 136/65 120/80   BP Location: Left arm Right arm Right arm Right arm   Pulse: 64 72 82 75   Resp: 20 19 18 20   Temp: 98 2 °F (36 8 °C) 97 9 °F (36 6 °C) 98 3 °F (36 8 °C) 97 9 °F (36 6 °C)   TempSrc: Oral Oral Oral Oral   SpO2: 96% 97% 95% 96%   Weight:       Height:           GEN: NAD  HEENT: NC/AT, EOMI  RESP: CTAB, no R/R/W  CV: +S1 S2, regular rate, no rubs/murmur  ABD: soft, NT, ND, normal BS   : no f[place]on  EXT: no LE edema  Skin: no rashes  Neuro: Awake and alert, BUE 4+/5, B/L LE 4/5      [ X ] Total time spent: 30 Mins and greater than 50% of this time was spent counseling/coordinating care        Toma Ambrocio, Rodolfo Garvin   Internal Medicine

## 2018-05-05 NOTE — PROGRESS NOTES
05/05/18 1230   Pain Assessment   Pain Assessment 0-10   Pain Score 5   Pain Type Acute pain   Pain Location Head   Pain Orientation Bilateral   Pain Descriptors Headache   Pain Frequency Intermittent   Pain Onset Ongoing   Clinical Progression Gradually improving   Patient's Stated Pain Goal No pain   Hospital Pain Intervention(s) Medication (See MAR); Rest   Response to Interventions tolerating well   Restrictions/Precautions   Precautions Bed/chair alarms;Cognitive; Fall Risk;Pain   Weight Bearing Restrictions No   ROM Restrictions No   Cognition   Overall Cognitive Status Impaired   Arousal/Participation Alert; Cooperative   Attention Attends with cues to redirect   Orientation Level Oriented X4   Memory Decreased short term memory;Decreased recall of recent events;Decreased recall of precautions   Following Commands Follows one step commands with increased time or repetition   Subjective   Subjective Patient c/o headache at the start of therapy session but she was still able to tolerate the entire therapy session with rest breaks in between      QI: Roll Left and Right   Assistance Needed Supervision   Assistance Provided by [place] No physical assistance   Roll Left and Right CARE Score 4   QI: Sit to [address] Provided by [place] No physical assistance   Sit to Lying CARE Score 4   QI: Lying to Sitting on Side of Bed   Assistance Needed Supervision   Assistance Provided by [place] No physical assistance   Lying to Sitting on Side of Bed CARE Score 4   QI: Sit to [address] Provided by [place] No physical assistance   Comment CS using RW   Sit to Stand CARE Score 4   Bed Mobility   Able to Roll Left to Right;Right to Left;Scoot Up;Scoot Down   Adaptive Equipment Side Rails   Findings CS   QI: Chair/Bed-to-Chair Transfer   Assistance Needed Supervision   Assistance Provided by [place] No physical assistance   Comment CS using RW Chair/Bed-to-Chair Transfer CARE Score 4   Transfer Bed/Chair/Wheelchair   Limitations Noted In Balance; Endurance;LE Strength   Adaptive Equipment Roller Walker   Stand Pivot Supervision   Sit to Stand Supervision   Stand to Sit Supervision   Supine to Sit Supervision   Sit to Supine Supervision   Findings CS with RW   Bed, Chair, Wheelchair Transfer (FIM) 5 - Patient requires supervision/monitoring   QI: Car Transfer   Reason if not Attempted Activity not applicable   Car Transfer CARE Score 9   QI: [address] Provided by [place] No physical assistance   Walk 10 Feet CARE Score 4   QI: Walk 50 Feet with Two [address] Provided by [place] No physical assistance   Walk 50 Feet with Two Turns CARE Score 4   QI: Walk 150 [address] Provided by [place] No physical assistance   Walk 150 Feet CARE Score 4   QI: Walking 10 Feet on Uneven Surfaces   Reason if not Attempted Activity not applicable   Walking 10 Feet on Uneven Surfaces CARE Score 9   Ambulation   Does the patient walk? 2  Yes   Primary Discharge Mode of Locomotion Walk   Walk Assist Level Close Supervision   Gait Pattern Inconsistant Melissa;Decreased foot clearance; Forward Flexion; Improper weight shift   Assist Device [place] Walked (feet) 400 ft   Limitations Noted In Balance; Coordination; Heel Strike;Speed;Strength;Swing   Findings CS using RW   Walking (FIM) 5 - Patient requires supervision/monitoring AND distance 150 feet or more, no rest   QI: Wheel 50 Feet with Two Turns   Reason if not Attempted Activity not applicable   Wheel 50 Feet with Two Turns CARE Score 9   QI: Wheel 150 Feet   Reason if not Attempted Activity not applicable   Wheel 414 Feet CARE Score 9   Wheelchair mobility   QI: Does the patient use a wheelchair? 0   No   Wheelchair (FIM) 0 - Activity does not occur   QI: 1 Step (Curb)   Reason if not Attempted Activity not applicable   1 Step (Curb) CARE Score 9   QI: 4 Steps   Assistance Needed Incidental touching   Assistance Provided by [place] No physical assistance   4 Steps CARE Score 4   QI: 12 Steps   Assistance Needed Incidental touching   Assistance Provided by [place] No physical assistance   12 Steps CARE Score 4   Stairs   Type Stairs   # of Steps 12   Weight Bearing Precautions Fall Risk   Assist Devices Bilateral Rail   Stairs (FIM) 4 - Patient requires steadying assist or light touching AND patient goes up and down full flight (12- 14 stairs)   QI: Picking Up Object   Reason if not Attempted Activity not applicable   Picking Up Object CARE Score 9   QI: Toilet Transfer   Reason if not Attempted Activity not applicable   Toilet Transfer CARE Score 9   Toilet Transfer   Toilet Transfer (FIM) 0 - Activity does not occur   Therapeutic Interventions   Strengthening Supine ther ex on BLE's for heel slides, hip abduction/adduction, quad sets, SAQ, SLR, ankle DF/PF for 3 sets of 10 reps each  Seatd ther ex on BLE's for LAQ, hip flexion, hip abduction/adduction with knees extended, hip adduction with pillow squeezes, ankle DF/PF for 3s ets of 10 reps each  Flexibility supine gentle pssive stretching on hamstring and gastrocs x 5 with 30 seconds hold each  Assessment   Treatment Assessment Patient reported of headache prior to therapy session and with encouragement she agreed to participate in her therapy session today with rest breaks in between exercises/activity  Nurse Suha Scott came to give her pain medication during therapy session but patient refused to take it because she said it will make her sleepy  She needd ocassional verbal cues with proper hand placements during transfers and to always use RW during transfers and gait for safety  No LOB noted   She will benefit from continued skilled PT services to improve BLE's strength to facilitate safetya nd Independnece with transfers and gait, to improve endurance with gait to be able to safely walk around their house at d/c and to improve standing balance to reduce risk for falls  Problem List Decreased strength;Decreased range of motion;Decreased endurance; Impaired balance;Decreased mobility; Decreased coordination; Impaired judgement;Decreased safety awareness;Pain   Barriers to Discharge Decreased caregiver support   PT Barriers   Physical Impairment Decreased strength;Decreased range of motion;Decreased endurance;Decreased coordination; Impaired balance;Decreased mobility; Impaired judgement;Decreased safety awareness;Pain   Functional Limitation Stair negotiation;Standing;Transfers; Walking   Plan   Treatment/Interventions Functional transfer training;LE strengthening/ROM; Elevations; Therapeutic exercise; Endurance training;Bed mobility;Gait training   Progress Progressing toward goals   Recommendation   Recommendation Outpatient PT; Home with family support   Equipment Recommended Walker   PT Therapy Minutes   PT Time In 1230   PT Time Out 1400   PT Total Time (minutes) 90   PT Mode of treatment - Individual (minutes) 90   PT Mode of treatment - Concurrent (minutes) 0   PT Mode of treatment - Group (minutes) 0   PT Mode of treatment - Co-treat (minutes) 0   PT Mode of Teatment - Total time(minutes) 90 minutes   Therapy Time missed   Time missed?  No

## 2018-05-06 PROCEDURE — 97530 THERAPEUTIC ACTIVITIES: CPT | Performed by: OCCUPATIONAL THERAPY ASSISTANT

## 2018-05-06 PROCEDURE — 97110 THERAPEUTIC EXERCISES: CPT

## 2018-05-06 PROCEDURE — 97116 GAIT TRAINING THERAPY: CPT

## 2018-05-06 PROCEDURE — G0515 COGNITIVE SKILLS DEVELOPMENT: HCPCS | Performed by: OCCUPATIONAL THERAPY ASSISTANT

## 2018-05-06 PROCEDURE — 97530 THERAPEUTIC ACTIVITIES: CPT

## 2018-05-06 RX ADMIN — ACETAMINOPHEN 650 MG: 325 TABLET ORAL at 11:56

## 2018-05-06 RX ADMIN — Medication 10 ML: at 11:56

## 2018-05-06 RX ADMIN — SERTRALINE HYDROCHLORIDE 50 MG: 50 TABLET ORAL at 08:21

## 2018-05-06 RX ADMIN — LORATADINE 10 MG: 10 TABLET ORAL at 08:21

## 2018-05-06 RX ADMIN — FLUTICASONE PROPIONATE 1 SPRAY: 50 SPRAY, METERED NASAL at 17:06

## 2018-05-06 RX ADMIN — MELATONIN TAB 3 MG 6 MG: 3 TAB at 21:35

## 2018-05-06 RX ADMIN — Medication 10 ML: at 21:39

## 2018-05-06 RX ADMIN — HYDROMORPHONE HYDROCHLORIDE 2 MG: 2 TABLET ORAL at 08:25

## 2018-05-06 RX ADMIN — DOCUSATE SODIUM 100 MG: 100 CAPSULE, LIQUID FILLED ORAL at 17:05

## 2018-05-06 RX ADMIN — Medication 10 ML: at 17:06

## 2018-05-06 RX ADMIN — Medication 10 ML: at 08:22

## 2018-05-06 RX ADMIN — DOCUSATE SODIUM 100 MG: 100 CAPSULE, LIQUID FILLED ORAL at 08:21

## 2018-05-06 RX ADMIN — ACETAMINOPHEN 650 MG: 325 TABLET ORAL at 17:05

## 2018-05-06 RX ADMIN — PRAVASTATIN SODIUM 40 MG: 40 TABLET ORAL at 17:05

## 2018-05-06 RX ADMIN — ACETAMINOPHEN 650 MG: 325 TABLET ORAL at 23:25

## 2018-05-06 RX ADMIN — SENNOSIDES 8.6 MG: 8.6 TABLET ORAL at 21:35

## 2018-05-06 RX ADMIN — HEPARIN SODIUM 5000 UNITS: 5000 INJECTION, SOLUTION INTRAVENOUS; SUBCUTANEOUS at 14:37

## 2018-05-06 RX ADMIN — ACETAMINOPHEN 650 MG: 325 TABLET ORAL at 06:07

## 2018-05-06 RX ADMIN — MONTELUKAST SODIUM 10 MG: 10 TABLET, FILM COATED ORAL at 08:21

## 2018-05-06 RX ADMIN — HYDROMORPHONE HYDROCHLORIDE 2 MG: 2 TABLET ORAL at 20:25

## 2018-05-06 RX ADMIN — PANTOPRAZOLE SODIUM 20 MG: 20 TABLET, DELAYED RELEASE ORAL at 06:07

## 2018-05-06 RX ADMIN — HEPARIN SODIUM 5000 UNITS: 5000 INJECTION, SOLUTION INTRAVENOUS; SUBCUTANEOUS at 21:35

## 2018-05-06 RX ADMIN — HEPARIN SODIUM 5000 UNITS: 5000 INJECTION, SOLUTION INTRAVENOUS; SUBCUTANEOUS at 06:07

## 2018-05-06 RX ADMIN — FLUTICASONE PROPIONATE 1 SPRAY: 50 SPRAY, METERED NASAL at 08:22

## 2018-05-06 NOTE — PROGRESS NOTES
Internal Medicine Progress Note  Patient: Jony Long  Age/sex: 63 y o  female  Medical Record #: [id number]      ASSESSMENT/PLAN:  Jony Long is seen and examined and management for following issues:    1  SAH/hydrocephalus: etio of bleed? = she had 2 negative a-grams and a negative CTA  She completed course of Nimotop on 4/30/18  She is for followup CT head 5/8/18      2  Asthma:  stable on her home dose of Breo and Singulair = will continue     3  Nicotine abuse:  on no patch currently; says no desire to smoke currently      4   Depression/Anxiety:  currently on Zoloft which was added in the hospital after being seen by Dr Jerod Curry from Psychiatry  She prefers to be on Wellbutrin but it decreases the seizure threshold = I explained this to her again today  Pt will remain on Zoloft  Appreciate Psychiatry input  She said today that is very tired of being depressed  Says has had depression even as a child  Had an IP stay at [facility] 2012  Trying to get an appt for an OP clinic for after discharge  Says doesn't want to do anything and has no motivation  Dr Romayne Cross to see      5  Hx cocaine abuse: drug screen was positive for such but she denied  Primary service in the hospital counseled her anyway      6  Tooth pain:  per SLIM, OMFS said no immediate intervention needed; she will need followup     7   Leukocytosis:  resolved     8  ABLA:  Resolved      Subjective: Patient seen and examined  She denies any complaints today       ROS:   GI: denies abdominal pain, change bowel habits or reflux symptoms  Neuro: No new neurologic changes  Respiratory: No Cough, SOB  Cardiovascular: No CP, palpitations     Scheduled Meds:    Current Facility-Administered Medications:  acetaminophen 325 mg Oral Q6H PRN Valerio Glynn MD   acetaminophen 650 mg Oral Q6H [facility] HUSAM Gomez   al mag oxide-diphenhydramine-lidocaine viscous 10 mL Swish & Spit 4x Daily (AC & HS) Valerio Glynn MD   albuterol 2 puff Inhalation Q6H PRN Tresa Harm, CRNP   ALPRAZolam 0 25 mg Oral Daily PRN Tresa Harm, CRNP   benzocaine  Mucosal 4x Daily PRN Anel Glynn, MD   bisacodyl 10 mg Rectal Daily PRN Tresa Harm, CRNP   docusate sodium 100 mg Oral BID Tresa Harm, CRNP   fluticasone 1 spray Each Nare BID Estrella Franz, JOSEPH   fluticasone furoate-vilanterol 1 puff Inhalation Daily Tresa Harm, CRNP   heparin (porcine) 5,000 Units Subcutaneous [facility] Tresa Harm, HUSAM   HYDROmorphone 2 mg Oral Q6H PRN Anel Glynn, MD   loratadine 10 mg Oral Daily Estrella Franz PA-C   melatonin 6 mg Oral HS Tresa Harm, CRNP   methocarbamol 500 mg Oral Q6H PRN Tresa Harm, CRNP   montelukast 10 mg Oral Daily Tresa Harm, CRNP   ondansetron 4 mg Oral Q6H PRN Tresa Harm, HUSAM   pantoprazole 20 mg Oral Early Morning Anel Glynn MD   pravastatin 40 mg Oral Daily With Gisele Ask, CRNP   senna 1 tablet Oral HS Tresa Harm, CRNP   sertraline 50 mg Oral Daily Heather Francis MD       Labs:       Results from last 7 days  Lab Units 05/02/18  0641 05/01/18  0527   WBC Thousand/uL 9 27 10 87*   HEMOGLOBIN g/dL 11 5 11 7   HEMATOCRIT % 35 8 37 2   PLATELETS Thousands/uL 665* 605*       Results from last 7 days  Lab Units 05/02/18  0641 05/01/18  0527   SODIUM mmol/L 138 138   POTASSIUM mmol/L 4 2 3 8   CHLORIDE mmol/L 106 103   CO2 mmol/L 26 27   BUN mg/dL 12 13   CREATININE mg/dL 0 66 0 61   GLUCOSE RANDOM mg/dL 87 84   CALCIUM mg/dL 9 6 9 5                  Glucose (mg/dL)   Date Value   05/02/2018 87   05/01/2018 84   04/30/2018 87   04/29/2018 85     Glucose, i-STAT (mg/dl)   Date Value   04/16/2018 95       Labs reviewed    Physical Examination:  Vitals:   Vitals:    05/05/18 0742 05/05/18 1533 05/05/18 2327 05/06/18 0731   BP: 120/80 124/81 134/77 108/64   BP Location: Right arm Right arm Right arm Left arm   Pulse: 75 78 77 68   Resp: 20 20 20 20   Temp: 97 9 °F (36 6 °C) 97 9 °F (36 6 °C) 98 1 °F (36 7 °C) 98 3 °F (36 8 °C)   TempSrc: Oral Oral Oral Oral   SpO2: 96% 96% 99% 97%   Weight:       Height:           GEN: NAD  HEENT: NC/AT, EOMI  RESP: BBS w/o crackles/wheeze/rhonci; resp unlabored  CV: +S1 S2, regular rate, no rubs/murmur  ABD: soft, NT, ND, normal BS   : no f[place]  EXT: no LE edema  Skin: no rashes  Neuro: Awake and alert, BUE 4+/5, B/L LE 4/5      [ X ] Total time spent: 30 Mins and greater than 50% of this time was spent counseling/coordinating care        Rodolfo Burroughs  Internal Medicine

## 2018-05-06 NOTE — PLAN OF CARE
Discharge to home or other facility with appropriate resources Progressing      Absence or prevention of progression during hospitalization Progressing      Absence of fever/infection during neutropenic period Progressing      Verbalizes/displays adequate comfort level or baseline comfort level Progressing      Patient will remain free of falls Progressing      Maintain or return to baseline ADL function Progressing      Maintain or return mobility status to optimal level Progressing

## 2018-05-06 NOTE — PROGRESS NOTES
Occupational Therapy Treatment Note:       05/06/18 1230   Pain Assessment   Pain Assessment No/denies pain   Pain Score No Pain   Restrictions/Precautions   Precautions Bed/chair alarms;Cognitive; Fall Risk;Contact/isolation   Weight Bearing Restrictions No   ROM Restrictions No   Cognition   Overall Cognitive Status Impaired   Arousal/Participation Alert; Cooperative   Attention Attends with cues to redirect   Orientation Level Oriented X4   Memory Decreased short term memory;Decreased recall of recent events;Decreased recall of precautions   Following Commands Follows one step commands with increased time or repetition   Comments Pt completed handout on gathering information from a calendar with Claudia  Activity Tolerance   Activity Tolerance Patient limited by fatigue  (Feelings of depression )   Assessment   Treatment Assessment OT tx session focused on overall problem solving/processing skills, ability to focus to task at hand, and pt's emotional well-being  Pt c/o feelings of severe depression, stating, "I just want to go home, lock myself in my room, and lay in bed in the dark  I did that once before, for 3 years - I got bed sores and everything " Pt demonstrated difficulty identifying what triggers her feelings of depression, stating, "I've just always been this way, I can't explain why " Pt also demonstrated difficulty identifying leisurely activities that bring her sekou/once brought her sekou, however pt did report that exercising has helped her in the past, although it would turn into an obsession and she would end up "burning" herself out  Pt identified music/soothing sounds to bring her increased paranoia  Provided pt with leisurely interest checklist to fill out and f/u with OT in following tx sessions in order to further explore activities to bring about relaxation and channel energy in a healthy manner   Spoke with Phil Hagan, who reported pt has been consulted by Dr Scott conklin already, and that LAMINE Phan is aware  Refer above for further details on pt performance  Pt would benefit from continued skilled OT services in order to achieve highest functional abilities  Prognosis Fair   Problem List Decreased strength;Decreased range of motion;Decreased endurance; Impaired balance;Decreased mobility; Decreased coordination;Decreased cognition; Impaired judgement;Decreased safety awareness   Barriers to Discharge Decreased caregiver support   Plan   Treatment/Interventions Cognitive reorientation;Patient/family training;OT   Progress Progressing toward goals   OT Therapy Minutes   OT Time In 1230   OT Time Out 1300   OT Total Time (minutes) 30   OT Mode of treatment - Individual (minutes) 30   OT Mode of treatment - Concurrent (minutes) 0   OT Mode of treatment - Group (minutes) 0   OT Mode of treatment - Co-treat (minutes) 0   OT Mode of Teatment - Total time(minutes) 30 minutes   Therapy Time missed   Time missed?  No   Marcial Valente, [address]

## 2018-05-06 NOTE — PROGRESS NOTES
05/06/18 0830   Pain Assessment   Pain Assessment 0-10   Pain Score 9   Pain Location Head   Pain Orientation Bilateral   Hospital Pain Intervention(s) Medication (See MAR)   Restrictions/Precautions   Precautions Contact/isolation;Cognitive;Bed/chair alarms; Fall Risk   Weight Bearing Restrictions No   ROM Restrictions No   Cognition   Overall Cognitive Status Impaired   Arousal/Participation Alert; Cooperative   Attention Attends with cues to redirect   Orientation Level Oriented X4   Memory Decreased short term memory;Decreased recall of recent events;Decreased recall of precautions   Following Commands Follows one step commands with increased time or repetition   Subjective   Subjective Pt resistant to participation after learning her session was a full 60 minutes, remarking on how she "doesn't do anything at home, but stay in bed"  Once patient was educated on the importance of participation as a means of going home she was more agreeable  QI: Roll Left and Right   Assistance Needed Supervision   Assistance Provided by [place] No physical assistance   Roll Left and Right CARE Score 4   QI: Sit to [address] Provided by [place] No physical assistance   Sit to Lying CARE Score 4   QI: Lying to Sitting on Side of Bed   Assistance Needed Supervision   Assistance Provided by [place] No physical assistance   Lying to Sitting on Side of Bed CARE Score 4   QI: Sit to Stand   Assistance Needed Supervision; Independent;Verbal cues   Assistance Provided by [place] No physical assistance   Comment vcs for hand placement, safety practices   Sit to Stand CARE Score -   Bed Mobility   Able to Roll Right to Left;Left to Right;Scoot Up;Scoot Down   Transfer Bed/Chair/Wheelchair   Limitations Noted In Balance;Problem Solving;UE Strength;LE Strength;Confidence   Adaptive Equipment Walker   Sit to Stand Supervision   Stand to Sit Supervision   Supine to Sit Supervision   Sit to Supine Supervision   Bed, Chair, Wheelchair Transfer (FIM) 5 - Patient requires supervision/monitoring   QI: Walk 10 Feet   Assistance Needed Supervision;Verbal cues; Adaptive equipment   Assistance Provided by [place] No physical assistance   Walk 10 Feet CARE Score 4   QI: Walk 150 Feet   Assistance Needed Supervision;Verbal cues; Adaptive equipment   Assistance Provided by [place] No physical assistance   Walk 150 Feet CARE Score 4   QI: Walking 10 Feet on Uneven Surfaces   Reason if not Attempted Safety concerns   Walking 10 Feet on Uneven Surfaces CARE Score 88   Ambulation   Does the patient walk? 2  Yes   Primary Discharge Mode of Locomotion Walk   Walk Assist Level Close Supervision;Contact Guard   Gait Pattern Inconsistant [place]; Slow [place]a;Decreased foot clearance;R foot drag;L foot drag; Forward Flexion; Wide LADAN   Assist Device Walker   Distance Walked (feet) 300 ft   Limitations Noted In Balance; Coordination; Heel Strike; Endurance;Posture; Safety;Speed;Strength;Swing   Findings Inconsistent alla with largely flat foot initial contact, easily distracted by people walking past occasionally causing near LOB, also gesturing actively while ambulating removing hand from walker on multiple occasions, responds well to demonstration for normalizing heel strike however quickly devolves into abnormal pattern   Walking (FIM) 4 - Patient requires steadying assist or light touching AND distance 150 feet or more, no rest   QI: Wheel 50 Feet with Two Turns   Reason if not Attempted Activity not applicable   Wheel 50 Feet with Two Turns CARE Score 9   QI: Wheel 150 Feet   Reason if not Attempted Activity not applicable   Wheel 579 Feet CARE Score 9   Wheelchair mobility   QI: Does the patient use a wheelchair? 0  No   QI: 12 Steps   Assistance Needed Adaptive equipment;Verbal cues; Physical assistance   Assistance Provided by [place] Less than 25%   Comment Non reciprocal pattern with single handrail, physical assist for steadying and guarding against knee buckle, poor eccentric control of descent   12 Steps CARE Score 3   Stairs   Type Stairs   # of Steps 12   Assist Devices Single Rail   Stairs (FIM) 4 - Patient requires steadying assist or light touching AND patient goes up and down full flight (12- 14 stairs)   Therapeutic Interventions   Strengthening Standing march x 20, HR/TR x20, seated LAQ ax 20 each   Flexibility hamstring and gastroc manual stretch 4 x 30" each   Equipment Use   NuStep L1 x 10'   Assessment   Treatment Assessment Pt participated in PT session emphasizing activity tolernace, RW gait stability, and elevations  Requires intermittent rest breaks secondary to fatigue and headaches  Depressed affect throughout session with pt complaining over having to participate, hyperfluent about her sedentary behavior outside of here and desire to remain in bed all day  Despite this does demonstrate good effort throughout  Easily distracted in busy hallways which is the source of multiple near LOB with pt independently righting  Anxious on stairs despite good performance, however is challenged with descent admitting to sense of instability  Pt will benefit from continued PT intervention to maximize safe mobility practices and activity tolerance while progressing towards dc  Problem List Decreased strength;Decreased range of motion;Decreased endurance; Impaired balance;Decreased mobility; Decreased coordination;Decreased cognition; Impaired judgement;Decreased safety awareness   Barriers to Discharge Decreased caregiver support   PT Barriers   Physical Impairment Decreased strength;Decreased range of motion;Decreased endurance; Impaired balance;Decreased mobility; Decreased coordination; Impaired judgement;Decreased cognition;Decreased safety awareness;Pain   Functional Limitation Stair negotiation;Standing;Transfers; Walking   Plan   Treatment/Interventions Functional transfer training;LE strengthening/ROM; Elevations; Therapeutic exercise; Endurance training;Gait training;Patient/family training   Progress Progressing toward goals   Recommendation   Recommendation Outpatient PT; Home with family support   Equipment Recommended Walker   PT Therapy Minutes   PT Time In 0830   PT Time Out 0930   PT Total Time (minutes) 60   PT Mode of treatment - Individual (minutes) 60   PT Mode of treatment - Concurrent (minutes) 0   PT Mode of treatment - Group (minutes) 0   PT Mode of treatment - Co-treat (minutes) 0   PT Mode of Teatment - Total time(minutes) 60 minutes   Therapy Time missed   Time missed?  No

## 2018-05-07 LAB
ANION GAP SERPL CALCULATED.3IONS-SCNC: 5 MMOL/L (ref 4–13)
BASOPHILS # BLD AUTO: 0.04 THOUSANDS/ΜL (ref 0–0.1)
BASOPHILS NFR BLD AUTO: 1 % (ref 0–1)
BUN SERPL-MCNC: 15 MG/DL (ref 5–25)
CALCIUM SERPL-MCNC: 9.4 MG/DL (ref 8.3–10.1)
CHLORIDE SERPL-SCNC: 105 MMOL/L (ref 100–108)
CO2 SERPL-SCNC: 28 MMOL/L (ref 21–32)
CREAT SERPL-MCNC: 0.76 MG/DL (ref 0.6–1.3)
EOSINOPHIL # BLD AUTO: 0.23 THOUSAND/ΜL (ref 0–0.61)
EOSINOPHIL NFR BLD AUTO: 3 % (ref 0–6)
ERYTHROCYTE [DISTWIDTH] IN BLOOD BY AUTOMATED COUNT: 14.2 % (ref 11.6–15.1)
GFR SERPL CREATININE-BSD FRML MDRD: 87 ML/MIN/1.73SQ M
GLUCOSE P FAST SERPL-MCNC: 94 MG/DL (ref 65–99)
GLUCOSE SERPL-MCNC: 94 MG/DL (ref 65–140)
HCT VFR BLD AUTO: 37.4 % (ref 34.8–46.1)
HGB BLD-MCNC: 11.9 G/DL (ref 11.5–15.4)
LYMPHOCYTES # BLD AUTO: 2.8 THOUSANDS/ΜL (ref 0.6–4.47)
LYMPHOCYTES NFR BLD AUTO: 41 % (ref 14–44)
MCH RBC QN AUTO: 29.7 PG (ref 26.8–34.3)
MCHC RBC AUTO-ENTMCNC: 31.8 G/DL (ref 31.4–37.4)
MCV RBC AUTO: 93 FL (ref 82–98)
MONOCYTES # BLD AUTO: 0.81 THOUSAND/ΜL (ref 0.17–1.22)
MONOCYTES NFR BLD AUTO: 12 % (ref 4–12)
NEUTROPHILS # BLD AUTO: 2.97 THOUSANDS/ΜL (ref 1.85–7.62)
NEUTS SEG NFR BLD AUTO: 43 % (ref 43–75)
NRBC BLD AUTO-RTO: 0 /100 WBCS
PLATELET # BLD AUTO: 734 THOUSANDS/UL (ref 149–390)
PMV BLD AUTO: 9.5 FL (ref 8.9–12.7)
POTASSIUM SERPL-SCNC: 4.1 MMOL/L (ref 3.5–5.3)
RBC # BLD AUTO: 4.01 MILLION/UL (ref 3.81–5.12)
SODIUM SERPL-SCNC: 138 MMOL/L (ref 136–145)
WBC # BLD AUTO: 6.9 THOUSAND/UL (ref 4.31–10.16)

## 2018-05-07 PROCEDURE — 97110 THERAPEUTIC EXERCISES: CPT

## 2018-05-07 PROCEDURE — 97150 GROUP THERAPEUTIC PROCEDURES: CPT

## 2018-05-07 PROCEDURE — 85025 COMPLETE CBC W/AUTO DIFF WBC: CPT | Performed by: PHYSICIAN ASSISTANT

## 2018-05-07 PROCEDURE — 97530 THERAPEUTIC ACTIVITIES: CPT

## 2018-05-07 PROCEDURE — 97110 THERAPEUTIC EXERCISES: CPT | Performed by: PHYSICAL THERAPIST

## 2018-05-07 PROCEDURE — 97116 GAIT TRAINING THERAPY: CPT | Performed by: PHYSICAL THERAPIST

## 2018-05-07 PROCEDURE — G0515 COGNITIVE SKILLS DEVELOPMENT: HCPCS

## 2018-05-07 PROCEDURE — 97535 SELF CARE MNGMENT TRAINING: CPT

## 2018-05-07 PROCEDURE — 80048 BASIC METABOLIC PNL TOTAL CA: CPT | Performed by: PHYSICIAN ASSISTANT

## 2018-05-07 PROCEDURE — 97530 THERAPEUTIC ACTIVITIES: CPT | Performed by: PHYSICAL THERAPIST

## 2018-05-07 RX ADMIN — DOCUSATE SODIUM 100 MG: 100 CAPSULE, LIQUID FILLED ORAL at 17:25

## 2018-05-07 RX ADMIN — FLUTICASONE PROPIONATE 1 SPRAY: 50 SPRAY, METERED NASAL at 08:22

## 2018-05-07 RX ADMIN — BENZOCAINE: 100 SOLUTION TOPICAL at 17:26

## 2018-05-07 RX ADMIN — PANTOPRAZOLE SODIUM 20 MG: 20 TABLET, DELAYED RELEASE ORAL at 06:15

## 2018-05-07 RX ADMIN — HEPARIN SODIUM 5000 UNITS: 5000 INJECTION, SOLUTION INTRAVENOUS; SUBCUTANEOUS at 14:08

## 2018-05-07 RX ADMIN — SERTRALINE HYDROCHLORIDE 50 MG: 50 TABLET ORAL at 08:20

## 2018-05-07 RX ADMIN — LORATADINE 10 MG: 10 TABLET ORAL at 08:20

## 2018-05-07 RX ADMIN — FLUTICASONE PROPIONATE 1 SPRAY: 50 SPRAY, METERED NASAL at 17:27

## 2018-05-07 RX ADMIN — DOCUSATE SODIUM 100 MG: 100 CAPSULE, LIQUID FILLED ORAL at 08:20

## 2018-05-07 RX ADMIN — MELATONIN TAB 3 MG 6 MG: 3 TAB at 21:04

## 2018-05-07 RX ADMIN — FLUTICASONE FUROATE AND VILANTEROL 1 PUFF: 200; 25 POWDER RESPIRATORY (INHALATION) at 14:07

## 2018-05-07 RX ADMIN — HYDROMORPHONE HYDROCHLORIDE 2 MG: 2 TABLET ORAL at 06:14

## 2018-05-07 RX ADMIN — SENNOSIDES 8.6 MG: 8.6 TABLET ORAL at 21:04

## 2018-05-07 RX ADMIN — ACETAMINOPHEN 650 MG: 325 TABLET ORAL at 11:46

## 2018-05-07 RX ADMIN — ACETAMINOPHEN 650 MG: 325 TABLET ORAL at 06:14

## 2018-05-07 RX ADMIN — MONTELUKAST SODIUM 10 MG: 10 TABLET, FILM COATED ORAL at 08:20

## 2018-05-07 RX ADMIN — ACETAMINOPHEN 650 MG: 325 TABLET ORAL at 17:24

## 2018-05-07 RX ADMIN — HYDROMORPHONE HYDROCHLORIDE 2 MG: 2 TABLET ORAL at 21:10

## 2018-05-07 RX ADMIN — HEPARIN SODIUM 5000 UNITS: 5000 INJECTION, SOLUTION INTRAVENOUS; SUBCUTANEOUS at 21:04

## 2018-05-07 RX ADMIN — HYDROMORPHONE HYDROCHLORIDE 2 MG: 2 TABLET ORAL at 13:27

## 2018-05-07 RX ADMIN — Medication 10 ML: at 08:22

## 2018-05-07 RX ADMIN — Medication 10 ML: at 21:10

## 2018-05-07 RX ADMIN — Medication 10 ML: at 17:26

## 2018-05-07 RX ADMIN — PRAVASTATIN SODIUM 40 MG: 40 TABLET ORAL at 17:24

## 2018-05-07 RX ADMIN — BENZOCAINE 1 APPLICATION: 100 SOLUTION TOPICAL at 21:15

## 2018-05-07 NOTE — PCC PHYSICAL THERAPY
Pt currently functioning @ CS level using RW/ rollator or CG/min A without AD  Pt c/o increased LBP without using AD  Trialed her with rollator today and pt reports increased comfort; also able to use seat for rest breaks 2* dec activity tolerance  Pt presents with overall fatigue and deconditioning, dec activity tolerance, dec standing tolerance, dec BLE strength, impaired balance, chronic LBP  Pt reports her daughter will be with her @ her sister's house upon D/C; also reports she can reside on 1st floor  Anticipate pt will achieve mod I goals; estimated D/C by the weekend

## 2018-05-07 NOTE — PROGRESS NOTES
Internal Medicine Progress Note  Patient: Tylene Severe  Age/sex: 63 y o  female  Medical Record #: [id number]      ASSESSMENT/PLAN:  Tylene Severe is seen and examined and management for following issues:    1  SAH/hydrocephalus: etio of bleed? = she had 2 negative a-grams and a negative CTA  She completed course of Nimotop on 4/30/18  She is for followup CT head 5/8/18 - order entered      2  Asthma:  stable on her home dose of Breo and Singulair = will continue     3  Nicotine abuse:  on no patch currently; says no desire to smoke currently      4   Depression/Anxiety:  currently on Zoloft which was added in the hospital after being seen by Dr Sebastien Nieves from Psychiatry  She prefers to be on Wellbutrin but it decreases the seizure threshold = I explained this to her again today  Pt will remain on Zoloft  Appreciate Psychiatry input  She said today that is very tired of being depressed  Says has had depression even as a child  Had an IP stay at [facility] 2012  Trying to get an appt for an OP clinic for after discharge  Says doesn't want to do anything and has no motivation  Dr Ant Ortega to see      5  Hx cocaine abuse: drug screen was positive for such but she denied  Primary service in the hospital counseled her      6  Tooth pain:  per SLIM, OMFS said no immediate intervention needed; she will need followup     7   Leukocytosis:  resolved     8  ABLA:  Resolved      Subjective: Patient seen and examined  She denies any complaints today       ROS:   GI: denies abdominal pain, change bowel habits or reflux symptoms  Neuro: No new neurologic changes  Respiratory: No Cough, SOB  Cardiovascular: No CP, palpitations     Scheduled Meds:    Current Facility-Administered Medications:  acetaminophen 325 mg Oral Q6H PRN Bryant Fiore MD   acetaminophen 650 mg Oral Q6H [facility] HUSAM Busch   al mag oxide-diphenhydramine-lidocaine viscous 10 mL Swish & Spit 4x Daily (AC & HS) Bryant Fiore MD   albuterol 2 puff Inhalation Q6H PRN [facility], CRNP   ALPRAZolam 0 25 mg Oral Daily PRN Skippy Foster, CRNP   benzocaine  Mucosal 4x Daily PRN Pushpa Parekh, MD   bisacodyl 10 mg Rectal Daily PRN [facility], CRNP   docusate sodium 100 mg Oral BID [facility], CRNP   fluticasone 1 spray Each Nare BID Dl Chambers, JOSEPH   fluticasone furoate-vilanterol 1 puff Inhalation Daily [facility] Foster, HUSAM   heparin (porcine) 5,000 Units Subcutaneous [facility] Foster, CRLAMINE   HYDROmorphone 2 mg Oral Q6H PRN Pushpa Parekh, MD   loratadine 10 mg Oral Daily Dl Reyes, JOSEPH   melatonin 6 mg Oral HS [facility], CRNP   methocarbamol 500 mg Oral Q6H PRN [facility], CRNP   montelukast 10 mg Oral Daily Skippy Foster, CRNP   ondansetron 4 mg Oral Q6H PRN Skippy Foster, HUSAM   pantoprazole 20 mg Oral Early Morning Pushpa Parekh, MD   pravastatin 40 mg Oral Daily With [facility]Current, CRNP   senna 1 tablet Oral HS Skippy Foster, CRNP   sertraline 50 mg Oral Daily Beatriz Eller MD       Labs:       Results from last 7 days  Lab Units 05/07/18  0608 05/02/18  0641   WBC Thousand/uL 6 90 9 27   HEMOGLOBIN g/dL 11 9 11 5   HEMATOCRIT % 37 4 35 8   PLATELETS Thousands/uL 734* 665*       Results from last 7 days  Lab Units 05/07/18  0608 05/02/18  0641   SODIUM mmol/L 138 138   POTASSIUM mmol/L 4 1 4 2   CHLORIDE mmol/L 105 106   CO2 mmol/L 28 26   BUN mg/dL 15 12   CREATININE mg/dL 0 76 0 66   GLUCOSE RANDOM mg/dL 94 87   CALCIUM mg/dL 9 4 9 6                  Glucose (mg/dL)   Date Value   05/07/2018 94   05/02/2018 87   05/01/2018 84   04/30/2018 87     Glucose, i-STAT (mg/dl)   Date Value   04/16/2018 95       Labs reviewed    Physical Examination:  Vitals:   Vitals:    05/06/18 0731 05/06/18 1516 05/07/18 0011 05/07/18 0734   BP: 108/64 120/69 135/87 135/80   BP Location: Left arm Left arm Right arm Left arm   Pulse: 68 77 81 71   Resp: 20 20 18 18   Temp: 98 3 °F (36 8 °C) 97 6 °F (36 4 °C) 97 6 °F (36 4 °C) 98 2 °F (36 8 °C)   TempSrc: Oral Oral Oral Oral   SpO2: 97% 96% 97% 95%   Weight:       Height:           GEN: NAD  HEENT: NC/AT, EOMI  RESP: BBS w/o crackles/wheeze/rhonci; resp unlabored  CV: +S1 S2, regular rate, no rubs/murmur  ABD: soft, NT, ND, normal BS   : no fulton  EXT: no LE edema  Skin: no rashes  Neuro: Awake and alert, BUE 4+/5, B/L LE 4/5      [ X ] Total time spent: 30 Mins and greater than 50% of this time was spent counseling/coordinating care        Lilly Stoddard PA-C  Internal Medicine

## 2018-05-07 NOTE — PROGRESS NOTES
05/07/18 0900   Pain Assessment   Pain Assessment 0-10   Pain Score 6   Pain Type Chronic pain   Pain Location Back   Pain Orientation Lower   Hospital Pain Intervention(s) Medication (See MAR); Ambulation/increased activity  (stretching)   Response to Interventions pain increases without use of AD; pt reports pain more tolerable with use of rollator    Restrictions/Precautions   Precautions Bed/chair alarms; Fall Risk;Contact/isolation   Cognition   Arousal/Participation Alert; Cooperative   Subjective   Subjective Pt reports she always feels tired and is not sleeping well @ night    QI: Sit to [address] Provided by [place] No physical assistance   Sit to Lying CARE Score 4   QI: Lying to Sitting on Side of Bed   Assistance Needed Supervision   Assistance Provided by [place] No physical assistance   Lying to Sitting on Side of Bed CARE Score 4   QI: Sit to [address] Provided by [place] No physical assistance   Sit to Stand CARE Score 4   QI: Chair/Bed-to-Chair Transfer   Assistance Needed Supervision; Adaptive equipment   Assistance Provided by [place] No physical assistance   Chair/Bed-to-Chair Transfer CARE Score 4   Transfer Bed/Chair/Wheelchair   Limitations Noted In Balance;Confidence;Problem Solving;LE Strength   Adaptive Equipment Roller Walker;Rollator   Bed, Chair, Wheelchair Transfer (FIM) 5 - Patient requires supervision/monitoring   QI: Car Transfer   Assistance Needed Supervision;Verbal cues; Adaptive equipment   Assistance Provided by [place] No physical assistance   Car Transfer CARE Score 4   QI: Walk 10 Feet   Assistance Needed Incidental touching   Assistance Provided by [place] No physical assistance   Walk 10 Feet CARE Score 4   QI: Walk 50 Feet with Two Turns   Assistance Needed Incidental touching   Assistance Provided by [place] No physical assistance   Walk 50 Feet with Two Turns CARE Score 4   QI: Walk 150 Feet Assistance Needed Supervision; Adaptive equipment   Assistance Provided by [place] No physical assistance   Walk 150 Feet CARE Score 4   Ambulation   Does the patient walk? 2  Yes   Primary Discharge Mode of Locomotion Walk   Walk Assist Level Contact Guard;Supervision   Gait Pattern Inconsistant Melissa; Slow Melissa;Decreased foot clearance; Improper weight shift; Forward Flexion   Assist Device Rollator;Roller Walker  (and none)   Distance Walked (feet) 100 ft   Limitations Noted In Balance; Coordination;Device Management; Endurance; Heel Strike;Posture;Speed;Strength;Swing   Findings 100' without AD and CG with gait belt; 300-500' using RW and rollator @ S level    Walking (FIM) 2 - Patient ambulates between 50 - 149 feet regardless of assist/device/set up   QI: 1 Step (Curb)   Assistance Needed Incidental touching   Assistance Provided by [place] No physical assistance   1 Step (Curb) CARE Score 4   QI: 4 Steps   Assistance Needed Incidental touching   Assistance Provided by [place] No physical assistance   4 Steps CARE Score 4   QI: 12 Steps   Assistance Needed Incidental touching   Assistance Provided by [place] No physical assistance   12 Steps CARE Score 4   Stairs   Type Stairs   # of Steps 12   Weight Bearing Precautions Fall Risk   Assist Devices Single Rail   Stairs (FIM) 4 - Patient requires steadying assist or light touching AND patient goes up and down full flight (12- 14 stairs)   QI: Toilet Transfer   Assistance Needed Supervision; Adaptive equipment   Assistance Provided by [place] No physical assistance   Toilet Transfer CARE Score 4   Toilet Transfer   Surface Assessed Standard Toilet   Toilet Transfer (FIM) 5 - Patient requires supervision/monitoring   Therapeutic Interventions   Flexibility supine HS, gastroc, SKTC stretch    Other practiced ambulating short household distances without AD (use of gait belt) as well as incorporating functional tasks such as getting cup of water from kitchen and carrying it over to kitchen table   Equipment Use   NuStep L1 x 17 min  Assessment   Treatment Assessment Pt participated in skilled PT session with focus on overall functional mobility and increasing activity tolerance  Pt cont to present with overall fatigue/ dec activity tolerance throughout session and requires frequent rest breaks  Pt c/o increased LBP when ambulating without AD so focused on short household distances only; pt requires CG but did require min A after getting off Nustep 2* back feeling "more stiff " Trialed pt with rollator 2* dec activity tolerance and pt preferred; practiced pushing it up against wall and locking brakes prior to sitting down to rest  Pt demonstrated impaired problem solving skills when turning rollator around to sit on it; pt turned it around backwards prior to trying to sit on it; after vc's and repeated practice she showed improvements  Pt will also require increased practice with brake management in order to demonstrate consistent performance  Pt is apprehensive when practicing FF stairs; practiced with R HR only to mimic home set-up; pt descends on an angle 2* LBP; pt also reports she can reside on 1st floor if necessary  Overall pt making good progress towards goals  Pt will cont to benefit from skilled PT to increase activity tolerance and maximize I with all functional mobility  PT Barriers   Physical Impairment Decreased strength;Decreased endurance; Impaired balance;Decreased mobility; Decreased coordination; Impaired judgement;Decreased safety awareness;Pain   Functional Limitation Stair negotiation; Walking   Plan   Treatment/Interventions Functional transfer training;LE strengthening/ROM; Elevations; Therapeutic exercise; Endurance training;Patient/family training;Equipment eval/education; Bed mobility;Gait training   Progress Progressing toward goals   Recommendation   Recommendation Home with family support; Outpatient PT   Equipment Recommended Walker  (TBD)   PT Therapy Minutes   PT Time In 0900   PT Time Out 1038   PT Total Time (minutes) 98   PT Mode of treatment - Individual (minutes) 98   PT Mode of treatment - Concurrent (minutes) 0   PT Mode of treatment - Group (minutes) 0   PT Mode of treatment - Co-treat (minutes) 0   PT Mode of Teatment - Total time(minutes) 98 minutes   Therapy Time missed   Time missed?  No

## 2018-05-07 NOTE — SOCIAL WORK
Cm was asked by patient to contact latrell Evans re: her missed loan payment to verify her hospital and rehab stay  Dennis phoned [phone] and spoke to andrés and informed of pts dates of hospital and rehab stay, he stated that was good enough for him  Pt made aware

## 2018-05-07 NOTE — PROGRESS NOTES
Occupational Therapy      05/07/18 1230   Pain Assessment   Pain Assessment 0-10   Pain Score 8   Pain Location Head;Back  (Pt did not provide medical rating of lower back pain)   Restrictions/Precautions   Precautions Bed/chair alarms;Contact/isolation; Fall Risk   Weight Bearing Restrictions No   ROM Restrictions No   QI: Roll Left and Right   Assistance Needed Supervision   Assistance Provided by [place] No physical assistance   Roll Left and Right CARE Score 4   QI: Sit to [address] Provided by [place] No physical assistance   Sit to Lying CARE Score 4   QI: Lying to Sitting on Side of Bed   Assistance Needed Supervision   Assistance Provided by [place] No physical assistance   Lying to Sitting on Side of Bed CARE Score 4   QI: Sit to Stand   Assistance Needed Supervision;Verbal cues   Assistance Provided by [place] No physical assistance   Sit to Stand CARE Score 4   QI: Chair/Bed-to-Chair Transfer   Assistance Needed Supervision   Assistance Provided by [place] No physical assistance   Chair/Bed-to-Chair Transfer CARE Score 4   Transfer Bed/Chair/Wheelchair   Positioning Concerns Cognition   Limitations Noted In Confidence; Endurance;Problem Solving;Balance;LE Strength   Adaptive Equipment Rollator   Stand Pivot Supervision   Sit to Stand Supervision   Stand to Sit Supervision   Supine to Sit Supervision   Sit to Supine Supervision   Findings Pt requires S and VC for hand placement in order to complete all fxnl transfers safely  Pt easily distracted and required VC to remember to lock the brakes of the rollator prior to transfers  Decreased carryover w/ repetetive training  Bed, Chair, Wheelchair Transfer (FIM) 5 - Patient requires verbal cues   Light Housekeeping   Light Housekeeping Level Other (Comment)  (Rollator)   Light Housekeeping Level of Assistance Close supervision   Light Housekeeping Pt demonstrated dynamic balance while engaged in bed making task   Utilized rollator for fxnl mobility during task and required VC to use brakes when reaching to make bed  Required CS due to decreased endurance and safety awarenes  Exercise Tools   Exercise Tools Yes   UE Ergometer Pt completed UE ergometer for 10 mins at resistance 4 to inc endurance and UE strenght for inc Ind w/ ADLs and fxnl transfers  Pt required VC and redirecting in order to attend to task  Cognition   Overall Cognitive Status Impaired   Arousal/Participation Cooperative;Responsive; Alert;Lethargic   Attention Attends with cues to redirect   Orientation Level Oriented X4   Memory Decreased recall of precautions;Decreased short term memory   Following Commands Follows one step commands with increased time or repetition   Comments Pt demo inc endurance and ability to visually scan hallway environment to remember and retrieve different color cones per OT instruction  Pt given llist of specific colors to retrieve in order, but required repeat of list to remember  OT had pt repeat the colors back as a strategy to remember  Pt demo ability to remember 5/5 colors and retrieve them  Pt able to attend to activity despite distractions w/ prompting from OT  Pt cont to require VC to lock breaks of rollator prior to reaching for an item  Discussed w/ pt that memory is needed for paying bills, taking medicines, and various other tasks in life  Pt required inc time to complete task  Assessment   Treatment Assessment Pt  Participated in skilled OT session w/ focus on inc endurance, balance, STM, working memory, home management, safety awareness, and pt education on leisure pursuits  Pt engaged in UE strengthening and endurance/memory activity  See above note for details  Pt reports that she "only wants to sleep"  Pt worked w/ OT to fill out Interest Checklist, but was unable to complete due to need for inc time  Encouraged to complete at her own pace   Pt did check off that she is interested in working on cars, listening to music, and swimming  Pt demo decreased motivation to participate and required prompting/coaxing from OT to complete activities throughout Tx session  Also discussed coping strategies such as engagment in meditation, yoga, or walking in order to inc inde and engagment in all IADLs/ADLs  Pt will cont to benefit from skilled OT to inc activity tolerance, short term memory/working memory, problem solving, safety awareness, and endurance to inc indep  w/ all ADLs and fxnl transfers  Prognosis Fair   Problem List Decreased strength;Decreased endurance; Impaired balance;Decreased mobility; Decreased cognition; Impaired judgement;Decreased safety awareness;Pain   Barriers to Discharge Decreased caregiver support   Barriers to Discharge Comments Pt reports "her sister drives her crazy" and that she "does not want to live with her sister"  Pt educated on psych consult to be completed  Psych aware  Plan   Treatment/Interventions ADL retraining;Functional transfer training; Therapeutic exercise; Endurance training;Cognitive reorientation;LE strengthening/ROM   Progress Progressing toward goals   OT Therapy Minutes   OT Time In 1230   OT Time Out 1400   OT Total Time (minutes) 90   OT Mode of treatment - Individual (minutes) 90   OT Mode of treatment - Concurrent (minutes) 0   OT Mode of treatment - Group (minutes) 0   OT Mode of treatment - Co-treat (minutes) 0   OT Mode of Teatment - Total time(minutes) 90 minutes   Therapy Time missed   Time missed?  No

## 2018-05-08 ENCOUNTER — APPOINTMENT (INPATIENT)
Dept: RADIOLOGY | Facility: HOSPITAL | Age: 58
DRG: 058 | End: 2018-05-08
Payer: COMMERCIAL

## 2018-05-08 PROCEDURE — 97530 THERAPEUTIC ACTIVITIES: CPT

## 2018-05-08 PROCEDURE — 97110 THERAPEUTIC EXERCISES: CPT | Performed by: PHYSICAL THERAPIST

## 2018-05-08 PROCEDURE — 97535 SELF CARE MNGMENT TRAINING: CPT

## 2018-05-08 PROCEDURE — 97110 THERAPEUTIC EXERCISES: CPT

## 2018-05-08 PROCEDURE — 97150 GROUP THERAPEUTIC PROCEDURES: CPT

## 2018-05-08 PROCEDURE — 70450 CT HEAD/BRAIN W/O DYE: CPT

## 2018-05-08 PROCEDURE — 97530 THERAPEUTIC ACTIVITIES: CPT | Performed by: PHYSICAL THERAPIST

## 2018-05-08 PROCEDURE — 97116 GAIT TRAINING THERAPY: CPT

## 2018-05-08 PROCEDURE — 99233 SBSQ HOSP IP/OBS HIGH 50: CPT | Performed by: PHYSICAL MEDICINE & REHABILITATION

## 2018-05-08 RX ORDER — CHLORHEXIDINE GLUCONATE 0.12 MG/ML
15 RINSE ORAL EVERY 12 HOURS SCHEDULED
Status: DISCONTINUED | OUTPATIENT
Start: 2018-05-08 | End: 2018-05-10 | Stop reason: HOSPADM

## 2018-05-08 RX ORDER — HYDROMORPHONE HYDROCHLORIDE 2 MG/1
2 TABLET ORAL 3 TIMES DAILY PRN
Status: DISCONTINUED | OUTPATIENT
Start: 2018-05-08 | End: 2018-05-10 | Stop reason: HOSPADM

## 2018-05-08 RX ADMIN — ACETAMINOPHEN 650 MG: 325 TABLET ORAL at 06:58

## 2018-05-08 RX ADMIN — HYDROMORPHONE HYDROCHLORIDE 2 MG: 2 TABLET ORAL at 21:51

## 2018-05-08 RX ADMIN — SERTRALINE HYDROCHLORIDE 50 MG: 50 TABLET ORAL at 08:36

## 2018-05-08 RX ADMIN — ALPRAZOLAM 0.25 MG: 0.25 TABLET ORAL at 18:26

## 2018-05-08 RX ADMIN — FLUTICASONE FUROATE AND VILANTEROL 1 PUFF: 200; 25 POWDER RESPIRATORY (INHALATION) at 13:46

## 2018-05-08 RX ADMIN — DOCUSATE SODIUM 100 MG: 100 CAPSULE, LIQUID FILLED ORAL at 08:36

## 2018-05-08 RX ADMIN — PRAVASTATIN SODIUM 40 MG: 40 TABLET ORAL at 16:30

## 2018-05-08 RX ADMIN — ACETAMINOPHEN 650 MG: 325 TABLET ORAL at 11:48

## 2018-05-08 RX ADMIN — HEPARIN SODIUM 5000 UNITS: 5000 INJECTION, SOLUTION INTRAVENOUS; SUBCUTANEOUS at 13:45

## 2018-05-08 RX ADMIN — HEPARIN SODIUM 5000 UNITS: 5000 INJECTION, SOLUTION INTRAVENOUS; SUBCUTANEOUS at 21:41

## 2018-05-08 RX ADMIN — ACETAMINOPHEN 650 MG: 325 TABLET ORAL at 17:10

## 2018-05-08 RX ADMIN — PANTOPRAZOLE SODIUM 20 MG: 20 TABLET, DELAYED RELEASE ORAL at 06:58

## 2018-05-08 RX ADMIN — CHLORHEXIDINE GLUCONATE 15 ML: 1.2 RINSE ORAL at 11:48

## 2018-05-08 RX ADMIN — HEPARIN SODIUM 5000 UNITS: 5000 INJECTION, SOLUTION INTRAVENOUS; SUBCUTANEOUS at 06:58

## 2018-05-08 RX ADMIN — Medication 10 ML: at 21:45

## 2018-05-08 RX ADMIN — DOCUSATE SODIUM 100 MG: 100 CAPSULE, LIQUID FILLED ORAL at 17:10

## 2018-05-08 RX ADMIN — FLUTICASONE PROPIONATE 1 SPRAY: 50 SPRAY, METERED NASAL at 08:36

## 2018-05-08 RX ADMIN — MONTELUKAST SODIUM 10 MG: 10 TABLET, FILM COATED ORAL at 08:36

## 2018-05-08 RX ADMIN — Medication 10 ML: at 06:58

## 2018-05-08 RX ADMIN — LORATADINE 10 MG: 10 TABLET ORAL at 08:36

## 2018-05-08 RX ADMIN — FLUTICASONE PROPIONATE 1 SPRAY: 50 SPRAY, METERED NASAL at 17:10

## 2018-05-08 RX ADMIN — ACETAMINOPHEN 650 MG: 325 TABLET ORAL at 00:00

## 2018-05-08 RX ADMIN — ONDANSETRON 4 MG: 4 TABLET, ORALLY DISINTEGRATING ORAL at 23:41

## 2018-05-08 RX ADMIN — MELATONIN TAB 3 MG 6 MG: 3 TAB at 21:41

## 2018-05-08 NOTE — SOCIAL WORK
CM spoke to [place] at [place] [address] [phone] to schedule outpatient PT for 5/16 at 3:30pm  Arrive 15 minutes early  Written instruction provided to patient  Received TC from [facility] OT and would like to add outpatient OT  TC to Anna at [facility] to add OT  She will return call with added date

## 2018-05-08 NOTE — PROGRESS NOTES
05/08/18 1030   Pain Assessment   Pain Assessment Heaton-Baker FACES   Heaton-Baker FACES Pain Rating 2   Pain Location Back   Pain Orientation Lower   Pain Frequency Constant/continuous   Hospital Pain Intervention(s) Repositioned;Distraction   Restrictions/Precautions   Precautions Fall Risk;Bed/chair alarms;Cognitive;Contact/isolation   Cognition   Overall Cognitive Status Impaired   Arousal/Participation Alert; Cooperative   Attention Attends with cues to redirect   Following Commands Follows one step commands with increased time or repetition   QI: Roll Left and Right   Assistance Needed Set-up / clean-up   Roll Left and Right CARE Score 5   QI: Sit to Lying   Assistance Needed Supervision   Sit to Lying CARE Score 4   QI: Lying to Sitting on Side of Bed   Assistance Needed Supervision   Lying to Sitting on Side of Bed CARE Score 4   QI: Sit to Stand   Assistance Needed Supervision;Verbal cues   Sit to Stand CARE Score 4   QI: Chair/Bed-to-Chair Transfer   Assistance Needed Supervision;Verbal cues   Chair/Bed-to-Chair Transfer CARE Score 4   Transfer Bed/Chair/Wheelchair   Limitations Noted In LE Strength; Endurance;Balance;Problem Solving   Adaptive Equipment Roller Walker   Stand Pivot Supervision   Sit to Stand Supervision   Stand to Sit Supervision   Supine to Sit Supervision   Sit to Supine Supervision   Bed, Chair, Wheelchair Transfer (FIM) 5 - Patient requires supervision/monitoring   QI: Walk 10 Feet   Assistance Needed Supervision;Verbal cues   Walk 10 Feet CARE Score 4   QI: Walk 50 Feet with Two Turns   Assistance Needed Supervision;Verbal cues   Walk 50 Feet with Two Turns CARE Score 4   QI: Walk 150 Feet   Assistance Needed Supervision;Verbal cues   Walk 150 Feet CARE Score 4   Ambulation   Does the patient walk? 2  Yes   Primary Discharge Mode of Locomotion Walk   Walk Assist Level Close Supervision   Gait Pattern Inconsistant Melissa; Forward Flexion   Assist Device Rollator   Distance Walked (feet) 200 ft  (x2, 150' x 2 )   Limitations Noted In Endurance;Posture;Strength;Speed   Findings focusing on safe walker management and endurance training   Walking (FIM) 5 - Patient requires supervision/monitoring AND distance 150 feet or more, no rest   QI: Toilet Transfer   Assistance Needed Supervision   Toilet Transfer CARE Score 4   Toilet Transfer   Surface Assessed Standard Toilet   Toilet Transfer (FIM) 5 - Patient requires supervision/monitoring   Therapeutic Interventions   Strengthening standing hip abd with rollator support x 15 reps each side   Flexibility gentle stretching of bilat hamstring x 1 min each, 30 SH x 2 sets   Equipment Use   NuStep L2x 15 mins   Assessment   Treatment Assessment PT session focused on functional mobility training with safe walker management  pt able to demonstrate safe management of rollator walker when reminded to show therapist how to safely manage walker but stated " If Im home I will do this " and proceded to unlock the walker brake prior to standing   pt also stated she plans not to do anything at home and nobody could tell her what to do  pt educated on the risks of immobility and its impact on her safety and indep  Pt able to increase gait tolerance to 200' at best and only required safe cueing on walker management 25% of the time as demonstrated good carry over after initial education  PT will continue to work on safe walker management during functional mobility training, standing balance training and improving activity tolerance to facilitate safe d/c and decrease burden of care at d/c  RN was also notified of pt receiving a call from outpatient neurosurgery for her follow up appt , needing assistance to schedule follow up CT scan prior to outpatient MD appt  Family/Caregiver Present no   Problem List Decreased strength;Decreased endurance; Impaired balance;Decreased mobility; Decreased cognition;Decreased safety awareness; Impaired judgement   Barriers to Discharge Inaccessible home environment;Decreased caregiver support   PT Barriers   Physical Impairment Decreased strength;Decreased endurance; Impaired balance;Decreased mobility; Decreased coordination; Impaired judgement;Decreased safety awareness;Pain   Functional Limitation Stair negotiation; Walking   Plan   Treatment/Interventions Functional transfer training;LE strengthening/ROM; Therapeutic exercise; Endurance training;Bed mobility;Gait training;Spoke to nursing;OT   Progress Progressing toward goals   Recommendation   Recommendation Outpatient PT; Home with family support   Equipment Recommended (rollator)   PT Therapy Minutes   PT Time In 1030   PT Time Out 1143   PT Total Time (minutes) 73   PT Mode of treatment - Individual (minutes) 73   PT Mode of treatment - Concurrent (minutes) 0   PT Mode of treatment - Group (minutes) 0   PT Mode of treatment - Co-treat (minutes) 0   PT Mode of Teatment - Total time(minutes) 73 minutes   Therapy Time missed   Time missed? No   Subjective: pt reported back pain but unable to rate the pain stating " its always there"

## 2018-05-08 NOTE — PCC CARE MANAGEMENT
Pt is participating well with therapy and is planning on return home  Pt has been educated on potential recommendations for contd therapy services  Following for contd stay review due 5/9 and dc recommendations
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
## 2018-05-08 NOTE — TEAM CONFERENCE
Acute RehabilitationTeam Conference Note  Date: 5/8/2018   Time: 10:45 AM       Patient Name:  Jony Long       Medical Record Number: [id number]   YOB: 1960  Sex: Female          Room/Bed:  /[facility] 459-01  Payor Info:  Payor: Dorice Earnest / Plan: Dorice Earnest / Product Type: Medicaid HMO /      Admitting Diagnosis: Subarachnoid hemorrhage ([facility] ) [I60 9]   Admit Date/Time:  5/2/2018  1:23 PM  Admission Comments: No comment available     Primary Diagnosis:  SAH (subarachnoid hemorrhage) ([facility] )  Principal Problem: SAH (subarachnoid hemorrhage) ([facility] )    Patient Active Problem List    Diagnosis Date Noted    Polyuria 04/20/2018    Acquired hydrocephalus 04/18/2018    SAH (subarachnoid hemorrhage) ([facility] ) 04/17/2018    History of asthma 04/17/2018    Hypoalbuminemia 04/17/2018    Moderate persistent asthma without complication 39/95/5754    Moderate episode of recurrent major depressive disorder ([facility] ) 61/80/2554    Renal colic 25/40/7268    Bladder spasm 08/06/2015    Tobacco use disorder 01/19/2015    Anxiety state 08/20/2012    Benign essential hypertension 07/31/2012    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 07/31/2012    Nondependent alcohol abuse 07/31/2012    Nondependent cocaine abuse 07/31/2012    Disorder of menstrual bleeding 07/18/2011       Physical Therapy:    Weight Bearing Status: Full Weight Bearing  Transfers: Supervision  Bed Mobility: Supervision  Amulation Distance (ft): 500 feet  Ambulation: Minimal Assistance, Contact Guard, Supervision  Assistive Device for Ambulation: Rollator  Number of Stairs: 12  Assistive Device for Stairs: Right Hand Rail  Stair Assistance: Contact Guard  Discharge Recommendations: Home with:  [address] with[de-identified] Family Support, Outpatient Physical Therapy    Pt currently functioning @ CS level using RW/ rollator or CG/min A without AD  Pt c/o increased LBP without using AD   Trialed her with rollator today and pt reports increased comfort; also able to use seat for rest breaks 2* dec activity tolerance  Pt presents with overall fatigue and deconditioning, dec activity tolerance, dec standing tolerance, dec BLE strength, impaired balance, chronic LBP  Pt reports her daughter will be with her @ her sister's house upon D/C; also reports she can reside on 1st floor  Anticipate pt will achieve mod I goals; estimated D/C by the weekend  Occupational Therapy:  Eating: Independent  Grooming: Supervision  Bathing: Minimal Assistance  Bathing: Minimal Assistance  Upper Body Dressing: Supervision  Lower Body Dressing: Minimal Assistance  Toileting: Minimal Assistance  Tub/Shower Transfer: Minimal Assistance  Toilet Transfer: Supervision  Orientation: Person, Place, Time, Situation  Discharge Recommendations: Home with:  [address] with[de-identified] Family Support       Pt presents s/p SAH with evacuation  Pt was IND prior to admission and used no device for fxnl mobility  Pt currently demonstrates deficits with higher level memory/problem solving, dec balance, dec endurance, LE weakness and dec engagement  Pt reports she has h/o depression which affects her greatly  Pt has been noted to have periods of decreased engagement, flat affect, and need for continuous encouragement to engage in activities  Pt overall is at CGA/CS level and using rollator for fxnl mobility  Pt requires continued education to ensure improved carryover with break management for mobility and improved LB ADL participation  Pt would benefit from continued OT to maximize independence and safety  Pt will d/c home with pt's dtr and sister  Speech Therapy:  Mode of Communication: Verbal  Cognition: Within Defined Limits     Orientation: Person, Place, Time  Discharge Recommendations: Home with:  [address] with[de-identified] Family Support  Pt completed formalized cognitive linguistic assessment (CLQT)   Upon completing assessment, pt's overall cognitive skills when compared to age matched peers between 18-69 yrs, demonstrates cognitive skills to be WNL at this time  No further SLP services warranted at this time but will benefit from OT/PT services to maximize functional abilities  Nursing Notes:  Appetite: Fair  Diet Type: Regular/House                           Type of Wound (LDA): Incision           Incision Head Right (Active)   Incision Description Dry; Intact; Clean 5/8/2018  7:44 AM   Rajani-wound Assessment Dry; Intact 5/8/2018  7:44 AM   Closure Sutures 5/8/2018  7:44 AM   Drainage Amount None 5/4/2018  8:00 PM   Treatments Other (Comment) 5/7/2018  8:20 AM   Dressing Open to air 5/8/2018  7:44 AM   Wound packed? No 5/4/2018  8:00 PM   Patient Tolerance Tolerated well 5/4/2018 10:00 AM   Dressing Status Clean;Dry; Intact 5/8/2018  7:44 AM                                Pain Location: Eye  Pain Orientation: Left  Pain Score: 4                       Hospital Pain Intervention(s): Medication (See MAR), Ambulation/increased activity (stretching)          Pt was admitted with SAH of unknown etiology  04/16/2018 Ventriculostomy done  05/08/2018 CT of the head showed Hydrocephalus  Hx: chronic lower back pain  C/O tooth pain   Will continue with pain management and fall prevention  Repeat CT to be schedule 05/08/2018  Continue on Heparin SC  Case Management:     Discharge Planning  Living Arrangements: Family members  Support Systems: Family members  Assistance Needed: yes  Type of Current Residence: Private residence  [address]: No  Pt is participating well with therapy and is planning on return home  Pt has been educated on potential recommendations for contd therapy services  Following for contd stay review due 5/9 and dc recommendations  Is the patient actively participating in therapies?  yes  List any modifications to the treatment plan:     Barriers Interventions   depression Potential inpat psych on dc   Activity tolerance Therapy exercises,    Low back pain Rest breaks, use of device for ambulation,              Is the patient making expected progress toward goals? yes  List any update or changes to goals:     Medical Goals: Patient will be medically stable for discharge to [facility] upon completion of rehab program and Patient will be able to manage medical conditions and comorbid conditions with medications and follow up upon completion of rehab program    Weekly Team Goals:   Rehab Team Goals  ADL Team Goal: Patient will be independent with ADLs with least restrictive device upon completion of rehab program  Bowel/Bladder Team Goal: Patient will be independent with bladder/bowel management with least restrictive device upon completion of rehab program  Transfer Team Goal: Patient will require supervision with transfers with least restrictive device upon completion of rehab program  Locomotion Team Goal: Patient will require supervision with locomotion with least restrictive device upon completion of rehab program  Cognitive Team Goal: Patient will be independent for basic and complex tasks upon completion of rehab program    Discussion: in attendance to review pts progress is rn pt ot slp cm and physician  Pt is participating well and is currently supervision with all functional mobility  Goals are for mod I with recommendations for contd outpt physical therapy  If pt expresses an interest in inpat psych treatment prior to return home that will take precedence       Anticipated Discharge Date:  5/12/18

## 2018-05-08 NOTE — PROGRESS NOTES
Physical Medicine & Rehabilitation Progress Note:    Primary Rehabilitation Diagnosis: Subarachnoid hemorrhage (HH3, [place]az[place] 3)    Subjective: Patient seen face to face after her meeting with Neuropsychology  Patient feeling her mood is down overall but denies any active suicidality/plan  Reassured patient she is improving from a functional standpoint  Her headaches and mouth pain have slightly improved  *Had an extended conversation regarding her mood and patient is interested in transitioning to inpatient psychiatry here at [facility] after her acute inpatient rehabilitation course has completed  Review Of Systems:   A 10 point review of systems was performed  Pertinent positives above    Functional Update:  I personally attended, reviewed, and discussed medical and functional updates in team conference today  Please refer to advance care planning note for details  Patient progressing to overall supervision level with rollator with goals of Mod I   Estimated Discharge: 5/12/18 with outpatient PT/OT with her sister      Objective:  /82   Pulse 71   Temp 98 4 °F (36 9 °C) (Oral)   Resp 20   Ht 5' 3" (1 6 m)   Wt 87 4 kg (192 lb 10 9 oz)   SpO2 97%   BMI 34 13 kg/m²     GENERAL: No acute distress    HEENT:  Right frontal scalp with sutures present, PERRL, EOMI, mucous membranes moist   CARDIOVASCULAR: regular rate rhythm, no murmurs  LUNGS: clear to ausculation bilaterally  ABDOMEN: soft, non-tender, non-distended, no guarding  Bowel sounds x 4 normal   EXTREMITIES: no pedal edema bilaterally  MUSCULOSKELETAL:  Range of motion functional x4  NEUROLOGICAL:   Awake, alert  Motor BUE 4/5 throughout  Bilateral lower extremity:  4/5 proximally at the hip flexors, 4/5 at the knee extensors and knee flexors, 4/5 at the dorsiflexors plantar flexors  Psych:  Flat affect  Sitting tolerance has improved         Assessment:    Fátima Gonzalez is a 63 y o  female in stable condition status post spontaneous subarachnoid hemorrhage located in the suprasellar cistern, sylvian fissure, basal cisterns and the anterior falx  Status post EVD placement close monitoring without further intervention  Rehabilitation Plan:      -Rehabilitation of subarachnoid hemorrhage: PT/OT/SLP therapies  -Appreciate Internal Medicine following - Dr Nas Amato to Internal Medicine     -Subarachnoid hemorrhage: HH#,Velazco 3 type  Located in the suprasellar cistern, sylvian fissures, basal cistern and the anterior falx:  Status post EVD placement and 2 angiograms with negative findings for AV malformation or aneurysm  F/U HCT per NSGY today  +Sutures to be removed this week      -MRI brain showing a small area of restricted diffusion in the left cerebellar hemisphere suspicious for possible acute to subacute infarct:  Patient managed on Pravachol for CVA prophylaxis and may need to follow with Neurology as an outpatient furthermore      -MRI cervical spine showing T2/T3 paracentral disc protrusion and right paracentral disc extrusion with mild mass effect on the right ventral aspect of cord:  NSGY aware  Physical examination of bilateral upper extremities within normal limits and current subjective symptoms negative       -headaches:  Managed conservatively with Tylenol scheduled,  p r n  Robaxin  Encouraged low light low sound and rest when needed between therapies      -dental pain: Patient reports she will require several teeth pulled and 1 tooth repaired on and she has a dentist that she follows with  Continue Magic mouthwash, Dilaudid 2 mg decreased from Q6h PRN to TID PRN  Due to her concern about dental infection order placed for Chlorhexidine BID     -depression/anxiety: continue Zoloft 50mg daily and Psychiatry following - Appreciate Neuropsychology consultation additionally      As her mood is a concern of hers, will discuss future inpatient psych stay as she has done this in the past with good results and more continuous psychotherapy  - 5PM *Had an extended conversation regarding her mood and patient is interested in transitioning to inpatient psychiatry here at [facility] after her acute inpatient rehabilitation course has completed         -asthma:  Managed on Breo as well as Singulair, Flonase     -GI prophylaxis managed on Protonix 20 mg daily     -DVT prophylaxis managed on SCDs, subacute heparin which was cleared by Neurosurgery     -Insomnia: managed on Melatonin    Estimated Discharge: 5/12/18 with outpatient PT/OT        Lab Results   Component Value Date    WBC 6 90 05/07/2018    HGB 11 9 05/07/2018    HCT 37 4 05/07/2018    MCV 93 05/07/2018     (H) 05/07/2018     Lab Results   Component Value Date    GLUCOSE 94 05/07/2018    CALCIUM 9 4 05/07/2018     05/07/2018    K 4 1 05/07/2018    CO2 28 05/07/2018     05/07/2018    BUN 15 05/07/2018    CREATININE 0 76 05/07/2018       Current Facility-Administered Medications:     acetaminophen (TYLENOL) tablet 325 mg, 325 mg, Oral, Q6H PRN, Amie Mcgowan MD    acetaminophen (TYLENOL) tablet 650 mg, 650 mg, Oral, Q6H [address], Erin Mac, CRNP, 650 mg at 05/08/18 9277    al mag oxide-diphenhydramine-lidocaine viscous (MAGIC MOUTHWASH) suspension 10 mL, 10 mL, Swish & Spit, 4x Daily (AC & HS), Amie Mcgowan MD, 10 mL at 05/08/18 0658    albuterol (PROVENTIL HFA,VENTOLIN HFA) inhaler 2 puff, 2 puff, Inhalation, Q6H PRN, Erin Mac, CRNP, 2 puff at 05/02/18 1715    ALPRAZolam On[address] Broom) tablet 0 25 mg, 0 25 mg, Oral, Daily PRN, Erin Mac, CRNP    benzocaine (ANBESOL) 10 % mucosal liquid, , Mucosal, 4x Daily PRN, Amie Mcgowan MD, 1 application at 18/91/52 1192    bisacodyl (DULCOLAX) rectal suppository 10 mg, 10 mg, Rectal, Daily PRN, Erin Mac, CRNP    docusate sodium (COLACE) capsule 100 mg, 100 mg, Oral, BID, Erin Mac, CRNP, 100 mg at 05/08/18 0836    fluticasone (FLONASE) 50 mcg/act nasal spray 1 spray, 1 spray, Each Nare, BID, Kar Garcia PA-C, 1 spray at 05/08/18 0836    fluticasone furoate-vilanterol (BREO ELLIPTA) 200-25 MCG/INH inhaler 1 puff, 1 puff, Inhalation, Daily, HUSAM Leal, 1 puff at 05/07/18 1407    heparin (porcine) subcutaneous injection 5,000 Units, 5,000 Units, Subcutaneous, Q8H [facility], HUSAM Leal, 5,000 Units at 05/08/18 0658    HYDROmorphone (DILAUDID) tablet 2 mg, 2 mg, Oral, Q6H PRN, Lawrence Crowe MD, 2 mg at 05/07/18 2110    loratadine (CLARITIN) tablet 10 mg, 10 mg, Oral, Daily, Kar Garcia PA-C, 10 mg at 05/08/18 0836    melatonin tablet 6 mg, 6 mg, Oral, HS, HUSAM Leal, 6 mg at 05/07/18 2104    methocarbamol (ROBAXIN) tablet 500 mg, 500 mg, Oral, Q6H PRN, HUSAM Leal    montelukast (SINGULAIR) tablet 10 mg, 10 mg, Oral, Daily, HUSAM Leal, 10 mg at 05/08/18 0836    ondansetron (ZOFRAN-ODT) dispersible tablet 4 mg, 4 mg, Oral, Q6H PRN, HUSAM Leal    pantoprazole (PROTONIX) EC tablet 20 mg, 20 mg, Oral, Early Morning, Lawrence Crowe MD, 20 mg at 05/08/18 9111    pravastatin (PRAVACHOL) tablet 40 mg, 40 mg, Oral, Daily With HUSAM Carter, 40 mg at 05/07/18 1724    senna (SENOKOT) tablet 8 6 mg, 1 tablet, Oral, HS, HUSAM Leal, 8 6 mg at 05/07/18 2104    sertraline (ZOLOFT) tablet 50 mg, 50 mg, Oral, Daily, Dary Bermeo MD, 50 mg at 05/08/18 3057          Lawrence Crowe MD  PM&R Primary Service

## 2018-05-08 NOTE — PROGRESS NOTES
Occupational Therapy Treatment Note       05/08/18 1230   Pain Assessment   Pain Assessment No/denies pain   Restrictions/Precautions   Precautions Fall Risk;Bed/chair alarms;Cognitive;Contact/isolation   Lifestyle   Autonomy "If this happens again I just wish I would die" "I can't drive?! This keeps getting worse"    QI: Sit to Stand   Assistance Needed Supervision   Comment rollator    Sit to Stand CARE Score 4   QI: Chair/Bed-to-Chair Transfer   Assistance Needed Supervision   Comment rollator    Chair/Bed-to-Chair Transfer CARE Score 4   Transfer Bed/Chair/Wheelchair   Limitations Noted In Balance;Confidence; Endurance; Coordination;LE Strength   Adaptive Equipment Rollator   Stand Pivot Modified Independent   Sit to Stand Supervision   Stand to Sit Supervision   Findings pt completes functional mobility within OT gym and room at supervision level  Pt requires only min cues for safety using rollator, demonstrates good carryover of locking/unlocking brakes appropriately    Bed, Chair, Wheelchair Transfer (FIM) 5 - Patient requires supervision/monitoring   Meal Prep   Meal Prep Level Walker  (rollator )   Meal Prep Level of Assistance Close supervision   Meal Preparation Pt completed simple meal preparation - making cereal - with supervision  Pt reports she does not enjoy cooking and her sister will do the cooking at home  Kitchen Mobility   Kitchen-Mobility Level Walker  (rollator)   Kitchen Activity Retrieve items;Transport items   Kitchen Mobility Comments Pt able to retrieve items from refrigerator and overhead cabinet with supervision following OT demonstration/explanation on safe use of rollator during kitchen mobility  Educated pt on sliding items across countertop, pt demonstrates good carryover      Light Housekeeping   Light Housekeeping Level Walker  (rollator )   Light Housekeeping Level of Assistance Close supervision   Cognition   Arousal/Participation Alert   Attention Attends with cues to redirect Orientation Level Oriented to person;Oriented to place;Oriented to situation   Memory Decreased short term memory   Following Commands Follows one step commands without difficulty   Comments Pt engages in appropriate basic conversation however reports feelings of depression throughout session (Dr Amrit Camarena aware in team and pt is being followed by psychology and psychotherapy)  Pt reports "not caring" about remembering information because "nothing really matters"  Pt visually upset when educated that pt will not be able to drive s/p stroke until MD's clearance, pt reporting she does not want to rely on her sister more than she has to and "my independence is being taken"  During conversation pt demonstrates inappropriate coping skills, becoming frustrated and verbally aggressive towards family in anticipation of loss of independence and how they will react to her being unable to drive (threatening family 1x)  OT will follow up with Dr Amrit Camarena regarding conversation with pt  Pt also reports fear of her sister and family not understanding deficits s/p stroke  OT educated pt on ability for pt's sister to come in for family training however pt hesitant about sister being present at rehab  At end of session pt agreeable to OT calling to speak with sister during tomorrow's OT session to review pt's current deficits and D/C recommendations  Activity Tolerance   Activity Tolerance Patient tolerated treatment well   Assessment   Treatment Assessment Pt participated in skilled OT tx session focused on IADLS - meal prep, laundry, kitchen mobility, pt education  See above for further details on functional performance  Pt demonstrates good carryover on safely using rollator during functional mobility and IADLS  OTR/L educated pt on community stroke support group, which pt was interested in having further information on   OTR/L also educated pt on requiring MD approval for driving s/p stroke, pt visually upset and verbalizes frustration, which escalates to frustration regarding living and relying on pt's sister  OT will follow up with Dr Karolina Schmidt regarding conversation, which did include brief period of pt verbalizing threat to sister if sister tries to take her car keys  Pt does emotionally de-escalate when provided with emotional support and explanation on need for safety after stroke and therefore not initially driving  At end of session pt agreeable to making phone call to pt's sister to review OT recommendations  Pt will continue to benefit from skilled OT services with focus on standing tolerance/balance, activity tolerance, safety education, functional cognition, family education  OT recommending D/C home with family support, outpatient OT (spoke with Gm Bahena from [facility]) and drivers evaluation when medically appropriate  Problem List Decreased endurance; Impaired balance;Decreased mobility; Impaired judgement   Plan   Treatment/Interventions ADL retraining;Functional transfer training; Therapeutic exercise; Endurance training;Cognitive reorientation;Patient/family training;Equipment eval/education; Compensatory technique education   Progress Progressing toward goals   Recommendation   OT Discharge Recommendation Outpatient OT  (+drivers eval )   OT Therapy Minutes   OT Time In 1235   OT Time Out 1405   OT Total Time (minutes) 90   OT Mode of treatment - Individual (minutes) 90   OT Mode of treatment - Concurrent (minutes) 0   OT Mode of treatment - Group (minutes) 0   OT Mode of treatment - Co-treat (minutes) 0   OT Mode of Teatment - Total time(minutes) 90 minutes   Therapy Time missed   Time missed? No       Addendum: spoke with Dr Karolina Schmidt 5/8/18 at 3:30 PM regarding pt's escalated conversation during session       Corazon Malave MS, OTR/L , CBIS

## 2018-05-08 NOTE — PCC OCCUPATIONAL THERAPY
Pt presents s/p SAH with evacuation  Pt was IND prior to admission and used no device for fxnl mobility  Pt currently demonstrates deficits with higher level memory/problem solving, dec balance, dec endurance, LE weakness and dec engagement  Pt reports she has h/o depression which affects her greatly  Pt has been noted to have periods of decreased engagement, flat affect, and need for continuous encouragement to engage in activities  Pt overall is at CGA/CS level and using rollator for fxnl mobility  Pt requires continued education to ensure improved carryover with break management for mobility and improved LB ADL participation  Pt would benefit from continued OT to maximize independence and safety  Pt will d/c home with pt's dtr and sister

## 2018-05-08 NOTE — DISCHARGE INSTR - APPOINTMENTS
Jimena [phone] to schedule outpatient PT for 5/16 at 3:30pm  Arrive 15 minutes early  Written instruction provided to patient  TC to Latoya Ruano at [facility] to add OT  She will return call with added date

## 2018-05-08 NOTE — PCC NURSING
Pt was admitted with [facility] of unknown etiology  04/16/2018 Ventriculostomy done  05/08/2018 CT of the head showed Hydrocephalus  Hx: chronic lower back pain  C/O tooth pain   Will continue with pain management and fall prevention  Repeat CT to be schedule 05/08/2018   Continue on Heparin SC

## 2018-05-08 NOTE — SOCIAL WORK
CM met with patient to review weekly team meeting and discuss current functional barriers  Patient agreeable to dc date 5/12 to her sister's home  She would like to continue outpatient therapy at [facility], made aware that CM will arrange 1st appointment  Patient requesting copy of team meeting and medical release form for social security

## 2018-05-08 NOTE — PROGRESS NOTES
05/08/18 0830   Pain Assessment   Pain Assessment No/denies pain   Pain Score No Pain   Restrictions/Precautions   Precautions Fall Risk;Bed/chair alarms;Contact/isolation   Cognition   Arousal/Participation Alert; Cooperative   Subjective   Subjective Pt reports "I am so depressed"; provided emotional support throughout session   QI: Sit to [address] Provided by [place] No physical assistance   Sit to Lying CARE Score 4   QI: Lying to Sitting on Side of Bed   Assistance Needed Supervision   Assistance Provided by [place] No physical assistance   Lying to Sitting on Side of Bed CARE Score 4   QI: Sit to [address] Provided by [place] No physical assistance   Sit to Stand CARE Score 4   QI: Chair/Bed-to-Chair Transfer   Assistance Needed Supervision   Assistance Provided by [place] No physical assistance   Chair/Bed-to-Chair Transfer CARE Score 4   Transfer Bed/Chair/Wheelchair   Limitations Noted In Balance; Endurance;LE Strength   Adaptive Equipment Rollator   Bed, Chair, Wheelchair Transfer (FIM) 5 - Patient requires supervision/monitoring   QI: Walk 10 Feet   Assistance Needed Supervision; Adaptive equipment   Assistance Provided by [place] No physical assistance   Walk 10 Feet CARE Score 4   QI: Walk 50 Feet with Two Turns   Assistance Needed Supervision; Adaptive equipment   Assistance Provided by [place] No physical assistance   Walk 50 Feet with Two Turns CARE Score 4   QI: Walk 150 Feet   Assistance Needed Supervision; Adaptive equipment   Assistance Provided by [place] No physical assistance   Walk 150 Feet CARE Score 4   Ambulation   Does the patient walk? 2  Yes   Primary Discharge Mode of Locomotion Walk   Walk Assist Level Supervision   Gait Pattern Inconsistant Melissa; Improper weight shift; Forward Flexion   Assist Device Rollator   Distance Walked (feet) 200 ft  (x3)   Limitations Noted In Balance;Device Management; Endurance; Heel Strike;Strength;Swing;Speed   Walking (FIM) 5 - Patient requires supervision/monitoring AND distance 150 feet or more, no rest   QI: 1 Step (Curb)   Assistance Needed Supervision; Adaptive equipment   Assistance Provided by [place] No physical assistance   1 Step (Curb) CARE Score 4   QI: 4 Steps   Assistance Needed Supervision; Adaptive equipment   Assistance Provided by [place] No physical assistance   4 Steps CARE Score 4   QI: 12 Steps   Assistance Needed Supervision; Adaptive equipment   Assistance Provided by [place] No physical assistance   12 Steps CARE Score 4   Stairs   Type Stairs   # of Steps 12   Weight Bearing Precautions Fall Risk   Assist Devices Single Rail   Stairs (FIM) 5 - Patient requires supervision/monitoring AND goes up and down full flight (12- 14 stairs)   QI: Toilet Transfer   Assistance Needed Supervision; Adaptive equipment   Assistance Provided by [place] No physical assistance   Toilet Transfer CARE Score 4   Toilet Transfer   Surface Assessed Standard Toilet   Toilet Transfer (FIM) 5 - Patient requires supervision/monitoring   Equipment Use   NuStep L2 x 10 min  Assessment   Treatment Assessment Pt participated in skilled PT session with focus on functional mobility using rollator and activity tolerance  Pt demonstrated improved safety awareness and brake management today; required vc's < 10%of the time  Pt retrieved clothing from her closet and placed on rollator seat to transport into bathroom; good safety awareness with rollator  Pt currently functioning @ S level for all mobility  Pt expressed that she feels depressed and is concerned about D/C home because she will sit on couch and "do nothing " Asked pt if she is having any thoughts/plans of harming herself; pt reports "No I don't think I would ever do that" informed TAI Chaparro and Dr Tod Gomez of pt's response   Discussed benefits of exercise to help manage feelings of depression; pt reports her sister has a pool and she used to enjoy swimming  Pt making excellent progress towards mod I goals  Pt will cont to benefit from skilled PT to maximize I and safety with all functional mobility  Problem List Decreased strength;Decreased endurance; Impaired balance;Decreased mobility; Decreased safety awareness   Plan   Treatment/Interventions Functional transfer training;LE strengthening/ROM; Elevations; Therapeutic exercise; Endurance training;Patient/family training;Equipment eval/education; Bed mobility;Gait training   Progress Progressing toward goals   Recommendation   Recommendation Home with family support; Outpatient PT   Equipment Recommended Walker  (rollator)   PT Therapy Minutes   PT Time In 0830   PT Time Out 0930   PT Total Time (minutes) 60   PT Mode of treatment - Individual (minutes) 60   PT Mode of treatment - Concurrent (minutes) 0   PT Mode of treatment - Group (minutes) 0   PT Mode of treatment - Co-treat (minutes) 0   PT Mode of Teatment - Total time(minutes) 60 minutes   Therapy Time missed   Time missed?  No

## 2018-05-08 NOTE — PROGRESS NOTES
Internal Medicine Progress Note  Patient: Cesar Moore  Age/sex: 63 y o  female  Medical Record #: [id number]      ASSESSMENT/PLAN:  Cesar Moore is seen and examined and management for following issues:    1  SAH/hydrocephalus: etio of bleed? = she had 2 negative a-grams and a negative CTA  She completed course of Nimotop on 4/30/18  She is for followup CT head 5/8/18 - order entered      2  Asthma:  stable on her home dose of Breo and Singulair = will continue     3  Nicotine abuse:  on no patch currently; says no desire to smoke currently      4   Depression/Anxiety:  currently on Zoloft which was added in the hospital after being seen by Dr Daina Rincon from Psychiatry  She prefers to be on Wellbutrin but it decreases the seizure threshold = I explained this to her again today  Pt will remain on Zoloft  Appreciate Psychiatry input  She said today that is very tired of being depressed  Says has had depression even as a child  Had an IP stay at [facility] 2012  Trying to get an appt for an OP clinic for after discharge  Says doesn't want to do anything and has no motivation  Dr Rai oMe to see      5  Hx cocaine abuse: drug screen was positive for such but she denied  Primary service in the hospital counseled her      6  Tooth pain:  per SLIM, OMFS said no immediate intervention needed; she will need followup     7   Leukocytosis:  resolved     8  ABLA:  Resolved      Subjective: Patient seen and examined  She reports that she slept well last night and  denies any new complaints today       ROS:   GI: denies abdominal pain, change bowel habits or reflux symptoms  Neuro: No new neurologic changes  Respiratory: No Cough, SOB  Cardiovascular: No CP, palpitations     Scheduled Meds:    Current Facility-Administered Medications:  acetaminophen 325 mg Oral Q6H PRN Tomasz Mcdermott MD   acetaminophen 650 mg Oral Q6H [facility] HUSAM Hagan mag oxide-diphenhydramine-lidocaine viscous 10 mL Swish & Spit 4x Daily (AC & HS) Glen Hickey MD   albuterol 2 puff Inhalation Q6H PRN Lucendia Daring, HUSAM   ALPRAZolam 0 25 mg Oral Daily PRN Lucendia Daring, HUSAM   benzocaine  Mucosal 4x Daily PRN Glen Hickey, MD   bisacodyl 10 mg Rectal Daily PRN Lucendia Daring, HUSAM   docusate sodium 100 mg Oral BID Lucendia Daring, HUSAM   fluticasone 1 spray Each Nare BID Cuba Church PA-C   fluticasone furoate-vilanterol 1 puff Inhalation Daily Lucendia Daring, HUSAM   heparin (porcine) 5,000 Units Subcutaneous [facility] Lucendia Daring, HUSAM   HYDROmorphone 2 mg Oral Q6H PRN Glen Hickey, MD   loratadine 10 mg Oral Daily Cuba Church PA-C   melatonin 6 mg Oral HS Lucendia Daring, HUSAM   methocarbamol 500 mg Oral Q6H PRN Lucendia Daring, HUSAM   montelukast 10 mg Oral Daily Lucendia Daring, HUSAM   ondansetron 4 mg Oral Q6H PRN Lucendia Daring, HUSAM   pantoprazole 20 mg Oral Early Morning Glen Hickey MD   pravastatin 40 mg Oral Daily With Orly Marin, HUSAM   senna 1 tablet Oral HS Lucendia Daring, HUSAM   sertraline 50 mg Oral Daily Jaden Caputo MD       Labs:       Results from last 7 days  Lab Units 05/07/18  0608 05/02/18  0641   WBC Thousand/uL 6 90 9 27   HEMOGLOBIN g/dL 11 9 11 5   HEMATOCRIT % 37 4 35 8   PLATELETS Thousands/uL 734* 665*       Results from last 7 days  Lab Units 05/07/18  0608 05/02/18  0641   SODIUM mmol/L 138 138   POTASSIUM mmol/L 4 1 4 2   CHLORIDE mmol/L 105 106   CO2 mmol/L 28 26   BUN mg/dL 15 12   CREATININE mg/dL 0 76 0 66   GLUCOSE RANDOM mg/dL 94 87   CALCIUM mg/dL 9 4 9 6                  Glucose (mg/dL)   Date Value   05/07/2018 94   05/02/2018 87   05/01/2018 84   04/30/2018 87     Glucose, i-STAT (mg/dl)   Date Value   04/16/2018 95       Labs reviewed    Physical Examination:  Vitals:   Vitals:    05/07/18 0011 05/07/18 0734 05/07/18 1542 05/07/18 2351   BP: 135/87 135/80 139/76 135/82   BP Location: Right arm Left arm Left arm    Pulse: 81 71 78 71   Resp: 18 18 16 20   Temp: 97 6 °F (36 4 °C) 98 2 °F (36 8 °C) 98 1 °F (36 7 °C) 98 4 °F (36 9 °C)   TempSrc: Oral Oral Oral Oral   SpO2: 97% 95% 97% 97%   Weight:       Height:           GEN: NAD  HEENT: NC/AT, EOMI  RESP: BBS w/o crackles/wheeze/rhonci; resp unlabored  CV: +S1 S2, regular rate, no rubs/murmur  ABD: soft, NT, ND, normal BS   : no fulton  EXT: no LE edema  Skin: no rashes  Neuro: Awake and alert, BUE 4+/5, B/L LE 4/5      [ X ] Total time spent: 30 Mins and greater than 50% of this time was spent counseling/coordinating care        Klever Whitfield PA-C  Internal Medicine

## 2018-05-09 PROCEDURE — 97110 THERAPEUTIC EXERCISES: CPT | Performed by: PHYSICAL THERAPIST

## 2018-05-09 PROCEDURE — G0515 COGNITIVE SKILLS DEVELOPMENT: HCPCS

## 2018-05-09 PROCEDURE — 97116 GAIT TRAINING THERAPY: CPT | Performed by: PHYSICAL THERAPIST

## 2018-05-09 PROCEDURE — 97530 THERAPEUTIC ACTIVITIES: CPT

## 2018-05-09 PROCEDURE — 97150 GROUP THERAPEUTIC PROCEDURES: CPT

## 2018-05-09 PROCEDURE — 97535 SELF CARE MNGMENT TRAINING: CPT

## 2018-05-09 PROCEDURE — 99232 SBSQ HOSP IP/OBS MODERATE 35: CPT | Performed by: PHYSICAL MEDICINE & REHABILITATION

## 2018-05-09 PROCEDURE — 97530 THERAPEUTIC ACTIVITIES: CPT | Performed by: PHYSICAL THERAPIST

## 2018-05-09 RX ADMIN — ACETAMINOPHEN 650 MG: 325 TABLET ORAL at 06:31

## 2018-05-09 RX ADMIN — PRAVASTATIN SODIUM 40 MG: 40 TABLET ORAL at 17:35

## 2018-05-09 RX ADMIN — MONTELUKAST SODIUM 10 MG: 10 TABLET, FILM COATED ORAL at 08:53

## 2018-05-09 RX ADMIN — FLUTICASONE PROPIONATE 1 SPRAY: 50 SPRAY, METERED NASAL at 08:53

## 2018-05-09 RX ADMIN — SENNOSIDES 8.6 MG: 8.6 TABLET ORAL at 21:27

## 2018-05-09 RX ADMIN — HEPARIN SODIUM 5000 UNITS: 5000 INJECTION, SOLUTION INTRAVENOUS; SUBCUTANEOUS at 06:31

## 2018-05-09 RX ADMIN — ACETAMINOPHEN 650 MG: 325 TABLET ORAL at 11:41

## 2018-05-09 RX ADMIN — HEPARIN SODIUM 5000 UNITS: 5000 INJECTION, SOLUTION INTRAVENOUS; SUBCUTANEOUS at 21:27

## 2018-05-09 RX ADMIN — DOCUSATE SODIUM 100 MG: 100 CAPSULE, LIQUID FILLED ORAL at 08:53

## 2018-05-09 RX ADMIN — SERTRALINE HYDROCHLORIDE 50 MG: 50 TABLET ORAL at 08:53

## 2018-05-09 RX ADMIN — FLUTICASONE FUROATE AND VILANTEROL 1 PUFF: 200; 25 POWDER RESPIRATORY (INHALATION) at 14:21

## 2018-05-09 RX ADMIN — Medication 10 ML: at 11:43

## 2018-05-09 RX ADMIN — MELATONIN TAB 3 MG 6 MG: 3 TAB at 21:28

## 2018-05-09 RX ADMIN — Medication 10 ML: at 17:35

## 2018-05-09 RX ADMIN — Medication 10 ML: at 08:53

## 2018-05-09 RX ADMIN — LORATADINE 10 MG: 10 TABLET ORAL at 08:53

## 2018-05-09 RX ADMIN — ACETAMINOPHEN 650 MG: 325 TABLET ORAL at 23:39

## 2018-05-09 RX ADMIN — HEPARIN SODIUM 5000 UNITS: 5000 INJECTION, SOLUTION INTRAVENOUS; SUBCUTANEOUS at 14:21

## 2018-05-09 RX ADMIN — ACETAMINOPHEN 650 MG: 325 TABLET ORAL at 17:34

## 2018-05-09 RX ADMIN — DOCUSATE SODIUM 100 MG: 100 CAPSULE, LIQUID FILLED ORAL at 17:34

## 2018-05-09 RX ADMIN — Medication 10 ML: at 21:35

## 2018-05-09 RX ADMIN — ACETAMINOPHEN 325 MG: 325 TABLET, FILM COATED ORAL at 15:35

## 2018-05-09 RX ADMIN — PANTOPRAZOLE SODIUM 20 MG: 20 TABLET, DELAYED RELEASE ORAL at 06:31

## 2018-05-09 RX ADMIN — ALPRAZOLAM 0.25 MG: 0.25 TABLET ORAL at 21:38

## 2018-05-09 NOTE — OCCUPATIONAL THERAPY NOTE
Stroke Education Series    Pt participated in skilled Stroke Education Series in a group setting to address the topic of Depression and Anxiety post stroke in both verbal and written formats  Education within this session included the signs and symptoms of Depression and Anxiety and the impact of psychological health after experiencing a stroke  The goal of this education session was to provide patient with the tools to identify the presence of these signs and symptoms within his/her self and to have the resources to improve overall coping skills; therefore increasing his/her overall quality of life  Following education, pt's response to education is: demonstrates understanding  Start Time: 1430    End Time: 0    Stroke Education Series    Pt participated in skilled Stroke Education Series in a group setting to address the topic of Medication Management post stroke in both verbal and written formats  Education within this session included a review of the common types of medications that are used to treat stroke and related risk factors as well as general medication management tips and safety recommendations  The goal of this education was to provide the patient with a general understanding of medications and their purpose while reinforcing the importance of following his/her doctor's directions related to his/her medication routine  In addition, the patient would be able to identify safe medication management strategies and the ability to incorporate these strategies into their medication management routine  Following education, pt's response to education is: demonstrates understanding        Start Time: 1430    End Time: 7212

## 2018-05-09 NOTE — PROGRESS NOTES
Internal Medicine Progress Note  Patient: Adrienne Jamil  Age/sex: 63 y o  female  Medical Record #: [id number]      ASSESSMENT/PLAN:  Adrienne Jamil is seen and examined and management for following issues:    1  SAH/hydrocephalus: etio of bleed? = she had 2 negative a-grams and a negative CTA  She completed course of Nimotop on 4/30/18  Follow up CT head completed 5/8 revealed resolution of previously seen IVH      2  Asthma:  stable on her home dose of Breo and Singulair = will continue     3  Nicotine abuse:  on no patch currently; says no desire to smoke currently      4   Depression/Anxiety:  currently on Zoloft which was added in the hospital after being seen by Dr Lesa Angelucci from Psychiatry  She prefers to be on Wellbutrin but it decreases the seizure threshold = I explained this to her again today  Pt will remain on Zoloft  Appreciate Psychiatry input  She said today that is very tired of being depressed  Says has had depression even as a child  Had an IP stay at [facility] 2012  Says doesn't want to do anything and has no motivation  Dr Scott Arteaga to see  To transition to inpatient psych once rehab course complete      5  Hx cocaine abuse: drug screen was positive for such but she denied  Primary service in the hospital counseled her      6  Tooth pain:  per SLIM, OMFS said no immediate intervention needed; she will need followup     7   Leukocytosis:  resolved     8  ABLA:  Resolved      Subjective: Patient seen and examined  She reports  No overnight issues  Headaches improved  Light sensitivity remains  She reports intermittent nausea, denies vomiting or abdominal pain  Moving bowels     ROS:   GI: denies abdominal pain, change bowel habits or reflux symptoms  Neuro: No new neurologic changes  Respiratory: No Cough, SOB  Cardiovascular: No CP, palpitations     Scheduled Meds:    Current Facility-Administered Medications:  acetaminophen 325 mg Oral Q6H PRN Jose Youssef MD   acetaminophen 650 mg Oral Q6H [facility] Sunday Fritter, HUSAM   al mag oxide-diphenhydramine-lidocaine viscous 10 mL Swish & Spit 4x Daily [facility] & HS) Elyse Beltrán MD   albuterol 2 puff Inhalation Q6H PRN Sunday Fritter, HUSAM   ALPRAZolam 0 25 mg Oral Daily PRN Sunday Fritter, HUSAM   benzocaine  Mucosal 4x Daily PRN Elyse Beltrán MD   bisacodyl 10 mg Rectal Daily PRN Sunday Fritter, HUSAM   chlorhexidine 15 mL Swish & Spit Q12H [address] Elyse Beltrán MD   docusate sodium 100 mg Oral BID Sunday Fritter, HUSAM   fluticasone 1 spray Each Nare BID Mily Bertrand PA-C   fluticasone furoate-vilanterol 1 puff Inhalation Daily Sunday Fritter, HUSAM   heparin (porcine) 5,000 Units Subcutaneous [facility] Sunday Fritter, HUSAM   HYDROmorphone 2 mg Oral TID PRN Elyse Beltrán MD   loratadine 10 mg Oral Daily Mily Bertrand PA-C   melatonin 6 mg Oral HS Sunday Fritter, HUSAM   methocarbamol 500 mg Oral Q6H PRN Sunday Fritter, HUSAM   montelukast 10 mg Oral Daily Sunday Fritter, HUSAM   ondansetron 4 mg Oral Q6H PRN Sunday Fritter, HUSAM   pantoprazole 20 mg Oral Early Morning Elyse Beltrán MD   pravastatin 40 mg Oral Daily With Dalton Hyde, HUSAM   senna 1 tablet Oral HS Sunday Fritter, HUSAM   sertraline 50 mg Oral Daily Desiree Bullard MD       Labs:       Results from last 7 days  Lab Units 05/07/18  0608   WBC Thousand/uL 6 90   HEMOGLOBIN g/dL 11 9   HEMATOCRIT % 37 4   PLATELETS Thousands/uL 734*       Results from last 7 days  Lab Units 05/07/18  0608   SODIUM mmol/L 138   POTASSIUM mmol/L 4 1   CHLORIDE mmol/L 105   CO2 mmol/L 28   BUN mg/dL 15   CREATININE mg/dL 0 76   GLUCOSE RANDOM mg/dL 94   CALCIUM mg/dL 9 4                  Glucose (mg/dL)   Date Value   05/07/2018 94   05/02/2018 87   05/01/2018 84   04/30/2018 87     Glucose, i-STAT (mg/dl)   Date Value   04/16/2018 95       Labs reviewed    Physical Examination:  Vitals:   Vitals:    05/08/18 1546 05/08/18 2354 05/09/18 0629 05/09/18 0750   BP: 133/76 152/83  102/64   BP Location: Right arm Right arm   Pulse: 82 77  74   Resp: 20 20  20   Temp: 98 °F (36 7 °C) (!) 97 3 °F (36 3 °C)  98 1 °F (36 7 °C)   TempSrc: Oral Oral  Oral   SpO2: 98% 97%  96%   Weight:   89 3 kg (196 lb 13 9 oz)    Height:           GEN: NAD  HEENT: NC/AT, EOMI  RESP: BBS w/o crackles/wheeze/rhonci; resp unlabored  CV: +S1 S2, regular rate, no rubs/murmur  ABD: soft, NT, ND, normal BS   : no fulton  EXT: no LE edema  Skin: no rashes  Neuro: Awake and alert, BUE 4+/5, B/L LE 4/5      [ X ] Total time spent: 30 Mins and greater than 50% of this time was spent counseling/coordinating care        MELVI McintyreC  Internal Medicine

## 2018-05-09 NOTE — PROGRESS NOTES
Stroke Education Series    Pt participated in skilled Stroke Education Series in a group setting to address the topic of Home Safety post stroke in both verbal and written formats  Education within this session focused on safety strategies within the home including environmental and lifestyle changes that should be made to support a safe discharge to his/her home setting  The goal of this education session was to provide the patient with recommended home set up and changes that could be made to promote increased independence and safety upon discharge  Additionally, to instill confidence in his/her return home within their functional capabilities for fall prevention  Following education, pt's response to education is: verbalizes understanding  Stroke Education Series    Patient (and family/caregivers as appropriate) participated in skilled Stroke Education Series in a group setting to address the topic of Caregiver Education post stroke in both verbal and written formats  Education within this session included the roles of family/caregivers within the rehabilitation process and how they will be involved and instrumental in his/her return home  In addition, education on how "home life" may be affected and the impact of caring for loved ones post stroke was provided  The goal of this education session was to provide patients and his/her caregivers with a greater understanding of their roles in the rehabilitation process and how instrumental that relationship could and will be at time of discharge to facilitate both patient and caregiver safety; therefore improving overall quality of life individually, and as a unit  Following education, pt's response to education is: verbalizes understanding        Start Time: 1430     End Time: 5328

## 2018-05-09 NOTE — PROGRESS NOTES
Occupational Therapy Treatment Note     05/09/18 0802   Pain Assessment   Pain Assessment No/denies pain   Restrictions/Precautions   Precautions Contact/isolation   Lifestyle   Autonomy "I will push the red button before I get up and when I get back from the bathroom" - pt able to recall distant supervision process    QI: Eating   Assistance Needed Independent   Eating CARE Score 6   Eating Assessment   Eating (FIM) 7 - Patient completely independent   QI: Shower/Bathe Self   Assistance Needed Supervision   Shower/Bathe Self CARE Score 4   Bathing   Assessed Bath Style Shower   Anticipated D/C Bath Style Shower   Able to [place] Yes   Able to [employer] Temperature Yes   Able to Wash/Rinse/Dry (body part) Left Arm;Right Arm;L Upper Leg;R Upper Leg;L Lower Leg/Foot;R Lower Leg/Foot;Chest;Abdomen;Perineal Area; Buttocks   Limitations Noted in Endurance;Timeliness   Positioning Seated;Standing   Adaptive Equipment Shower Seat;Hand Rodriguez's; Shower Bars   Findings  Pt completes bathing while seated at mod I level, stands for bathing of buttock/perineal area with supervision  Pt uses grab bars in shower despite being educated on attempting to not use grab bars 2* not having them at home  Pt able to place unilateral UE onto shower chair to assist in LB bathing with supervision  Pt reports having large built in shower chair at home that pt will be able to access, declines purchasing of standard shower chair      Bathing (FIM) 5 - Patient requires supervision/monitoring but completes 10/10 parts   Tub/Shower Transfer   Assessed Shower   Findings pt completes shower transfer with supervision and no assistive device 2* parking rollator outside of the shower    Shower Transfer (FIM) 5 - Patient requires supervision/monitoring   QI: Upper Body Dressing   Assistance Needed Supervision   Upper Body Dressing CARE Score 4   QI: Lower Body Dressing   Assistance Needed Supervision   Lower Body Dressing CARE Score 4   QI: Putting On/Taking Off Footwear   Assistance Needed Supervision   Putting On/Taking Off Footwear CARE Score 4   Dressing/Undressing Clothing   Remove UB Clothes Pullover Shirt   Remove LB Clothes Socks;Pants   Don UB Clothes Pullover Shirt;Bra   Don LB Clothes Pants; Undergarment;Socks; Shoes   Limitations Noted In Endurance;Timeliness   Positioning Supported Sit;Standing   Findings Pt completes UB dressing while seated at mod I level, requires supervision for item retrieval to/from closet  Pt completes LB dressing while seated/in stance with supervision and no use of DME for steadying  Pt sits on locked rollator to complete LB dressing    UB Dressing (FIM) 5 - Patient requires supervision/monitoring   LB Dressing (FIM) 5 - Patient requires supervision/monitoring   QI: Roll Left and Right   Assistance Needed Independent   Roll Left and Right CARE Score 6   QI: Sit to Lying   Assistance Needed Independent   Sit to Lying CARE Score 6   QI: Lying to Sitting on Side of Bed   Assistance Needed Independent   Lying to Sitting on Side of Bed CARE Score 6   QI: Sit to [address]; Adaptive equipment   Comment rollator    Sit to Stand CARE Score 6   QI: Chair/Bed-to-Chair Transfer   Assistance Needed Independent; Adaptive equipment   Comment rollator   Chair/Bed-to-Chair Transfer CARE Score 6   Transfer Bed/Chair/Wheelchair   Limitations Noted In LE Strength; Coordination;Balance   Adaptive Equipment Rollator   Sit to Stand (mod I)   Stand to Sit (mod I)   Supine to Sit (independent)   Sit to Supine (independent)   Findings Pt completes functional mobility within room/bathroom with mod I using rollator, demonstrates good carryover of locking brakes  Pt progressed to distant supervision using rollator    Bed, Chair, Wheelchair Transfer (FIM) 6 - Patient requires assistive device/extra time/safety concerns but completes independently   QI: [address]; Adaptive equipment Toileting Hygiene CARE Score 6   Toileting   Able to Pull Clothing down yes, up yes  Able to [address] Yes   Manage Hygiene Bladder   Findings pt completes toileting with increased time    Toileting (FIM) 6 - Patient requires assistive device/extra time/safety concerns but completes independently   QI: Toilet Transfer   Assistance Needed Independent; Adaptive equipment   Comment rollator    Toilet Transfer CARE Score 6   Toilet Transfer   Surface Assessed Standard Toilet   Transfer Technique Standard   Findings using rollator to/from toilet    Toilet Transfer (FIM) 6 - Patient requires assistive device/extra time/safety concerns but completes independently   Cognition   Arousal/Participation Alert   Attention Attends with cues to redirect   Orientation Level Oriented to person;Oriented to place;Oriented to situation   Comments Pt completed [place] Cognitive Assessment (MOCA) version 7 2  Pt scored overall 25 / 30, indicating a mild cognitive deficit, but demonstrating improvement from completion of MOCA 7 1 where pt scored 21/30 on 4/26/18  Activity Tolerance   Activity Tolerance Patient tolerated treatment well   Assessment   Treatment Assessment Pt participated in skilled OT tx session focused on ADL performance and [facility] of [address]  See above for further details on functional performance  Pt demonstrates progress in functional mobility/transfers using rollator and toileting, progressing to MOD I level, progressed to distant supervision (TAI [place] and [address] aware)  Pt demonstrates improvement in [facility] Cognitive Assessment, increasing score 4 points since 4/26/18  Pt reports interest in inpatient psych services upon D/C, OTR/L spoke with Phuong from  who reports  is aware of D/C plan   OT recommending outpatient OT services after completion of inpatient psych program  Continue OT plan of care with focus on functional cognition, increasing activity tolerance and standing tolerance  Prognosis Good   Problem List Decreased strength;Decreased endurance; Impaired balance;Decreased mobility; Decreased cognition   Plan   Treatment/Interventions ADL retraining;Functional transfer training; Therapeutic exercise;Cognitive reorientation;Patient/family training;Equipment eval/education; Compensatory technique education   Progress Progressing toward goals   Recommendation   OT Discharge Recommendation Outpatient OT   OT Therapy Minutes   OT Time In 0802   OT Time Out 0935   OT Total Time (minutes) 93   OT Mode of treatment - Individual (minutes) 93   OT Mode of treatment - Concurrent (minutes) 0   OT Mode of treatment - Group (minutes) 0   OT Mode of treatment - Co-treat (minutes) 0   OT Mode of Teatment - Total time(minutes) 93 minutes   Therapy Time missed   Time missed? No       Pt completed [place] Cognitive Assessment (MOCA) version 7 2  Pt scored overall 25 / 30  indicating a mild cognitive deficit     Visuospatial/executive: 5 /5   Naming: 3/3   Memory:    (worth no points) - immediately recalls 5/5 words on 2 attempts   Attention:  5/ 6 - completes 2/5 correct serial 7 subtractions   Language: 3 / 3   Abstraction: 2 / 2   Delayed recall: 2 / 5 - recalls 2/5 words s/p 5 minute delay with no cues; 2/5 with category cue; 1/5 with multiple choice cue   Orientation: 5 / 6 - not oriented to specific date       Roselyn Lipoma, MS, OTR/L , CBIS

## 2018-05-09 NOTE — PROGRESS NOTES
05/09/18 1030   Pain Assessment   Pain Assessment No/denies pain   Pain Score No Pain   Restrictions/Precautions   Precautions Fall Risk;Contact/isolation   Cognition   Arousal/Participation Alert   Subjective   Subjective Pt reports "the world is so depressing"   QI: Sit to [address] Provided by [place] No physical assistance   Sit to Lying CARE Score 6   QI: Lying to Sitting on Side of Bed   Assistance Needed Independent   Assistance Provided by [place] No physical assistance   Lying to Sitting on Side of Bed CARE Score 6   QI: Sit to [address]; Adaptive equipment   Assistance Provided by [place] No physical assistance   Sit to Stand CARE Score 6   QI: Chair/Bed-to-Chair Transfer   Assistance Needed Independent; Adaptive equipment   Assistance Provided by [place] No physical assistance   Chair/Bed-to-Chair Transfer CARE Score 6   Transfer Bed/Chair/Wheelchair   Limitations Noted In LE Strength; Endurance   Adaptive Equipment Rollator   Findings good carryover of locking brakes   Bed, Chair, Wheelchair Transfer (FIM) 6 - Patient requires assistive device/extra time/safety concerns but completes independently   QI: [address]; Adaptive equipment   Assistance Provided by [place] No physical assistance   Walk 10 Feet CARE Score 6   QI: Walk 50 Feet with Two [address]; Adaptive equipment   Assistance Provided by [place] No physical assistance   Walk 50 Feet with Two Turns CARE Score 6   QI: Walk 150 Feet   Assistance Needed Supervision; Adaptive equipment   Assistance Provided by [place] No physical assistance   Walk 150 Feet CARE Score 4   QI: Walking 10 Feet on Uneven Surfaces   Assistance Needed Supervision; Adaptive equipment   Assistance Provided by [place] No physical assistance   Walking 10 Feet on Uneven Surfaces CARE Score 4   Ambulation   Does the patient walk? 2   Yes   Primary Discharge Mode of Locomotion Walk   Walk Assist Level Modified Independent;Supervision   Gait Pattern Inconsistant Melissa; Forward Flexion   Assist Device Rollator   Distance Walked (feet) 400 ft   Limitations Noted In Endurance;Strength;Speed;Posture   Findings slow gait speed; frequent rest breaks; pt limited by dec activity tolerance    Walking (FIM) 5 - HOUSEHOLD EXCEPTION: Ambulates 50 feet or more, with or w/o a device, but completely independent   Therapeutic Interventions   Strengthening standing HT raises; standing hip abduction   Equipment Use   NuStep L2 x 12 min  Assessment   Treatment Assessment Pt cont to be limited by dec activity tolerance and fatigue throughout session  Pt moves @ slow speed on Nustep and when ambulating  Practiced ambulating outdoors on uneven surfaces; pt enjoyed sitting in the sun for a few minutes  Pt becomes flustered in crowded, busy environments (in the lobby) but does not result in LOB  Pt demonstrates good carryover of brake management of rollator; need to take frequent rest breaks throughout session  Progressed pt to DS today using rollator  Informed pt of out of pocket cost of rollator; pt reports she will discuss with her sister prior to ordering  Pt also reports she plans to D/C to inpatient psych rehab from here  Overall pt cont to show lack of interest/engagement during sessions and requires constant motivation and encouragement to participate  PT Barriers   Physical Impairment Decreased strength;Decreased endurance; Impaired balance;Decreased mobility; Impaired judgement   Functional Limitation Walking;Stair negotiation   Plan   Treatment/Interventions Functional transfer training;LE strengthening/ROM; Elevations; Therapeutic exercise; Endurance training;Patient/family training;Equipment eval/education; Bed mobility;Gait training   Progress Progressing toward goals   Recommendation   Recommendation (home w/ family support vs  inpatient psysh rehab )   Equipment Recommended Leo  (rollator)   PT Therapy Minutes   PT Time In 1030   PT Time Out 1130   PT Total Time (minutes) 60   PT Mode of treatment - Individual (minutes) 60   PT Mode of treatment - Concurrent (minutes) 0   PT Mode of treatment - Group (minutes) 0   PT Mode of treatment - Co-treat (minutes) 0   PT Mode of Teatment - Total time(minutes) 60 minutes   Therapy Time missed   Time missed?  No

## 2018-05-09 NOTE — PROGRESS NOTES
Physical Medicine & Rehabilitation Progress Note:    Primary Rehabilitation Diagnosis: Subarachnoid hemorrhage (HH3, Velazco 3)    Subjective: Patient seen face to face  Progressing as expected  Reviewed her HCT results preliminarily with patient until [address] sees her again  Relayed to CM her voluntary interest in transitioning to inpatient psychiatry for depression  No current active suicide thoughts or plans  Review Of Systems:   A 10 point review of systems was performed  Pertinent positives above    Functional Update:  Transfers are progressing to Mod I with rollator  Today ambulating 400 feet with rollator at a supervision - Mod I       Objective:  /64 (BP Location: Right arm)   Pulse 74   Temp 98 1 °F (36 7 °C) (Oral)   Resp 20   Ht 5' 3" (1 6 m)   Wt 89 3 kg (196 lb 13 9 oz)   SpO2 96%   BMI 34 87 kg/m²     GENERAL: No acute distress    HEENT:  Right frontal scalp with sutures present, PERRL, EOMI, mucous membranes moist   CARDIOVASCULAR: regular rate rhythm, no murmurs  LUNGS: clear to ausculation bilaterally  ABDOMEN: soft, non-tender, non-distended, no guarding  Bowel sounds x 4 normal   EXTREMITIES: no pedal edema bilaterally  MUSCULOSKELETAL:  Range of motion functional x4  NEUROLOGICAL:   Awake, alert  Motor BUE 4/5 throughout  Bilateral lower extremity:  4/5 proximally at the hip flexors, 4/5 at the knee extensors and knee flexors, 4/5 at the dorsiflexors plantar flexors  Psych:  Flat affect  Sitting tolerance has improved  Assessment:    Zach Wright is a 63 y o  female in stable condition status post spontaneous subarachnoid hemorrhage located in the suprasellar cistern, sylvian fissure, basal cisterns and the anterior falx  Status post EVD placement close monitoring without further intervention  Rehabilitation Plan:      -Rehabilitation of subarachnoid hemorrhage: PT/OT/SLP therapies      -Appreciate Internal Medicine following - Dr Nani Nicolas to Internal Medicine     -Subarachnoid hemorrhage: HH#,Velazco 3 type  Located in the suprasellar cistern, sylvian fissures, basal cistern and the anterior falx:  Status post EVD placement and 2 angiograms with negative findings for AV malformation or aneurysm  +Sutures to be removed this week      -MRI brain showing a small area of restricted diffusion in the left cerebellar hemisphere suspicious for possible acute to subacute infarct:  Patient managed on Pravachol for CVA prophylaxis and may need to follow with Neurology as an outpatient furthermore      -MRI cervical spine showing T2/T3 paracentral disc protrusion and right paracentral disc extrusion with mild mass effect on the right ventral aspect of cord:  NSGY aware  Physical examination of bilateral upper extremities within normal limits and current subjective symptoms negative       -headaches:  Managed conservatively with Tylenol scheduled,  p r n  Robaxin  Encouraged low light low sound and rest when needed between therapies      -dental pain: Patient reports she will require several teeth pulled and 1 tooth repaired on and she has a dentist that she follows with  Continue Magic mouthwash, Dilaudid 2 mg decreased from Q6h PRN to TID PRN  Due to her concern about dental infection order placed for Chlorhexidine BID     -depression/anxiety: continue Zoloft 50mg daily and Psychiatry following - Appreciate Neuropsychology consultation additionally  As her mood is a concern of hers, will discuss future inpatient psych stay as she has done this in the past with good results and more continuous psychotherapy    - 5PM 5/8/18: *Had an extended conversation regarding her mood and patient is interested in transitioning to inpatient psychiatry here at [facility] after her acute inpatient rehabilitation course has completed - will relay to Psychiatry and CM    -asthma:  Managed on Breo as well as Singulair, Flonase     -GI prophylaxis managed on Protonix 20 mg daily     -DVT prophylaxis managed on SCDs, subacute heparin which was cleared by Neurosurgery     -Insomnia: managed on Melatonin    Estimated Discharge: 5/12/18 with outpatient PT/OT        Lab Results   Component Value Date    WBC 6 90 05/07/2018    HGB 11 9 05/07/2018    HCT 37 4 05/07/2018    MCV 93 05/07/2018     (H) 05/07/2018     Lab Results   Component Value Date    GLUCOSE 94 05/07/2018    CALCIUM 9 4 05/07/2018     05/07/2018    K 4 1 05/07/2018    CO2 28 05/07/2018     05/07/2018    BUN 15 05/07/2018    CREATININE 0 76 05/07/2018       Current Facility-Administered Medications:     acetaminophen (TYLENOL) tablet 325 mg, 325 mg, Oral, Q6H PRN, Efrain Campos MD    acetaminophen (TYLENOL) tablet 650 mg, 650 mg, Oral, Q6H [address], Dorean Cutter, CRNP, 650 mg at 05/09/18 1141    al mag oxide-diphenhydramine-lidocaine viscous (MAGIC MOUTHWASH) suspension 10 mL, 10 mL, Swish & Spit, 4x Daily (AC & HS), Efrain Campos MD, 10 mL at 05/09/18 1143    albuterol (PROVENTIL HFA,VENTOLIN HFA) inhaler 2 puff, 2 puff, Inhalation, Q6H PRN, Dorean Cutter, CRNP, 2 puff at 05/02/18 1715    ALPRAZolam Jeannett Elms) tablet 0 25 mg, 0 25 mg, Oral, Daily PRN, Doreabilly Cutter, CRNP, 0 25 mg at 05/08/18 1826    benzocaine (ANBESOL) 10 % mucosal liquid, , Mucosal, 4x Daily PRN, Efrain Campos MD, 1 application at 51/95/82 2115    bisacodyl (DULCOLAX) rectal suppository 10 mg, 10 mg, Rectal, Daily PRN, Issaceabilly Cutter, CRNP    chlorhexidine (PERIDEX) 0 12 % oral rinse 15 mL, 15 mL, Swish & Spit, Q12H [address], Efrain Campos MD, 15 mL at 05/08/18 1148    docusate sodium (COLACE) capsule 100 mg, 100 mg, Oral, BID, Dorbeena Jay, CRNP, 100 mg at 05/09/18 0853    fluticasone (FLONASE) 50 mcg/act nasal spray 1 spray, 1 spray, Each Nare, BID, Kalpesh Huertas PA-C, 1 spray at 05/09/18 0853    fluticasone furoate-vilanterol (BREO ELLIPTA) 200-25 MCG/INH inhaler 1 puff, 1 puff, Inhalation, Daily, HUSAM Vaughn, 1 puff at 05/08/18 1346    heparin (porcine) subcutaneous injection 5,000 Units, 5,000 Units, Subcutaneous, Q8H [facility], HUSAM Gorman, 5,000 Units at 05/09/18 0631    HYDROmorphone (DILAUDID) tablet 2 mg, 2 mg, Oral, TID PRN, Jose Youssef MD, 2 mg at 05/08/18 2151    loratadine (CLARITIN) tablet 10 mg, 10 mg, Oral, Daily, Adri Zamorano PA-C, 10 mg at 05/09/18 0853    melatonin tablet 6 mg, 6 mg, Oral, HS, HUSAM Gorman, 6 mg at 05/08/18 2141    methocarbamol (ROBAXIN) tablet 500 mg, 500 mg, Oral, Q6H PRN, HUSAM Gorman    montelukast (SINGULAIR) tablet 10 mg, 10 mg, Oral, Daily, HUSAM Gorman, 10 mg at 05/09/18 0853    ondansetron (ZOFRAN-ODT) dispersible tablet 4 mg, 4 mg, Oral, Q6H PRN, HUSAM Gorman, 4 mg at 05/08/18 2341    pantoprazole (PROTONIX) EC tablet 20 mg, 20 mg, Oral, Early Morning, Jose Youssef MD, 20 mg at 05/09/18 0631    pravastatin (PRAVACHOL) tablet 40 mg, 40 mg, Oral, Daily With HUSAM Zurita, 40 mg at 05/08/18 1630    senna (SENOKOT) tablet 8 6 mg, 1 tablet, Oral, HS, HUSAM Gorman, 8 6 mg at 05/07/18 2104    sertraline (ZOLOFT) tablet 50 mg, 50 mg, Oral, Daily, Mariann Ponce MD, 50 mg at 05/09/18 8702          Jose Youssef MD  [facility] Primary [facility]

## 2018-05-09 NOTE — SOCIAL WORK
Insurance review faxed to [employer] [phone]; awaiting determination  Made aware that patient has expressd desire to go to inpatient  Psych unit; referral process started  Will move when bed available

## 2018-05-09 NOTE — PROGRESS NOTES
Occupational Therapy Treatment Note       05/09/18 1410   Pain Assessment   Pain Assessment No/denies pain   Restrictions/Precautions   Precautions Contact/isolation   Lifestyle   Autonomy "I am getting anxious, I don't want it to be a waste of time"    QI: Roll Left and Right   Assistance Needed Independent   Roll Left and Right CARE Score 6   QI: Sit to Lying   Assistance Needed Independent   Sit to Lying CARE Score 6   QI: Lying to Sitting on Side of Bed   Assistance Needed Independent   Lying to Sitting on Side of Bed CARE Score 6   QI: Sit to [address]; Adaptive equipment   Comment rollator    Sit to Stand CARE Score 6   QI: Chair/Bed-to-Chair Transfer   Assistance Needed Independent   Comment rollator    Chair/Bed-to-Chair Transfer CARE Score 6   Transfer Bed/Chair/Wheelchair   Adaptive Equipment Rollator   Sit to Stand Other  (mod I)   Stand to Sit Other  (mod I )   Bed, Chair, Wheelchair Transfer (FIM) 6 - Patient requires assistive device/extra time/safety concerns but completes independently   QI: [address]; Adaptive equipment   [address] CARE Score 6   Toileting   Able to [address] down yes, up yes  Toileting (FIM) 6 - Patient requires assistive device/extra time/safety concerns but completes independently   QI: Toilet Transfer   Assistance Needed Independent   Toilet Transfer CARE Score 6   Toilet Transfer   Surface Assessed Standard Toilet   Transfer Technique Standard   Toilet Transfer (FIM) 6 - Patient requires assistive device/extra time/safety concerns but completes independently   Exercise Tools   UE Ergometer Pt engaged in UE strengthening using ergometer to increase UB strength to increase overall activity tolerance for increased participation in ADL/IADLS  Pt completed prograde x6 minutes at level 4 resistance  Pt presents with good tolerance      Cognition   Comments Pt reports feeling "anxious" about inpatient psychiatric stay  OTR/L answered pt's questions regarding if other people will also be present in psychiatric unit  When provided with emotional support, pt then reports "I guess this would be good for me"    Activity Tolerance   Activity Tolerance Patient tolerated treatment well   Assessment   Treatment Assessment Pt participated in skilled OT tx session focused on improving activity tolerance, providing emotional support regarding pt's questions with D/C to inpatient psychiatric unit  See above for further details on functional performance  Pt will continue to benefit from increasing activity tolerance to increase participation in ADL/IADL tasks  OTR/L offered to call pt's sister regarding family training, however pt reports her sister is at work and will not be able to receive a phone call  Prognosis Good   Plan   Treatment/Interventions ADL retraining;Functional transfer training; Therapeutic exercise; Endurance training;Patient/family training;Equipment eval/education; Compensatory technique education   Progress Progressing toward goals   Recommendation   OT Discharge Recommendation Outpatient OT  (+drivers eval)   OT Therapy Minutes   OT Time In 1410   OT Time Out 1440   OT Total Time (minutes) 30   OT Mode of treatment - Individual (minutes) 30   OT Mode of treatment - Concurrent (minutes) 0   OT Mode of treatment - Group (minutes) 0   OT Mode of treatment - Co-treat (minutes) 0   OT Mode of Teatment - Total time(minutes) 30 minutes   Therapy Time missed   Time missed?  No       Beatrice Gonzalez, MS, OTR/L , CBIS

## 2018-05-10 ENCOUNTER — HOSPITAL ENCOUNTER (INPATIENT)
Facility: HOSPITAL | Age: 58
LOS: 14 days | Discharge: HOME | DRG: 753 | End: 2018-05-25
Attending: PSYCHIATRY & NEUROLOGY | Admitting: PSYCHIATRY & NEUROLOGY
Payer: COMMERCIAL

## 2018-05-10 VITALS
DIASTOLIC BLOOD PRESSURE: 60 MMHG | RESPIRATION RATE: 20 BRPM | WEIGHT: 196.87 LBS | SYSTOLIC BLOOD PRESSURE: 111 MMHG | BODY MASS INDEX: 34.88 KG/M2 | HEART RATE: 74 BPM | TEMPERATURE: 98.7 F | OXYGEN SATURATION: 96 % | HEIGHT: 63 IN

## 2018-05-10 DIAGNOSIS — Z87.09 HISTORY OF ASTHMA: Primary | ICD-10-CM

## 2018-05-10 DIAGNOSIS — G91.9 ACQUIRED HYDROCEPHALUS (HCC): ICD-10-CM

## 2018-05-10 DIAGNOSIS — F31.81 BIPOLAR 2 DISORDER, MAJOR DEPRESSIVE EPISODE (HCC): Chronic | ICD-10-CM

## 2018-05-10 DIAGNOSIS — I60.9 SAH (SUBARACHNOID HEMORRHAGE) (HCC): ICD-10-CM

## 2018-05-10 DIAGNOSIS — F41.1 ANXIETY STATE: ICD-10-CM

## 2018-05-10 LAB
ANION GAP SERPL CALCULATED.3IONS-SCNC: 6 MMOL/L (ref 4–13)
BASOPHILS # BLD AUTO: 0.05 THOUSANDS/ΜL (ref 0–0.1)
BASOPHILS NFR BLD AUTO: 1 % (ref 0–1)
BUN SERPL-MCNC: 15 MG/DL (ref 5–25)
CALCIUM SERPL-MCNC: 9.5 MG/DL (ref 8.3–10.1)
CHLORIDE SERPL-SCNC: 107 MMOL/L (ref 100–108)
CO2 SERPL-SCNC: 27 MMOL/L (ref 21–32)
CREAT SERPL-MCNC: 0.71 MG/DL (ref 0.6–1.3)
EOSINOPHIL # BLD AUTO: 0.21 THOUSAND/ΜL (ref 0–0.61)
EOSINOPHIL NFR BLD AUTO: 3 % (ref 0–6)
ERYTHROCYTE [DISTWIDTH] IN BLOOD BY AUTOMATED COUNT: 14.3 % (ref 11.6–15.1)
GFR SERPL CREATININE-BSD FRML MDRD: 95 ML/MIN/1.73SQ M
GLUCOSE SERPL-MCNC: 85 MG/DL (ref 65–140)
HCT VFR BLD AUTO: 38 % (ref 34.8–46.1)
HGB BLD-MCNC: 12.2 G/DL (ref 11.5–15.4)
LYMPHOCYTES # BLD AUTO: 2.76 THOUSANDS/ΜL (ref 0.6–4.47)
LYMPHOCYTES NFR BLD AUTO: 39 % (ref 14–44)
MCH RBC QN AUTO: 30.2 PG (ref 26.8–34.3)
MCHC RBC AUTO-ENTMCNC: 32.1 G/DL (ref 31.4–37.4)
MCV RBC AUTO: 94 FL (ref 82–98)
MONOCYTES # BLD AUTO: 0.75 THOUSAND/ΜL (ref 0.17–1.22)
MONOCYTES NFR BLD AUTO: 11 % (ref 4–12)
NEUTROPHILS # BLD AUTO: 3.28 THOUSANDS/ΜL (ref 1.85–7.62)
NEUTS SEG NFR BLD AUTO: 46 % (ref 43–75)
NRBC BLD AUTO-RTO: 0 /100 WBCS
PLATELET # BLD AUTO: 687 THOUSANDS/UL (ref 149–390)
PMV BLD AUTO: 9.9 FL (ref 8.9–12.7)
POTASSIUM SERPL-SCNC: 4.1 MMOL/L (ref 3.5–5.3)
RBC # BLD AUTO: 4.04 MILLION/UL (ref 3.81–5.12)
SODIUM SERPL-SCNC: 140 MMOL/L (ref 136–145)
WBC # BLD AUTO: 7.07 THOUSAND/UL (ref 4.31–10.16)

## 2018-05-10 PROCEDURE — 99232 SBSQ HOSP IP/OBS MODERATE 35: CPT | Performed by: PSYCHIATRY & NEUROLOGY

## 2018-05-10 PROCEDURE — 97535 SELF CARE MNGMENT TRAINING: CPT

## 2018-05-10 PROCEDURE — 85025 COMPLETE CBC W/AUTO DIFF WBC: CPT | Performed by: PHYSICIAN ASSISTANT

## 2018-05-10 PROCEDURE — 99231 SBSQ HOSP IP/OBS SF/LOW 25: CPT | Performed by: PHYSICIAN ASSISTANT

## 2018-05-10 PROCEDURE — 97150 GROUP THERAPEUTIC PROCEDURES: CPT | Performed by: PHYSICAL THERAPIST

## 2018-05-10 PROCEDURE — 97110 THERAPEUTIC EXERCISES: CPT

## 2018-05-10 PROCEDURE — 97530 THERAPEUTIC ACTIVITIES: CPT

## 2018-05-10 PROCEDURE — 97112 NEUROMUSCULAR REEDUCATION: CPT

## 2018-05-10 PROCEDURE — 97116 GAIT TRAINING THERAPY: CPT

## 2018-05-10 PROCEDURE — 80048 BASIC METABOLIC PNL TOTAL CA: CPT | Performed by: PHYSICIAN ASSISTANT

## 2018-05-10 PROCEDURE — 99239 HOSP IP/OBS DSCHRG MGMT >30: CPT | Performed by: PHYSICAL MEDICINE & REHABILITATION

## 2018-05-10 RX ORDER — ALPRAZOLAM 0.25 MG/1
0.25 TABLET ORAL DAILY PRN
Status: DISCONTINUED | OUTPATIENT
Start: 2018-05-10 | End: 2018-05-25 | Stop reason: HOSPADM

## 2018-05-10 RX ORDER — MONTELUKAST SODIUM 10 MG/1
10 TABLET ORAL DAILY
Status: CANCELLED | OUTPATIENT
Start: 2018-05-11

## 2018-05-10 RX ORDER — LANOLIN ALCOHOL/MO/W.PET/CERES
6 CREAM (GRAM) TOPICAL
Status: DISCONTINUED | OUTPATIENT
Start: 2018-05-10 | End: 2018-05-25 | Stop reason: HOSPADM

## 2018-05-10 RX ORDER — CHLORHEXIDINE GLUCONATE 0.12 MG/ML
15 RINSE ORAL EVERY 12 HOURS SCHEDULED
Status: DISCONTINUED | OUTPATIENT
Start: 2018-05-10 | End: 2018-05-25 | Stop reason: HOSPADM

## 2018-05-10 RX ORDER — PRAVASTATIN SODIUM 40 MG
40 TABLET ORAL
Status: DISCONTINUED | OUTPATIENT
Start: 2018-05-10 | End: 2018-05-25 | Stop reason: HOSPADM

## 2018-05-10 RX ORDER — SENNOSIDES 8.6 MG
1 TABLET ORAL
Status: CANCELLED | OUTPATIENT
Start: 2018-05-10

## 2018-05-10 RX ORDER — PANTOPRAZOLE SODIUM 20 MG/1
20 TABLET, DELAYED RELEASE ORAL
Status: DISCONTINUED | OUTPATIENT
Start: 2018-05-11 | End: 2018-05-25 | Stop reason: HOSPADM

## 2018-05-10 RX ORDER — ACETAMINOPHEN 325 MG/1
650 TABLET ORAL EVERY 4 HOURS PRN
Status: CANCELLED | OUTPATIENT
Start: 2018-05-10

## 2018-05-10 RX ORDER — HYDROMORPHONE HYDROCHLORIDE 2 MG/1
2 TABLET ORAL 3 TIMES DAILY PRN
Status: DISCONTINUED | OUTPATIENT
Start: 2018-05-10 | End: 2018-05-25 | Stop reason: HOSPADM

## 2018-05-10 RX ORDER — METHOCARBAMOL 500 MG/1
500 TABLET, FILM COATED ORAL EVERY 6 HOURS PRN
Status: CANCELLED | OUTPATIENT
Start: 2018-05-10

## 2018-05-10 RX ORDER — LANOLIN ALCOHOL/MO/W.PET/CERES
6 CREAM (GRAM) TOPICAL
Status: CANCELLED | OUTPATIENT
Start: 2018-05-10

## 2018-05-10 RX ORDER — METHOCARBAMOL 500 MG/1
500 TABLET, FILM COATED ORAL EVERY 6 HOURS PRN
Status: DISCONTINUED | OUTPATIENT
Start: 2018-05-10 | End: 2018-05-25 | Stop reason: HOSPADM

## 2018-05-10 RX ORDER — FLUTICASONE PROPIONATE 50 MCG
1 SPRAY, SUSPENSION (ML) NASAL 2 TIMES DAILY
Status: DISCONTINUED | OUTPATIENT
Start: 2018-05-10 | End: 2018-05-25 | Stop reason: HOSPADM

## 2018-05-10 RX ORDER — TRAZODONE HYDROCHLORIDE 50 MG/1
25 TABLET ORAL
Status: DISCONTINUED | OUTPATIENT
Start: 2018-05-10 | End: 2018-05-25 | Stop reason: HOSPADM

## 2018-05-10 RX ORDER — LORATADINE 10 MG/1
10 TABLET ORAL DAILY
Status: DISCONTINUED | OUTPATIENT
Start: 2018-05-11 | End: 2018-05-25 | Stop reason: HOSPADM

## 2018-05-10 RX ORDER — CHLORHEXIDINE GLUCONATE 0.12 MG/ML
15 RINSE ORAL EVERY 12 HOURS SCHEDULED
Status: CANCELLED | OUTPATIENT
Start: 2018-05-10

## 2018-05-10 RX ORDER — DOCUSATE SODIUM 100 MG/1
100 CAPSULE, LIQUID FILLED ORAL 2 TIMES DAILY
Status: CANCELLED | OUTPATIENT
Start: 2018-05-10

## 2018-05-10 RX ORDER — TRAZODONE HYDROCHLORIDE 50 MG/1
25 TABLET ORAL
Status: DISCONTINUED | OUTPATIENT
Start: 2018-05-10 | End: 2018-05-10 | Stop reason: HOSPADM

## 2018-05-10 RX ORDER — ACETAMINOPHEN 325 MG/1
650 TABLET ORAL EVERY 6 HOURS SCHEDULED
Status: DISCONTINUED | OUTPATIENT
Start: 2018-05-10 | End: 2018-05-25 | Stop reason: HOSPADM

## 2018-05-10 RX ORDER — ALBUTEROL SULFATE 90 UG/1
2 AEROSOL, METERED RESPIRATORY (INHALATION) EVERY 6 HOURS PRN
Status: DISCONTINUED | OUTPATIENT
Start: 2018-05-10 | End: 2018-05-25 | Stop reason: HOSPADM

## 2018-05-10 RX ORDER — FLUTICASONE FUROATE AND VILANTEROL 200; 25 UG/1; UG/1
1 POWDER RESPIRATORY (INHALATION) DAILY
Status: CANCELLED | OUTPATIENT
Start: 2018-05-11

## 2018-05-10 RX ORDER — BISACODYL 10 MG
10 SUPPOSITORY, RECTAL RECTAL DAILY PRN
Status: CANCELLED | OUTPATIENT
Start: 2018-05-10

## 2018-05-10 RX ORDER — SENNOSIDES 8.6 MG
1 TABLET ORAL
Status: DISCONTINUED | OUTPATIENT
Start: 2018-05-10 | End: 2018-05-25 | Stop reason: HOSPADM

## 2018-05-10 RX ORDER — ALPRAZOLAM 0.25 MG/1
0.25 TABLET ORAL DAILY PRN
Status: CANCELLED | OUTPATIENT
Start: 2018-05-10

## 2018-05-10 RX ORDER — ONDANSETRON 4 MG/1
4 TABLET, ORALLY DISINTEGRATING ORAL EVERY 6 HOURS PRN
Status: CANCELLED | OUTPATIENT
Start: 2018-05-10

## 2018-05-10 RX ORDER — HYDROMORPHONE HYDROCHLORIDE 2 MG/1
2 TABLET ORAL 3 TIMES DAILY PRN
Status: CANCELLED | OUTPATIENT
Start: 2018-05-10

## 2018-05-10 RX ORDER — ALBUTEROL SULFATE 90 UG/1
2 AEROSOL, METERED RESPIRATORY (INHALATION) EVERY 6 HOURS PRN
Status: CANCELLED | OUTPATIENT
Start: 2018-05-10

## 2018-05-10 RX ORDER — ACETAMINOPHEN 325 MG/1
650 TABLET ORAL EVERY 6 HOURS SCHEDULED
Status: CANCELLED | OUTPATIENT
Start: 2018-05-10

## 2018-05-10 RX ORDER — FLUTICASONE FUROATE AND VILANTEROL 200; 25 UG/1; UG/1
1 POWDER RESPIRATORY (INHALATION) DAILY
Status: DISCONTINUED | OUTPATIENT
Start: 2018-05-10 | End: 2018-05-11

## 2018-05-10 RX ORDER — ONDANSETRON 4 MG/1
4 TABLET, ORALLY DISINTEGRATING ORAL EVERY 6 HOURS PRN
Status: DISCONTINUED | OUTPATIENT
Start: 2018-05-10 | End: 2018-05-25 | Stop reason: HOSPADM

## 2018-05-10 RX ORDER — ACETAMINOPHEN 325 MG/1
650 TABLET ORAL EVERY 4 HOURS PRN
Status: DISCONTINUED | OUTPATIENT
Start: 2018-05-10 | End: 2018-05-10 | Stop reason: HOSPADM

## 2018-05-10 RX ORDER — PRAVASTATIN SODIUM 40 MG
40 TABLET ORAL
Status: CANCELLED | OUTPATIENT
Start: 2018-05-10

## 2018-05-10 RX ORDER — BISACODYL 10 MG
10 SUPPOSITORY, RECTAL RECTAL DAILY PRN
Status: DISCONTINUED | OUTPATIENT
Start: 2018-05-10 | End: 2018-05-25 | Stop reason: HOSPADM

## 2018-05-10 RX ORDER — PANTOPRAZOLE SODIUM 20 MG/1
20 TABLET, DELAYED RELEASE ORAL
Status: CANCELLED | OUTPATIENT
Start: 2018-05-11

## 2018-05-10 RX ORDER — FLUTICASONE PROPIONATE 50 MCG
1 SPRAY, SUSPENSION (ML) NASAL 2 TIMES DAILY
Status: CANCELLED | OUTPATIENT
Start: 2018-05-10

## 2018-05-10 RX ORDER — TRAZODONE HYDROCHLORIDE 50 MG/1
25 TABLET ORAL
Status: CANCELLED | OUTPATIENT
Start: 2018-05-10

## 2018-05-10 RX ORDER — LORATADINE 10 MG/1
10 TABLET ORAL DAILY
Status: CANCELLED | OUTPATIENT
Start: 2018-05-11

## 2018-05-10 RX ORDER — MONTELUKAST SODIUM 10 MG/1
10 TABLET ORAL DAILY
Status: DISCONTINUED | OUTPATIENT
Start: 2018-05-11 | End: 2018-05-25 | Stop reason: HOSPADM

## 2018-05-10 RX ORDER — ACETAMINOPHEN 325 MG/1
650 TABLET ORAL EVERY 4 HOURS PRN
Status: DISCONTINUED | OUTPATIENT
Start: 2018-05-10 | End: 2018-05-25 | Stop reason: HOSPADM

## 2018-05-10 RX ORDER — DOCUSATE SODIUM 100 MG/1
100 CAPSULE, LIQUID FILLED ORAL 2 TIMES DAILY
Status: DISCONTINUED | OUTPATIENT
Start: 2018-05-10 | End: 2018-05-25 | Stop reason: HOSPADM

## 2018-05-10 RX ADMIN — PRAVASTATIN SODIUM 40 MG: 40 TABLET ORAL at 18:08

## 2018-05-10 RX ADMIN — FLUTICASONE FUROATE AND VILANTEROL 1 PUFF: 200; 25 POWDER RESPIRATORY (INHALATION) at 18:23

## 2018-05-10 RX ADMIN — PANTOPRAZOLE SODIUM 20 MG: 20 TABLET, DELAYED RELEASE ORAL at 05:20

## 2018-05-10 RX ADMIN — ALPRAZOLAM 0.25 MG: 0.25 TABLET ORAL at 20:30

## 2018-05-10 RX ADMIN — SENNOSIDES 8.6 MG: 8.6 TABLET, FILM COATED ORAL at 21:28

## 2018-05-10 RX ADMIN — MONTELUKAST SODIUM 10 MG: 10 TABLET, FILM COATED ORAL at 09:17

## 2018-05-10 RX ADMIN — MELATONIN TAB 3 MG 6 MG: 3 TAB at 21:28

## 2018-05-10 RX ADMIN — DOCUSATE SODIUM 100 MG: 100 CAPSULE, LIQUID FILLED ORAL at 18:09

## 2018-05-10 RX ADMIN — FLUTICASONE PROPIONATE 1 SPRAY: 50 SPRAY, METERED NASAL at 18:08

## 2018-05-10 RX ADMIN — ACETAMINOPHEN 650 MG: 325 TABLET, FILM COATED ORAL at 18:08

## 2018-05-10 RX ADMIN — ACETAMINOPHEN 650 MG: 325 TABLET ORAL at 12:10

## 2018-05-10 RX ADMIN — CHLORHEXIDINE GLUCONATE 15 ML: 1.2 RINSE ORAL at 21:28

## 2018-05-10 RX ADMIN — DOCUSATE SODIUM 100 MG: 100 CAPSULE, LIQUID FILLED ORAL at 09:17

## 2018-05-10 RX ADMIN — LORATADINE 10 MG: 10 TABLET ORAL at 09:17

## 2018-05-10 RX ADMIN — SERTRALINE HYDROCHLORIDE 50 MG: 50 TABLET ORAL at 09:17

## 2018-05-10 RX ADMIN — ACETAMINOPHEN 650 MG: 325 TABLET ORAL at 05:20

## 2018-05-10 RX ADMIN — HYDROMORPHONE HYDROCHLORIDE 2 MG: 2 TABLET ORAL at 10:51

## 2018-05-10 RX ADMIN — LIDOCAINE HYDROCHLORIDE 10 ML: 20 SOLUTION ORAL; TOPICAL at 21:28

## 2018-05-10 RX ADMIN — Medication 10 ML: at 12:10

## 2018-05-10 RX ADMIN — CHLORHEXIDINE GLUCONATE 15 ML: 1.2 RINSE ORAL at 09:17

## 2018-05-10 RX ADMIN — FLUTICASONE PROPIONATE 1 SPRAY: 50 SPRAY, METERED NASAL at 09:17

## 2018-05-10 RX ADMIN — HEPARIN SODIUM 5000 UNITS: 5000 INJECTION, SOLUTION INTRAVENOUS; SUBCUTANEOUS at 05:20

## 2018-05-10 NOTE — PHYSICAL THERAPY NOTE
Stroke Education Series    Pt participated in skilled Stroke Education Series in a group setting to address the topic of Stroke 101: Understanding the Basics of Stroke in both verbal and written formats  Education within this session reviewed the basic structural/functional components of the brain and included information on the causes of stroke, related signs/symptoms, risk factors, and the process of stroke rehabilitation  The goal of this education was to provide the patient with general understanding of how the brain functions and how a stroke can impact his/her performance  In addition, this series aimed to provide the patient with the information that can help reduce the risk of sustaining another stroke  Following education, pt's response to education is: verbalizes understanding        Start Time: 1430    End Time: 2237

## 2018-05-10 NOTE — LETTER
May 25, 2018    [facility]    Patient: Ida Small   YOB: 1960   Date of Visit: 5/10/2018       Dear Dr Juanito Johnson:        Sincerely,      No name on file          CC: No Recipients

## 2018-05-10 NOTE — PROGRESS NOTES
Progress Note - Neuropsychology/Psychology   Jorge Singh 63 y o  female MRN: [id number]    Unit/Bed#: -01 Encounter: [id number]      Subjective:   Pt presented in depressed mood, depressed affect  Pt reported increase in depressed mood and anhedonia; discussed possibility of inpatient behavioral health admission due to continued worsening of depressive symptoms and suicidal ideation  Pt was in agreement with discussing symptoms with psychiatry  Provided supportive counseling  Assessment:  Pt was cooperative; depressive symptoms have worsened; she is insightful to her need for ongoing psychological care  Mobility issues have improved  Dx: Z04 1 Major depressive disorder  Code: [id number]    Plan:  Continue individual therapy sessions while pt is at [facility]; pt will be reassessed by psychiatry service  Objective:     Vitals: Blood pressure 123/60, pulse 68, temperature 98 6 °F (37 °C), temperature source Oral, resp  rate 20, height 5' 3" (1 6 m), weight 89 3 kg (196 lb 13 9 oz), SpO2 97 %  ,Body mass index is 34 87 kg/m²        Intake/Output Summary (Last 24 hours) at 05/10/18 0944  Last data filed at 05/09/18 2004   Gross per 24 hour   Intake              840 ml   Output                0 ml   Net              840 ml         Invasive Devices          No matching active lines, drains, or airways

## 2018-05-10 NOTE — PROGRESS NOTES
Occupational Therapy Treatment Note       05/10/18 1235   Pain Assessment   Pain Assessment No/denies pain   Restrictions/Precautions   Precautions Contact/isolation;Cognitive   Lifestyle   Autonomy "everyone sucks, I'm not sure if I will get into the program here  The doctor is going to come talk to me"    QI: Roll Left and Right   Assistance Needed Independent   Roll Left and Right CARE Score 6   QI: Sit to Lying   Assistance Needed Independent   Sit to Lying CARE Score 6   QI: Lying to Sitting on Side of Bed   Assistance Needed Independent   Lying to Sitting on Side of Bed CARE Score 6   QI: Sit to [address]; Adaptive equipment   Comment rollator    Sit to Stand CARE Score 6   QI: Chair/Bed-to-Chair Transfer   Assistance Needed Independent; Adaptive equipment   Comment rollator   Chair/Bed-to-Chair Transfer CARE Score 6   Transfer Bed/Chair/Wheelchair   Sit to Stand Other  (mod I)   Stand to Sit Other  (mod I)   Supine to Sit (mod I )   Sit to Supine (mod I )   Findings pt requires increased time to complete functional mobility/transfers with rollator    Bed, Chair, Wheelchair Transfer (FIM) 6 - Patient requires assistive device/extra time/safety concerns but completes independently   QI: [address]  Score 6   Toileting   Able to Pull Clothing down yes, up yes  Able to [address] Yes   Manage Hygiene Bladder   Findings pt completes toileting with increased time    Toileting (FIM) 6 - Patient requires assistive device/extra time/safety concerns but completes independently   QI: Toilet Transfer   Assistance Needed Independent; Adaptive equipment   Toilet Transfer CARE Score 6   Toilet Transfer   Surface Assessed Standard Toilet   Transfer Technique Standard   Findings rollator    Toilet Transfer (FIM) 6 - Patient requires assistive device/extra time/safety concerns but completes independently   Exercise Tools UE Ergometer Pt engaged in UE strengthening using ergometer to increase UB strength to increase overall activity tolerance for increased participation in ADL/IADLS  Pt completed prograde x10  Then x5 minutes at level 4 resistance  Pt presents with fair tolerance, completes task slowly, able to increase speed with verbal cues  Cognition   Comments pt communicates beyond basic needs but expresses feelings of depression  Pt notes that feelings of depression limit pt in caring about specific topics, such as date, year, etc  OTR/L spoke with Dr Dariel Millan who reports plan for pt to be evaluated by inpatient psychology services this afternoon    Activity Tolerance   Activity Tolerance Patient tolerated treatment well   Assessment   Treatment Assessment Pt participated in skilled OT tx session focused on improving activity tolerance during functional mobility as prerequisite for engagement in IADLS and UE strengthening to improve activity tolerance  Pt expresses feelings of depression during session, which appear to limit pt's attention  Psychology MD present during OT session to evaluate pt, therefore 35 minutes of time missed  Pt presents with improved activity tolerance, able to tolerate 10 + 5 minutes on UE ergometer with verbal cues to increase speed  Pt able to complete functional mobility/toileting at mod I level  Continue OT plan of care with focus on improving activity tolerance and coping strategies for improved participation in ADL/IADL tasks  Problem List Decreased endurance; Impaired balance;Decreased mobility   Plan   Treatment/Interventions ADL retraining;Functional transfer training; Endurance training;Cognitive reorientation;Patient/family training;Equipment eval/education   Progress Progressing toward goals   Recommendation   OT Discharge Recommendation Outpatient OT   OT Therapy Minutes   OT Time In 8964   OT Time Out 1330   OT Total Time (minutes) 55   OT Mode of treatment - Individual (minutes) 55 OT Mode of treatment - Concurrent (minutes) 0   OT Mode of treatment - Group (minutes) 0   OT Mode of treatment - Co-treat (minutes) 0   OT Mode of Teatment - Total time(minutes) 55 minutes   Therapy Time missed   Time missed?  Yes - 35 minutes 2* assessment by behavioral health       Corazon Malave MS, OTR/L , CBIS

## 2018-05-10 NOTE — PROGRESS NOTES
Called report to [phone]  S/w security and they will come up  Gave paperwork to security when they picked her up

## 2018-05-10 NOTE — PROGRESS NOTES
Progress Note - Neurosurgery   Param Yanez 63 y o  female MRN: [id number]  Unit/Bed#: [facility] 459-01 Encounter: [id number]    Assessment:  1  Subarachnoid hemorrhage involving suprasellar cistern, sylvian fissure, basal cisterns and anterior falx - improving  2  HH3, [place] 3  3  Acute hydrocephalus  4  History of asthma  5  Depression     Plan:  · Exam: GCS 15  AAOx3  Mood blunted  BARRERA wo focal weakness  3/3 immediate object recall intact  No PD, clonus  +bilateral marrufo  Finger to nose intact  3/3 JPS intact  · Right frontal EVD sutures removed without complications on 2/24/93  · Imaging: personally reviewed and reviewed by attending:  · 5/8 - CT head wo: previous IVH resolved  No new acute intracranial hemorrhage  Stable ventricle size  · STAT CT head without contrast if decline in GCS>2 pts/1hr  · PT/OT: completed ARC therapy  · DVT ppx: SCDs, HSQ  · Medical management per primary team   · Pain control  · SBP control  · Plan dispo to inpatient psych today  No neurosurgical intervention at this time  Subjective/Objective   Chief Complaint: "I'm doing fine"pa    Subjective: patient states she is doing fine  She admits to having a headache earlier but took medication with improvement  She admits to a bifrontal headache radiating into primarily the left eye  She admits to associated photophobia  She also states her neck feels full but denies tenderness  She denies dizziness, change in vision, chest pain, SOB, abdominal pain//V, weakness/numbness  She admits to going to inpatient psych  She states she is having a lot of depression and anxiety  She admits to intermittent suicidal ideations  Objective: laying in bed  NAD  I/O       05/08 0701 - 05/09 0700 05/09 0701 - 05/10 0700 05/10 0701 - 05/11 0700    P  O  585 1080 420    Total Intake(mL/kg) 585 (6 6) 1080 (12 1) 420 (4 7)    Net +585 +1080 +420           Unmeasured Urine Occurrence 3 x 3 x 1 x    Unmeasured Stool Occurrence 1 x Invasive Devices          No matching active lines, drains, or airways          Physical Exam:  Vitals: Blood pressure 111/60, pulse 74, temperature 98 7 °F (37 1 °C), resp  rate 20, height 5' 3" (1 6 m), weight 89 3 kg (196 lb 13 9 oz), SpO2 96 %  ,Body mass index is 34 87 kg/m²  General appearance: alert, appears stated age, cooperative and no distress  Head: Normocephalic, right frontal region with multiple sutures without signs of erythema, edema or discharge  Eyes: EOMI, PERRL  Neck: no midline tenderness or paraspinous muscle tenderness on palpation  ROM intact  Lungs: non labored breathing  Heart: regular heart rate  Neurologic:   Mental status: Alert, oriented x3, thought content appropriate  Speech is fluent, clear  Able to repeat a sentence  3/3 immediate and delayed object recall intact  Able to perform math  Able to name the days of the week backwards  Cranial nerves: grossly intact (Cranial nerves II-XII)  Facial symmetry at rest and with expression  Tongue is midline  Sensory: normal to LT in bilateral upper and lower extremities  DSS intact  Motor: moving all extremities without focal weakness, strength 5/5 throughout  Reflexes: 2+ and symmetric  +bilateral marrufo, negative clonus  Coordination: finger to nose normal bilaterally, no drift bilaterally  3/3 JPS intact         Lab Results:    Results from last 7 days  Lab Units 05/10/18  0517 05/07/18  0608   WBC Thousand/uL 7 07 6 90   HEMOGLOBIN g/dL 12 2 11 9   HEMATOCRIT % 38 0 37 4   PLATELETS Thousands/uL 687* 734*   NEUTROS PCT % 46 43   MONOS PCT % 11 12       Results from last 7 days  Lab Units 05/10/18  0517 05/07/18  0608   SODIUM mmol/L 140 138   POTASSIUM mmol/L 4 1 4 1   CHLORIDE mmol/L 107 105   CO2 mmol/L 27 28   BUN mg/dL 15 15   CREATININE mg/dL 0 71 0 76   CALCIUM mg/dL 9 5 9 4   GLUCOSE RANDOM mg/dL 85 94                 No results found for: TROPONINT  ABG:No results found for: PHART, ECS2EFS, PO2ART, FYY9BAJ, M4DMVPXM, BEART, SOURCE    Imaging Studies: I have personally reviewed pertinent reports  and I have personally reviewed pertinent films in PACS  CT head wo contrast   Final Result   Previously seen intraventricular hemorrhage has resolved  No acute intracranial hemorrhage seen   No mass effect or midline shift seen                     Workstation performed: [id number]         CT head wo contrast    (Results Pending)         EKG, Pathology, and Other Studies: I have personally reviewed pertinent reports        VTE  Prophylaxis: Sequential compression device (Venodyne)  and Heparin

## 2018-05-10 NOTE — PHYSICAL THERAPY NOTE
Physical Therapy D/C Summary:    Pt made steady progress throughout LOS and met LTG's for transfers and ambulation  Pt currently functioning @ mod I level using rollator  Pt showed significant improvements in balance; scored 19/56 on [place] upon admission indicating high fall risk, and scored 45/56 today upon D/C indicating low fall risk  Pt cont to present with LBP and overall dec speed of movement   Pt was D/C to inpatient psych rehab today 2* ongoing feelings of depression, per pt request

## 2018-05-10 NOTE — LETTER
May 25, 2018    Preventive measures    Patient: Dorothy Deng   YOB: 1960   Date of Visit: 5/10/2018       Dear Dr Lisa Arshad:        Sincerely,      No name on file          CC: No Recipients

## 2018-05-10 NOTE — PROGRESS NOTES
Occupational Therapy Treatment Note       05/10/18 1320   Pain Assessment   Pain Assessment No/denies pain   Restrictions/Precautions   Precautions Contact/isolation;Cognitive   Lifestyle   Autonomy "yay, that's  good" - pt notified she was accepted to inpatient psych    QI: Eating   Assistance Needed Independent   Eating CARE Score 6   Eating Assessment   Eating (FIM) 7 - Patient completely independent   QI: Oral Hygiene   Assistance Needed Independent   Oral Hygiene CARE Score 6   Grooming   Able To Wash/Dry Hands   Findings in stance at sink with UE support on sink    Grooming (FIM) 6 - Patient requires assistive device/extra time/safety concerns but completes independently   QI: Upper Body Dressing   Assistance Needed Independent   Upper Body Dressing CARE Score 6   QI: Putting On/Taking Off [address]   Putting On/Taking Off Footwear CARE Score 6   QI: [address]; Adaptive equipment   Comment unilateral UE support on rollator while reaching to retrieve item from floor    Picking Up Object CARE Score 6   Dressing/Undressing Clothing   Findings pt don/doffs socks while seated independently, able to don/doff sweatshirt in stance with no device    UB Dressing (FIM) 7 - No helper, safely, timely and completes all tasks independently   LB Dressing (FIM) 7 - No helper, safely, timely and completes all tasks independently   QI: Roll Left and Right   Assistance Needed Independent   Roll Left and Right CARE Score 6   QI: Sit to [address] to Lying CARE Score 6   QI: Lying to Sitting on Side of Bed   Assistance Needed Independent   Lying to Sitting on Side of Bed CARE Score 6   QI: Sit to [address]; Adaptive equipment   Sit to Stand CARE Score 6   QI: Chair/Bed-to-Chair Transfer   Assistance Needed Independent; Adaptive equipment   Chair/Bed-to-Chair Transfer CARE Score 6   Transfer Bed/Chair/Wheelchair   Findings functional mobility within room/bathroom with rollator    Bed, Chair, Wheelchair Transfer (FIM) 6 - Patient requires assistive device/extra time/safety concerns but completes independently   QI: [address]  Score 6   Toileting   Able to [address] down yes, up yes  Manage Hygiene Bladder   Findings no use of DME during toileting    Toileting (FIM) 7 - No helper, safely, timely and completes all tasks independently   QI: Toilet Transfer   Assistance Needed Independent   Toilet Transfer CARE Score 6   Toilet Transfer   Surface Assessed Standard Toilet   Transfer Technique Standard   Toilet Transfer (FIM) 7 - No helper, safely, timely and completes all tasks independently   Activity Tolerance   Activity Tolerance Patient tolerated treatment well   Assessment   Treatment Assessment Pt seen for skilled OT tx session focused on ADL performance in preparation for D/C  Pt presents with increased mood after being accepted to inpatient psychiatric facility  Pt has demonstrated good progress in ADL performance, able to complete ADLS at mod I/independent level using rollator as needed  Plan for pt to D/C to inpatient psychiatric facility, after psychiatric stay continue to recommend outpatient OT services and rollator for energy conservation during ADL/IADLS  Recommendation   OT Discharge Recommendation Outpatient OT   Equipment Recommended (rollator )   OT Therapy Minutes   OT Time In 1520   OT Time Out 1530   OT Total Time (minutes) 10   OT Mode of treatment - Individual (minutes) 10   OT Mode of treatment - Concurrent (minutes) 0   OT Mode of treatment - Group (minutes) 0   OT Mode of treatment - Co-treat (minutes) 0   OT Mode of Teatment - Total time(minutes) 10 minutes   Therapy Time missed   Time missed?  No       Beatrice Gonzalez, MS, OTR/L , CBIS

## 2018-05-10 NOTE — LETTER
May 25, 2018    [facility]    Patient: Eduardo Aguilera   YOB: 1960   Date of Visit: 5/10/2018       Dear Dr Borjas Donald:        Sincerely,      No name on file          CC: No Recipients

## 2018-05-10 NOTE — PROGRESS NOTES
Internal Medicine Progress Note  Patient: Eduardo Aguilera  Age/sex: 63 y o  female  Medical Record #: [id number]      ASSESSMENT/PLAN:  Eduardo Aguilera is seen and examined and management for following issues:    1  SAH/hydrocephalus: etio of bleed? = she had 2 negative a-grams and a negative CTA  She completed course of Nimotop on 4/30/18  Follow up CT head completed 5/8 revealed resolution of previously seen IVH  Nsx to remove staples     2  Asthma:  stable on her home dose of Breo and Singulair = will continue     3  Nicotine abuse:  on no patch currently; says no desire to smoke currently      4   Depression/Anxiety:  currently on Zoloft which was added in the hospital after being seen by Dr Francis Wagner from Psychiatry  She prefers to be on Wellbutrin but it decreases the seizure threshold = I explained this to her again today  Pt will remain on Zoloft  Appreciate Psychiatry input  She said today that is very tired of being depressed  Says has had depression even as a child  Had an IP stay at [facility] 2012  Says doesn't want to do anything and has no motivation  Dr Melani Brown to see  To transition to inpatient psych once rehab course complete      5  Hx cocaine abuse: drug screen was positive for such but she denied  Primary service in the hospital counseled her      6  Tooth pain:  per SLIM, OMFS said no immediate intervention needed; she will need followup           Subjective: Patient seen and examined  She reports  No overnight issues  Reports having a bad headache this AM  Light sensitivity remains   Questioning when and If she will be going to inpatient psych unit  ROS:    GI: denies abdominal pain, change bowel habits or reflux symptoms  Neuro: No new neurologic changes  Respiratory: No Cough, SOB  Cardiovascular: No CP, palpitations     Scheduled Meds:    Current Facility-Administered Medications:  acetaminophen 325 mg Oral Q6H PRN Dane Benjamin MD   acetaminophen 650 mg Oral HOSP MICHELET HUSAM [employer] oxide-diphenhydramine-lidocaine viscous 10 mL Swish & Spit 4x Daily (AC & HS) Ramona Wright MD   albuterol 2 puff Inhalation Q6H PRN HUSAM Barron   ALPRAZolam 0 25 mg Oral Daily PRN Bubba Bird, HUSAM   benzocaine  Mucosal 4x Daily PRN Ramona Wright MD   bisacodyl 10 mg Rectal Daily PRN Bubba Bird, HUSAM   chlorhexidine 15 mL Swish & Spit Q12H [address] Ramona Wright MD   docusate sodium 100 mg Oral BID HUSAM Barron   fluticasone 1 spray Each Nare BID Jazlyn Khan PA-C   fluticasone furoate-vilanterol 1 puff Inhalation Daily HUSAM Barron   heparin (porcine) 5,000 Units Subcutaneous [facility] Bubba Bird, HUSAM   HYDROmorphone 2 mg Oral TID PRN Ramona Wright MD   loratadine 10 mg Oral Daily Jazlyn Khan PA-C   melatonin 6 mg Oral HS Gacristina Bird, HUSAM   methocarbamol 500 mg Oral Q6H PRN Gacristina Bird, HUSAM   montelukast 10 mg Oral Daily Gacristina Bird, HUSAM   ondansetron 4 mg Oral Q6H PRN Gacristina Bird, HUSAM   pantoprazole 20 mg Oral Early Morning Ramona Wright MD   pravastatin 40 mg Oral Daily With HUSAM Campbell   senna 1 tablet Oral HS Gae Marge, HUSAM   sertraline 50 mg Oral Daily Eduardo Guzman MD       Labs:       Results from last 7 days  Lab Units 05/10/18  0517 05/07/18  0608   WBC Thousand/uL 7 07 6 90   HEMOGLOBIN g/dL 12 2 11 9   HEMATOCRIT % 38 0 37 4   PLATELETS Thousands/uL 687* 734*       Results from last 7 days  Lab Units 05/10/18  0517 05/07/18  0608   SODIUM mmol/L 140 138   POTASSIUM mmol/L 4 1 4 1   CHLORIDE mmol/L 107 105   CO2 mmol/L 27 28   BUN mg/dL 15 15   CREATININE mg/dL 0 71 0 76   GLUCOSE RANDOM mg/dL 85 94   CALCIUM mg/dL 9 5 9 4                  Glucose (mg/dL)   Date Value   05/10/2018 85   05/07/2018 94   05/02/2018 87   05/01/2018 84     Glucose, i-STAT (mg/dl)   Date Value   04/16/2018 95       Labs reviewed    Physical Examination:  Vitals:   Vitals:    05/09/18 0750 05/09/18 1530 05/09/18 2358 05/10/18 0950   BP: 102/64 139/84 123/60 111/60   BP Location: Right arm Right arm     Pulse: 74 79 68 74   Resp: 20 18 20    Temp: 98 1 °F (36 7 °C) 98 °F (36 7 °C) 98 6 °F (37 °C) 98 7 °F (37 1 °C)   TempSrc: Oral Oral Oral    SpO2: 96% 95% 97% 96%   Weight:       Height:           GEN: NAD  HEENT: NC/AT, EOMI  RESP: BBS w/o crackles/wheeze/rhonci; resp unlabored  CV: +S1 S2, regular rate, no rubs/murmur  ABD: soft, NT, ND, normal BS   : no fulton  EXT: no LE edema  Skin: no rashes  Neuro: Awake and alert, BUE 4+/5, B/L LE 4/5      [ X ] Total time spent: 30 Mins and greater than 50% of this time was spent counseling/coordinating care        Jamil Li PA-C  Internal Medicine

## 2018-05-10 NOTE — DISCHARGE SUMMARY
Discharge Summary - PMR   Joel Newell 63 y o  female MRN: [id number]  Unit/Bed#: -16 Encounter: [id number]    Admission Date: 5/2/18    Discharge Date: 5/10/18    Discharge to Inpatient Psychiatry U  Dx: Major depressive disorder severe recurrent without psychotic feature F 33 2    Rehabilitation Diagnosis: Subarachnoid hemorrhage (HH3, Velazco 3)    HPI: Joel Newell is a 63 y o  right-handed female with past medical history significant for asthma and past splenectomy who presented to [address] [place] [address] on 4/16/18 after having a worsening headache associated with nausea which occurred after smoking a cigarette  Tox screen was positive for cocaine, patient denies use  Initial CTH showing a large SAH of the suprasellar cistern, sylvian fissures, basal cisterns, and anterior falx  CTA without new findings  Patient transferred to [facility] and Neurosurgery evaluated patient  Patient had ventriculostomy placement for hydrocelphalus and angiogram performed on 4/17/18 by Dr Nawaf Ziegler without AVM/aneursym seen  Patient was placed on Keppra for seizure prophylaxis and monitored closely in ICU  Patient was seen by Psychiatry for suicidal ideation/depression and started on Zoloft  Patient developed vasospasm of L MCA seen on TCD on 4/19/18 and was started on Nimodipine  MRI brain showing small left cerebellar restricted diffusion suggesting an acute or subacute infarct  MRI C Spine showing DJD and a T2/T3 paracentral disc protrusion and right paracentral disc extrusion with mild mass effect on the right ventral aspect of cord  Neurosurgery aware of findings  Patient had a repeat angiogram on 4/23/18 by Dr Nawaf Ziegler without notable AVMs/aneurysms  Patient completed Keppra and Nimodipine courses  Patient cleared for SQ Heparin for DVT ppx by Neurosurgery  Subsequent CTH showing improvement of SAH  Patient transferred out of the ICU on 4/27/18     Medical issues included left dental pain, patient was seen by [facility] without immediate intervention; patient needs to be seen by her dentist to have treatment for several teeth bilaterally in her mouth  Headaches were ongoing and located bifrontal   Also some associated visual changes which are resolving  Patient with acute functional decline from baseline  Patient was evaluated by PT/OT/SLP and recommended for IRF level of care  After medical stabilization and clearance, patient was admitted to the [facility] for inpatient rehabilitation 5/2/18  Hospital Course:     -Rehabilitation of subarachnoid hemorrhage: Patient successfully completed an inpatient rehabilitation program at the [facility] with PT/OT/SLP therapies  Patient progressed to a Supervision - Mod I level with mobility and self care using a rollator      Functional Update:  Physical Therapy Occupational Therapy Speech Therapy   Weight Bearing Status: Full Weight Bearing  Transfers: Supervision  Bed Mobility: Supervision  Amulation Distance (ft): 500 feet  Ambulation: Minimal Assistance, Contact Guard, Supervision  Assistive Device for Ambulation: Rollator  Number of Stairs: 12  Assistive Device for Stairs: Right Hand Rail  Stair Assistance: Contact Guard  Discharge Recommendations: Home with:  [address] with[de-identified] Family Support, Outpatient Physical Therapy   Eating: Independent  Grooming: Supervision  Bathing: Minimal Assistance  Bathing: Minimal Assistance  Upper Body Dressing: Supervision  Lower Body Dressing: Minimal Assistance  Toileting: Minimal Assistance  Tub/Shower Transfer: Minimal Assistance  Toilet Transfer: Supervision  Orientation: Person, Place, Time, Situation   Mode of Communication: Verbal  Cognition: Within Defined Limits     Orientation: Person, Place, Time  Discharge Recommendations: Home with:  [address] with[de-identified] Family Support       -Appreciate Internal Medicine following - Dr Kavin Patton service and has cleared patient for discharge to inpatient psychiatry        -Subarachnoid hemorrhage: HH#,Velazco 3 type  Located in the suprasellar cistern, sylvian fissures, basal cistern and the anterior falx:  Status post EVD placement and 2 angiograms with negative findings for AV malformation or aneurysm  [facility] completed 5/8/18 and NSGY to review  NSGY to remove scalp sutures        -MRI brain showing a small area of restricted diffusion in the left cerebellar hemisphere suspicious for possible acute to subacute infarct:  Patient managed on Pravachol for CVA prophylaxis and may need to follow with Neurology as an outpatient furthermore      -MRI cervical spine showing T2/T3 paracentral disc protrusion and right paracentral disc extrusion with mild mass effect on the right ventral aspect of cord:  NSGY aware  Physical examination of bilateral upper extremities within normal limits and current subjective symptoms negative       -headaches:  Managed conservatively with Tylenol scheduled,  p r n  Robaxin   Encouraged low light low sound and rest when needed between therapies      -dental pain: Patient reports she will require several teeth pulled and 1 tooth repaired on and she has a dentist that she follows with   Continue Magic mouthwash, Dilaudid 2 mg decreased from Q6h PRN to TID PRN (suggest wean off)  Due to her concern about dental infection order placed for Chlorhexidine BID     -Depression/anxiety: continue Zoloft 75mg daily and Psychiatry following - Appreciate Neuropsychology consultation additionally  Patient with increased depression and has started to have increased suicidal thoughts, asked Dr Bubba Mcintyre to revaluate patient    This was done 5/10/17 and Dr Bubba Mcintyre determined patient requires inpatient psychiatry        -asthma:  Managed on Breo as well as Singulair, Flonase     -GI prophylaxis managed on Protonix 20 mg daily (can discontinue once ready for home)     -DVT prophylaxis managed on SCDs, SQ heparin can be discontinued ambulating >500 feet     -Insomnia: managed on Melatonin          Discharge Medications: See AVS    Condition at Discharge: fair         Discharge instructions/Information to patient and family:   See after visit summary for information provided to patient and family  Provisions for Follow-Up Care:  See after visit summary for information related to follow-up care and any pertinent home health orders  Disposition: Inpatient Psychiatry U    Planned Readmission: No    Discharge Statement   I spent 45 minutes discharging the patient  This time was spent on the day of discharge  I had direct contact with the patient on the day of discharge  Additional documentation is required if more than 30 minutes were spent on discharge  Discharge Medications:  See after visit summary for reconciled discharge medications provided to patient and family

## 2018-05-10 NOTE — PROGRESS NOTES
05/10/18 0800   Pain Assessment   Pain Assessment No/denies pain   Pain Score No Pain   Heaton-Baker FACES Pain Rating 0   Restrictions/Precautions   Precautions Cognitive;Contact/isolation   Cognition   Overall Cognitive Status Impaired   Arousal/Participation Alert   Attention Attends with cues to redirect   Following Commands Follows one step commands with increased time or repetition   Subjective   Subjective pt reported fatigue requiring encouragement to start moving and participate with therapy  QI: Roll Left and Right   Assistance Needed Independent   Roll Left and Right CARE Score 6   QI: Sit to Lying   Assistance Needed Independent   Sit to Lying CARE Score 6   QI: Lying to Sitting on Side of Bed   Assistance Needed Independent   Lying to Sitting on Side of Bed CARE Score 6   QI: Sit to Stand   Assistance Needed Independent   Sit to Stand CARE Score 6   Bed Mobility   Findings mod indep   QI: Chair/Bed-to-Chair Transfer   Assistance Needed Independent   Chair/Bed-to-Chair Transfer CARE Score 6   Transfer Bed/Chair/Wheelchair   Limitations Noted In LE Strength; Endurance   Adaptive Equipment Rollator   Stand Pivot Modified Independent   Sit to Stand Modified Independent   Stand to Sit Modified Independent   Supine to Sit Modified Independent   Sit to Supine Modified Independent   Bed, Chair, Wheelchair Transfer (FIM) 5 - Patient requires supervision/monitoring   QI: Walk 10 Feet   Assistance Needed Independent   Walk 10 Feet CARE Score 6   QI: Walk 50 Feet with Two Turns   Assistance Needed Independent   Walk 50 Feet with Two Turns CARE Score 6   QI: Walk 150 Feet   Assistance Needed Independent   Walk 150 Feet CARE Score 6   Ambulation   Does the patient walk? 2  Yes   Primary Discharge Mode of Locomotion Walk   Walk Assist Level Modified Independent   Gait Pattern Inconsistant Melissa; Improper weight shift   Assist Device Rollator   Distance Walked (feet) 200 ft  (300)   Limitations Noted In Endurance Walking (FIM) 6 - Patient requires assistive device/extra time/safety concerns but completes independently AND distance 150 feet or more, no rest   Wheelchair mobility   QI: Does the patient use a wheelchair? 0  No   Therapeutic Interventions   Balance PRIETO balance score: 45/56 -low fall risk   Equipment Use   NuStep L3 x 15 mins   Assessment   Treatment Assessment pt participated in skilled PT including transfers and gait training and strengthening exercises  Pt demonstrated mod indep with functional mobilities using a rollator walker although amb with slow gait speed but was steady and no LOB demonstrated  Follow up with CM per pt request regarding placement, pt notified that CM was still working on it   pt was progress to BRP today and demonstrated improvement with PRIETO balance score from 19/56 on 5/3/18 to 45/56 which indicates pt is low fall risk at this time  Family/Caregiver Present no   Problem List Decreased endurance;Decreased mobility; Decreased cognition; Impaired balance   Barriers to Discharge Inaccessible home environment;Decreased caregiver support   PT Barriers   Physical Impairment Decreased strength;Decreased endurance; Impaired balance;Decreased mobility; Impaired judgement   Functional Limitation Walking;Stair negotiation   Plan   Treatment/Interventions Functional transfer training; Therapeutic exercise; Bed mobility;Gait training;Spoke to nursing;OT;Endurance training;Cognitive reorientation;Spoke to case management   Progress Progressing toward goals   Recommendation   Equipment Recommended Walker  (rollator)   PT Therapy Minutes   PT Time In 0800   PT Time Out 0900   PT Total Time (minutes) 60   PT Mode of treatment - Individual (minutes) 60   PT Mode of treatment - Concurrent (minutes) 0   PT Mode of treatment - Group (minutes) 0   PT Mode of treatment - Co-treat (minutes) 0   PT Mode of Teatment - Total time(minutes) 60 minutes   Therapy Time missed   Time missed?  No

## 2018-05-10 NOTE — PROGRESS NOTES
Pt admitted on a 201 from [facility] for increased depression and anxiety  Pt was calm and cooperative with care this evening  Pt is on contact precautions for history of MRSA  Pt reported depression and anxiety but denied suicidal/homicidal ideations, hallucinations or pain  Pt did report suicidal ideations approximately 1 week ago, with no plan  Pt reports that she did not feel safe going home at this time, which is why she signed a 201 to be admitted to inpatient  Pt reports helplessness/hopelessness  Pt was originally admitted to medical after having a subarachnoid hemorrhage  Pt has a history of asthma and spelenectomy  Will continue to monitor

## 2018-05-10 NOTE — PROGRESS NOTES
Progress Note - Behavioral Health   Joel Newell 63 y o  female MRN: [id number]  Unit/Bed#: -45 Encounter: [id number]        I was called to re-evaluate the patient because she is feeling very depressed despite that she has good   Progress  in  Rehabilitation, she states that she feels very depressed, has lot of anxiety, has no motivation, anhedonia, she states that  has some suicidal thoughts no plan or intent  She had been in the hospital for so long that she does not want to be with  anyone  She feels hopeless and helpless  She feels overwhelm  She has no energy  And she wants help  She has been taking the Zoloft since she was admitted to the hospital but little improvement          Behavior over the last 24 hours:  regressed  Sleep: insomnia  Appetite: normal  Medication side effects: No  ROS: no complaints    Mental Status Evaluation:  Appearance:  age appropriate and casually dressed   Behavior:  cooperative   Speech:  soft   Mood:  anxious and depressed   Affect:  constricted   Language: naming objects and repeating phrases   Thought Process:  goal directed   Associations: intact associations   Thought Content:  Feels paranoid at times   Perceptual Disturbances: None   Risk Potential: Suicidal Ideations without plan   Sensorium:  person, place, time/date, situation, day of week and month of year   Memory:  recent and remote memory grossly intact   Cognition:  grossly intact   Consciousness:  alert and awake    Attention: attention span and concentration were age appropriate   Intellect: within normal limits   Fund of Knowledge: awareness of current events: Fair   Insight:  limited   Judgment: limited   Muscle Strength and Tone: Within normal limits   Gait/Station: She walks with a walker   Motor Activity: no abnormal movements         Assessment/Plan  Joel Newell is a 63 y o  female with medical history of asthma and splenectomy she was admitted to [facility] on April 16, 2017 secondary to [facility] she had the surgery and on May 2, 2018 she was transferred to inpatient rehabilitation  She has been seen by me and was started on  Zoloft for her depression and anxiety but patient had not improved she had regressed  She feels very depressed, she had no motivation, she is withdrawn, she feels hopeless and helpless and she has suicidal thoughts without a plan or intent  Patient will need inpatient psych admission to stabilize her mood     Diagnosis:  Major depressive disorder severe recurrent without psychotic feature F 33 2    Recommended Treatment:   Continue medical management  Increase Zoloft to 75 mg p o  daily  Inpatient psych admission when medically clear and bed available patient is a 201  Case discussed with Dr Eliana Chavez   Case discussed with staff    Medications:   current meds:   Current Facility-Administered Medications   Medication Dose Route Frequency    acetaminophen (TYLENOL) tablet 325 mg  325 mg Oral Q6H PRN    acetaminophen (TYLENOL) tablet 650 mg  650 mg Oral Q6H [address]    al mag oxide-diphenhydramine-lidocaine viscous (MAGIC MOUTHWASH) suspension 10 mL  10 mL Swish & Spit 4x Daily (AC & HS)    albuterol (PROVENTIL HFA,VENTOLIN HFA) inhaler 2 puff  2 puff Inhalation Q6H PRN    ALPRAZolam (XANAX) tablet 0 25 mg  0 25 mg Oral Daily PRN    benzocaine (ANBESOL) 10 % mucosal liquid   Mucosal 4x Daily PRN    bisacodyl (DULCOLAX) rectal suppository 10 mg  10 mg Rectal Daily PRN    chlorhexidine (PERIDEX) 0 12 % oral rinse 15 mL  15 mL Swish & Spit Q12H [address]    docusate sodium (COLACE) capsule 100 mg  100 mg Oral BID    fluticasone (FLONASE) 50 mcg/act nasal spray 1 spray  1 spray Each Nare BID    fluticasone furoate-vilanterol (BREO ELLIPTA) 200-25 MCG/INH inhaler 1 puff  1 puff Inhalation Daily    heparin (porcine) subcutaneous injection 5,000 Units  5,000 Units Subcutaneous Q8H [address]    HYDROmorphone (DILAUDID) tablet 2 mg  2 mg Oral TID PRN    loratadine (CLARITIN) tablet 10 mg 10 mg Oral Daily    melatonin tablet 6 mg  6 mg Oral HS    methocarbamol (ROBAXIN) tablet 500 mg  500 mg Oral Q6H PRN    montelukast (SINGULAIR) tablet 10 mg  10 mg Oral Daily    ondansetron (ZOFRAN-ODT) dispersible tablet 4 mg  4 mg Oral Q6H PRN    pantoprazole (PROTONIX) EC tablet 20 mg  20 mg Oral Early Morning    pravastatin (PRAVACHOL) tablet 40 mg  40 mg Oral Daily With Dinner    senna (SENOKOT) tablet 8 6 mg  1 tablet Oral HS    [START ON 5/11/2018] sertraline (ZOLOFT) tablet 75 mg  75 mg Oral Daily         Risks, benefits and possible side effects of Medications:     Risks, benefits, and possible side effects of medications explained to patient and patient verbalizes understanding  Labs: I have personally reviewed all pertinent laboratory results     Lab Results   Component Value Date    WBC 7 07 05/10/2018    HGB 12 2 05/10/2018    HCT 38 0 05/10/2018    MCV 94 05/10/2018     (H) 05/10/2018     Lab Results   Component Value Date    GLUCOSE 85 05/10/2018    CALCIUM 9 5 05/10/2018     05/10/2018    K 4 1 05/10/2018    CO2 27 05/10/2018     05/10/2018    BUN 15 05/10/2018    CREATININE 0 71 05/10/2018           Lucina Singleton MD

## 2018-05-10 NOTE — SOCIAL WORK
Pt has been 201'd by psychiatry, form sent to [facility] in crisis at 12  Referral reviewed with physician on nw5 pt approved and bed avaialble today  Cm phoned fan at [phone] and provided precert info to Stephen  Pt approved for 4 days for inpat psych  Cm phoned nurse Giles Ann and informed she is to call report to [phone] and then phone security to have patient escorted to unit  Cm confirmed with nurse original 201 is to accompany patient to psych

## 2018-05-11 PROCEDURE — 99223 1ST HOSP IP/OBS HIGH 75: CPT | Performed by: PSYCHIATRY & NEUROLOGY

## 2018-05-11 RX ORDER — FLUTICASONE FUROATE AND VILANTEROL 100; 25 UG/1; UG/1
1 POWDER RESPIRATORY (INHALATION) DAILY
Status: DISCONTINUED | OUTPATIENT
Start: 2018-05-12 | End: 2018-05-25 | Stop reason: HOSPADM

## 2018-05-11 RX ORDER — SERTRALINE HYDROCHLORIDE 100 MG/1
100 TABLET, FILM COATED ORAL DAILY
Status: DISCONTINUED | OUTPATIENT
Start: 2018-05-12 | End: 2018-05-24

## 2018-05-11 RX ORDER — FLUTICASONE FUROATE AND VILANTEROL 100; 25 UG/1; UG/1
1 POWDER RESPIRATORY (INHALATION) DAILY
Refills: 0
Start: 2018-05-11

## 2018-05-11 RX ADMIN — METHOCARBAMOL 500 MG: 500 TABLET ORAL at 09:54

## 2018-05-11 RX ADMIN — ALPRAZOLAM 0.25 MG: 0.25 TABLET ORAL at 21:24

## 2018-05-11 RX ADMIN — CHLORHEXIDINE GLUCONATE 15 ML: 1.2 RINSE ORAL at 09:08

## 2018-05-11 RX ADMIN — MONTELUKAST SODIUM 10 MG: 10 TABLET, FILM COATED ORAL at 09:06

## 2018-05-11 RX ADMIN — ACETAMINOPHEN 650 MG: 325 TABLET ORAL at 09:54

## 2018-05-11 RX ADMIN — SERTRALINE HYDROCHLORIDE 75 MG: 50 TABLET ORAL at 09:06

## 2018-05-11 RX ADMIN — PRAVASTATIN SODIUM 40 MG: 40 TABLET ORAL at 17:40

## 2018-05-11 RX ADMIN — LIDOCAINE HYDROCHLORIDE 10 ML: 20 SOLUTION ORAL; TOPICAL at 17:40

## 2018-05-11 RX ADMIN — SENNOSIDES 8.6 MG: 8.6 TABLET, FILM COATED ORAL at 21:24

## 2018-05-11 RX ADMIN — FLUTICASONE PROPIONATE 1 SPRAY: 50 SPRAY, METERED NASAL at 17:40

## 2018-05-11 RX ADMIN — LORATADINE 10 MG: 10 TABLET ORAL at 09:06

## 2018-05-11 RX ADMIN — CHLORHEXIDINE GLUCONATE 15 ML: 1.2 RINSE ORAL at 21:22

## 2018-05-11 RX ADMIN — ACETAMINOPHEN 650 MG: 325 TABLET, FILM COATED ORAL at 17:39

## 2018-05-11 RX ADMIN — MELATONIN TAB 3 MG 6 MG: 3 TAB at 21:22

## 2018-05-11 RX ADMIN — LIDOCAINE HYDROCHLORIDE 10 ML: 20 SOLUTION ORAL; TOPICAL at 21:22

## 2018-05-11 RX ADMIN — LIDOCAINE HYDROCHLORIDE 10 ML: 20 SOLUTION ORAL; TOPICAL at 06:42

## 2018-05-11 RX ADMIN — ACETAMINOPHEN 650 MG: 325 TABLET, FILM COATED ORAL at 06:42

## 2018-05-11 RX ADMIN — DOCUSATE SODIUM 100 MG: 100 CAPSULE, LIQUID FILLED ORAL at 17:40

## 2018-05-11 RX ADMIN — DOCUSATE SODIUM 100 MG: 100 CAPSULE, LIQUID FILLED ORAL at 09:06

## 2018-05-11 RX ADMIN — FLUTICASONE PROPIONATE 1 SPRAY: 50 SPRAY, METERED NASAL at 09:08

## 2018-05-11 RX ADMIN — PANTOPRAZOLE SODIUM 20 MG: 20 TABLET, DELAYED RELEASE ORAL at 06:42

## 2018-05-11 RX ADMIN — LIDOCAINE HYDROCHLORIDE 10 ML: 20 SOLUTION ORAL; TOPICAL at 12:35

## 2018-05-11 NOTE — PROGRESS NOTES
This writer contacted NeuroSurgery at [facility] to inform them that pt will require staples to be removed from her scalp tomorrow, 5/11
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
## 2018-05-11 NOTE — PLAN OF CARE
Depression     Attend and participate in unit activities, including therapeutic, recreational, and educational groups Not Progressing          Anxiety     Anxiety is at manageable level Progressing        Depression     Treatment Goal: Demonstrate behavioral control of depressive symptoms, verbalize feelings of improved mood/affect, and adopt new coping skills prior to discharge Progressing     Verbalize thoughts and feelings Progressing     Refrain from harming self Progressing     Refrain from [address] from self-neglect Progressing     Complete daily ADLs, including personal hygiene independently, as able Progressing        Risk for Self Injury/Neglect     Treatment Goal: Remain safe during length of stay, learn and adopt new coping skills, and be free of self-injurious ideation, impulses and acts at the time of discharge Progressing     Verbalize thoughts and feelings Progressing     Refrain from harming self Progressing     Attend and participate in unit activities, including therapeutic, recreational, and educational groups Progressing     Recognize maladaptive responses and adopt new coping mechanisms Progressing     Complete daily ADLs, including personal hygiene independently, as able Progressing

## 2018-05-11 NOTE — PROGRESS NOTES
Pt reported increased anxiety this evening and requested PRN Xanax  Pt was instructed to try multiple coping mechanisms, and then return if they did not reduce the pt's anxiety  Pt returned and requested Xanax again  Xanax was administered as ordered

## 2018-05-11 NOTE — CASE MANAGEMENT
Team dc summary -  Pt made good rehab progress but expressing desire to seek inpat psychiatric treatment prior to returning home  Pt evaluated by psychiatry and agreed, pt approved for admission to older adult behavior health unit at [facility]  precertification obtained, by [facility] and pt was transferred
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
## 2018-05-11 NOTE — PLAN OF CARE
Problem: DISCHARGE PLANNING  Goal: Discharge to home or other facility with appropriate resources  INTERVENTIONS:  - Identify barriers to discharge w/patient and caregiver  - Arrange for needed discharge resources and transportation as appropriate  - Identify discharge learning needs (meds, wound care, etc )  - Arrange for interpretive services to assist at discharge as needed  - Refer to Case Management Department for coordinating discharge planning if the patient needs post-hospital services based on physician/advanced practitioner order or complex needs related to functional status, cognitive ability, or social support system   Outcome: Progressing  SW intake appointment completed, Tx plan reviewed and signed by Pt and release of information obtained for family supports (sister and daughter) and outpatient providers  Contacted Pt's sister for coordination of care and discharge planning purposes  Scheduled outpatient psychiatric treatment intake appointment and neurosurgical and neurology outpatient follow-up appointments in preparation for discharge

## 2018-05-11 NOTE — OCCUPATIONAL THERAPY NOTE
Occupational Therapy Discharge Summary:     Pt made good progress throughout rehab stay, from OT standpoint  Pt was D/C to inpatient psych with plan for pt to later D/C home with family support, outpatient OT and OT drivers eval  At time of D/C pt was completing ADLS independently/mod I level and functional transfers with mod I and use of rollator  Pt recommended for rollator for energy conservation       Lila Jose, MS, OTR/L , CBIS

## 2018-05-11 NOTE — DISCHARGE INSTR - OTHER ORDERS
[address] [facility] 24 Hour Crisis Hotline  [address]:  a telephone support service for [place], [place] adults challenged by loneliness, confusion and other mental health concerns, or who need information about available services  Workers answering the phone are not counselors, but are kind, caring and compassionate people, trained in active listening, referral support and other skills that allow them to assist callers  The Warmline accepts calls between 6:00 AM to 10:00 AM and from 4:00 PM to 12:00 AM, seven days a week  Because we have a small staff working during those times, your call might go to voice mail  If this happens, please leave us your first name and your phone number and we will return your call within one hour  [facility] residents (only, please!) can reach the [address] [place] [address] by calling [phone]  [address] is a free, confidential, non-emergency, comprehensive information and referral service that connects [facility] and [place] residents with the health and human services they need    Dial 211 on your telephone 24/7                       Khadra Shaker

## 2018-05-11 NOTE — PROGRESS NOTES
Stroke Education Series     Pt participated in skilled Stroke Education Series in a group setting to address the topic of Energy Conservation post stroke in both verbal and written formats  Education within this session included information on stroke recovery and the essential use of energy conservation strategies and the importance of sleep hygiene  Additionally discussed was the Theory of the "Four P's" including planning, prioritizing, pacing, and positioning as energy conservation standards  The goal of this education session was to provide the patient with a more well rounded understanding of how the brain utilizes sleep/rest as a healing and "refeuling" tool and how he/she can incorporate energy conservation strategies in their daily routines   Following education, pt's response to education is: verbalizes understanding and demonstrates understanding         Start Time: 1500     End Time: 7228

## 2018-05-11 NOTE — PROGRESS NOTES
Pt calm and pleasant upon approach, medication compliant and visible in the milieu with limited socialization  Pt denies SI/HI and reports depression/anxiety, pt c/o back pain/spasm 5/10 and was given prn Tylenol and Robaxin at 0954  Pt's staples removed on 5/10- head wound intact and no signs of infection noted  Will continue to monitor

## 2018-05-11 NOTE — PROGRESS NOTES
Stroke Education Series    Pt participated in skilled Stroke Education Series in a group setting to address the topic of Energy Conservation post stroke in both verbal and written formats  Education within this session included information on stroke recovery and the essential use of energy conservation strategies and the importance of sleep hygiene  Additionally discussed was the Theory of the "Four P's" including planning, prioritizing, pacing, and positioning as energy conservation standards  The goal of this education session was to provide the patient with a more well rounded understanding of how the brain utilizes sleep/rest as a healing and "refeuling" tool and how he/she can incorporate energy conservation strategies in their daily routines  Following education, pt's response to education is: verbalizes understanding and demonstrates understanding        Start Time: 1500    End Time: 2938

## 2018-05-12 PROCEDURE — 99232 SBSQ HOSP IP/OBS MODERATE 35: CPT | Performed by: PSYCHIATRY & NEUROLOGY

## 2018-05-12 RX ADMIN — ACETAMINOPHEN 650 MG: 325 TABLET, FILM COATED ORAL at 11:43

## 2018-05-12 RX ADMIN — FLUTICASONE FUROATE AND VILANTEROL 1 PUFF: 100; 25 POWDER RESPIRATORY (INHALATION) at 08:51

## 2018-05-12 RX ADMIN — CHLORHEXIDINE GLUCONATE 15 ML: 1.2 RINSE ORAL at 21:06

## 2018-05-12 RX ADMIN — FLUTICASONE PROPIONATE 1 SPRAY: 50 SPRAY, METERED NASAL at 17:47

## 2018-05-12 RX ADMIN — ACETAMINOPHEN 650 MG: 325 TABLET, FILM COATED ORAL at 17:34

## 2018-05-12 RX ADMIN — MONTELUKAST SODIUM 10 MG: 10 TABLET, FILM COATED ORAL at 08:50

## 2018-05-12 RX ADMIN — HYDROMORPHONE HYDROCHLORIDE 2 MG: 2 TABLET ORAL at 14:13

## 2018-05-12 RX ADMIN — ACETAMINOPHEN 650 MG: 325 TABLET, FILM COATED ORAL at 00:01

## 2018-05-12 RX ADMIN — PANTOPRAZOLE SODIUM 20 MG: 20 TABLET, DELAYED RELEASE ORAL at 05:34

## 2018-05-12 RX ADMIN — CHLORHEXIDINE GLUCONATE 15 ML: 1.2 RINSE ORAL at 08:49

## 2018-05-12 RX ADMIN — LIDOCAINE HYDROCHLORIDE 10 ML: 20 SOLUTION ORAL; TOPICAL at 21:06

## 2018-05-12 RX ADMIN — LIDOCAINE HYDROCHLORIDE 10 ML: 20 SOLUTION ORAL; TOPICAL at 17:34

## 2018-05-12 RX ADMIN — SERTRALINE HYDROCHLORIDE 100 MG: 100 TABLET ORAL at 08:49

## 2018-05-12 RX ADMIN — MELATONIN TAB 3 MG 6 MG: 3 TAB at 21:07

## 2018-05-12 RX ADMIN — LIDOCAINE HYDROCHLORIDE 10 ML: 20 SOLUTION ORAL; TOPICAL at 11:44

## 2018-05-12 RX ADMIN — LIDOCAINE HYDROCHLORIDE 10 ML: 20 SOLUTION ORAL; TOPICAL at 06:01

## 2018-05-12 RX ADMIN — PRAVASTATIN SODIUM 40 MG: 40 TABLET ORAL at 17:34

## 2018-05-12 RX ADMIN — SENNOSIDES 8.6 MG: 8.6 TABLET, FILM COATED ORAL at 21:07

## 2018-05-12 RX ADMIN — FLUTICASONE PROPIONATE 1 SPRAY: 50 SPRAY, METERED NASAL at 08:50

## 2018-05-12 RX ADMIN — ACETAMINOPHEN 650 MG: 325 TABLET, FILM COATED ORAL at 05:36

## 2018-05-12 RX ADMIN — HYDROMORPHONE HYDROCHLORIDE 2 MG: 2 TABLET ORAL at 22:43

## 2018-05-12 RX ADMIN — LORATADINE 10 MG: 10 TABLET ORAL at 08:49

## 2018-05-12 RX ADMIN — DOCUSATE SODIUM 100 MG: 100 CAPSULE, LIQUID FILLED ORAL at 17:34

## 2018-05-12 RX ADMIN — DOCUSATE SODIUM 100 MG: 100 CAPSULE, LIQUID FILLED ORAL at 08:49

## 2018-05-12 NOTE — PROGRESS NOTES
Pt calm and pleasant upon approach, remains medication compliant and withdrawn  Pt denies SI/HI and reports anxiety and depression  C/o headache 9/10 and was given prn Dilaudid at 1413  Pt is currently in bed resting  Will continue to monitor

## 2018-05-12 NOTE — PLAN OF CARE
Anxiety     Anxiety is at manageable level Progressing        Depression     Treatment Goal: Demonstrate behavioral control of depressive symptoms, verbalize feelings of improved mood/affect, and adopt new coping skills prior to discharge Progressing     Verbalize thoughts and feelings Progressing     Refrain from harming self Progressing     Refrain from [address] from self-neglect Progressing     Attend and participate in unit activities, including therapeutic, recreational, and educational groups Progressing     Complete daily ADLs, including personal hygiene independently, as able Progressing        Risk for Self Injury/Neglect     Treatment Goal: Remain safe during length of stay, learn and adopt new coping skills, and be free of self-injurious ideation, impulses and acts at the time of discharge Progressing     Verbalize thoughts and feelings Progressing     Refrain from harming self Progressing     Attend and participate in unit activities, including therapeutic, recreational, and educational groups Progressing     Recognize maladaptive responses and adopt new coping mechanisms Progressing     Complete daily ADLs, including personal hygiene independently, as able Progressing

## 2018-05-12 NOTE — PROGRESS NOTES
Progress Note - Behavioral Health     Leida Hernández 63 y o  female MRN: [id number]  Unit/Bed#: BP3 567-01 Encounter: [id number]    Leida Hernández was seen for continuing care and reviewed with treatment team   Pt is s/p Rt frontal ventriculostomy, s/p SAH with secondary acute hydrocephalus  (No aneurysm found on angiogram)  Neurosurgeon had signed off 5/10/2018  Has h/o MRSA but not active as inpatient  She was transferred to the [facility] 5/11/2018 after being deemed medically stable, due to Sxs of depression without SI  She was recently evicted from her apartment as well  Presently the wound area on scalp appears clean without signs of infection, s/p suture removal by neurology  Pt presently reports "I wake up a lot  "Her depression is the same and anxiety is worse because she wants to go home  She states she cannot remember anything  She is agitated because some of her clothes are missing and she cannot have privacy  Also she reports a desire for individual counseling and states "Groups don't work "  She has a HA in fronto-temporal area but no present visual disturbances or N/V  Sneezing, coughing or breath holding when taking her inhalers can induce HA  She also has some tooth pain  Pt presently denies SI, HI, or psychotic Sxs  Floor team relays Pt has been calm and compliant with medications on the unit  No SI or HI reported to staff and there have not been any aggressive outbursts or bizarre behaviors noted  She is visible in the milieu but not very socially interactive  OT had seen and discharged Pt with recommendation for rollator for gait dysfunction  OMFS had seen Pt for tooth pain and will f/u as outpatient  Sleep: Impaired   Appetite: Varies, but able to eat without difficulty  Medication side effects: None per Pt  ROS: as per HPI    Labs/head CT, CTA, MRI, Cspine MRI/Echocardiogram/EKG:  Reviewed    Mental Status Evaluation:  Appearance:  Dressed, adequate hygiene, poor eye contact    PERRL, EOM intact   Behavior:  Cooperative, overall calm, but with an edge   Speech:  Clear, normal rate and once became loud    Mood:  Anxious, Depressed, Agitated   Affect:  Constricted   Thought Process:  Organized, Goal directed   Associations: Intact associations   Thought Content:  No verbalized delusions   Perceptual Disturbances: Pt denies any hallucinations or paranoia and does not appear to be responding to internal stimuli   Risk Potential: Pt presently denies SI or HI    Sensorium:  Self, birthday, Place, Day of the week, Month, Year   Memory:  Not formally tested   Consciousness:  alert, awake   Attention: Fair   Insight:  Fair   Judgment: Good   Gait/Station: Slow, somewhat small steps    Motor Activity: No abnormal movements     Vitals:    05/12/18 0707   BP: 104/63   Pulse: 86   Resp: 16   Temp: 97 7 °F (36 5 °C)   SpO2: 98%       No visits with results within 1 Day(s) from this visit     Latest known visit with results is:   Admission on 05/02/2018, Discharged on 05/10/2018   Component Date Value    WBC 05/07/2018 6 90     RBC 05/07/2018 4 01     Hemoglobin 05/07/2018 11 9     Hematocrit 05/07/2018 37 4     MCV 05/07/2018 93     MCH 05/07/2018 29 7     MCHC 05/07/2018 31 8     RDW 05/07/2018 14 2     MPV 05/07/2018 9 5     Platelets 14/90/4846 734*    nRBC 05/07/2018 0     Neutrophils Relative 05/07/2018 43     Lymphocytes Relative 05/07/2018 41     Monocytes Relative 05/07/2018 12     Eosinophils Relative 05/07/2018 3     Basophils Relative 05/07/2018 1     Neutrophils Absolute 05/07/2018 2 97     Lymphocytes Absolute 05/07/2018 2 80     Monocytes Absolute 05/07/2018 0 81     Eosinophils Absolute 05/07/2018 0 23     Basophils Absolute 05/07/2018 0 04     Sodium 05/07/2018 138     Potassium 05/07/2018 4 1     Chloride 05/07/2018 105     CO2 05/07/2018 28     Anion Gap 05/07/2018 5     BUN 05/07/2018 15     Creatinine 05/07/2018 0 76     Glucose 05/07/2018 94     Glucose, Fasting 05/07/2018 94     Calcium 05/07/2018 9 4     eGFR 05/07/2018 87     WBC 05/10/2018 7 07     RBC 05/10/2018 4 04     Hemoglobin 05/10/2018 12 2     Hematocrit 05/10/2018 38 0     MCV 05/10/2018 94     MCH 05/10/2018 30 2     MCHC 05/10/2018 32 1     RDW 05/10/2018 14 3     MPV 05/10/2018 9 9     Platelets 87/00/1598 687*    nRBC 05/10/2018 0     Neutrophils Relative 05/10/2018 46     Lymphocytes Relative 05/10/2018 39     Monocytes Relative 05/10/2018 11     Eosinophils Relative 05/10/2018 3     Basophils Relative 05/10/2018 1     Neutrophils Absolute 05/10/2018 3 28     Lymphocytes Absolute 05/10/2018 2 76     Monocytes Absolute 05/10/2018 0 75     Eosinophils Absolute 05/10/2018 0 21     Basophils Absolute 05/10/2018 0 05     Sodium 05/10/2018 140     Potassium 05/10/2018 4 1     Chloride 05/10/2018 107     CO2 05/10/2018 27     Anion Gap 05/10/2018 6     BUN 05/10/2018 15     Creatinine 05/10/2018 0 71     Glucose 05/10/2018 85     Calcium 05/10/2018 9 5     eGFR 05/10/2018 95        Progress Toward Goals:   No significant progress, though Sertraline increase was reflected this morning  Will observe for effect  I encouraged use of prn pain medication  Repeat BMP, CBC with diff      Assessment/Plan   Principal Problem:    Severe episode of recurrent major depressive disorder, without psychotic features ([address] )      Recommended Treatment: Continue with pharmacotherapy, group therapy, milieu therapy and occupational therapy    The patient will be maintained on the following medications:    Current Facility-Administered Medications:  acetaminophen 650 mg Oral Q4H PRN Kenyatta Mccray MD   acetaminophen 650 mg Oral Q6H [address] Kenyatta Mccray MD   al mag oxide-diphenhydramine-lidocaine viscous 10 mL Swish & Spit 4x Daily (AC & HS) Kenyatta Mccray MD   albuterol 2 puff Inhalation Q6H PRN Kenyatta Mccray MD   ALPRAZolam 0 25 mg Oral Daily PRN Kenyatta Mccray MD   benzocaine  Mucosal 4x Daily PRN Ana Sinha MD   bisacodyl 10 mg Rectal Daily PRN Ana iSnha MD   chlorhexidine 15 mL Swish & Spit Q12H Rex Infante MD   docusate sodium 100 mg Oral BID Ana Sinha MD   fluticasone 1 spray Each Nare BID Ana Sinha MD   fluticasone furoate-vilanterol 1 puff Inhalation Daily Shahzad Grossman MD   HYDROmorphone 2 mg Oral TID PRN Ana Sinha MD   loratadine 10 mg Oral Daily Ana Sinha MD   melatonin 6 mg Oral HS Ana Sinha MD   methocarbamol 500 mg Oral Q6H PRN Ana Sinha MD   montelukast 10 mg Oral Daily Ana Sinha MD   ondansetron 4 mg Oral Q6H PRN Ana Sinha MD   pantoprazole 20 mg Oral Early Morning Ana Sinha MD   pravastatin 40 mg Oral Daily With Jewels Puentes MD   senna 1 tablet Oral HS Ana Sinha MD   sertraline 100 mg Oral Daily Shahzad Grossman MD   traZODone 25 mg Oral HS PRN Ana Sinha MD       Risks, benefits and possible side effects of Medications:   Risks, benefits, and possible side effects of medications explained to patient and patient verbalizes understanding

## 2018-05-12 NOTE — PROGRESS NOTES
Pt sits alone appearing sad in small TV room, most of evening  Reports feeling very anxious  Pt reports that she wishes to leave unit tonight  Medicated with Xanax PRN which was effective for symptom  Pt agrees to talk with medical staff in AM about future plans for discharge  Medication compliant  Denies all s/s except anxiety and depression

## 2018-05-13 LAB
ANION GAP SERPL CALCULATED.3IONS-SCNC: 6 MMOL/L (ref 4–13)
BASOPHILS # BLD AUTO: 0.03 THOUSANDS/ΜL (ref 0–0.1)
BASOPHILS NFR BLD AUTO: 1 % (ref 0–1)
BUN SERPL-MCNC: 14 MG/DL (ref 5–25)
CALCIUM SERPL-MCNC: 8.9 MG/DL (ref 8.3–10.1)
CHLORIDE SERPL-SCNC: 105 MMOL/L (ref 100–108)
CO2 SERPL-SCNC: 29 MMOL/L (ref 21–32)
CREAT SERPL-MCNC: 0.77 MG/DL (ref 0.6–1.3)
EOSINOPHIL # BLD AUTO: 0.2 THOUSAND/ΜL (ref 0–0.61)
EOSINOPHIL NFR BLD AUTO: 3 % (ref 0–6)
ERYTHROCYTE [DISTWIDTH] IN BLOOD BY AUTOMATED COUNT: 14.4 % (ref 11.6–15.1)
GFR SERPL CREATININE-BSD FRML MDRD: 86 ML/MIN/1.73SQ M
GLUCOSE SERPL-MCNC: 88 MG/DL (ref 65–140)
HCT VFR BLD AUTO: 37.9 % (ref 34.8–46.1)
HGB BLD-MCNC: 12 G/DL (ref 11.5–15.4)
LYMPHOCYTES # BLD AUTO: 2.94 THOUSANDS/ΜL (ref 0.6–4.47)
LYMPHOCYTES NFR BLD AUTO: 45 % (ref 14–44)
MCH RBC QN AUTO: 29.8 PG (ref 26.8–34.3)
MCHC RBC AUTO-ENTMCNC: 31.7 G/DL (ref 31.4–37.4)
MCV RBC AUTO: 94 FL (ref 82–98)
MONOCYTES # BLD AUTO: 0.81 THOUSAND/ΜL (ref 0.17–1.22)
MONOCYTES NFR BLD AUTO: 13 % (ref 4–12)
NEUTROPHILS # BLD AUTO: 2.5 THOUSANDS/ΜL (ref 1.85–7.62)
NEUTS SEG NFR BLD AUTO: 38 % (ref 43–75)
NRBC BLD AUTO-RTO: 0 /100 WBCS
PLATELET # BLD AUTO: 571 THOUSANDS/UL (ref 149–390)
PMV BLD AUTO: 9.8 FL (ref 8.9–12.7)
POTASSIUM SERPL-SCNC: 4 MMOL/L (ref 3.5–5.3)
RBC # BLD AUTO: 4.03 MILLION/UL (ref 3.81–5.12)
SODIUM SERPL-SCNC: 140 MMOL/L (ref 136–145)
WBC # BLD AUTO: 6.5 THOUSAND/UL (ref 4.31–10.16)

## 2018-05-13 PROCEDURE — 85025 COMPLETE CBC W/AUTO DIFF WBC: CPT | Performed by: PHYSICIAN ASSISTANT

## 2018-05-13 PROCEDURE — 80048 BASIC METABOLIC PNL TOTAL CA: CPT | Performed by: PHYSICIAN ASSISTANT

## 2018-05-13 PROCEDURE — 99231 SBSQ HOSP IP/OBS SF/LOW 25: CPT | Performed by: PSYCHIATRY & NEUROLOGY

## 2018-05-13 RX ORDER — LAMOTRIGINE 25 MG/1
25 TABLET ORAL
Status: DISCONTINUED | OUTPATIENT
Start: 2018-05-13 | End: 2018-05-16

## 2018-05-13 RX ADMIN — FLUTICASONE PROPIONATE 1 SPRAY: 50 SPRAY, METERED NASAL at 17:17

## 2018-05-13 RX ADMIN — ACETAMINOPHEN 650 MG: 325 TABLET, FILM COATED ORAL at 12:11

## 2018-05-13 RX ADMIN — SENNOSIDES 8.6 MG: 8.6 TABLET, FILM COATED ORAL at 21:32

## 2018-05-13 RX ADMIN — DOCUSATE SODIUM 100 MG: 100 CAPSULE, LIQUID FILLED ORAL at 08:20

## 2018-05-13 RX ADMIN — ACETAMINOPHEN 650 MG: 325 TABLET, FILM COATED ORAL at 17:18

## 2018-05-13 RX ADMIN — SERTRALINE HYDROCHLORIDE 100 MG: 100 TABLET ORAL at 08:20

## 2018-05-13 RX ADMIN — PRAVASTATIN SODIUM 40 MG: 40 TABLET ORAL at 17:18

## 2018-05-13 RX ADMIN — PANTOPRAZOLE SODIUM 20 MG: 20 TABLET, DELAYED RELEASE ORAL at 06:30

## 2018-05-13 RX ADMIN — LORATADINE 10 MG: 10 TABLET ORAL at 08:20

## 2018-05-13 RX ADMIN — MONTELUKAST SODIUM 10 MG: 10 TABLET, FILM COATED ORAL at 08:20

## 2018-05-13 RX ADMIN — LIDOCAINE HYDROCHLORIDE 10 ML: 20 SOLUTION ORAL; TOPICAL at 06:30

## 2018-05-13 RX ADMIN — HYDROMORPHONE HYDROCHLORIDE 2 MG: 2 TABLET ORAL at 09:10

## 2018-05-13 RX ADMIN — DOCUSATE SODIUM 100 MG: 100 CAPSULE, LIQUID FILLED ORAL at 17:18

## 2018-05-13 RX ADMIN — FLUTICASONE PROPIONATE 1 SPRAY: 50 SPRAY, METERED NASAL at 08:20

## 2018-05-13 RX ADMIN — ALPRAZOLAM 0.25 MG: 0.25 TABLET ORAL at 21:43

## 2018-05-13 RX ADMIN — ACETAMINOPHEN 650 MG: 325 TABLET, FILM COATED ORAL at 00:30

## 2018-05-13 RX ADMIN — CHLORHEXIDINE GLUCONATE 15 ML: 1.2 RINSE ORAL at 21:32

## 2018-05-13 RX ADMIN — MELATONIN TAB 3 MG 6 MG: 3 TAB at 21:32

## 2018-05-13 RX ADMIN — FLUTICASONE FUROATE AND VILANTEROL 1 PUFF: 100; 25 POWDER RESPIRATORY (INHALATION) at 08:20

## 2018-05-13 RX ADMIN — LIDOCAINE HYDROCHLORIDE 10 ML: 20 SOLUTION ORAL; TOPICAL at 13:06

## 2018-05-13 RX ADMIN — LAMOTRIGINE 25 MG: 25 TABLET ORAL at 21:33

## 2018-05-13 RX ADMIN — ACETAMINOPHEN 650 MG: 325 TABLET, FILM COATED ORAL at 23:53

## 2018-05-13 RX ADMIN — CHLORHEXIDINE GLUCONATE 15 ML: 1.2 RINSE ORAL at 09:37

## 2018-05-13 RX ADMIN — LIDOCAINE HYDROCHLORIDE 10 ML: 20 SOLUTION ORAL; TOPICAL at 21:32

## 2018-05-13 RX ADMIN — LIDOCAINE HYDROCHLORIDE 10 ML: 20 SOLUTION ORAL; TOPICAL at 17:18

## 2018-05-13 RX ADMIN — ACETAMINOPHEN 650 MG: 325 TABLET, FILM COATED ORAL at 06:30

## 2018-05-13 NOTE — PROGRESS NOTES
Pt visible on unit for breakfast, secluding herself in the small TV room during the day  Pt appeared angry and anxious, pt was upset about the continuous rounding, saying that she doesn't get privacy  Pt was also angry with another pt, saying she was annoyed and had to step out of the room  Pt says she feels anxious and depressed, but denies SI and HI  C/o pain in her head, 8/10  PRN Dilaudid given  Compliant with medications  Pt currently sitting in small TV room, will continue to monitor

## 2018-05-13 NOTE — PROGRESS NOTES
Pt appears quite sad/depressed this evening  Feels that she is not improving  Encouraged pt and provided support  Medication compliant  Still sits alone on unit  Reports that she wouldn't do well interacting with so many other patients  Requested dilaudid for 8/10 HA-given and appears effective  Pt sleeping, face smooth, and pt appears comfortable sleeping  Denies SI/HI AH/VH and anxiety

## 2018-05-13 NOTE — PROGRESS NOTES
Progress Note - Behavioral Health     Rebeca Warren 63 y o  female MRN: [id number]  Unit/Bed#: DL0 567-01 Encounter: [id number]    Rebeca Warren was seen for continuing care and reviewed with treatment team   Pt reports feeling "Sad" --worse than yesterday, hopeless that she can overcome her problems, and c/o "I'm a little anxious "  She states "I just can't believe I'm here and my life is changing so fast "   She becomes agitated and loud talking about her sister with whom she will live for a while after point of future discharge, while case mgt is arranging for her own apartment  Pt does not want to be "Under my sister's thumb" but realizes she has few choices at this time  She has some worries that she will be able to function on her own given her depression and all she has been through  She also states she is feeling paranoid and has always been that way-- feels "Like people are like, plotting against me "  She feels the staff make it worse because everyone is always checking on her --ie if the door is closed while she is getting dressed  She c/o no privacy  I explained that checking measures are done for the safety of patients  Pt presently denies SI, HI, or hallucinations  Floor team relays she has been medication compliant and OOB but not interactive and mainly stays in the small TV room  She has required Dilauded multiple times for headaches and it appears to be effective  She did not c/o HA to the undersigned until she became angry talking about her sister  Sleep: Good  Appetite: Variable, but eating without difficulty  Medication side effects: None per Pt  ROS: as per HPI    Labs/Tests: Reviewed    Mental Status Evaluation:  Appearance:  Dressed, adequate hygiene, fair eye contact today   Behavior:  Calm for the most part, but became incensed due to thoughts of her sister    Cooperative to interview   Speech:  Clear, normal rate, became loud when speaking of her sister   Mood:  Anxious, Depressed, Agitated, but the lability is not extreme, and she calms quickly    Affect:  Constricted   Thought Process:  Organized, Goal directed, Ruminative, Negative, Pessimistic thoughts   Associations: Intact associations   Thought Content:  Paranoid delusions   Perceptual Disturbances: Pt reports paranoia, but no hallucinations    She does not appear to be responding to internal stimuli   Risk Potential: Pt presently denies SI or HI    Sensorium:  Self, birthday, Place, Day of the week, Month, Year   Memory:  Not formally tested    Consciousness:  alert, awake   Attention: Fair   Insight:  Fair   Judgment: Fair   Gait/Station: Slow, larger steps today but drags feet    Motor Activity: No abnormal movements     Vitals:    05/12/18 1456 05/13/18 0725 05/13/18 0745 05/13/18 0811   BP: 122/67 (!) 89/50 95/58 104/66   BP Location: Left arm Left arm Left arm    Pulse: 68 70     Resp: 18 18     Temp: 98 1 °F (36 7 °C) 98 6 °F (37 °C)     TempSrc: Oral Tympanic     SpO2: 97% 97%     Weight:       Height:           Admission on 05/10/2018   Component Date Value    Sodium 05/13/2018 140     Potassium 05/13/2018 4 0     Chloride 05/13/2018 105     CO2 05/13/2018 29     Anion Gap 05/13/2018 6     BUN 05/13/2018 14     Creatinine 05/13/2018 0 77     Glucose 05/13/2018 88     Calcium 05/13/2018 8 9     eGFR 05/13/2018 86     WBC 05/13/2018 6 50     RBC 05/13/2018 4 03     Hemoglobin 05/13/2018 12 0     Hematocrit 05/13/2018 37 9     MCV 05/13/2018 94     MCH 05/13/2018 29 8     MCHC 05/13/2018 31 7     RDW 05/13/2018 14 4     MPV 05/13/2018 9 8     Platelets 62/40/5425 571*    nRBC 05/13/2018 0     Neutrophils Relative 05/13/2018 38*    Lymphocytes Relative 05/13/2018 45*    Monocytes Relative 05/13/2018 13*    Eosinophils Relative 05/13/2018 3     Basophils Relative 05/13/2018 1     Neutrophils Absolute 05/13/2018 2 50     Lymphocytes Absolute 05/13/2018 2 94     Monocytes Absolute 05/13/2018 0 81     Eosinophils Absolute 05/13/2018 0 20     Basophils Absolute 05/13/2018 0 03        Progress Toward Goals:  No progress  Pt is having more agitation  I will reduce Sertraline back to 100mg qd and add lamotrigine 25mg qhs  HA today seemed triggered by anger  However, she is having them recurrently  Will consider neurology consult if HA continue to require Dilauded  Assessment/Plan   Principal Problem:    Severe episode of recurrent major depressive disorder, without psychotic features ([address] )      Recommended Treatment: Continue with pharmacotherapy, group therapy, milieu therapy and occupational therapy    The patient will be maintained on the following medications:    Current Facility-Administered Medications:  acetaminophen 650 mg Oral Q4H PRN Meryle Leigh, MD   acetaminophen 650 mg Oral Q6H [address] Meryle Leigh, MD   al mag oxide-diphenhydramine-lidocaine viscous 10 mL Swish & Spit 4x Daily (AC & HS) Meryle Leigh, MD   albuterol 2 puff Inhalation Q6H PRN Meryle Leigh, MD   ALPRAZolam 0 25 mg Oral Daily PRN Meryle Leigh, MD   benzocaine  Mucosal 4x Daily PRN Meryle Leigh, MD   bisacodyl 10 mg Rectal Daily PRN Meryle Leigh, MD   chlorhexidine 15 mL Swish & Spit Q12H Danii Torres MD   docusate sodium 100 mg Oral BID Meryle Leigh, MD   fluticasone 1 spray Each Nare BID Meryle Leigh, MD   fluticasone furoate-vilanterol 1 puff Inhalation Daily Blade Roth MD   HYDROmorphone 2 mg Oral TID PRN Meryle Leigh, MD   loratadine 10 mg Oral Daily Meryle Leigh, MD   melatonin 6 mg Oral HS Meryle Leigh, MD   methocarbamol 500 mg Oral Q6H PRN Meryle Leigh, MD   montelukast 10 mg Oral Daily Meryle Leigh, MD   ondansetron 4 mg Oral Q6H PRN Meryle Leigh, MD   pantoprazole 20 mg Oral Early Morning Meryle Leigh, MD   pravastatin 40 mg Oral Daily With Dory Adams MD   senna 1 tablet Oral HS Meryle Leigh, MD   sertraline 100 mg Oral Daily Blade Roth MD   traZODone 25 mg Oral HS PRN Herb Salt MD Scott       Risks, benefits and possible side effects of Medications:   Risks, benefits, and possible side effects of medications explained to patient and patient verbalizes understanding

## 2018-05-14 PROCEDURE — 99232 SBSQ HOSP IP/OBS MODERATE 35: CPT | Performed by: PHYSICIAN ASSISTANT

## 2018-05-14 RX ORDER — ARIPIPRAZOLE 5 MG/1
5 TABLET ORAL DAILY
Status: DISCONTINUED | OUTPATIENT
Start: 2018-05-14 | End: 2018-05-16

## 2018-05-14 RX ADMIN — ACETAMINOPHEN 650 MG: 325 TABLET, FILM COATED ORAL at 23:26

## 2018-05-14 RX ADMIN — CHLORHEXIDINE GLUCONATE 15 ML: 1.2 RINSE ORAL at 09:29

## 2018-05-14 RX ADMIN — FLUTICASONE PROPIONATE 1 SPRAY: 50 SPRAY, METERED NASAL at 17:23

## 2018-05-14 RX ADMIN — CHLORHEXIDINE GLUCONATE 15 ML: 1.2 RINSE ORAL at 21:10

## 2018-05-14 RX ADMIN — ACETAMINOPHEN 650 MG: 325 TABLET, FILM COATED ORAL at 12:13

## 2018-05-14 RX ADMIN — LIDOCAINE HYDROCHLORIDE 10 ML: 20 SOLUTION ORAL; TOPICAL at 21:10

## 2018-05-14 RX ADMIN — DOCUSATE SODIUM 100 MG: 100 CAPSULE, LIQUID FILLED ORAL at 09:29

## 2018-05-14 RX ADMIN — FLUTICASONE PROPIONATE 1 SPRAY: 50 SPRAY, METERED NASAL at 09:29

## 2018-05-14 RX ADMIN — LIDOCAINE HYDROCHLORIDE 10 ML: 20 SOLUTION ORAL; TOPICAL at 06:00

## 2018-05-14 RX ADMIN — LIDOCAINE HYDROCHLORIDE 10 ML: 20 SOLUTION ORAL; TOPICAL at 17:23

## 2018-05-14 RX ADMIN — PRAVASTATIN SODIUM 40 MG: 40 TABLET ORAL at 17:22

## 2018-05-14 RX ADMIN — SERTRALINE HYDROCHLORIDE 100 MG: 100 TABLET ORAL at 09:29

## 2018-05-14 RX ADMIN — ACETAMINOPHEN 650 MG: 325 TABLET, FILM COATED ORAL at 17:22

## 2018-05-14 RX ADMIN — ARIPIPRAZOLE 5 MG: 5 TABLET ORAL at 12:13

## 2018-05-14 RX ADMIN — PANTOPRAZOLE SODIUM 20 MG: 20 TABLET, DELAYED RELEASE ORAL at 05:54

## 2018-05-14 RX ADMIN — LIDOCAINE HYDROCHLORIDE 10 ML: 20 SOLUTION ORAL; TOPICAL at 12:14

## 2018-05-14 RX ADMIN — ACETAMINOPHEN 650 MG: 325 TABLET, FILM COATED ORAL at 05:54

## 2018-05-14 RX ADMIN — HYDROMORPHONE HYDROCHLORIDE 2 MG: 2 TABLET ORAL at 21:19

## 2018-05-14 RX ADMIN — MELATONIN TAB 3 MG 6 MG: 3 TAB at 21:10

## 2018-05-14 RX ADMIN — LAMOTRIGINE 25 MG: 25 TABLET ORAL at 21:10

## 2018-05-14 RX ADMIN — MONTELUKAST SODIUM 10 MG: 10 TABLET, FILM COATED ORAL at 09:29

## 2018-05-14 RX ADMIN — HYDROMORPHONE HYDROCHLORIDE 2 MG: 2 TABLET ORAL at 09:56

## 2018-05-14 RX ADMIN — DOCUSATE SODIUM 100 MG: 100 CAPSULE, LIQUID FILLED ORAL at 17:22

## 2018-05-14 RX ADMIN — SENNOSIDES 8.6 MG: 8.6 TABLET, FILM COATED ORAL at 21:10

## 2018-05-14 RX ADMIN — LORATADINE 10 MG: 10 TABLET ORAL at 09:29

## 2018-05-14 NOTE — PLAN OF CARE
Problem: Ineffective Coping  Goal: Participates in unit activities  Interventions:  - Provide therapeutic environment   - Provide required programming   - Redirect inappropriate behaviors    Outcome: Progressing  Pt did attend two morning groups but sat away from peers after initial introduction to the group  She refused to attend seated exercise/breathing techniques,instead sitting with her arms folded over her chest in resistance

## 2018-05-14 NOTE — PROGRESS NOTES
Progress Note - Behavioral Health   Elza Sahni 63 y o  female MRN: [id number]  Unit/Bed#: WY9 416-33 Encounter: [id number]    Patient seen, chart reviewed, discussed with staff  Nursing staff notes the patient remains depressed and anxious  She has been seclusive to herself and her room  She has been compliant with her medications and treatment plan  Patient states that she has decreased sleep  She complains of a headache and states that she feels like there is liquid in my head  Patient states that she also feels like there is a fluid in her ear and states that this was evaluated on the medical floor and she does not have any cerumen  She states that she has been having chronic headaches since being here in the hospital and treated for subarachnoid hemorrhage  She reports that she does feel dizzy and off balance at times  Patient appears labile with an irritable edge at times  She reports that her moods are still anxious and depressed and she feels no better since was started on Zoloft She has decreased eye contact  She denies any suicidal homicidal ideation  She does appear to be hopeless, helpless and somewhat guarded with questions      Behavior over the last 24 hours:  unchanged  Sleep: insomnia  Appetite: normal  Medication side effects: No  ROS: headache  /58 (BP Location: Left arm)   Pulse 79   Temp (!) 97 1 °F (36 2 °C) (Tympanic)   Resp 18   Ht 5' 3" (1 6 m)   Wt 84 5 kg (186 lb 4 6 oz)   SpO2 96%   BMI 33 00 kg/m²     Medications:   Current Facility-Administered Medications   Medication Dose Route Frequency    acetaminophen (TYLENOL) tablet 650 mg  650 mg Oral Q4H PRN    acetaminophen (TYLENOL) tablet 650 mg  650 mg Oral Q6H [address]    al mag oxide-diphenhydramine-lidocaine viscous (MAGIC MOUTHWASH) suspension 10 mL  10 mL Swish & Spit 4x Daily (AC & HS)    albuterol (PROVENTIL HFA,VENTOLIN HFA) inhaler 2 puff  2 puff Inhalation Q6H PRN    ALPRAZolam (XANAX) tablet 0 25 mg 0 25 mg Oral Daily PRN    benzocaine (ANBESOL) 10 % mucosal liquid   Mucosal 4x Daily PRN    bisacodyl (DULCOLAX) rectal suppository 10 mg  10 mg Rectal Daily PRN    chlorhexidine (PERIDEX) 0 12 % oral rinse 15 mL  15 mL Swish & Spit Q12H [facility]    docusate sodium (COLACE) capsule 100 mg  100 mg Oral BID    fluticasone (FLONASE) 50 mcg/act nasal spray 1 spray  1 spray Each Nare BID    fluticasone furoate-vilanterol (BREO ELLIPTA) 1 puff  1 puff Inhalation Daily    HYDROmorphone (DILAUDID) tablet 2 mg  2 mg Oral TID PRN    lamoTRIgine (LaMICtal) tablet 25 mg  25 mg Oral HS    loratadine (CLARITIN) tablet 10 mg  10 mg Oral Daily    melatonin tablet 6 mg  6 mg Oral HS    methocarbamol (ROBAXIN) tablet 500 mg  500 mg Oral Q6H PRN    montelukast (SINGULAIR) tablet 10 mg  10 mg Oral Daily    ondansetron (ZOFRAN-ODT) dispersible tablet 4 mg  4 mg Oral Q6H PRN    pantoprazole (PROTONIX) EC tablet 20 mg  20 mg Oral Early Morning    pravastatin (PRAVACHOL) tablet 40 mg  40 mg Oral Daily With Dinner    senna (SENOKOT) tablet 8 6 mg  1 tablet Oral HS    sertraline (ZOLOFT) tablet 100 mg  100 mg Oral Daily    traZODone (DESYREL) tablet 25 mg  25 mg Oral HS PRN       Labs:   Admission on 05/10/2018   Component Date Value Ref Range Status    Sodium 05/13/2018 140  136 - 145 mmol/L Final    Potassium 05/13/2018 4 0  3 5 - 5 3 mmol/L Final    Chloride 05/13/2018 105  100 - 108 mmol/L Final    CO2 05/13/2018 29  21 - 32 mmol/L Final    Anion Gap 05/13/2018 6  4 - 13 mmol/L Final    BUN 05/13/2018 14  5 - 25 mg/dL Final    Creatinine 05/13/2018 0 77  0 60 - 1 30 mg/dL Final    Glucose 05/13/2018 88  65 - 140 mg/dL Final    Calcium 05/13/2018 8 9  8 3 - 10 1 mg/dL Final    eGFR 05/13/2018 86  ml/min/1 73sq m Final    WBC 05/13/2018 6 50  4 31 - 10 16 Thousand/uL Final    RBC 05/13/2018 4 03  3 81 - 5 12 Million/uL Final    Hemoglobin 05/13/2018 12 0  11 5 - 15 4 g/dL Final    Hematocrit 05/13/2018 37 9 34 8 - 46 1 % Final    MCV 05/13/2018 94  82 - 98 fL Final    MCH 05/13/2018 29 8  26 8 - 34 3 pg Final    MCHC 05/13/2018 31 7  31 4 - 37 4 g/dL Final    RDW 05/13/2018 14 4  11 6 - 15 1 % Final    MPV 05/13/2018 9 8  8 9 - 12 7 fL Final    Platelets 03/59/3450 571* 149 - 390 Thousands/uL Final    nRBC 05/13/2018 0  /100 WBCs Final    Neutrophils Relative 05/13/2018 38* 43 - 75 % Final    Lymphocytes Relative 05/13/2018 45* 14 - 44 % Final    Monocytes Relative 05/13/2018 13* 4 - 12 % Final    Eosinophils Relative 05/13/2018 3  0 - 6 % Final    Basophils Relative 05/13/2018 1  0 - 1 % Final    Neutrophils Absolute 05/13/2018 2 50  1 85 - 7 62 Thousands/µL Final    Lymphocytes Absolute 05/13/2018 2 94  0 60 - 4 47 Thousands/µL Final    Monocytes Absolute 05/13/2018 0 81  0 17 - 1 22 Thousand/µL Final    Eosinophils Absolute 05/13/2018 0 20  0 00 - 0 61 Thousand/µL Final    Basophils Absolute 05/13/2018 0 03  0 00 - 0 10 Thousands/µL Final       Mental Status Evaluation:  Appearance:  age appropriate and casually dressed   Behavior:  guarded and Cooperative, decreased eye contact   Speech:  normal pitch and normal volume   Mood:  anxious and depressed   Affect:  labile   Thought Process:  goal directed   Thought Content:  Negative, somewhat obsessive, somatic preoccupations   Perceptual Disturbances: None   Risk Potential: Suicidal Ideations none and Homicidal Ideations none   Sensorium:  person, place and time/date   Cognition:  grossly intact   Consciousness:  alert and awake    Attention: attention span appeared shorter than expected for age   Insight:  fair   Judgment: fair   Gait/Station: normal gait/station   Motor Activity: no abnormal movements      Progress Toward Goals:   No change    Assessment/Plan   Principal Problem:    Severe episode of recurrent major depressive disorder, without psychotic features ([facility] [address] )      Recommended Treatment:   Consult Neurosurgery for continuity of care and due to continued complaints of headache  Patient's history elicit symptoms consistent with a bipolar type depression  Monitor with addition of Lamictal 25 mg p o  q h s  started yesterday  Will need to gradually increase as patient tolerates  Add Abilify 5 mg p o  daily for augmentation  Continue with Zoloft 100 mg daily at this time  Continue with group therapy, milieu therapy and occupational therapy  Risks, benefits and possible side effects of Medications:   Risks, benefits, and possible side effects of medications explained to patient and patient verbalizes understanding  Counseling / Coordination of Care  Total floor / unit time spent today 25 minutes  Greater than 50% of total time was spent with the patient and / or family counseling and / or coordination of care  A description of the counseling / coordination of care:  Medication management, chart review, patient interview

## 2018-05-14 NOTE — PROGRESS NOTES
Pt c/o anxiety and racing thoughts  Requested Xanax which was given for symptoms  Pt took all medications without issue  Denies all other s/s  Sitting  alone in small TV room most of shift  C/O missing her boyfriend  Cooperative with care

## 2018-05-14 NOTE — PROGRESS NOTES
Pt calm and cooperative this AM, and continues to be med compliant  Pt denies SI at current, just reports being very sad d/t the events leading up to her hospitalization  Pt expressed frustration with the woman in the room next to her and stated 'if she continues with treating me like she is more important than I am, I'll make it known I am equal ' Pt would not elaborate what she had meant by this statement, however did agree to talk to staff before she enacted on anything  Pt out in milieu for meals and groups  Will continue to monitor

## 2018-05-15 PROBLEM — F31.9 BIPOLAR 1 DISORDER (HCC): Status: ACTIVE | Noted: 2018-05-15

## 2018-05-15 PROCEDURE — 99232 SBSQ HOSP IP/OBS MODERATE 35: CPT | Performed by: PHYSICIAN ASSISTANT

## 2018-05-15 RX ADMIN — LIDOCAINE HYDROCHLORIDE 10 ML: 20 SOLUTION ORAL; TOPICAL at 17:25

## 2018-05-15 RX ADMIN — PANTOPRAZOLE SODIUM 20 MG: 20 TABLET, DELAYED RELEASE ORAL at 06:15

## 2018-05-15 RX ADMIN — MONTELUKAST SODIUM 10 MG: 10 TABLET, FILM COATED ORAL at 09:42

## 2018-05-15 RX ADMIN — DOCUSATE SODIUM 100 MG: 100 CAPSULE, LIQUID FILLED ORAL at 17:21

## 2018-05-15 RX ADMIN — LORATADINE 10 MG: 10 TABLET ORAL at 09:42

## 2018-05-15 RX ADMIN — FLUTICASONE PROPIONATE 1 SPRAY: 50 SPRAY, METERED NASAL at 17:24

## 2018-05-15 RX ADMIN — PRAVASTATIN SODIUM 40 MG: 40 TABLET ORAL at 17:21

## 2018-05-15 RX ADMIN — LAMOTRIGINE 25 MG: 25 TABLET ORAL at 21:05

## 2018-05-15 RX ADMIN — FLUTICASONE FUROATE AND VILANTEROL 1 PUFF: 100; 25 POWDER RESPIRATORY (INHALATION) at 09:42

## 2018-05-15 RX ADMIN — HYDROMORPHONE HYDROCHLORIDE 2 MG: 2 TABLET ORAL at 20:01

## 2018-05-15 RX ADMIN — SERTRALINE HYDROCHLORIDE 100 MG: 100 TABLET ORAL at 09:42

## 2018-05-15 RX ADMIN — CHLORHEXIDINE GLUCONATE 15 ML: 1.2 RINSE ORAL at 09:42

## 2018-05-15 RX ADMIN — ACETAMINOPHEN 650 MG: 325 TABLET, FILM COATED ORAL at 12:34

## 2018-05-15 RX ADMIN — DOCUSATE SODIUM 100 MG: 100 CAPSULE, LIQUID FILLED ORAL at 09:42

## 2018-05-15 RX ADMIN — HYDROMORPHONE HYDROCHLORIDE 2 MG: 2 TABLET ORAL at 09:56

## 2018-05-15 RX ADMIN — MELATONIN TAB 3 MG 6 MG: 3 TAB at 21:05

## 2018-05-15 RX ADMIN — LIDOCAINE HYDROCHLORIDE 10 ML: 20 SOLUTION ORAL; TOPICAL at 06:15

## 2018-05-15 RX ADMIN — LIDOCAINE HYDROCHLORIDE 10 ML: 20 SOLUTION ORAL; TOPICAL at 21:05

## 2018-05-15 RX ADMIN — ACETAMINOPHEN 650 MG: 325 TABLET, FILM COATED ORAL at 17:21

## 2018-05-15 RX ADMIN — LIDOCAINE HYDROCHLORIDE 10 ML: 20 SOLUTION ORAL; TOPICAL at 12:34

## 2018-05-15 RX ADMIN — SENNOSIDES 8.6 MG: 8.6 TABLET, FILM COATED ORAL at 21:05

## 2018-05-15 RX ADMIN — FLUTICASONE PROPIONATE 1 SPRAY: 50 SPRAY, METERED NASAL at 09:42

## 2018-05-15 RX ADMIN — CHLORHEXIDINE GLUCONATE 15 ML: 1.2 RINSE ORAL at 21:02

## 2018-05-15 RX ADMIN — ACETAMINOPHEN 650 MG: 325 TABLET, FILM COATED ORAL at 06:16

## 2018-05-15 RX ADMIN — ARIPIPRAZOLE 5 MG: 5 TABLET ORAL at 09:42

## 2018-05-15 NOTE — PROGRESS NOTES
Pt was calm and cooperative with care this evening  Pt was medication compliant  Pt reported depression and anxiety and stated that while she does not wish to hurt herself, she does not trust herself due to "past trauma"  Pt denied SI/HI and hallucinations  Pt was visible out in the milieu this evening  Will continue to monitor

## 2018-05-15 NOTE — PLAN OF CARE
Problem: DISCHARGE PLANNING  Goal: Discharge to home or other facility with appropriate resources  INTERVENTIONS:  - Identify barriers to discharge w/patient and caregiver  - Arrange for needed discharge resources and transportation as appropriate  - Identify discharge learning needs (meds, wound care, etc )  - Arrange for interpretive services to assist at discharge as needed  - Refer to Case Management Department for coordinating discharge planning if the patient needs post-hospital services based on physician/advanced practitioner order or complex needs related to functional status, cognitive ability, or social support system   Outcome: Progressing  Discharge plan is for Pt to temporarily live with sister until Pt can find housing for herself and adult daughter in [address] intensive case management referral completed and submitted for Pt to receive services upon discharge  Contacted Pt's sister for coordination of care and discharge planning  Awaiting PT evaluation to continue with discharge planning

## 2018-05-15 NOTE — PLAN OF CARE
Problem: Ineffective Coping  Goal: Participates in unit activities  Interventions:  - Provide therapeutic environment   - Provide required programming   - Redirect inappropriate behaviors    Outcome: Progressing  Pt verbally aggressive toward a peer that annoyed her this morning and reports that,that is how she gets what she wants  Pt superficially engaged in morning groups of "responsibility and relaxation  She was present for anger management and didn't express her thoughts until group was over  Pt confused about the benefits of assertiveness verses aggressiveness  She reports,"When I get loud with people,they get my point "

## 2018-05-15 NOTE — PROGRESS NOTES
Progress Note - Behavioral Health   Catrachito Art 63 y o  female MRN: [id number]  Unit/Bed#: XD4 405-99 Encounter: [id number]    Patient seen, chart reviewed, discussed with staff  Nursing staff notes the patient has been cooperative but continues with depression anxiety  She has passive suicidal ideation  She did attend 2 groups yesterday but minimal participation  The patient appears labile on exam   She is easily irritated and angered  Patient states that she was upset regarding another patient here  Patient was compliant with her Abilify however she states that she did take this in the past   She is agreeable to try again  Patient states that she was on Seroquel in the past and did not do well with that  She also continues with complaints of pain and states that she has been having tooth pain for several weeks now  She states that she did see a dentist prior to coming into the hospital and was unhappy with him  Patient is tangential in her thought process this morning  She also has complaints of various pains and states that everything hurts  She did deny any headache though this morning      Behavior over the last 24 hours:  unchanged  Sleep: normal  Appetite: normal  Medication side effects: No  ROS: Tooth pain, generalized aches and pains  /72 (BP Location: Right arm)   Pulse 72   Temp (!) 97 4 °F (36 3 °C) (Tympanic)   Resp 16   Ht 5' 3" (1 6 m)   Wt 84 5 kg (186 lb 4 6 oz)   SpO2 97%   BMI 33 00 kg/m²     Medications:   Current Facility-Administered Medications   Medication Dose Route Frequency    acetaminophen (TYLENOL) tablet 650 mg  650 mg Oral Q4H PRN    acetaminophen (TYLENOL) tablet 650 mg  650 mg Oral Q6H [address]    al mag oxide-diphenhydramine-lidocaine viscous (MAGIC MOUTHWASH) suspension 10 mL  10 mL Swish & Spit 4x Daily (AC & HS)    albuterol (PROVENTIL HFA,VENTOLIN HFA) inhaler 2 puff  2 puff Inhalation Q6H PRN    ALPRAZolam (XANAX) tablet 0 25 mg  0 25 mg Oral Daily PRN    ARIPiprazole (ABILIFY) tablet 5 mg  5 mg Oral Daily    benzocaine (ANBESOL) 10 % mucosal liquid   Mucosal 4x Daily PRN    bisacodyl (DULCOLAX) rectal suppository 10 mg  10 mg Rectal Daily PRN    chlorhexidine (PERIDEX) 0 12 % oral rinse 15 mL  15 mL Swish & Spit Q12H [address]    docusate sodium (COLACE) capsule 100 mg  100 mg Oral BID    fluticasone (FLONASE) 50 mcg/act nasal spray 1 spray  1 spray Each Nare BID    fluticasone furoate-vilanterol (BREO ELLIPTA) 1 puff  1 puff Inhalation Daily    HYDROmorphone (DILAUDID) tablet 2 mg  2 mg Oral TID PRN    lamoTRIgine (LaMICtal) tablet 25 mg  25 mg Oral HS    loratadine (CLARITIN) tablet 10 mg  10 mg Oral Daily    melatonin tablet 6 mg  6 mg Oral HS    methocarbamol (ROBAXIN) tablet 500 mg  500 mg Oral Q6H PRN    montelukast (SINGULAIR) tablet 10 mg  10 mg Oral Daily    ondansetron (ZOFRAN-ODT) dispersible tablet 4 mg  4 mg Oral Q6H PRN    pantoprazole (PROTONIX) EC tablet 20 mg  20 mg Oral Early Morning    pravastatin (PRAVACHOL) tablet 40 mg  40 mg Oral Daily With Dinner    senna (SENOKOT) tablet 8 6 mg  1 tablet Oral HS    sertraline (ZOLOFT) tablet 100 mg  100 mg Oral Daily    traZODone (DESYREL) tablet 25 mg  25 mg Oral HS PRN       Labs:   Admission on 05/10/2018   Component Date Value Ref Range Status    Sodium 05/13/2018 140  136 - 145 mmol/L Final    Potassium 05/13/2018 4 0  3 5 - 5 3 mmol/L Final    Chloride 05/13/2018 105  100 - 108 mmol/L Final    CO2 05/13/2018 29  21 - 32 mmol/L Final    Anion Gap 05/13/2018 6  4 - 13 mmol/L Final    BUN 05/13/2018 14  5 - 25 mg/dL Final    Creatinine 05/13/2018 0 77  0 60 - 1 30 mg/dL Final    Glucose 05/13/2018 88  65 - 140 mg/dL Final    Calcium 05/13/2018 8 9  8 3 - 10 1 mg/dL Final    eGFR 05/13/2018 86  ml/min/1 73sq m Final    WBC 05/13/2018 6 50  4 31 - 10 16 Thousand/uL Final    RBC 05/13/2018 4 03  3 81 - 5 12 Million/uL Final    Hemoglobin 05/13/2018 12 0  11 5 - 15 4 g/dL Final    Hematocrit 05/13/2018 37 9  34 8 - 46 1 % Final    MCV 05/13/2018 94  82 - 98 fL Final    MCH 05/13/2018 29 8  26 8 - 34 3 pg Final    MCHC 05/13/2018 31 7  31 4 - 37 4 g/dL Final    RDW 05/13/2018 14 4  11 6 - 15 1 % Final    MPV 05/13/2018 9 8  8 9 - 12 7 fL Final    Platelets 38/03/0578 571* 149 - 390 Thousands/uL Final    nRBC 05/13/2018 0  /100 WBCs Final    Neutrophils Relative 05/13/2018 38* 43 - 75 % Final    Lymphocytes Relative 05/13/2018 45* 14 - 44 % Final    Monocytes Relative 05/13/2018 13* 4 - 12 % Final    Eosinophils Relative 05/13/2018 3  0 - 6 % Final    Basophils Relative 05/13/2018 1  0 - 1 % Final    Neutrophils Absolute 05/13/2018 2 50  1 85 - 7 62 Thousands/µL Final    Lymphocytes Absolute 05/13/2018 2 94  0 60 - 4 47 Thousands/µL Final    Monocytes Absolute 05/13/2018 0 81  0 17 - 1 22 Thousand/µL Final    Eosinophils Absolute 05/13/2018 0 20  0 00 - 0 61 Thousand/µL Final    Basophils Absolute 05/13/2018 0 03  0 00 - 0 10 Thousands/µL Final       Mental Status Evaluation:  Appearance:  age appropriate and casually dressed, fair eye contact   Behavior:  psychomotor agitation and Irritable edge   Speech:  Variable, soft and loud   Mood:  labile   Affect:  labile   Thought Process:  circumstantial   Thought Content:  Negative, somatic preoccupation, somewhat paranoid   Perceptual Disturbances: No auditory or visual hallucinations   Risk Potential: Suicidal Ideations none and Homicidal Ideations none   Sensorium:  person, place, time/date and situation   Cognition:  grossly intact   Consciousness:  alert and awake    Attention: attention span appeared shorter than expected for age   Insight:  fair   Judgment: fair   Gait/Station: normal gait/station   Motor Activity: no abnormal movements     Progress Toward Goals:  Minimal change    Assessment/Plan   Principal Problem:    Severe episode of recurrent major depressive disorder, without psychotic features [facility])      Recommended Treatment:   Monitor with addition of Abilify 5 mg daily will titrate as tolerated  Continue with Lamictal 25 mg at HS that was started on 05/13  Continue Zoloft 100 mg p o  daily  Continue to encourage in milieu participations  Continue with group therapy, milieu therapy and occupational therapy  Risks, benefits and possible side effects of Medications:   Risks, benefits, and possible side effects of medications explained to patient and patient verbalizes understanding  Counseling / Coordination of Care  Total floor / unit time spent today 25 minutes  Greater than 50% of total time was spent with the patient and / or family counseling and / or coordination of care  A description of the counseling / coordination of care:  Medication management, chart review, patient interview

## 2018-05-15 NOTE — PROGRESS NOTES
Pt calm, pleasant, cooperative, and continues to be med compliant  Pt denies SI at current, along with all other s/s  Pt out in milieu most of morning  Pt minimally social with peers and staff, only talking when approached by staff  Will continue to monitor

## 2018-05-15 NOTE — PLAN OF CARE
Anxiety     Anxiety is at manageable level Not Progressing          Depression     Treatment Goal: Demonstrate behavioral control of depressive symptoms, verbalize feelings of improved mood/affect, and adopt new coping skills prior to discharge Progressing     Verbalize thoughts and feelings Progressing     Refrain from harming self Progressing     Refrain from [address] from self-neglect Progressing     Attend and participate in unit activities, including therapeutic, recreational, and educational groups Progressing     Complete daily ADLs, including personal hygiene independently, as able Progressing        Risk for Self Injury/Neglect     Treatment Goal: Remain safe during length of stay, learn and adopt new coping skills, and be free of self-injurious ideation, impulses and acts at the time of discharge Progressing     Verbalize thoughts and feelings Progressing     Refrain from harming self Progressing     Attend and participate in unit activities, including therapeutic, recreational, and educational groups Progressing     Recognize maladaptive responses and adopt new coping mechanisms Progressing     Complete daily ADLs, including personal hygiene independently, as able Progressing

## 2018-05-15 NOTE — CASE MANAGEMENT
Treatment team discussed Pt's PT/OT course of treatment while on [facility] and need for continued PT/OT on [facility] as [facility] recommended Pt receive outpatient PT/OT upon discharge with referral to [facility] location  Met with Pt and discussed discharge planning  Pt states she is homeless, would like to live in [facility] and wants to apply for disability  Pt states that upon discharge, she will live temporarily with her sister Essie Marin) in [place], [place]  Pt reports her daughter is currently living with sister Essie Marin) and daughter has ID and Pt was caring for daughter and daughter's  prior to subarachnoid hemorrhage  Pt states that daughter's  was placed in SNF due to his need for higher level of care and will remain in SNF  Pt was and is relying on her daughter's income to support both herself and daughter in apartment  Pt states her sister will provide transport for her to attend outpatient appointments in [facility]) upon discharge and Pt states she does not want to change outpatient providers and switch her medical assistance to [facility] as the living situation with her sister upon discharge is "temporary " Pt and this writer discussed referral to [employer] of [facility] intensive case management for assistance with housing in [facility] and disability application process upon discharge and Pt agreeable to same  Completed  referral for [facility] intensive case management, faxed and contacted Darío Tarango of [facility] intensive case Ori and Niall Velasco will meet with Pt to complete intake 5/16/18 between 9:00 am and 10:00 am      Contacted Pt's sister Essie Marin @ [phone]) and confirmed that Pt will live with her temporarily upon discharge and Pt's sister will assist Pt with discharge transport and transport to and from all outpatient appointments

## 2018-05-16 ENCOUNTER — APPOINTMENT (INPATIENT)
Dept: RADIOLOGY | Facility: HOSPITAL | Age: 58
DRG: 753 | End: 2018-05-16
Payer: COMMERCIAL

## 2018-05-16 PROCEDURE — G8979 MOBILITY GOAL STATUS: HCPCS

## 2018-05-16 PROCEDURE — G8978 MOBILITY CURRENT STATUS: HCPCS

## 2018-05-16 PROCEDURE — 97110 THERAPEUTIC EXERCISES: CPT

## 2018-05-16 PROCEDURE — 97163 PT EVAL HIGH COMPLEX 45 MIN: CPT

## 2018-05-16 PROCEDURE — 70496 CT ANGIOGRAPHY HEAD: CPT

## 2018-05-16 PROCEDURE — 70498 CT ANGIOGRAPHY NECK: CPT

## 2018-05-16 PROCEDURE — 99232 SBSQ HOSP IP/OBS MODERATE 35: CPT | Performed by: PHYSICIAN ASSISTANT

## 2018-05-16 RX ORDER — GABAPENTIN 100 MG/1
100 CAPSULE ORAL 3 TIMES DAILY
Status: DISCONTINUED | OUTPATIENT
Start: 2018-05-16 | End: 2018-05-17

## 2018-05-16 RX ORDER — ARIPIPRAZOLE 5 MG/1
5 TABLET ORAL ONCE
Status: COMPLETED | OUTPATIENT
Start: 2018-05-16 | End: 2018-05-16

## 2018-05-16 RX ORDER — ARIPIPRAZOLE 10 MG/1
10 TABLET ORAL DAILY
Status: DISCONTINUED | OUTPATIENT
Start: 2018-05-17 | End: 2018-05-25 | Stop reason: HOSPADM

## 2018-05-16 RX ADMIN — LIDOCAINE HYDROCHLORIDE 10 ML: 20 SOLUTION ORAL; TOPICAL at 11:16

## 2018-05-16 RX ADMIN — FLUTICASONE PROPIONATE 1 SPRAY: 50 SPRAY, METERED NASAL at 17:02

## 2018-05-16 RX ADMIN — LORATADINE 10 MG: 10 TABLET ORAL at 08:35

## 2018-05-16 RX ADMIN — DOCUSATE SODIUM 100 MG: 100 CAPSULE, LIQUID FILLED ORAL at 17:01

## 2018-05-16 RX ADMIN — CHLORHEXIDINE GLUCONATE 15 ML: 1.2 RINSE ORAL at 08:35

## 2018-05-16 RX ADMIN — ACETAMINOPHEN 650 MG: 325 TABLET, FILM COATED ORAL at 11:16

## 2018-05-16 RX ADMIN — PRAVASTATIN SODIUM 40 MG: 40 TABLET ORAL at 17:01

## 2018-05-16 RX ADMIN — DOCUSATE SODIUM 100 MG: 100 CAPSULE, LIQUID FILLED ORAL at 08:35

## 2018-05-16 RX ADMIN — ARIPIPRAZOLE 5 MG: 5 TABLET ORAL at 08:35

## 2018-05-16 RX ADMIN — CHLORHEXIDINE GLUCONATE 15 ML: 1.2 RINSE ORAL at 21:33

## 2018-05-16 RX ADMIN — MONTELUKAST SODIUM 10 MG: 10 TABLET, FILM COATED ORAL at 08:35

## 2018-05-16 RX ADMIN — ACETAMINOPHEN 650 MG: 325 TABLET, FILM COATED ORAL at 23:42

## 2018-05-16 RX ADMIN — FLUTICASONE PROPIONATE 1 SPRAY: 50 SPRAY, METERED NASAL at 08:35

## 2018-05-16 RX ADMIN — ACETAMINOPHEN 650 MG: 325 TABLET, FILM COATED ORAL at 06:06

## 2018-05-16 RX ADMIN — GABAPENTIN 100 MG: 100 CAPSULE ORAL at 17:01

## 2018-05-16 RX ADMIN — ACETAMINOPHEN 650 MG: 325 TABLET, FILM COATED ORAL at 00:13

## 2018-05-16 RX ADMIN — IOHEXOL 85 ML: 350 INJECTION, SOLUTION INTRAVENOUS at 20:16

## 2018-05-16 RX ADMIN — FLUTICASONE FUROATE AND VILANTEROL 1 PUFF: 100; 25 POWDER RESPIRATORY (INHALATION) at 08:37

## 2018-05-16 RX ADMIN — SERTRALINE HYDROCHLORIDE 100 MG: 100 TABLET ORAL at 08:35

## 2018-05-16 RX ADMIN — LIDOCAINE HYDROCHLORIDE 10 ML: 20 SOLUTION ORAL; TOPICAL at 21:33

## 2018-05-16 RX ADMIN — LIDOCAINE HYDROCHLORIDE 10 ML: 20 SOLUTION ORAL; TOPICAL at 17:01

## 2018-05-16 RX ADMIN — PANTOPRAZOLE SODIUM 20 MG: 20 TABLET, DELAYED RELEASE ORAL at 08:37

## 2018-05-16 RX ADMIN — ALPRAZOLAM 0.25 MG: 0.25 TABLET ORAL at 20:41

## 2018-05-16 RX ADMIN — LIDOCAINE HYDROCHLORIDE 10 ML: 20 SOLUTION ORAL; TOPICAL at 06:07

## 2018-05-16 RX ADMIN — ACETAMINOPHEN 650 MG: 325 TABLET, FILM COATED ORAL at 17:01

## 2018-05-16 RX ADMIN — ARIPIPRAZOLE 5 MG: 5 TABLET ORAL at 11:17

## 2018-05-16 RX ADMIN — GABAPENTIN 100 MG: 100 CAPSULE ORAL at 11:16

## 2018-05-16 RX ADMIN — GABAPENTIN 100 MG: 100 CAPSULE ORAL at 21:33

## 2018-05-16 RX ADMIN — SENNOSIDES 8.6 MG: 8.6 TABLET, FILM COATED ORAL at 21:33

## 2018-05-16 RX ADMIN — MELATONIN TAB 3 MG 6 MG: 3 TAB at 21:33

## 2018-05-16 NOTE — PLAN OF CARE
Problem: DISCHARGE PLANNING  Goal: Discharge to home or other facility with appropriate resources  INTERVENTIONS:  - Identify barriers to discharge w/patient and caregiver  - Arrange for needed discharge resources and transportation as appropriate  - Identify discharge learning needs (meds, wound care, etc )  - Arrange for interpretive services to assist at discharge as needed  - Refer to Case Management Department for coordinating discharge planning if the patient needs post-hospital services based on physician/advanced practitioner order or complex needs related to functional status, cognitive ability, or social support system   Outcome: Progressing  Rollator walker ordered in preparation for discharge  [facility] [address] intensive case management intake completed in preparation for discharge  Pt has phone interview with Social Security Disability on 5/21/18 @ 10:50 am - call will be received on Pt's cell phone at that time

## 2018-05-16 NOTE — PLAN OF CARE
Problem: PHYSICAL THERAPY ADULT  Goal: Performs mobility at highest level of function for planned discharge setting  See evaluation for individualized goals  Treatment/Interventions: Functional transfer training, LE strengthening/ROM, Therapeutic exercise, Elevations, Endurance training, Patient/family training, Equipment eval/education, Bed mobility, Gait training, Continued evaluation, Spoke to nursing, Spoke to case management, OT  Equipment Recommended: Michael Chowdhury       See flowsheet documentation for full assessment, interventions and recommendations  Outcome: Progressing  Prognosis: Fair  Problem List: Impaired balance, Decreased mobility, Decreased coordination, Impaired judgement, Decreased safety awareness  Assessment: Pt is 63 y o  female seen for PT evaluation s/p admit to [facility] on [facility]/10/2018 w/ Severe episode of recurrent major depressive disorder, without psychotic features ([facility] [address] )  PT consulted to assess pt's functional mobility and d/c needs  Order placed for PT eval and tx, w/ up w/ A order  Comorbidities affecting pt's physical performance at time of assessment include: those as noted above  PTA, pt was ambulates community distances and elevations, ambulates household distances and lives in multi-level home  Personal factors affecting pt at time of IE include: lives in 2 story house, stairs to enter home, anxiety, decreased initiation and engagement and depression  Please find objective findings from PT assessment regarding body systems outlined above with impairments and limitations including impaired balance, decreased endurance, impaired coordination, gait deviations, impaired judgement, fall risk and decreased cognition  The following objective measures performed on IE also reveal limitations: Barthel Index: 80/100  Pt's clinical presentation is currently unstable/unpredictable seen in pt's presentation of deficits and impairments as noted above   Pt to benefit from continued PT tx to address deficits as defined above and maximize level of functional independent mobility and consistency  From PT/mobility standpoint, recommendation at time of d/c would be Home PT with family support pending progress in order to facilitate return to PLOF  Barriers to Discharge: Decreased caregiver support     Recommendation: Home PT, Home with family support          See flowsheet documentation for full assessment

## 2018-05-16 NOTE — PROGRESS NOTES
Progress Note - Behavioral Health   Fátima Gonzalez 63 y o  female MRN: [id number]  Unit/Bed#: EO1 565-01 Encounter: [id number]    Patient seen, chart reviewed, discussed with staff  Nursing staff notes that the patient is minimally social   She has been seclusive  She has been compliant with her medications and treatment plan  She continues with frequent complaints of headache as well as complaints of tooth pain and receiving p r n  pain medication  Patient reports that her anxiety is pretty bad  She also complains of anger and states that she is keeping to herself because she is fearful of losing her temper  She reports that she feels impulsive and continues with mood swings  No adverse effects noted with Lamictal but minimal benefit  Patient states that she was on gabapentin in the past and states that this was helpful for her mood as well as for her pain  Patient states that she stopped the medication because she has a history of noncompliance and reports that she was frequently was started on medication over the years and discontinued them  Sh states she has made many mistakes over the years but reports she feels vengeful toward other people so she keeps to herself  No HI  Patient denies any active suicidal ideation however she is apathetic, hopeless, helpless      Behavior over the last 24 hours:  unchanged  Sleep: normal  Appetite: normal  Medication side effects: No  ROS: headache and tooth pain  /68 (BP Location: Right arm)   Pulse 64   Temp 98 1 °F (36 7 °C) (Tympanic)   Resp 18   Ht 5' 3" (1 6 m)   Wt 84 5 kg (186 lb 4 6 oz)   SpO2 98%   BMI 33 00 kg/m²     Medications:   Current Facility-Administered Medications   Medication Dose Route Frequency    acetaminophen (TYLENOL) tablet 650 mg  650 mg Oral Q4H PRN    acetaminophen (TYLENOL) tablet 650 mg  650 mg Oral Q6H [facility]    al mag oxide-diphenhydramine-lidocaine viscous (MAGIC MOUTHWASH) suspension 10 mL  10 mL Swish & Spit 4x Daily (AC & HS)    albuterol (PROVENTIL HFA,VENTOLIN HFA) inhaler 2 puff  2 puff Inhalation Q6H PRN    ALPRAZolam (XANAX) tablet 0 25 mg  0 25 mg Oral Daily PRN    [START ON 5/17/2018] ARIPiprazole (ABILIFY) tablet 10 mg  10 mg Oral Daily    ARIPiprazole (ABILIFY) tablet 5 mg  5 mg Oral Once    benzocaine (ANBESOL) 10 % mucosal liquid   Mucosal 4x Daily PRN    bisacodyl (DULCOLAX) rectal suppository 10 mg  10 mg Rectal Daily PRN    chlorhexidine (PERIDEX) 0 12 % oral rinse 15 mL  15 mL Swish & Spit Q12H [facility]    docusate sodium (COLACE) capsule 100 mg  100 mg Oral BID    fluticasone (FLONASE) 50 mcg/act nasal spray 1 spray  1 spray Each Nare BID    fluticasone furoate-vilanterol (BREO ELLIPTA) 1 puff  1 puff Inhalation Daily    gabapentin (NEURONTIN) capsule 100 mg  100 mg Oral TID    HYDROmorphone (DILAUDID) tablet 2 mg  2 mg Oral TID PRN    loratadine (CLARITIN) tablet 10 mg  10 mg Oral Daily    melatonin tablet 6 mg  6 mg Oral HS    methocarbamol (ROBAXIN) tablet 500 mg  500 mg Oral Q6H PRN    montelukast (SINGULAIR) tablet 10 mg  10 mg Oral Daily    ondansetron (ZOFRAN-ODT) dispersible tablet 4 mg  4 mg Oral Q6H PRN    pantoprazole (PROTONIX) EC tablet 20 mg  20 mg Oral Early Morning    pravastatin (PRAVACHOL) tablet 40 mg  40 mg Oral Daily With Dinner    senna (SENOKOT) tablet 8 6 mg  1 tablet Oral HS    sertraline (ZOLOFT) tablet 100 mg  100 mg Oral Daily    traZODone (DESYREL) tablet 25 mg  25 mg Oral HS PRN       Labs:   Admission on 05/10/2018   Component Date Value Ref Range Status    Sodium 05/13/2018 140  136 - 145 mmol/L Final    Potassium 05/13/2018 4 0  3 5 - 5 3 mmol/L Final    Chloride 05/13/2018 105  100 - 108 mmol/L Final    CO2 05/13/2018 29  21 - 32 mmol/L Final    Anion Gap 05/13/2018 6  4 - 13 mmol/L Final    BUN 05/13/2018 14  5 - 25 mg/dL Final    Creatinine 05/13/2018 0 77  0 60 - 1 30 mg/dL Final    Glucose 05/13/2018 88  65 - 140 mg/dL Final    Calcium 05/13/2018 8 9  8 3 - 10 1 mg/dL Final    eGFR 05/13/2018 86  ml/min/1 73sq m Final    WBC 05/13/2018 6 50  4 31 - 10 16 Thousand/uL Final    RBC 05/13/2018 4 03  3 81 - 5 12 Million/uL Final    Hemoglobin 05/13/2018 12 0  11 5 - 15 4 g/dL Final    Hematocrit 05/13/2018 37 9  34 8 - 46 1 % Final    MCV 05/13/2018 94  82 - 98 fL Final    MCH 05/13/2018 29 8  26 8 - 34 3 pg Final    MCHC 05/13/2018 31 7  31 4 - 37 4 g/dL Final    RDW 05/13/2018 14 4  11 6 - 15 1 % Final    MPV 05/13/2018 9 8  8 9 - 12 7 fL Final    Platelets 89/00/1670 571* 149 - 390 Thousands/uL Final    nRBC 05/13/2018 0  /100 WBCs Final    Neutrophils Relative 05/13/2018 38* 43 - 75 % Final    Lymphocytes Relative 05/13/2018 45* 14 - 44 % Final    Monocytes Relative 05/13/2018 13* 4 - 12 % Final    Eosinophils Relative 05/13/2018 3  0 - 6 % Final    Basophils Relative 05/13/2018 1  0 - 1 % Final    Neutrophils Absolute 05/13/2018 2 50  1 85 - 7 62 Thousands/µL Final    Lymphocytes Absolute 05/13/2018 2 94  0 60 - 4 47 Thousands/µL Final    Monocytes Absolute 05/13/2018 0 81  0 17 - 1 22 Thousand/µL Final    Eosinophils Absolute 05/13/2018 0 20  0 00 - 0 61 Thousand/µL Final    Basophils Absolute 05/13/2018 0 03  0 00 - 0 10 Thousands/µL Final       Mental Status Evaluation:  Appearance:  age appropriate and disheveled   Behavior:  psychomotor agitation and Cooperative, decreased eye contact   Speech:  normal pitch and normal volume   Mood:  angry, anxious, depressed, irritable and labile   Affect:  mood-congruent   Thought Process:  goal directed   Thought Content:  Accusatory   Perceptual Disturbances: Denies auditory or visual hallucinations   Risk Potential: Suicidal Ideations none and Homicidal Ideations none   Sensorium:  person, place and time/date   Cognition:  grossly intact   Consciousness:  alert and awake    Attention: attention span appeared shorter than expected for age   Insight:  limited   Judgment: fair Gait/Station: normal gait/station   Motor Activity: no abnormal movements     Progress Toward Goals:   Minimal change    Assessment/Plan   Principal Problem:    Severe episode of recurrent major depressive disorder, without psychotic features ([address] )  Active Problems:    Bipolar 1 disorder (HCC)      Recommended Treatment:   Will discontinue the Lamictal that was started over the weekend due to slow titration and minimal benefit at this time  This is something that can be considered on an outpatient basis  Will increase her Abilify to 10 mg total daily to start today  Start gabapentin 100 mg p o  t i d  with plans to titrate as tolerated  Continue with group therapy, milieu therapy and occupational therapy  Risks, benefits and possible side effects of Medications:   Risks, benefits, and possible side effects of medications explained to patient and patient verbalizes understanding  Counseling / Coordination of Care  Total floor / unit time spent today 25 minutes  Greater than 50% of total time was spent with the patient and / or family counseling and / or coordination of care  A description of the counseling / coordination of care:  Medication management, chart review, patient interview

## 2018-05-16 NOTE — CASE MANAGEMENT
Cedrick Hanson, OT spoke with [facility] [employer] regarding discharge plan for Pt after discharge from [facility] 5/10/18 and [facility] [employer] recommends Pt have a rollator walker to use upon discharge and be provided with contact information for [facility] to follow-up with completing assessment to resume driving  Discussed obtaining rollator walker with Pt and Pt agreeable to same and signed release of information for [facility]  Prescription for rollator walker, demographic info and copy of medical assistance card faxed to [facility] and spoke with Suma Kennedy @ extension [phone]  Suma Kennedy states that rollator walker cannot be given to Pt prior to 48 hours before discharge  Suma Kennedy states he wll not be in the office next week and provided contact of Franca Sexton @ [phone] to follow-up on obtaining rollator walker for Pt when ready for discharge  Pt met with Nafisa Hunter of [address] [place] [address] [place] [address] [place] case management services and completed intake to receive outpatient intensive case management after discharge  Caterina Lauren will contact this writer by 5/18/18 with assigned intensive  Pt will work with after discharge to continue with disability application process and to assist with finding housing for Pt in [address] [place] [address]  Pt cam meet with intensive  when she attends her outpatient appointments @ [facility]  Pt's sister can provide transport for Pt to ensure she can meet with intensive  after discharge  Pt states she has a Social Security Disability phone interview on Monday 5/21/18 @ 10:50 AM and this was confirmed with letter in her belongings [facility] with [facility]  Social Security will call Pt on her cell phone to continue with the application process with goal of Pt eventually receiving SSD income   Pt will need access to her cell phone Monday 5/21/18 @ 10:50 am to complete this phone interview

## 2018-05-16 NOTE — PLAN OF CARE
Problem: Ineffective Coping  Goal: Participates in unit activities  Interventions:  - Provide therapeutic environment   - Provide required programming   - Redirect inappropriate behaviors    Outcome: Not Progressing  Pt  In interviews today and did not attend any groups

## 2018-05-16 NOTE — PROGRESS NOTES
Pt was calm and cooperative with care this evening  Pt was medication compliant  Pt was out of bed, but stationed herself alone in the visitors room most of the evening  Pt was pleasant and brightened upon approach  Pt reported depression and increased anxiety, stating "You know, the same old stuff"  Pt denied any hallucinations and suicidal/homicidal ideations  Will continue to monitor

## 2018-05-16 NOTE — PROGRESS NOTES
Patient spent evening sitting in small TV room  Does not interact with peers or staff unless approached  States her tooth is really hurting and that is why she likes to stay by herself  Currently denies SI/depression, anxiety just saying her only problem tonight is her pain, does state that dilaudid was effective

## 2018-05-16 NOTE — PROGRESS NOTES
Pt remained in room this shift  Encouraged to go to groups she refused  Denies s/s Med compliant   Will continue to monitor

## 2018-05-16 NOTE — PHYSICAL THERAPY NOTE
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    Nondependent alcohol abuse    Nondependent cocaine abuse    Renal colic    [facility] of [facility], [facility]    Disorder of menstrual bleeding    Anxiety state    Bladder spasm    Hypoalbuminemia    Acquired hydrocephalus    Polyuria    Bipolar 1 disorder ([facility])       Past Medical History:   Diagnosis Date    Anxiety     Asthma     Depression     Head injury     Seizures ([facility] )     "Last one was a couple of years ago"       Past Surgical History:   Procedure Laterality Date     SECTION      SPLENECTOMY          18 1408   Pain Assessment   Pain Assessment No/denies pain   Pain Score No Pain   Home Living   Type of [address] [place] [address] Two level   Bathroom Shower/Tub Tub/shower unit   Bathroom Toilet Standard   Bathroom Accessibility Accessible   Home Equipment (states a RW has been ordered for her)   Additional Comments does not have AD will be getting RW   Prior Function   Level of [place] Independent with ADLs and functional mobility   Lives With Family  (sister)   Receives Help From Family   ADL Assistance Independent   IADLs Independent   Falls in the last 6 months 0   Restrictions/Precautions   Weight Bearing Precautions Per Order No   Other Precautions Cognitive; Fall Risk   General   Family/Caregiver Present No   Cognition   Overall Cognitive Status WFL   Arousal/Participation Cooperative   Attention Attends with cues to redirect   Orientation Level Oriented X4   Following Commands Follows one step commands without difficulty   RLE Assessment   RLE Assessment WFL   LLE Assessment   LLE Assessment WFL   Bed Mobility Supine to Sit 6  Modified independent   Sit to Supine 6  Modified independent   Transfers   Sit to Stand 5  Supervision   Stand to Sit 5  Supervision   Stand pivot 5  Supervision   Ambulation/Elevation   Gait pattern Narrow LADAN; Foward flexed; Step to;Excessively slow   Gait Assistance 5  Supervision   Assistive Device Rolling walker   Distance 350ft   Balance   Static Sitting Fair +   Dynamic Sitting Fair   Static Standing Fair   Dynamic Standing Fair -   Ambulatory Fair -   Endurance Deficit   Endurance Deficit No   Activity Tolerance   Activity Tolerance Patient tolerated treatment well   Nurse Made Aware yes   Assessment   Prognosis Fair   Problem List Impaired balance;Decreased mobility; Decreased coordination; Impaired judgement;Decreased safety awareness   Assessment Pt is 63 y o  female seen for PT evaluation s/p admit to [facility] on 5/10/2018 w/ Severe episode of recurrent major depressive disorder, without psychotic features ([facility] )  PT consulted to assess pt's functional mobility and d/c needs  Order placed for PT eval and tx, w/ up w/ A order  Comorbidities affecting pt's physical performance at time of assessment include: those as noted above  PTA, pt was ambulates community distances and elevations, ambulates household distances and lives in multi-level home  Personal factors affecting pt at time of IE include: lives in 2 story house, stairs to enter home, anxiety, decreased initiation and engagement and depression  Please find objective findings from PT assessment regarding body systems outlined above with impairments and limitations including impaired balance, decreased endurance, impaired coordination, gait deviations, impaired judgement, fall risk and decreased cognition  The following objective measures performed on IE also reveal limitations: Barthel Index: 80/100   Pt's clinical presentation is currently unstable/unpredictable seen in pt's presentation of deficits and impairments as noted above  Pt to benefit from continued PT tx to address deficits as defined above and maximize level of functional independent mobility and consistency  From PT/mobility standpoint, recommendation at time of d/c would be Home PT with family support pending progress in order to facilitate return to PLOF  Barriers to Discharge Decreased caregiver support   Goals   Patient Goals TO go home   STG Expiration Date 05/26/18   Short Term Goal #1 In 7-10 days pt will amb with RW >500ft mod I without LOB, assess stair negotiation ([place] pt will be mod I for ascending/descending FF), I with all transfers   Treatment Day 1   Plan   Treatment/Interventions Functional transfer training;LE strengthening/ROM; Therapeutic exercise;Elevations; Endurance training;Patient/family training;Equipment eval/education; Bed mobility;Gait training;Continued evaluation;Spoke to nursing;Spoke to case management;OT   PT Frequency 2-3x/wk   Recommendation   Recommendation Home PT; Home with family support   Equipment Recommended Walker   Barthel Index   Feeding 10   Bathing 5   Grooming Score 5   Dressing Score 10   Bladder Score 10   Bowels Score 10   Toilet Use Score 10   Transfers (Bed/Chair) Score 10   Mobility (Level Surface) Score 10   Stairs Score 0   Barthel Index Score 80     Treatment session [id number]  Pt seen for skilled PT session following evaluation consisting of instruction in seated therex/ HEP instruction; for increased LE strengthening to assist w/ increasing independence w/ transfers and gait  Pt tolerates therex w/o complaints or increase in pain  Will continue to  benefit from repeated instruction for correct technique and compliance          Marquita Wilkerson, PT

## 2018-05-17 PROCEDURE — 99232 SBSQ HOSP IP/OBS MODERATE 35: CPT | Performed by: PHYSICIAN ASSISTANT

## 2018-05-17 RX ORDER — GABAPENTIN 100 MG/1
200 CAPSULE ORAL 3 TIMES DAILY
Status: DISCONTINUED | OUTPATIENT
Start: 2018-05-17 | End: 2018-05-18

## 2018-05-17 RX ADMIN — DOCUSATE SODIUM 100 MG: 100 CAPSULE, LIQUID FILLED ORAL at 08:59

## 2018-05-17 RX ADMIN — FLUTICASONE PROPIONATE 1 SPRAY: 50 SPRAY, METERED NASAL at 09:02

## 2018-05-17 RX ADMIN — PANTOPRAZOLE SODIUM 20 MG: 20 TABLET, DELAYED RELEASE ORAL at 06:15

## 2018-05-17 RX ADMIN — LORATADINE 10 MG: 10 TABLET ORAL at 08:59

## 2018-05-17 RX ADMIN — LIDOCAINE HYDROCHLORIDE 10 ML: 20 SOLUTION ORAL; TOPICAL at 06:15

## 2018-05-17 RX ADMIN — ACETAMINOPHEN 650 MG: 325 TABLET, FILM COATED ORAL at 17:04

## 2018-05-17 RX ADMIN — CHLORHEXIDINE GLUCONATE 15 ML: 1.2 RINSE ORAL at 21:05

## 2018-05-17 RX ADMIN — GABAPENTIN 200 MG: 100 CAPSULE ORAL at 17:02

## 2018-05-17 RX ADMIN — FLUTICASONE PROPIONATE 1 SPRAY: 50 SPRAY, METERED NASAL at 17:02

## 2018-05-17 RX ADMIN — LIDOCAINE HYDROCHLORIDE 10 ML: 20 SOLUTION ORAL; TOPICAL at 21:05

## 2018-05-17 RX ADMIN — MONTELUKAST SODIUM 10 MG: 10 TABLET, FILM COATED ORAL at 08:59

## 2018-05-17 RX ADMIN — ARIPIPRAZOLE 10 MG: 10 TABLET ORAL at 09:49

## 2018-05-17 RX ADMIN — GABAPENTIN 200 MG: 100 CAPSULE ORAL at 21:05

## 2018-05-17 RX ADMIN — MELATONIN TAB 3 MG 6 MG: 3 TAB at 21:05

## 2018-05-17 RX ADMIN — FLUTICASONE FUROATE AND VILANTEROL 1 PUFF: 100; 25 POWDER RESPIRATORY (INHALATION) at 09:50

## 2018-05-17 RX ADMIN — PRAVASTATIN SODIUM 40 MG: 40 TABLET ORAL at 17:04

## 2018-05-17 RX ADMIN — HYDROMORPHONE HYDROCHLORIDE 2 MG: 2 TABLET ORAL at 09:53

## 2018-05-17 RX ADMIN — LIDOCAINE HYDROCHLORIDE 10 ML: 20 SOLUTION ORAL; TOPICAL at 11:56

## 2018-05-17 RX ADMIN — CHLORHEXIDINE GLUCONATE 15 ML: 1.2 RINSE ORAL at 09:01

## 2018-05-17 RX ADMIN — ACETAMINOPHEN 650 MG: 325 TABLET, FILM COATED ORAL at 11:56

## 2018-05-17 RX ADMIN — ACETAMINOPHEN 650 MG: 325 TABLET, FILM COATED ORAL at 06:15

## 2018-05-17 RX ADMIN — LIDOCAINE HYDROCHLORIDE 10 ML: 20 SOLUTION ORAL; TOPICAL at 17:02

## 2018-05-17 RX ADMIN — METHOCARBAMOL 500 MG: 500 TABLET ORAL at 08:59

## 2018-05-17 RX ADMIN — SENNOSIDES 8.6 MG: 8.6 TABLET, FILM COATED ORAL at 21:05

## 2018-05-17 RX ADMIN — SERTRALINE HYDROCHLORIDE 100 MG: 100 TABLET ORAL at 08:59

## 2018-05-17 RX ADMIN — DOCUSATE SODIUM 100 MG: 100 CAPSULE, LIQUID FILLED ORAL at 17:03

## 2018-05-17 RX ADMIN — GABAPENTIN 100 MG: 100 CAPSULE ORAL at 08:59

## 2018-05-17 RX ADMIN — ACETAMINOPHEN 650 MG: 325 TABLET, FILM COATED ORAL at 23:47

## 2018-05-17 NOTE — PROGRESS NOTES
Pt went down to [facility]scan per orders at 2000 in stable condition  IV to Right AC 20g placed by RN apryl RODRIGUEZ  A  PT returned with increase anxiety, requesting PRN  Xanax 0 25mg given  IV removed  Will continue to monitor

## 2018-05-17 NOTE — PROGRESS NOTES
Patient was crying this morning in dayroom  Patient has been out in milieu spontaneously but peripheral  Patient was reluctant to engage in group  Patient states she is tired, anxious and depressed  Patient denies SI  Patient complains of being congested  Patient is calm and cooperative

## 2018-05-17 NOTE — PROGRESS NOTES
Pt calm and cooperative  Visible in the milieu  Social with peers  Refused to participate in group after returning from [address] [place] [address] [address]  Requested to be in small tv room  Denies SI  Will continue to monitor

## 2018-05-17 NOTE — CASE MANAGEMENT
Jakob Keith [phone]  ([facility] [address] intensive ) has been assigned to Pt and will meet with Pt on Wednesday 5/23/18 @ 9:00 am      Discharge plan of care: Pt will live with sister temporarily and work with [facility] intensive  to work on obtaining secure housing with her daughter again in [facility]  Pt's sister will provide discharge transportation  Rollator walker has been ordered with [employer] and will be delivered within 24 hours of discharge  Pt has scheduled appointments in preparation for discharge as follows:  5/24/18 @ [facility] intake for outpatient therapy and psychiatry, 6/13/18 at [facility], 6/6/18 at [facility] and will receive intensive case management services with [facility] upon discharge  Pt's sister or family will provide discharge transport for Pt to and from all outpatient appointments and services  PCP and [facility] location for PT/OT Evaluation and Treatment will need to be scheduled when discharge date is established

## 2018-05-17 NOTE — PLAN OF CARE
Problem: DISCHARGE PLANNING  Goal: Discharge to home or other facility with appropriate resources  INTERVENTIONS:  - Identify barriers to discharge w/patient and caregiver  - Arrange for needed discharge resources and transportation as appropriate  - Identify discharge learning needs (meds, wound care, etc )  - Arrange for interpretive services to assist at discharge as needed  - Refer to Case Management Department for coordinating discharge planning if the patient needs post-hospital services based on physician/advanced practitioner order or complex needs related to functional status, cognitive ability, or social support system   Outcome: Progressing  Discharge plan of care: Pt will live with sister temporarily and work with [employer] [employer] [facility] ([facility]) intensive  to work on obtaining secure housing with her daughter again in [facility]  Pt's sister will provide discharge transportation  Rollator walker has been ordered with [employer] and will be delivered within 24 hours of discharge  Pt has scheduled appointments in preparation for discharge as follows:  5/24/18 @ [facility] intake for outpatient therapy and psychiatry, 6/13/18 at [facility], 6/6/18 at [place] [address] Neurology and will receive intensive case management services with [facility] upon discharge  Pt's sister or family will provide discharge transport for Pt to and from all outpatient appointments and services  PCP and [facility] location for PT/OT Evaluation and Treatment will need to be scheduled when discharge date is established

## 2018-05-17 NOTE — PLAN OF CARE
Anxiety     Anxiety is at manageable level Not Progressing        Depression     Refrain from isolation Not Progressing     Attend and participate in unit activities, including therapeutic, recreational, and educational groups Not Progressing        Risk for Self Injury/Neglect     Attend and participate in unit activities, including therapeutic, recreational, and educational groups Not Progressing          Depression     Treatment Goal: Demonstrate behavioral control of depressive symptoms, verbalize feelings of improved mood/affect, and adopt new coping skills prior to discharge Progressing     Verbalize thoughts and feelings Progressing     Refrain from harming self Progressing     Refrain from self-neglect Progressing     Complete daily ADLs, including personal hygiene independently, as able Progressing        Risk for Self Injury/Neglect     Treatment Goal: Remain safe during length of stay, learn and adopt new coping skills, and be free of self-injurious ideation, impulses and acts at the time of discharge Progressing     Verbalize thoughts and feelings Progressing     Refrain from harming self Progressing     Recognize maladaptive responses and adopt new coping mechanisms Progressing     Complete daily ADLs, including personal hygiene independently, as able Progressing

## 2018-05-17 NOTE — PLAN OF CARE
Anxiety     Anxiety is at manageable level Not Progressing        Depression     Treatment Goal: Demonstrate behavioral control of depressive symptoms, verbalize feelings of improved mood/affect, and adopt new coping skills prior to discharge Not Progressing        Patient reports unmanageable anxiety and depression     Depression     Verbalize thoughts and feelings Progressing     Refrain from harming self Progressing     Refrain from [address] from self-neglect Progressing     Complete daily ADLs, including personal hygiene independently, as able Progressing        Risk for Self Injury/Neglect     Treatment Goal: Remain safe during length of stay, learn and adopt new coping skills, and be free of self-injurious ideation, impulses and acts at the time of discharge Progressing     Verbalize thoughts and feelings Progressing     Refrain from harming self Progressing     Recognize maladaptive responses and adopt new coping mechanisms Progressing     Complete daily ADLs, including personal hygiene independently, as able Progressing

## 2018-05-17 NOTE — PROGRESS NOTES
Progress Note - Behavioral Health   Adrienne Jamil 63 y o  female MRN: [id number]  Unit/Bed#: XI7 565-01 Encounter: [id number]    Patient seen, chart reviewed, discussed with staff  Nursing staff notes the patient remains seclusive and anxious  She has been compliant with medications treatment plan  Fair sleep and adequate appetite  Patient met with [facility] yesterday  Patient also had CT scan of her head yesterday that showed resolution of ICH with relative prominence of the ventricular system suggesting impedence of CSF flow  This morning on approach the patient is quite tearful and states that she continues to feel depressed and angry  Continues with racing thoughts  She states she continues with mood swings and has difficulty being around other people  Patient states that she is sleeping better at night  She reports an adequate appetite  She denies any suicidal or homicidal ideation  She continues with apathy and feels hopeless, helpless  No adverse effects noted with medications      Behavior over the last 24 hours:  unchanged  Sleep: normal  Appetite: normal  Medication side effects: No  ROS: Chronic pain  /71 (BP Location: Right arm)   Pulse 69   Temp 98 6 °F (37 °C) (Tympanic)   Resp 18   Ht 5' 3" (1 6 m)   Wt 84 5 kg (186 lb 4 6 oz)   SpO2 96%   BMI 33 00 kg/m²     Medications:   Current Facility-Administered Medications   Medication Dose Route Frequency    acetaminophen (TYLENOL) tablet 650 mg  650 mg Oral Q4H PRN    acetaminophen (TYLENOL) tablet 650 mg  650 mg Oral Q6H [facility]    al mag oxide-diphenhydramine-lidocaine viscous (MAGIC MOUTHWASH) suspension 10 mL  10 mL Swish & Spit 4x Daily (AC & HS)    albuterol (PROVENTIL HFA,VENTOLIN HFA) inhaler 2 puff  2 puff Inhalation Q6H PRN    ALPRAZolam (XANAX) tablet 0 25 mg  0 25 mg Oral Daily PRN    ARIPiprazole (ABILIFY) tablet 10 mg  10 mg Oral Daily    benzocaine (ANBESOL) 10 % mucosal liquid   Mucosal 4x Daily PRN    bisacodyl (DULCOLAX) rectal suppository 10 mg  10 mg Rectal Daily PRN    chlorhexidine (PERIDEX) 0 12 % oral rinse 15 mL  15 mL Swish & Spit Q12H [facility]    docusate sodium (COLACE) capsule 100 mg  100 mg Oral BID    fluticasone (FLONASE) 50 mcg/act nasal spray 1 spray  1 spray Each Nare BID    fluticasone furoate-vilanterol (BREO ELLIPTA) 1 puff  1 puff Inhalation Daily    gabapentin (NEURONTIN) capsule 200 mg  200 mg Oral TID    HYDROmorphone (DILAUDID) tablet 2 mg  2 mg Oral TID PRN    loratadine (CLARITIN) tablet 10 mg  10 mg Oral Daily    melatonin tablet 6 mg  6 mg Oral HS    methocarbamol (ROBAXIN) tablet 500 mg  500 mg Oral Q6H PRN    montelukast (SINGULAIR) tablet 10 mg  10 mg Oral Daily    ondansetron (ZOFRAN-ODT) dispersible tablet 4 mg  4 mg Oral Q6H PRN    pantoprazole (PROTONIX) EC tablet 20 mg  20 mg Oral Early Morning    pravastatin (PRAVACHOL) tablet 40 mg  40 mg Oral Daily With Dinner    senna (SENOKOT) tablet 8 6 mg  1 tablet Oral HS    sertraline (ZOLOFT) tablet 100 mg  100 mg Oral Daily    traZODone (DESYREL) tablet 25 mg  25 mg Oral HS PRN       Labs:   Admission on 05/10/2018   Component Date Value Ref Range Status    Sodium 05/13/2018 140  136 - 145 mmol/L Final    Potassium 05/13/2018 4 0  3 5 - 5 3 mmol/L Final    Chloride 05/13/2018 105  100 - 108 mmol/L Final    CO2 05/13/2018 29  21 - 32 mmol/L Final    Anion Gap 05/13/2018 6  4 - 13 mmol/L Final    BUN 05/13/2018 14  5 - 25 mg/dL Final    Creatinine 05/13/2018 0 77  0 60 - 1 30 mg/dL Final    Glucose 05/13/2018 88  65 - 140 mg/dL Final    Calcium 05/13/2018 8 9  8 3 - 10 1 mg/dL Final    eGFR 05/13/2018 86  ml/min/1 73sq m Final    WBC 05/13/2018 6 50  4 31 - 10 16 Thousand/uL Final    RBC 05/13/2018 4 03  3 81 - 5 12 Million/uL Final    Hemoglobin 05/13/2018 12 0  11 5 - 15 4 g/dL Final    Hematocrit 05/13/2018 37 9  34 8 - 46 1 % Final    MCV 05/13/2018 94  82 - 98 fL Final    MCH 05/13/2018 29 8  26 8 - 34 3 pg Final    MCHC 05/13/2018 31 7  31 4 - 37 4 g/dL Final    RDW 05/13/2018 14 4  11 6 - 15 1 % Final    MPV 05/13/2018 9 8  8 9 - 12 7 fL Final    Platelets 61/98/4404 571* 149 - 390 Thousands/uL Final    nRBC 05/13/2018 0  /100 WBCs Final    Neutrophils Relative 05/13/2018 38* 43 - 75 % Final    Lymphocytes Relative 05/13/2018 45* 14 - 44 % Final    Monocytes Relative 05/13/2018 13* 4 - 12 % Final    Eosinophils Relative 05/13/2018 3  0 - 6 % Final    Basophils Relative 05/13/2018 1  0 - 1 % Final    Neutrophils Absolute 05/13/2018 2 50  1 85 - 7 62 Thousands/µL Final    Lymphocytes Absolute 05/13/2018 2 94  0 60 - 4 47 Thousands/µL Final    Monocytes Absolute 05/13/2018 0 81  0 17 - 1 22 Thousand/µL Final    Eosinophils Absolute 05/13/2018 0 20  0 00 - 0 61 Thousand/µL Final    Basophils Absolute 05/13/2018 0 03  0 00 - 0 10 Thousands/µL Final       Mental Status Evaluation:  Appearance:  age appropriate, casually dressed and disheveled   Behavior:  Cooperative, variable eye contact, tearful   Speech:  normal pitch and normal volume   Mood:  angry, anxious and depressed   Affect:  labile   Thought Process:  goal directed   Thought Content:  No delusions voiced   Perceptual Disturbances: No auditory or visual hallucinations   Risk Potential: Denies suicidal or homicidal ideation   Sensorium:  person, place and time/date   Cognition:  grossly intact   Consciousness:  alert and awake    Attention: attention span appeared shorter than expected for age   Insight:   Fair   Judgment: fair   Gait/Station:  Ambulates with walker   Motor Activity: no abnormal movements     Progress Toward Goals:  Minimal change    Assessment/Plan   Principal Problem:    Severe episode of recurrent major depressive disorder, without psychotic features ([facility] )  Active Problems:    Bipolar 1 disorder ([facility] )      Recommended Treatment:   Continue gabapentin titration and increased to 200 mg p o  t i d   Will continue to titrate as tolerated  Monitor with increase in Abilify to 10 mg p  o  daily  Continue to encourage in milieu participations  Continue with group therapy, milieu therapy and occupational therapy  Risks, benefits and possible side effects of Medications:   Risks, benefits, and possible side effects of medications explained to patient and patient verbalizes understanding  Counseling / Coordination of Care  Total floor / unit time spent today 25 minutes  Greater than 50% of total time was spent with the patient and / or family counseling and / or coordination of care  A description of the counseling / coordination of care:  Medication management, chart review, patient interview

## 2018-05-18 ENCOUNTER — APPOINTMENT (INPATIENT)
Dept: RADIOLOGY | Facility: HOSPITAL | Age: 58
DRG: 753 | End: 2018-05-18
Payer: COMMERCIAL

## 2018-05-18 PROCEDURE — 76536 US EXAM OF HEAD AND NECK: CPT

## 2018-05-18 PROCEDURE — 97116 GAIT TRAINING THERAPY: CPT

## 2018-05-18 PROCEDURE — 99232 SBSQ HOSP IP/OBS MODERATE 35: CPT | Performed by: NURSE PRACTITIONER

## 2018-05-18 PROCEDURE — 97530 THERAPEUTIC ACTIVITIES: CPT

## 2018-05-18 PROCEDURE — 97110 THERAPEUTIC EXERCISES: CPT

## 2018-05-18 PROCEDURE — 99232 SBSQ HOSP IP/OBS MODERATE 35: CPT | Performed by: PSYCHIATRY & NEUROLOGY

## 2018-05-18 RX ORDER — GABAPENTIN 300 MG/1
300 CAPSULE ORAL 3 TIMES DAILY
Status: DISCONTINUED | OUTPATIENT
Start: 2018-05-18 | End: 2018-05-25 | Stop reason: HOSPADM

## 2018-05-18 RX ADMIN — FLUTICASONE PROPIONATE 1 SPRAY: 50 SPRAY, METERED NASAL at 17:08

## 2018-05-18 RX ADMIN — BENZOCAINE: 100 SOLUTION TOPICAL at 17:14

## 2018-05-18 RX ADMIN — FLUTICASONE PROPIONATE 1 SPRAY: 50 SPRAY, METERED NASAL at 09:15

## 2018-05-18 RX ADMIN — LIDOCAINE HYDROCHLORIDE 10 ML: 20 SOLUTION ORAL; TOPICAL at 21:16

## 2018-05-18 RX ADMIN — ACETAMINOPHEN 650 MG: 325 TABLET, FILM COATED ORAL at 11:40

## 2018-05-18 RX ADMIN — GABAPENTIN 300 MG: 300 CAPSULE ORAL at 21:16

## 2018-05-18 RX ADMIN — ACETAMINOPHEN 650 MG: 325 TABLET, FILM COATED ORAL at 06:04

## 2018-05-18 RX ADMIN — ALPRAZOLAM 0.25 MG: 0.25 TABLET ORAL at 01:03

## 2018-05-18 RX ADMIN — GABAPENTIN 300 MG: 300 CAPSULE ORAL at 17:08

## 2018-05-18 RX ADMIN — SERTRALINE HYDROCHLORIDE 100 MG: 100 TABLET ORAL at 08:14

## 2018-05-18 RX ADMIN — CHLORHEXIDINE GLUCONATE 15 ML: 1.2 RINSE ORAL at 09:15

## 2018-05-18 RX ADMIN — LIDOCAINE HYDROCHLORIDE 10 ML: 20 SOLUTION ORAL; TOPICAL at 12:59

## 2018-05-18 RX ADMIN — ARIPIPRAZOLE 10 MG: 10 TABLET ORAL at 08:15

## 2018-05-18 RX ADMIN — PANTOPRAZOLE SODIUM 20 MG: 20 TABLET, DELAYED RELEASE ORAL at 06:23

## 2018-05-18 RX ADMIN — CHLORHEXIDINE GLUCONATE 15 ML: 1.2 RINSE ORAL at 21:16

## 2018-05-18 RX ADMIN — LIDOCAINE HYDROCHLORIDE 10 ML: 20 SOLUTION ORAL; TOPICAL at 17:09

## 2018-05-18 RX ADMIN — MELATONIN TAB 3 MG 6 MG: 3 TAB at 21:16

## 2018-05-18 RX ADMIN — LIDOCAINE HYDROCHLORIDE 10 ML: 20 SOLUTION ORAL; TOPICAL at 06:04

## 2018-05-18 RX ADMIN — ACETAMINOPHEN 650 MG: 325 TABLET, FILM COATED ORAL at 17:09

## 2018-05-18 RX ADMIN — DOCUSATE SODIUM 100 MG: 100 CAPSULE, LIQUID FILLED ORAL at 08:14

## 2018-05-18 RX ADMIN — DOCUSATE SODIUM 100 MG: 100 CAPSULE, LIQUID FILLED ORAL at 17:08

## 2018-05-18 RX ADMIN — GABAPENTIN 200 MG: 100 CAPSULE ORAL at 08:15

## 2018-05-18 RX ADMIN — HYDROMORPHONE HYDROCHLORIDE 2 MG: 2 TABLET ORAL at 10:09

## 2018-05-18 RX ADMIN — PRAVASTATIN SODIUM 40 MG: 40 TABLET ORAL at 17:08

## 2018-05-18 RX ADMIN — MONTELUKAST SODIUM 10 MG: 10 TABLET, FILM COATED ORAL at 08:14

## 2018-05-18 RX ADMIN — LORATADINE 10 MG: 10 TABLET ORAL at 08:14

## 2018-05-18 RX ADMIN — FLUTICASONE FUROATE AND VILANTEROL 1 PUFF: 100; 25 POWDER RESPIRATORY (INHALATION) at 09:15

## 2018-05-18 RX ADMIN — SENNOSIDES 8.6 MG: 8.6 TABLET, FILM COATED ORAL at 21:16

## 2018-05-18 RX ADMIN — ACETAMINOPHEN 650 MG: 325 TABLET, FILM COATED ORAL at 23:30

## 2018-05-18 NOTE — PLAN OF CARE
Problem: Ineffective Coping  Goal: Participates in unit activities  Interventions:  - Provide therapeutic environment   - Provide required programming   - Redirect inappropriate behaviors    Outcome: Progressing  Fluctuating participation in groups  Refused to come out of bed for  Community meeting,attended exercise group but sat with her back to the leader  Pleasant by noontime in 1-1 interactions with staff

## 2018-05-18 NOTE — PHYSICAL THERAPY NOTE
Physical Therapy Progress Note     05/18/18 1428   Pain Assessment   Pain Assessment 0-10   Pain Score 4   Pain Location Back;Head   Pain Orientation Bilateral   Restrictions/Precautions   Weight Bearing Precautions Per Order No   Other Precautions Fall Risk;Cognitive   General   Chart Reviewed Yes   Family/Caregiver Present No   Subjective   Subjective Agreeable to PT  seated in day room  Transfers   Sit to Stand 5  Supervision   Additional items Assist x 1;Verbal cues;Armrests   Stand to Sit 5  Supervision   Additional items Assist x 1;Verbal cues;Armrests   Stand pivot 5  Supervision   Additional items Assist x 1   Ambulation/Elevation   Gait pattern Ataxia; Excessively slow; Short stride;Narrow LADAN; Decreased foot clearance; Inconsistent alla   Gait Assistance 5  Supervision   Additional items Assist x 1;Verbal cues   Assistive Device Rolling walker   Distance 400ft, 280ft   Balance   Static Sitting Good   Static Standing Fair -   Ambulatory Fair -   Endurance Deficit   Endurance Deficit Yes   Endurance Deficit Description Fatigue, pain   Activity Tolerance   Activity Tolerance Patient limited by fatigue;Patient limited by pain; Patient tolerated treatment well   Nurse Made Aware Yes   Exercises   TKR Sitting;Standing;20 reps;Bilateral;AROM   Assessment   Prognosis Fair   Problem List Impaired balance;Decreased mobility; Decreased coordination;Decreased cognition; Impaired judgement;Decreased safety awareness;Pain;Obesity   Assessment pt  progressing well with mobility  pt  noted with impaired balance, endurance, and mobility  Ambulation noted to be ataxic with B/L knee instability, decreased foot clearance  Pt  needed much cueing to keep the knee in extension to perform heel raises  Performed seated and standing LE TE's actively  Will continue to follow during the stay to address mobility impairments       Barriers to Discharge Decreased caregiver support   Goals   Patient Goals None reported   STG Expiration Date 05/26/18   Treatment Day 2   Plan   Treatment/Interventions Functional transfer training;LE strengthening/ROM; Therapeutic exercise; Endurance training;Patient/family training;Equipment eval/education;Gait training;Spoke to nursing   Progress Slow progress, medical status limitations   PT Frequency 2-3x/wk   Recommendation   Recommendation Home PT; Home with family support   Equipment Recommended Walker   PT - OK to Discharge Yes         Verlena Nails, PTA

## 2018-05-18 NOTE — PLAN OF CARE
Problem: PHYSICAL THERAPY ADULT  Goal: Performs mobility at highest level of function for planned discharge setting  See evaluation for individualized goals  Treatment/Interventions: Functional transfer training, LE strengthening/ROM, Therapeutic exercise, Elevations, Endurance training, Patient/family training, Equipment eval/education, Bed mobility, Gait training, Continued evaluation, Spoke to nursing, Spoke to case management, OT  Equipment Recommended: Alyson Richardson       See flowsheet documentation for full assessment, interventions and recommendations  Outcome: Progressing  Prognosis: Fair  Problem List: Impaired balance, Decreased mobility, Decreased coordination, Decreased cognition, Impaired judgement, Decreased safety awareness, Pain, Obesity  Assessment: pt  progressing well with mobility  pt  noted with impaired balance, endurance, and mobility  Ambulation noted to be ataxic with B/L knee instability, decreased foot clearance  Pt  needed much cueing to keep the knee in extension to perform heel raises  Performed seated and standing LE TE's actively  Will continue to follow during the stay to address mobility impairments  Barriers to Discharge: Decreased caregiver support     Recommendation: Home PT, Home with family support     PT - OK to Discharge: Yes    See flowsheet documentation for full assessment

## 2018-05-18 NOTE — PLAN OF CARE
Problem: DISCHARGE PLANNING  Goal: Discharge to home or other facility with appropriate resources  INTERVENTIONS:  - Identify barriers to discharge w/patient and caregiver  - Arrange for needed discharge resources and transportation as appropriate  - Identify discharge learning needs (meds, wound care, etc )  - Arrange for interpretive services to assist at discharge as needed  - Refer to Case Management Department for coordinating discharge planning if the patient needs post-hospital services based on physician/advanced practitioner order or complex needs related to functional status, cognitive ability, or social support system   Outcome: Progressing  Pt  has a Social Security Disability phone interview on Monday 5/21/18 @ 10:50 AM   Social Security will call Pt on her cell phone to continue with the application process with goal of Pt eventually receiving SSD income  Pt will need access to her cell phone Monday 5/21/18 @ 10:50 am to complete this phone interview  Discharge plan of care: Pt will live with sister temporarily and work with [facility]  to work on obtaining secure housing with her daughter again in [place], [place] area  Pt's sister will provide discharge transportation  Rollator walker has been ordered with [employer] and will be delivered within 24 hours of discharge  Pt has scheduled appointments in preparation for discharge as follows:  5/24/18 @ [facility] intake for outpatient therapy and psychiatry, 6/13/18 at [facility], 6/6/18 at [place] [address] Neurology and will receive intensive case management services with [facility] upon discharge  Pt's sister or family will provide discharge transport for Pt to and from all outpatient appointments and services     PCP and [facility] @ [facility] location for PT/OT Evaluation and Treatment will need to be scheduled when discharge date is established

## 2018-05-18 NOTE — PLAN OF CARE
Anxiety     Anxiety is at manageable level Not Progressing        Depression     Treatment Goal: Demonstrate behavioral control of depressive symptoms, verbalize feelings of improved mood/affect, and adopt new coping skills prior to discharge Not Progressing     Attend and participate in unit activities, including therapeutic, recreational, and educational groups Not Progressing        Risk for Self Injury/Neglect     Attend and participate in unit activities, including therapeutic, recreational, and educational groups Not Progressing          Depression     Verbalize thoughts and feelings Progressing     Refrain from harming self Progressing     Refrain from [address] from self-neglect Progressing     Complete daily ADLs, including personal hygiene independently, as able Progressing        Risk for Self Injury/Neglect     Treatment Goal: Remain safe during length of stay, learn and adopt new coping skills, and be free of self-injurious ideation, impulses and acts at the time of discharge Progressing     Verbalize thoughts and feelings Progressing     Refrain from harming self Progressing     Recognize maladaptive responses and adopt new coping mechanisms Progressing     Complete daily ADLs, including personal hygiene independently, as able Progressing

## 2018-05-18 NOTE — PROGRESS NOTES
Progress Note - Behavioral Health   Antonio Durán 63 y o  female MRN: [id number]  Unit/Bed#: OV9 565-01 Encounter: [id number]    The patient was seen for continuing care and reviewed with treatment team   Staff reported that the patient was anxious last night and could not rest but they administered a p r n  and she was effective  Patient continues with multiple somatic complaints  Patient told me that she did not sleep well last night  She stated she had a migraine and was having eye floaters and a lot of anxiety  She stated she does not feel well today due to being overtired  She states her appetite is okay  She states her mood is all over the map and elaborates that she is really sad, then angry, and waiting for happiness to come through  No SI  She states that her anxiety is manageable during the day but that she usually has increased anxiety in the evening  She spent considerable time talking about the difficulty she is having adjusting to life after her stroke  She also was talking about the toxic relationship that she had with her boyfriend who she believes is a narcissist   He did a lot of damage to her self-esteem and she continues to dwell on that  She wants to continue with the gabapentin titration as she states it helps her pain as well as anxiety  Mental Status Evaluation:  Appearance:  Adequate hygiene and grooming   Behavior:  calm and cooperative   Fund of knowledge  Not assessed   Speech:   Language: Normal rate and Normal volume, rambling  No overt abnormality   Mood:  anxious and depressed   Affect:   Associations: constricted  intract   Thought Process:   Tangential   Thought Content:  Does not verbalize delusional material   Perceptual Disturbances: Denies hallucinations and does not appear to be responding to internal stimuli   Risk Potential: No suicidal or homicidal ideation   Orientation  Oriented x 3   Memory grossly intact   Attention/Concentration Attention span appeared shorter than expected for age   Insight:  limited   Judgment: Limited   Gait/Station:  needs assistive device   Motor Activity: No abnormal movement noted     Progress Toward Goals:  Minimal progress    Assessment/Plan    Principal Problem:    Severe episode of recurrent major depressive disorder, without psychotic features ([facility] )  Active Problems:    Bipolar 1 disorder ([facility] )      Recommended Treatment:  Continue with gabapentin titration to 300 mg t i d  Continue with pharmacotherapy, group therapy, milieu therapy and occupational therapy    The patient will be maintained on the following medications:    Current Facility-Administered Medications:  acetaminophen 650 mg Oral Q4H PRN Hima Hopkins MD   acetaminophen 650 mg Oral Q6H [facility] Hima Hopkins MD   al mag oxide-diphenhydramine-lidocaine viscous 10 mL Swish & Spit 4x Daily (AC & HS) Hima Hopkins MD   albuterol 2 puff Inhalation Q6H PRN Hima Hopkins MD   ALPRAZolam 0 25 mg Oral Daily PRN Hima Hopkins MD   ARIPiprazole 10 mg Oral Daily Luz Parekh PA-C   benzocaine  Mucosal 4x Daily PRN Hima Hopkins MD   bisacodyl 10 mg Rectal Daily PRN Hima Hopkins MD   chlorhexidine 15 mL Swish & Spit Q12H [facility] Hima Hopkins MD   docusate sodium 100 mg Oral BID Hima Hopkins MD   fluticasone 1 spray Each Nare BID Hima Hopkins MD   fluticasone furoate-vilanterol 1 puff Inhalation Daily Hodan Griffin MD   gabapentin 300 mg Oral TID HUSAM Burt   HYDROmorphone 2 mg Oral TID PRN Hima Hopkins MD   loratadine 10 mg Oral Daily Hima Hopkins MD   melatonin 6 mg Oral HS Hima Hopkins MD   methocarbamol 500 mg Oral Q6H PRN Hima Hopkins MD   montelukast 10 mg Oral Daily Hima Hopkins MD   ondansetron 4 mg Oral Q6H PRN Hima Hopkins MD   pantoprazole 20 mg Oral Early Morning Hima Hopkins MD   pravastatin 40 mg Oral Daily With Cyndie Cheek MD   senna 1 tablet Oral HS Hima Hopkins MD   sertraline 100 mg Oral Daily MD Yazmin Grover 25 mg Oral HS PRN Hima Hopkins MD       Risks, benefits and possible side effects of Medications:   Risks, benefits, and possible side effects of medications explained to patient and patient verbalizes understanding

## 2018-05-18 NOTE — CASE MANAGEMENT
Jakob Keith [phone]  ([address] [facility] [address] intensive ) has been assigned to Pt and will meet with Pt on Wednesday 5/23/18 @ 9:00 am       Discharge plan of care: Pt will live with sister temporarily and work with [facility]  to work on obtaining secure housing with her daughter again in [facility]  Pt's sister will provide discharge transportation  Rollator walker has been ordered with [employer] and will be delivered within 24 hours of discharge  Call [employer] Filemon Guillen @ extension [phone] or Andrew Nuñez @ [phone]) to obtain rollator walker prior to discharge  Pt has scheduled appointments in preparation for discharge as follows:  5/24/18 @ [facility] intake for outpatient therapy and psychiatry, 6/13/18 at [facility], 6/6/18 at [place] [address] Neurology and will receive intensive case management services with [facility] upon discharge  Pt's sister or family will provide discharge transport for Pt to and from all outpatient appointments and services  PCP and [facility] @ [facility] location for PT/OT Evaluation and Treatment will need to be scheduled when discharge date is established  Pt  has a Social Security Disability phone interview on Monday 5/21/18 @ 10:50 AM   Social Security will call Pt on her cell phone to continue with the application process with goal of Pt eventually receiving SSD income  Pt will need access to her cell phone Monday 5/21/18 @ 10:50 am to complete this phone interview

## 2018-05-18 NOTE — PROGRESS NOTES
Pt calm and cooperative with care this evening  Pt was medication compliant  Pt reported depression and anxiety related to "my life; it's a mess"  Pt reports being sad that her boyfriend of 20 years left her recently  Pt was tearful at times, but would brighten upon approach  Pt denied any suicidal/homicidal ideations and hallucinations  Pt spent most of the evening in her room, but did occasionally come out to the dayroom  When pt was in the dayroom, she sat alone and did not interact with her peers  Will continue to monitor

## 2018-05-18 NOTE — PROGRESS NOTES
Pt was calm and cooperative with care this evening  Pt was medication compliant  Pt was very scant in conversation, only answering exactly what was asked, and flat in affect  Pt reported moderate depression and anxiety  Pt had difficulty with her immediate memory; recalling what she was watching on TV during a commercial break  Pt was out of bed, and in the milieu, but did not interact with her peers  Most of the day she spent in the small TV room, alone  When pt was in the day room, she sat alone and did not interact with her peers  Will continue to monitor

## 2018-05-18 NOTE — PROGRESS NOTES
Pt OOB, visible on unit for breakfast, then returned to room due to a headache  Pt did not attend first group, but went to the second group  Pt had limited interaction with peers at group  Pt compliant with medications and incentive spirtometry  Pt became tearful when talking about her anxiety and depression; pt states she feels like she is "starting all over and is all alone" for when discharge comes  Pt is stressed about her living situation and her boyfriend leaving her  Writer helped pt establish coping mechanisms, pt said deep breathing helps her decrease her anxiety, however she still has trouble keeping her anxiety under control  When asked about SI, pt said she "sometimes" feels like she wants to hurt herself, but says she hasn't felt that way since she was admitted  Pt currently denies SI and HI  Will continue to monitor

## 2018-05-18 NOTE — PROGRESS NOTES
Patient awake 0100 stating she was anxious and could not rest  Prn xanax requested and administered  Medication effective, slept rest of night

## 2018-05-19 PROBLEM — F43.12 CHRONIC POST-TRAUMATIC STRESS DISORDER (PTSD): Chronic | Status: ACTIVE | Noted: 2018-05-19

## 2018-05-19 PROBLEM — F31.81 BIPOLAR 2 DISORDER, MAJOR DEPRESSIVE EPISODE (HCC): Chronic | Status: ACTIVE | Noted: 2018-05-15

## 2018-05-19 LAB — TSH SERPL DL<=0.05 MIU/L-ACNC: 0.55 UIU/ML (ref 0.36–3.74)

## 2018-05-19 PROCEDURE — 84443 ASSAY THYROID STIM HORMONE: CPT | Performed by: NURSE PRACTITIONER

## 2018-05-19 PROCEDURE — 99233 SBSQ HOSP IP/OBS HIGH 50: CPT | Performed by: PSYCHIATRY & NEUROLOGY

## 2018-05-19 RX ORDER — BUSPIRONE HYDROCHLORIDE 5 MG/1
5 TABLET ORAL 3 TIMES DAILY
Status: DISCONTINUED | OUTPATIENT
Start: 2018-05-19 | End: 2018-05-20

## 2018-05-19 RX ADMIN — BUSPIRONE HYDROCHLORIDE 5 MG: 5 TABLET ORAL at 17:24

## 2018-05-19 RX ADMIN — FLUTICASONE PROPIONATE 1 SPRAY: 50 SPRAY, METERED NASAL at 08:41

## 2018-05-19 RX ADMIN — GABAPENTIN 300 MG: 300 CAPSULE ORAL at 21:59

## 2018-05-19 RX ADMIN — FLUTICASONE FUROATE AND VILANTEROL 1 PUFF: 100; 25 POWDER RESPIRATORY (INHALATION) at 08:41

## 2018-05-19 RX ADMIN — ACETAMINOPHEN 650 MG: 325 TABLET, FILM COATED ORAL at 12:31

## 2018-05-19 RX ADMIN — SERTRALINE HYDROCHLORIDE 100 MG: 100 TABLET ORAL at 08:38

## 2018-05-19 RX ADMIN — DOCUSATE SODIUM 100 MG: 100 CAPSULE, LIQUID FILLED ORAL at 17:24

## 2018-05-19 RX ADMIN — LORATADINE 10 MG: 10 TABLET ORAL at 08:38

## 2018-05-19 RX ADMIN — GABAPENTIN 300 MG: 300 CAPSULE ORAL at 08:38

## 2018-05-19 RX ADMIN — ACETAMINOPHEN 650 MG: 325 TABLET, FILM COATED ORAL at 06:12

## 2018-05-19 RX ADMIN — ALPRAZOLAM 0.25 MG: 0.25 TABLET ORAL at 10:45

## 2018-05-19 RX ADMIN — GABAPENTIN 300 MG: 300 CAPSULE ORAL at 17:24

## 2018-05-19 RX ADMIN — HYDROMORPHONE HYDROCHLORIDE 2 MG: 2 TABLET ORAL at 09:31

## 2018-05-19 RX ADMIN — LIDOCAINE HYDROCHLORIDE 10 ML: 20 SOLUTION ORAL; TOPICAL at 12:31

## 2018-05-19 RX ADMIN — ACETAMINOPHEN 650 MG: 325 TABLET, FILM COATED ORAL at 17:23

## 2018-05-19 RX ADMIN — LIDOCAINE HYDROCHLORIDE 10 ML: 20 SOLUTION ORAL; TOPICAL at 22:15

## 2018-05-19 RX ADMIN — MELATONIN TAB 3 MG 6 MG: 3 TAB at 22:15

## 2018-05-19 RX ADMIN — CHLORHEXIDINE GLUCONATE 15 ML: 1.2 RINSE ORAL at 08:38

## 2018-05-19 RX ADMIN — CHLORHEXIDINE GLUCONATE 15 ML: 1.2 RINSE ORAL at 21:59

## 2018-05-19 RX ADMIN — METHOCARBAMOL 500 MG: 500 TABLET ORAL at 10:45

## 2018-05-19 RX ADMIN — FLUTICASONE PROPIONATE 1 SPRAY: 50 SPRAY, METERED NASAL at 17:24

## 2018-05-19 RX ADMIN — LIDOCAINE HYDROCHLORIDE 10 ML: 20 SOLUTION ORAL; TOPICAL at 17:23

## 2018-05-19 RX ADMIN — BUSPIRONE HYDROCHLORIDE 5 MG: 5 TABLET ORAL at 12:31

## 2018-05-19 RX ADMIN — MONTELUKAST SODIUM 10 MG: 10 TABLET, FILM COATED ORAL at 08:38

## 2018-05-19 RX ADMIN — PRAVASTATIN SODIUM 40 MG: 40 TABLET ORAL at 17:23

## 2018-05-19 RX ADMIN — SENNOSIDES 8.6 MG: 8.6 TABLET, FILM COATED ORAL at 22:19

## 2018-05-19 RX ADMIN — LIDOCAINE HYDROCHLORIDE 10 ML: 20 SOLUTION ORAL; TOPICAL at 06:12

## 2018-05-19 RX ADMIN — DOCUSATE SODIUM 100 MG: 100 CAPSULE, LIQUID FILLED ORAL at 08:38

## 2018-05-19 RX ADMIN — PANTOPRAZOLE SODIUM 20 MG: 20 TABLET, DELAYED RELEASE ORAL at 06:12

## 2018-05-19 RX ADMIN — BUSPIRONE HYDROCHLORIDE 5 MG: 5 TABLET ORAL at 21:59

## 2018-05-19 RX ADMIN — ARIPIPRAZOLE 10 MG: 10 TABLET ORAL at 08:38
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
## 2018-05-19 NOTE — PROGRESS NOTES
Pt calm and pleasant upon approach and remains medication compliant  Pt remains seclusive to self, c/o headache 8/10 and requested prn Dilaudid- given at 0931  Pt returned to nurse c/o anxiety/depression and muscle spasm r/t her thinking about thyroid nodules might be cancerous  Prn Robaxin & Xanax given at 10:45  Pt denies SI/HI; didn't attend morning group and is currently in bed  Will continue to monitor

## 2018-05-20 PROCEDURE — 99232 SBSQ HOSP IP/OBS MODERATE 35: CPT | Performed by: PSYCHIATRY & NEUROLOGY

## 2018-05-20 RX ORDER — BUSPIRONE HYDROCHLORIDE 10 MG/1
10 TABLET ORAL 2 TIMES DAILY
Status: DISCONTINUED | OUTPATIENT
Start: 2018-05-20 | End: 2018-05-22

## 2018-05-20 RX ADMIN — MELATONIN TAB 3 MG 6 MG: 3 TAB at 21:47

## 2018-05-20 RX ADMIN — LORATADINE 10 MG: 10 TABLET ORAL at 09:18

## 2018-05-20 RX ADMIN — DOCUSATE SODIUM 100 MG: 100 CAPSULE, LIQUID FILLED ORAL at 09:18

## 2018-05-20 RX ADMIN — BUSPIRONE HYDROCHLORIDE 5 MG: 5 TABLET ORAL at 09:18

## 2018-05-20 RX ADMIN — LIDOCAINE HYDROCHLORIDE 10 ML: 20 SOLUTION ORAL; TOPICAL at 06:29

## 2018-05-20 RX ADMIN — CHLORHEXIDINE GLUCONATE 15 ML: 1.2 RINSE ORAL at 09:17

## 2018-05-20 RX ADMIN — LIDOCAINE HYDROCHLORIDE 10 ML: 20 SOLUTION ORAL; TOPICAL at 21:46

## 2018-05-20 RX ADMIN — CHLORHEXIDINE GLUCONATE 15 ML: 1.2 RINSE ORAL at 21:46

## 2018-05-20 RX ADMIN — LIDOCAINE HYDROCHLORIDE 10 ML: 20 SOLUTION ORAL; TOPICAL at 17:29

## 2018-05-20 RX ADMIN — GABAPENTIN 300 MG: 300 CAPSULE ORAL at 17:28

## 2018-05-20 RX ADMIN — DOCUSATE SODIUM 100 MG: 100 CAPSULE, LIQUID FILLED ORAL at 17:27

## 2018-05-20 RX ADMIN — ONDANSETRON 4 MG: 4 TABLET, ORALLY DISINTEGRATING ORAL at 20:02

## 2018-05-20 RX ADMIN — HYDROMORPHONE HYDROCHLORIDE 2 MG: 2 TABLET ORAL at 09:23

## 2018-05-20 RX ADMIN — ACETAMINOPHEN 650 MG: 325 TABLET, FILM COATED ORAL at 01:30

## 2018-05-20 RX ADMIN — SERTRALINE HYDROCHLORIDE 100 MG: 100 TABLET ORAL at 09:18

## 2018-05-20 RX ADMIN — FLUTICASONE FUROATE AND VILANTEROL 1 PUFF: 100; 25 POWDER RESPIRATORY (INHALATION) at 09:24

## 2018-05-20 RX ADMIN — PANTOPRAZOLE SODIUM 20 MG: 20 TABLET, DELAYED RELEASE ORAL at 06:29

## 2018-05-20 RX ADMIN — ALPRAZOLAM 0.25 MG: 0.25 TABLET ORAL at 11:37

## 2018-05-20 RX ADMIN — HYDROMORPHONE HYDROCHLORIDE 2 MG: 2 TABLET ORAL at 21:58

## 2018-05-20 RX ADMIN — BUSPIRONE HYDROCHLORIDE 10 MG: 10 TABLET ORAL at 17:26

## 2018-05-20 RX ADMIN — GABAPENTIN 300 MG: 300 CAPSULE ORAL at 21:47

## 2018-05-20 RX ADMIN — SENNOSIDES 8.6 MG: 8.6 TABLET, FILM COATED ORAL at 21:46

## 2018-05-20 RX ADMIN — ACETAMINOPHEN 650 MG: 325 TABLET, FILM COATED ORAL at 06:29

## 2018-05-20 RX ADMIN — FLUTICASONE PROPIONATE 1 SPRAY: 50 SPRAY, METERED NASAL at 17:28

## 2018-05-20 RX ADMIN — ARIPIPRAZOLE 10 MG: 10 TABLET ORAL at 09:18

## 2018-05-20 RX ADMIN — ACETAMINOPHEN 650 MG: 325 TABLET, FILM COATED ORAL at 17:27

## 2018-05-20 RX ADMIN — ACETAMINOPHEN 650 MG: 325 TABLET, FILM COATED ORAL at 23:34

## 2018-05-20 RX ADMIN — PRAVASTATIN SODIUM 40 MG: 40 TABLET ORAL at 17:27

## 2018-05-20 RX ADMIN — METHOCARBAMOL 500 MG: 500 TABLET ORAL at 10:30

## 2018-05-20 RX ADMIN — LIDOCAINE HYDROCHLORIDE 10 ML: 20 SOLUTION ORAL; TOPICAL at 11:37

## 2018-05-20 RX ADMIN — FLUTICASONE PROPIONATE 1 SPRAY: 50 SPRAY, METERED NASAL at 09:24

## 2018-05-20 RX ADMIN — MONTELUKAST SODIUM 10 MG: 10 TABLET, FILM COATED ORAL at 09:18

## 2018-05-20 RX ADMIN — GABAPENTIN 300 MG: 300 CAPSULE ORAL at 09:18

## 2018-05-20 RX ADMIN — ACETAMINOPHEN 650 MG: 325 TABLET, FILM COATED ORAL at 11:37

## 2018-05-20 NOTE — PROGRESS NOTES
Pt out in small TV room socializing with other patients  Worries about her thyroid nodules considering her sister's history of thyroid cancer  Reports she had urge to smoke today, the first time in her admission  Pt was medication compliant  Calm and cooperative with care  Denies s/s

## 2018-05-20 NOTE — PROGRESS NOTES
Pt calm and pleasant upon approach; remains medication compliant, did not attend groups and not social with peers  Pt denies SI/HI and reports anxiety and depression  Pt c/o headache 8/10, muscle spasms, & anxiety  Prn's given: Dilaudid @ 9:23; Robaxin @ 10:30; & Xanax @ 11:37am   Pt encouraged to attend group and is currently in bed resting  Will continue to monitor

## 2018-05-20 NOTE — PLAN OF CARE
Depression     Attend and participate in unit activities, including therapeutic, recreational, and educational groups Not Progressing        Risk for Self Injury/Neglect     Attend and participate in unit activities, including therapeutic, recreational, and educational groups Not Progressing          Anxiety     Anxiety is at manageable level Progressing        Depression     Treatment Goal: Demonstrate behavioral control of depressive symptoms, verbalize feelings of improved mood/affect, and adopt new coping skills prior to discharge Progressing     Verbalize thoughts and feelings Progressing     Refrain from harming self Progressing     Refrain from [address] from self-neglect Progressing     Complete daily ADLs, including personal hygiene independently, as able Progressing        Risk for Self Injury/Neglect     Treatment Goal: Remain safe during length of stay, learn and adopt new coping skills, and be free of self-injurious ideation, impulses and acts at the time of discharge Progressing     Verbalize thoughts and feelings Progressing     Refrain from harming self Progressing     Recognize maladaptive responses and adopt new coping mechanisms Progressing     Complete daily ADLs, including personal hygiene independently, as able Progressing

## 2018-05-20 NOTE — PROGRESS NOTES
Progress Note - Behavioral Health   Zach Wright 63 y o  female MRN: [id number]  Unit/Bed#: HA3 565-01 Encounter: [id number]    The patient was seen for continuing care and reviewed with treatment team   Complains of headaches earlier this morning which got better with a dose of Dilaudid and Robaxin  She also c/o poor sleep  Tolerated buspirone  Thoughts are no longer racing  Mental Status Evaluation:  Appearance:  Adequate hygiene and grooming and Good eye contact   Behavior:  calm and cooperative   Fund of knowledge  Not assessed   Speech:   Language: Normal rate and Normal volume  No overt abnormality   Mood:  anxious and depressed   Affect:   Associations: blunted and constricted  Tightly connected   Thought Process:  Goal directed and coherent   Thought Content:  Does not verbalize delusional material   Perceptual Disturbances: Denies hallucinations and does not appear to be responding to internal stimuli   Risk Potential: No suicidal or homicidal ideation   Orientation  Oriented x 3   Memory grossly intact   Attention/Concentration attention span and concentration were age appropriate   Insight:  Good insight   Judgment: Good judgment   Gait/Station:  needs assistive device   Motor Activity: No abnormal movement noted     Progress Toward Goals:  No significant changes    Assessment/Plan    Principal Problem:    Bipolar 2 disorder, major depressive episode ([facility] [address] )  Active Problems:    Chronic post-traumatic stress disorder (PTSD)      Recommended Treatment:will gradually increase buspirone Continue with pharmacotherapy, group therapy, milieu therapy and occupational therapy    The patient will be maintained on the following medications:    Current Facility-Administered Medications:  acetaminophen 650 mg Oral Q4H PRN Everette Licea MD   acetaminophen 650 mg Oral Q6H [address] Everette Licea MD   al mag oxide-diphenhydramine-lidocaine viscous 10 mL Swish & Spit 4x Daily (AC & HS) Everette Licea MD   albuterol 2 puff Inhalation Q6H PRN Sophie Topete MD   ALPRAZolam 0 25 mg Oral Daily PRN Sophie Topete MD   ARIPiprazole 10 mg Oral Daily Luz Parekh PA-C   benzocaine  Mucosal 4x Daily PRN Sophie Topete MD   bisacodyl 10 mg Rectal Daily PRN Sophie Topete MD   busPIRone 10 mg Oral BID Fer Cortez MD   chlorhexidine 15 mL Swish & Spit Q12H Rishi Stone MD   docusate sodium 100 mg Oral BID Sophie Topete MD   fluticasone 1 spray Each Nare BID Sophie Toepte MD   fluticasone furoate-vilanterol 1 puff Inhalation Daily Fer Cortez MD   gabapentin 300 mg Oral TID HUSAM Edge   HYDROmorphone 2 mg Oral TID PRN Sophie Topete MD   loratadine 10 mg Oral Daily Sophie Topete MD   melatonin 6 mg Oral HS Sophie Topete MD   methocarbamol 500 mg Oral Q6H PRN Sophie Topete MD   montelukast 10 mg Oral Daily Sophie Topete MD   ondansetron 4 mg Oral Q6H PRN Sophie Topete MD   pantoprazole 20 mg Oral Early Morning Sophie Topete MD   pravastatin 40 mg Oral Daily With Gerardo Roche MD   senna 1 tablet Oral HS Sophie Topete MD   sertraline 100 mg Oral Daily Fer Cortez MD   traZODone 25 mg Oral HS PRN Sophie Topete MD       Risks, benefits and possible side effects of Medications:   Risks, benefits, and possible side effects of medications explained to patient and patient verbalizes understanding

## 2018-05-21 PROCEDURE — 99232 SBSQ HOSP IP/OBS MODERATE 35: CPT | Performed by: NURSE PRACTITIONER

## 2018-05-21 PROCEDURE — 99232 SBSQ HOSP IP/OBS MODERATE 35: CPT | Performed by: PSYCHIATRY & NEUROLOGY

## 2018-05-21 RX ADMIN — ARIPIPRAZOLE 10 MG: 10 TABLET ORAL at 08:34

## 2018-05-21 RX ADMIN — SERTRALINE HYDROCHLORIDE 100 MG: 100 TABLET ORAL at 08:34

## 2018-05-21 RX ADMIN — BUSPIRONE HYDROCHLORIDE 10 MG: 10 TABLET ORAL at 17:00

## 2018-05-21 RX ADMIN — CHLORHEXIDINE GLUCONATE 15 ML: 1.2 RINSE ORAL at 08:34

## 2018-05-21 RX ADMIN — ACETAMINOPHEN 650 MG: 325 TABLET, FILM COATED ORAL at 23:55

## 2018-05-21 RX ADMIN — BUSPIRONE HYDROCHLORIDE 10 MG: 10 TABLET ORAL at 08:34

## 2018-05-21 RX ADMIN — METHOCARBAMOL 500 MG: 500 TABLET ORAL at 19:34

## 2018-05-21 RX ADMIN — MELATONIN TAB 3 MG 6 MG: 3 TAB at 21:12

## 2018-05-21 RX ADMIN — LIDOCAINE HYDROCHLORIDE 10 ML: 20 SOLUTION ORAL; TOPICAL at 16:58

## 2018-05-21 RX ADMIN — SENNOSIDES 8.6 MG: 8.6 TABLET, FILM COATED ORAL at 21:12

## 2018-05-21 RX ADMIN — DOCUSATE SODIUM 100 MG: 100 CAPSULE, LIQUID FILLED ORAL at 17:00

## 2018-05-21 RX ADMIN — LIDOCAINE HYDROCHLORIDE 10 ML: 20 SOLUTION ORAL; TOPICAL at 06:08

## 2018-05-21 RX ADMIN — MONTELUKAST SODIUM 10 MG: 10 TABLET, FILM COATED ORAL at 08:34

## 2018-05-21 RX ADMIN — ACETAMINOPHEN 650 MG: 325 TABLET, FILM COATED ORAL at 12:03

## 2018-05-21 RX ADMIN — GABAPENTIN 300 MG: 300 CAPSULE ORAL at 16:58

## 2018-05-21 RX ADMIN — FLUTICASONE FUROATE AND VILANTEROL 1 PUFF: 100; 25 POWDER RESPIRATORY (INHALATION) at 08:35

## 2018-05-21 RX ADMIN — ACETAMINOPHEN 650 MG: 325 TABLET, FILM COATED ORAL at 06:08

## 2018-05-21 RX ADMIN — GABAPENTIN 300 MG: 300 CAPSULE ORAL at 08:34

## 2018-05-21 RX ADMIN — FLUTICASONE PROPIONATE 1 SPRAY: 50 SPRAY, METERED NASAL at 08:35

## 2018-05-21 RX ADMIN — CHLORHEXIDINE GLUCONATE 15 ML: 1.2 RINSE ORAL at 21:11

## 2018-05-21 RX ADMIN — LORATADINE 10 MG: 10 TABLET ORAL at 08:34

## 2018-05-21 RX ADMIN — DOCUSATE SODIUM 100 MG: 100 CAPSULE, LIQUID FILLED ORAL at 08:34

## 2018-05-21 RX ADMIN — LIDOCAINE HYDROCHLORIDE 10 ML: 20 SOLUTION ORAL; TOPICAL at 21:12

## 2018-05-21 RX ADMIN — ACETAMINOPHEN 650 MG: 325 TABLET, FILM COATED ORAL at 17:00

## 2018-05-21 RX ADMIN — FLUTICASONE PROPIONATE 1 SPRAY: 50 SPRAY, METERED NASAL at 17:00

## 2018-05-21 RX ADMIN — GABAPENTIN 300 MG: 300 CAPSULE ORAL at 21:12

## 2018-05-21 RX ADMIN — PRAVASTATIN SODIUM 40 MG: 40 TABLET ORAL at 16:58

## 2018-05-21 RX ADMIN — PANTOPRAZOLE SODIUM 20 MG: 20 TABLET, DELAYED RELEASE ORAL at 06:07

## 2018-05-21 NOTE — PROGRESS NOTES
Progress Note - Behavioral Health   Elza Sahni 63 y o  female MRN: [id number]  Unit/Bed#: YR7 565-01 Encounter: [id number]    The patient was seen for continuing care and reviewed with treatment team   Staff report that the patient has been taking p r ns  exactly when due and sleeping all day and skipping groups because she is so sedated  Patient states she has a lot of anxiety and she is not sure why  She stated she had her sleep has improved she has only waking up once per night which is good for her and her appetite is okay  She states that she has made an effort to attend groups and that she is not sleeping during the day  When discussing discontinuing the Dilaudid she voiced multiple somatic complaints  She stated she has a lot of pain in the back of her neck and problems turning her head and severe pain behind her left eye and the Dilaudid is the only thing that works  Explained that if she does not attend groups and is sleeping all day that we will look to decreasing or changing that medication so that she is not as sedated and she understood        Mental Status Evaluation:  Appearance:  Adequate hygiene and grooming   Behavior:  calm and cooperative   Fund of knowledge  Not assessed   Speech:   Language: Normal rate and Normal volume  No overt abnormality   Mood:  anxious and depressed   Affect:   Associations: blunted and constricted  Tightly connected   Thought Process:  Goal directed and coherent   Thought Content:  Somatic delusions   Perceptual Disturbances: Denies hallucinations and does not appear to be responding to internal stimuli   Risk Potential: No suicidal or homicidal ideation   Orientation  Oriented x 3   Memory Not tested   Attention/Concentration Appeared age appropriate   Insight:  Good insight   Judgment: Limited   Gait/Station: normal gait/station   Motor Activity: No abnormal movement noted     Progress Toward Goals:  No change    Assessment/Plan    Principal Problem: Bipolar 2 disorder, major depressive episode ([facility] [address] )  Active Problems:    Chronic post-traumatic stress disorder (PTSD)      Recommended Treatment:  Continue with pharmacotherapy, group therapy, milieu therapy and occupational therapy  The patient will be maintained on the following medications:    Current Facility-Administered Medications:  acetaminophen 650 mg Oral Q4H PRN Beola Climes, MD   acetaminophen 650 mg Oral Q6H [facility] Beola Climes, MD   al mag oxide-diphenhydramine-lidocaine viscous 10 mL Swish & Spit 4x Daily (AC & HS) Beola Climluis alberto, MD   albuterol 2 puff Inhalation Q6H PRN Beola Climes, MD   ALPRAZolam 0 25 mg Oral Daily PRN Beola Climes, MD   ARIPiprazole 10 mg Oral Daily Luz Parekh PA-C   benzocaine  Mucosal 4x Daily PRN Beola Climes, MD   bisacodyl 10 mg Rectal Daily PRN Beola Climes, MD   busPIRone 10 mg Oral BID Parag Sanders, MD   chlorhexidine 15 mL Swish & Spit Q12H Salomon Xiong MD   docusate sodium 100 mg Oral BID Beola Climes, MD   fluticasone 1 spray Each Nare BID Beola Climes, MD   fluticasone furoate-vilanterol 1 puff Inhalation Daily Parag Sanders MD   gabapentin 300 mg Oral TID WaunHUSAM Martinez   HYDROmorphone 2 mg Oral TID PRN Beola Climes, MD   loratadine 10 mg Oral Daily Beola Climes, MD   melatonin 6 mg Oral HS Beola Climes, MD   methocarbamol 500 mg Oral Q6H PRN Beola Climes, MD   montelukast 10 mg Oral Daily Beola Climes, MD   ondansetron 4 mg Oral Q6H PRN Beola Climes, MD   pantoprazole 20 mg Oral Early Morning Beola Climes, MD   pravastatin 40 mg Oral Daily With Vincent Goins MD   senna 1 tablet Oral HS Beola Climes, MD   sertraline 100 mg Oral Daily Parag Sanders MD   traZODone 25 mg Oral HS PRN Beola Climes, MD       Risks, benefits and possible side effects of Medications:   Risks, benefits, and possible side effects of medications explained to patient and patient verbalizes understanding

## 2018-05-21 NOTE — PROGRESS NOTES
Pt calm and pleasant; has flat affect but brightens on approach  Remains medication compliant and denies SI/HI; reports anxiety and depression r/t breaking up with boyfriend and being on her own  Pt has no c/o pain this morning and was encouraged to attend group  Will continue to monitor

## 2018-05-21 NOTE — PLAN OF CARE
Depression     Attend and participate in unit activities, including therapeutic, recreational, and educational groups Not Progressing        Ineffective Coping     Participates in unit activities Not Progressing        Risk for Self Injury/Neglect     Attend and participate in unit activities, including therapeutic, recreational, and educational groups Not Progressing          Anxiety     Anxiety is at manageable level Progressing        Depression     Treatment Goal: Demonstrate behavioral control of depressive symptoms, verbalize feelings of improved mood/affect, and adopt new coping skills prior to discharge Progressing     Verbalize thoughts and feelings Progressing     Refrain from harming self Progressing     Refrain from [address] from self-neglect Progressing     Complete daily ADLs, including personal hygiene independently, as able Progressing        Risk for Self Injury/Neglect     Treatment Goal: Remain safe during length of stay, learn and adopt new coping skills, and be free of self-injurious ideation, impulses and acts at the time of discharge Progressing     Verbalize thoughts and feelings Progressing     Refrain from harming self Progressing     Recognize maladaptive responses and adopt new coping mechanisms Progressing     Complete daily ADLs, including personal hygiene independently, as able Progressing

## 2018-05-21 NOTE — PROGRESS NOTES
Pt reported back spasms, and requested Robaxin  Robaxin administered as ordered  Will continue to monitor

## 2018-05-21 NOTE — PROGRESS NOTES
Zofran effective for nausea  Pt remains in small TV room all evening  C/O tooth pain 9/10 and requested Dilaudid  Also asked for Tylenol a few minutes later  Told her that I could not do that and she was suprised  Pt reported relief later and scheduled Tylenol given at 2330  Pt appeared depressed and anxious tonight and admits to same  Denies SI/HI  Interacted with select peers in Small TV room  Medication compliant

## 2018-05-22 PROCEDURE — 99232 SBSQ HOSP IP/OBS MODERATE 35: CPT | Performed by: NURSE PRACTITIONER

## 2018-05-22 PROCEDURE — 99232 SBSQ HOSP IP/OBS MODERATE 35: CPT | Performed by: PSYCHIATRY & NEUROLOGY

## 2018-05-22 RX ORDER — TRAZODONE HYDROCHLORIDE 50 MG/1
50 TABLET ORAL
Status: DISCONTINUED | OUTPATIENT
Start: 2018-05-22 | End: 2018-05-25 | Stop reason: HOSPADM

## 2018-05-22 RX ADMIN — HYDROMORPHONE HYDROCHLORIDE 2 MG: 2 TABLET ORAL at 09:04

## 2018-05-22 RX ADMIN — GABAPENTIN 300 MG: 300 CAPSULE ORAL at 08:50

## 2018-05-22 RX ADMIN — DOCUSATE SODIUM 100 MG: 100 CAPSULE, LIQUID FILLED ORAL at 17:03

## 2018-05-22 RX ADMIN — LORATADINE 10 MG: 10 TABLET ORAL at 08:50

## 2018-05-22 RX ADMIN — METHOCARBAMOL 500 MG: 500 TABLET ORAL at 12:44

## 2018-05-22 RX ADMIN — FLUTICASONE PROPIONATE 1 SPRAY: 50 SPRAY, METERED NASAL at 17:03

## 2018-05-22 RX ADMIN — ACETAMINOPHEN 650 MG: 325 TABLET, FILM COATED ORAL at 23:32

## 2018-05-22 RX ADMIN — DOCUSATE SODIUM 100 MG: 100 CAPSULE, LIQUID FILLED ORAL at 08:50

## 2018-05-22 RX ADMIN — BUSPIRONE HYDROCHLORIDE 15 MG: 5 TABLET ORAL at 17:02

## 2018-05-22 RX ADMIN — BUSPIRONE HYDROCHLORIDE 15 MG: 5 TABLET ORAL at 21:03

## 2018-05-22 RX ADMIN — GABAPENTIN 300 MG: 300 CAPSULE ORAL at 21:02

## 2018-05-22 RX ADMIN — MONTELUKAST SODIUM 10 MG: 10 TABLET, FILM COATED ORAL at 09:04

## 2018-05-22 RX ADMIN — MELATONIN TAB 3 MG 6 MG: 3 TAB at 21:02

## 2018-05-22 RX ADMIN — TRAZODONE HYDROCHLORIDE 50 MG: 50 TABLET ORAL at 21:03

## 2018-05-22 RX ADMIN — PRAVASTATIN SODIUM 40 MG: 40 TABLET ORAL at 17:03

## 2018-05-22 RX ADMIN — LIDOCAINE HYDROCHLORIDE 10 ML: 20 SOLUTION ORAL; TOPICAL at 12:43

## 2018-05-22 RX ADMIN — BUSPIRONE HYDROCHLORIDE 10 MG: 10 TABLET ORAL at 08:50

## 2018-05-22 RX ADMIN — PANTOPRAZOLE SODIUM 20 MG: 20 TABLET, DELAYED RELEASE ORAL at 06:00

## 2018-05-22 RX ADMIN — SERTRALINE HYDROCHLORIDE 100 MG: 100 TABLET ORAL at 08:50

## 2018-05-22 RX ADMIN — ACETAMINOPHEN 650 MG: 325 TABLET, FILM COATED ORAL at 12:43

## 2018-05-22 RX ADMIN — ACETAMINOPHEN 650 MG: 325 TABLET, FILM COATED ORAL at 17:03

## 2018-05-22 RX ADMIN — ACETAMINOPHEN 650 MG: 325 TABLET, FILM COATED ORAL at 06:00

## 2018-05-22 RX ADMIN — LIDOCAINE HYDROCHLORIDE 10 ML: 20 SOLUTION ORAL; TOPICAL at 17:03

## 2018-05-22 RX ADMIN — LIDOCAINE HYDROCHLORIDE 10 ML: 20 SOLUTION ORAL; TOPICAL at 06:00

## 2018-05-22 RX ADMIN — GABAPENTIN 300 MG: 300 CAPSULE ORAL at 17:03

## 2018-05-22 RX ADMIN — FLUTICASONE PROPIONATE 1 SPRAY: 50 SPRAY, METERED NASAL at 08:51

## 2018-05-22 RX ADMIN — ARIPIPRAZOLE 10 MG: 10 TABLET ORAL at 08:51

## 2018-05-22 RX ADMIN — SENNOSIDES 8.6 MG: 8.6 TABLET, FILM COATED ORAL at 21:02

## 2018-05-22 RX ADMIN — FLUTICASONE FUROATE AND VILANTEROL 1 PUFF: 100; 25 POWDER RESPIRATORY (INHALATION) at 09:05

## 2018-05-22 NOTE — CASE MANAGEMENT
Patient would like to attend a partial program after discharge  Pt states she is unable to drive at this time, however, her sister will drive her  Writer spoke with sister who initially stated it would not be possible to drive pt, and later stated the family would come together to help patient get to whatever treatment is recommended  Referral for [facility] Transitions completed and faxed  CARMEN signed by patient

## 2018-05-22 NOTE — PROGRESS NOTES
Pt is cooperative with care and is medication compliant  Pt was out for groups with no additional encouragement needed  Pt currently denies depression, si/hi  Pt received PRN Robaxin for back spasm/pain  Will continue to monitor

## 2018-05-22 NOTE — PROGRESS NOTES
Progress Note - Behavioral Health   Charli Caballero 63 y o  female MRN: [id number]  Unit/Bed#: ZP0 565-01 Encounter: [id number]    The patient was seen for continuing care and reviewed with treatment team   Staff reports that patient has been med seeking however she has decreased her Dilaudid use  She has been attending groups  The patient tells me that she is not happy about leaving has been really anxious about it  She states she wants to be here as long as she can because she has support and structure here  We talked about partial hospitalization programs after discharge and she wants to do that  She stated that she is not sleeping well  She said she falls asleep okay but then wakes with a lot of anxiety and is clenching her teeth and thinking too much  She states that her appetite is fine  Mental Status Evaluation:  Appearance:  Adequate hygiene and grooming and Good eye contact   Behavior:  calm and cooperative   Fund of knowledge  Not assessed   Speech:   Language: Normal rate and Soft  No overt abnormality   Mood:  anxious and depressed   Affect:   Associations: blunted and constricted  Tightly connected   Thought Process:  Goal directed and coherent   Thought Content:  Does not verbalize delusional material   Perceptual Disturbances: Denies hallucinations and does not appear to be responding to internal stimuli   Risk Potential: No suicidal or homicidal ideation   Orientation  Oriented x 3   Memory Not tested   Attention/Concentration [place] than expected for age   Insight:  Good insight   Judgment: Good judgment   Gait/Station:  needs assistive device   Motor Activity: No abnormal movement noted     Progress Toward Goals:  No change    Assessment/Plan    Principal Problem:    Bipolar 2 disorder, major depressive episode (HCC)  Active Problems:    Chronic post-traumatic stress disorder (PTSD)      Recommended Treatment:  Increase Buspar to 15 mg t i d  Add standing order of trazodone at HS  Continue with pharmacotherapy, group therapy, milieu therapy and occupational therapy  The patient will be maintained on the following medications:    Current Facility-Administered Medications:  acetaminophen 650 mg Oral Q4H PRN Rocíoyce Coldsommer, MD   acetaminophen 650 mg Oral Q6H [address] Loyce Coldsommer, MD   al mag oxide-diphenhydramine-lidocaine viscous 10 mL Swish & Spit 4x Daily (AC & HS) Kathryne Juancho, MD   albuterol 2 puff Inhalation Q6H PRN Loyce Colder, MD   ALPRAZolam 0 25 mg Oral Daily PRN Loyce Colder, MD   ARIPiprazole 10 mg Oral Daily Luz Parekh PA-C   benzocaine  Mucosal 4x Daily PRN Loyce Colder, MD   bisacodyl 10 mg Rectal Daily PRN Loyce Colder, MD   busPIRone 15 mg Oral TID Blake Razor, NIKNP   chlorhexidine 15 mL Swish & Spit Q12H [address] Loyce Coldsommer, MD   docusate sodium 100 mg Oral BID Loyce Colder, MD   fluticasone 1 spray Each Nare BID Loyce Colder, MD   fluticasone furoate-vilanterol 1 puff Inhalation Daily Casimiro Bynum MD   gabapentin 300 mg Oral TID Blake Hazel, HUSAM   HYDROmorphone 2 mg Oral TID PRN Loyce Colder, MD   loratadine 10 mg Oral Daily Loyce Colder, MD   melatonin 6 mg Oral HS Loyce Colder, MD   methocarbamol 500 mg Oral Q6H PRN Loyce Colder, MD   montelukast 10 mg Oral Daily Loyce Coldsommer, MD   ondansetron 4 mg Oral Q6H PRN Loyce Colder, MD   pantoprazole 20 mg Oral Early Morning Loyce Coldsommer, MD   pravastatin 40 mg Oral Daily With Jimmy Sommers MD   senna 1 tablet Oral HS Loyce Colder, MD   sertraline 100 mg Oral Daily Casimiro Bynum MD   traZODone 25 mg Oral HS PRN Loyce Coldsommer, MD       Risks, benefits and possible side effects of Medications:   Risks, benefits, and possible side effects of medications explained to patient and patient verbalizes understanding

## 2018-05-22 NOTE — PROGRESS NOTES
Pt calm and cooperative with care this evening  Pt was medication compliant  Pt reported anxiety and depression RT being discharged tomorrow and having to start over  Pt denied any suicidal/homicidal ideations, hallucinations and pain  Pt had a flat affect, but brightened upon approach  Pt was visualized out in the milieu, but minimal interaction with her peers  Pt attended and participated in wrap up group  Will continue to monitor

## 2018-05-22 NOTE — PROGRESS NOTES
Pt calm and cooperative with care this evening  PT was medication compliant  PT reported depression and anxiety related to "going home and starting over"  Pt reported back pain and back spasms this evening  Pt denied depression, suicidal/homicidal ideations, and hallucinations  Pt requested medication for her anxiety, but this writer encouraged pt to use coping mechanism as she would soon receive a scheduled dose of anxiety medication; pt was agreeable  Pt was out of bed and sat in the small TV room with another peer  Will continue to monitor

## 2018-05-22 NOTE — PLAN OF CARE
Problem: Ineffective Coping  Goal: Participates in unit activities  Interventions:  - Provide therapeutic environment   - Provide required programming   - Redirect inappropriate behaviors    Outcome: Progressing  Pt was agreeable to attending all groups with min  prompting and less irritability  She completed a relapse prevention plan and expressed her fears about leaving the hospital due to a lack of structure when not provided for her

## 2018-05-23 PROCEDURE — 99232 SBSQ HOSP IP/OBS MODERATE 35: CPT | Performed by: NURSE PRACTITIONER

## 2018-05-23 PROCEDURE — 99232 SBSQ HOSP IP/OBS MODERATE 35: CPT | Performed by: PSYCHIATRY & NEUROLOGY

## 2018-05-23 RX ADMIN — PANTOPRAZOLE SODIUM 20 MG: 20 TABLET, DELAYED RELEASE ORAL at 06:43

## 2018-05-23 RX ADMIN — FLUTICASONE PROPIONATE 1 SPRAY: 50 SPRAY, METERED NASAL at 17:18

## 2018-05-23 RX ADMIN — DOCUSATE SODIUM 100 MG: 100 CAPSULE, LIQUID FILLED ORAL at 17:19

## 2018-05-23 RX ADMIN — SENNOSIDES 8.6 MG: 8.6 TABLET, FILM COATED ORAL at 21:33

## 2018-05-23 RX ADMIN — ARIPIPRAZOLE 10 MG: 10 TABLET ORAL at 09:41

## 2018-05-23 RX ADMIN — BUSPIRONE HYDROCHLORIDE 15 MG: 5 TABLET ORAL at 17:18

## 2018-05-23 RX ADMIN — BUSPIRONE HYDROCHLORIDE 15 MG: 5 TABLET ORAL at 21:32

## 2018-05-23 RX ADMIN — ACETAMINOPHEN 650 MG: 325 TABLET, FILM COATED ORAL at 17:19

## 2018-05-23 RX ADMIN — ACETAMINOPHEN 650 MG: 325 TABLET, FILM COATED ORAL at 06:43

## 2018-05-23 RX ADMIN — LORATADINE 10 MG: 10 TABLET ORAL at 09:40

## 2018-05-23 RX ADMIN — GABAPENTIN 300 MG: 300 CAPSULE ORAL at 17:19

## 2018-05-23 RX ADMIN — FLUTICASONE FUROATE AND VILANTEROL 1 PUFF: 100; 25 POWDER RESPIRATORY (INHALATION) at 09:43

## 2018-05-23 RX ADMIN — PRAVASTATIN SODIUM 40 MG: 40 TABLET ORAL at 17:19

## 2018-05-23 RX ADMIN — GABAPENTIN 300 MG: 300 CAPSULE ORAL at 09:40

## 2018-05-23 RX ADMIN — TRAZODONE HYDROCHLORIDE 50 MG: 50 TABLET ORAL at 21:33

## 2018-05-23 RX ADMIN — CHLORHEXIDINE GLUCONATE 15 ML: 1.2 RINSE ORAL at 10:20

## 2018-05-23 RX ADMIN — FLUTICASONE PROPIONATE 1 SPRAY: 50 SPRAY, METERED NASAL at 09:40

## 2018-05-23 RX ADMIN — TRAZODONE HYDROCHLORIDE 25 MG: 50 TABLET ORAL at 23:39

## 2018-05-23 RX ADMIN — GABAPENTIN 300 MG: 300 CAPSULE ORAL at 21:33

## 2018-05-23 RX ADMIN — HYDROMORPHONE HYDROCHLORIDE 2 MG: 2 TABLET ORAL at 16:19

## 2018-05-23 RX ADMIN — ACETAMINOPHEN 650 MG: 325 TABLET, FILM COATED ORAL at 11:48

## 2018-05-23 RX ADMIN — MELATONIN TAB 3 MG 6 MG: 3 TAB at 21:33

## 2018-05-23 RX ADMIN — LIDOCAINE HYDROCHLORIDE 10 ML: 20 SOLUTION ORAL; TOPICAL at 21:33

## 2018-05-23 RX ADMIN — CHLORHEXIDINE GLUCONATE 15 ML: 1.2 RINSE ORAL at 21:33

## 2018-05-23 RX ADMIN — MONTELUKAST SODIUM 10 MG: 10 TABLET, FILM COATED ORAL at 09:40

## 2018-05-23 RX ADMIN — SERTRALINE HYDROCHLORIDE 100 MG: 100 TABLET ORAL at 09:40

## 2018-05-23 RX ADMIN — ACETAMINOPHEN 650 MG: 325 TABLET, FILM COATED ORAL at 23:37

## 2018-05-23 RX ADMIN — BUSPIRONE HYDROCHLORIDE 15 MG: 5 TABLET ORAL at 09:41

## 2018-05-23 RX ADMIN — DOCUSATE SODIUM 100 MG: 100 CAPSULE, LIQUID FILLED ORAL at 09:40

## 2018-05-23 RX ADMIN — LIDOCAINE HYDROCHLORIDE 10 ML: 20 SOLUTION ORAL; TOPICAL at 18:25

## 2018-05-23 RX ADMIN — LIDOCAINE HYDROCHLORIDE 10 ML: 20 SOLUTION ORAL; TOPICAL at 13:28

## 2018-05-23 RX ADMIN — METHOCARBAMOL 500 MG: 500 TABLET ORAL at 09:40

## 2018-05-23 NOTE — PROGRESS NOTES
Pt calm, cooperative, visible on unit for meals  Did not attend morning group, but attending afternoon group and participated  Pt compliant with medications  Denies SI and HI but reports anxiety and depression  She states her anxiety and depression is "getting better" and she is able to control her crying, although she became tearful during our conversation  She states she is anxious for discharge and feels like she has to "start all over" and is worried about how she will handle being discharged, but stated "I know I can't stay here forever though " Pt received PRN Robaxin for muscle spasms  Will continue to monitor

## 2018-05-23 NOTE — PROGRESS NOTES
Progress Note - Behavioral Health   Aubrie Chambers 63 y o  female MRN: [id number]  Unit/Bed#: MT3 565-01 Encounter: [id number]    The patient was seen for continuing care and reviewed with treatment team   Staff reports that the patient has been anxious about discharge  She has denied all other symptoms  The patient states that her mood has been okay and she is not as tearful and not as depressed  She said she has been sleeping okay but woke up once last night crying because she had a bad dream   She was able to get right back to sleep  Her appetite has been normal  She is still somatic complaining of lots of head, neck and body pain  She said she attended group last night and was laughing so much that now she has had a headache ever since  She said that the 1st time she has left in a while  She is still nervous about discharge but feels less so if partial hospitalization can be set up for her which her  is working on  She has no thoughts to hurt herself  She is tolerating the increased Buspar       Mental Status Evaluation:  Appearance:  Adequate hygiene and grooming and Good eye contact   Behavior:  calm and cooperative   Fund of knowledge  Not assessed   Speech:   Language: Normal rate and Soft  No overt abnormality   Mood:  improving   Affect:   Associations: blunted and constricted  Tightly connected   Thought Process:  Goal directed and coherent   Thought Content:  Does not verbalize delusional material   Perceptual Disturbances: Denies hallucinations and does not appear to be responding to internal stimuli   Risk Potential: No suicidal or homicidal ideation   Orientation  Oriented x 3   Memory grossly intact   Attention/Concentration Appears shorter than expected for age   Insight:  Good insight   Judgment: Good judgment   Gait/Station:  needs assistive device   Motor Activity: No abnormal movement noted     Progress Toward Goals:  No change    Assessment/Plan    Principal Problem: Bipolar 2 disorder, major depressive episode ([facility] [address] )  Active Problems:    Chronic post-traumatic stress disorder (PTSD)      Recommended Treatment: Continue with pharmacotherapy, group therapy, milieu therapy and occupational therapy  The patient will be maintained on the following medications:    Current Facility-Administered Medications:  acetaminophen 650 mg Oral Q4H PRN Jose Youssef MD   acetaminophen 650 mg Oral Q6H [address] Jose Youssef MD   al mag oxide-diphenhydramine-lidocaine viscous 10 mL Swish & Spit 4x Daily (AC & HS) Jose Youssef MD   albuterol 2 puff Inhalation Q6H PRN Jose Youssef MD   ALPRAZolam 0 25 mg Oral Daily PRN Jose Youssef MD   ARIPiprazole 10 mg Oral Daily Luz Parekh PA-C   benzocaine  Mucosal 4x Daily PRN Jose Youssef MD   bisacodyl 10 mg Rectal Daily PRN Jose Youssef MD   busPIRone 15 mg Oral TID Brown Siemens, CRNP   chlorhexidine 15 mL Swish & Spit Q12H Tabatha Bolton MD   docusate sodium 100 mg Oral BID Jose Youssef MD   fluticasone 1 spray Each Nare BID Jose Youssef MD   fluticasone furoate-vilanterol 1 puff Inhalation Daily Saritha Rey MD   gabapentin 300 mg Oral TID Brown Siemens, CRNP   HYDROmorphone 2 mg Oral TID PRN Jose Youssef MD   loratadine 10 mg Oral Daily Jose Youssef MD   melatonin 6 mg Oral HS Jose Youssef MD   methocarbamol 500 mg Oral Q6H PRN Jose Youssef MD   montelukast 10 mg Oral Daily Jose Youssef MD   ondansetron 4 mg Oral Q6H PRN Jose Youssef MD   pantoprazole 20 mg Oral Early Morning Jose Youssef MD   pravastatin 40 mg Oral Daily With Bret Sainz MD   senna 1 tablet Oral HS Jose Youssef MD   sertraline 100 mg Oral Daily Saritha Rey MD   traZODone 25 mg Oral HS PRN Jose Youssef MD   traZODone 50 mg Oral HS Brown Siemens, CRNP       Risks, benefits and possible side effects of Medications:   Risks, benefits, and possible side effects of medications explained to patient and patient verbalizes understanding

## 2018-05-23 NOTE — PLAN OF CARE
Problem: Ineffective Coping  Goal: Participates in unit activities  Interventions:  - Provide therapeutic environment   - Provide required programming   - Redirect inappropriate behaviors    Outcome: Progressing  Pt attended groups as able today  She demonstrated min  self motivation yet was less irritable upon approach

## 2018-05-23 NOTE — PLAN OF CARE
Depression     Treatment Goal: Demonstrate behavioral control of depressive symptoms, verbalize feelings of improved mood/affect, and adopt new coping skills prior to discharge Progressing     Verbalize thoughts and feelings Progressing     Refrain from harming self Progressing     Refrain from [address] from self-neglect Progressing     Attend and participate in unit activities, including therapeutic, recreational, and educational groups Progressing     Complete daily ADLs, including personal hygiene independently, as able Progressing        Ineffective Coping     Participates in unit activities Progressing        Risk for Self Injury/Neglect     Treatment Goal: Remain safe during length of stay, learn and adopt new coping skills, and be free of self-injurious ideation, impulses and acts at the time of discharge Progressing     Verbalize thoughts and feelings Progressing     Refrain from harming self Progressing     Attend and participate in unit activities, including therapeutic, recreational, and educational groups Progressing     Recognize maladaptive responses and adopt new coping mechanisms Progressing     Complete daily ADLs, including personal hygiene independently, as able Progressing

## 2018-05-23 NOTE — CASE MANAGEMENT
Referral for Transitions re-faxed as they have not yet received it  If accepted, there is not a current opening until late next week  Pt's ICM from [address] [place] [address] was in to see patient today  She will continue to follow-up regarding status

## 2018-05-24 PROCEDURE — 99232 SBSQ HOSP IP/OBS MODERATE 35: CPT | Performed by: PSYCHIATRY & NEUROLOGY

## 2018-05-24 RX ADMIN — ACETAMINOPHEN 650 MG: 325 TABLET, FILM COATED ORAL at 06:18

## 2018-05-24 RX ADMIN — HYDROMORPHONE HYDROCHLORIDE 2 MG: 2 TABLET ORAL at 20:23

## 2018-05-24 RX ADMIN — CHLORHEXIDINE GLUCONATE 15 ML: 1.2 RINSE ORAL at 09:32

## 2018-05-24 RX ADMIN — GABAPENTIN 300 MG: 300 CAPSULE ORAL at 09:32

## 2018-05-24 RX ADMIN — SERTRALINE HYDROCHLORIDE 100 MG: 100 TABLET ORAL at 09:32

## 2018-05-24 RX ADMIN — CHLORHEXIDINE GLUCONATE 15 ML: 1.2 RINSE ORAL at 21:35

## 2018-05-24 RX ADMIN — MONTELUKAST SODIUM 10 MG: 10 TABLET, FILM COATED ORAL at 09:32

## 2018-05-24 RX ADMIN — ACETAMINOPHEN 650 MG: 325 TABLET, FILM COATED ORAL at 17:36

## 2018-05-24 RX ADMIN — LORATADINE 10 MG: 10 TABLET ORAL at 09:32

## 2018-05-24 RX ADMIN — TRAZODONE HYDROCHLORIDE 50 MG: 50 TABLET ORAL at 21:35

## 2018-05-24 RX ADMIN — FLUTICASONE PROPIONATE 1 SPRAY: 50 SPRAY, METERED NASAL at 17:52

## 2018-05-24 RX ADMIN — DOCUSATE SODIUM 100 MG: 100 CAPSULE, LIQUID FILLED ORAL at 09:32

## 2018-05-24 RX ADMIN — SENNOSIDES 8.6 MG: 8.6 TABLET, FILM COATED ORAL at 21:35

## 2018-05-24 RX ADMIN — PANTOPRAZOLE SODIUM 20 MG: 20 TABLET, DELAYED RELEASE ORAL at 06:18

## 2018-05-24 RX ADMIN — ARIPIPRAZOLE 10 MG: 10 TABLET ORAL at 09:32

## 2018-05-24 RX ADMIN — ACETAMINOPHEN 650 MG: 325 TABLET, FILM COATED ORAL at 12:56

## 2018-05-24 RX ADMIN — FLUTICASONE FUROATE AND VILANTEROL 1 PUFF: 100; 25 POWDER RESPIRATORY (INHALATION) at 09:32

## 2018-05-24 RX ADMIN — MELATONIN TAB 3 MG 6 MG: 3 TAB at 21:35

## 2018-05-24 RX ADMIN — FLUTICASONE PROPIONATE 1 SPRAY: 50 SPRAY, METERED NASAL at 09:32

## 2018-05-24 RX ADMIN — GABAPENTIN 300 MG: 300 CAPSULE ORAL at 21:35

## 2018-05-24 RX ADMIN — DOCUSATE SODIUM 100 MG: 100 CAPSULE, LIQUID FILLED ORAL at 17:37

## 2018-05-24 RX ADMIN — GABAPENTIN 300 MG: 300 CAPSULE ORAL at 17:38

## 2018-05-24 RX ADMIN — BUSPIRONE HYDROCHLORIDE 15 MG: 5 TABLET ORAL at 09:32

## 2018-05-24 RX ADMIN — METHOCARBAMOL 500 MG: 500 TABLET ORAL at 10:52

## 2018-05-24 RX ADMIN — ACETAMINOPHEN 650 MG: 325 TABLET, FILM COATED ORAL at 23:39

## 2018-05-24 RX ADMIN — BUSPIRONE HYDROCHLORIDE 15 MG: 5 TABLET ORAL at 17:36

## 2018-05-24 RX ADMIN — PRAVASTATIN SODIUM 40 MG: 40 TABLET ORAL at 17:37

## 2018-05-24 RX ADMIN — BUSPIRONE HYDROCHLORIDE 15 MG: 5 TABLET ORAL at 21:35

## 2018-05-24 NOTE — PLAN OF CARE
Risk for Self Injury/Neglect     Treatment Goal: Remain safe during length of stay, learn and adopt new coping skills, and be free of self-injurious ideation, impulses and acts at the time of discharge Progressing     Verbalize thoughts and feelings Progressing     Refrain from harming self Progressing     Attend and participate in unit activities, including therapeutic, recreational, and educational groups Progressing     Recognize maladaptive responses and adopt new coping mechanisms Progressing     Complete daily ADLs, including personal hygiene independently, as able Progressing

## 2018-05-24 NOTE — PROGRESS NOTES
Patient was out of bed and visible on the milieu  She was medication complaint and pleasant  She did complain of a headache and neck ache that she claims started at the beginning of the day at the beginning of the shift and was given dilaudid which was effective for her pain  She denies all other signs and symptoms for today and says she feels good

## 2018-05-24 NOTE — PROGRESS NOTES
Pt calm and cooperative this AM, however reports being depressed and anxious r/t her upcoming D/C  Pt states feeling 'afraid of leaving the hospital because I don't know what lies ahead when I get out ' Pt denies SI at current  Pt out in milieu most of AM, and reports the PRN robaxin was effective  Will continue to monitor

## 2018-05-24 NOTE — PROGRESS NOTES
Pt is calm and cooperative  Pt brightens on approach  Patient denies Si but reports feelings of anxiety related to upcoming discharge  Pt is med compliant  Will continue to monitor

## 2018-05-24 NOTE — CASE MANAGEMENT
Writer met with patient who states she is feeling much better, however, is anxious about being discharged from the hospital and not having a structured day  Writer explained that patient will be going to [facility] for an intake for Partial Hospitalization and that she has multiple appointments including Physical Therapy to participate in  Patient is requesting to stay one additional day to ease her anxiety  Writer did explain that she is able to go for an intake at [facility] tomorrow morning if discharged today  Patient will discuss with Physician  Jessica Body has been ordered, OT eval for driving assessment has been requested  PT and outpatient have been arranged

## 2018-05-24 NOTE — PROGRESS NOTES
Progress Note - Behavioral Health   Humphrey Carver 63 y o  female MRN: [id number]  Unit/Bed#: TJ8 565-01 Encounter: [id number]    The patient was seen for continuing care and reviewed with treatment team  Apprehensive about going home and facing her family asking her questions  Was up at 3 and could not fall back asleep  Mental Status Evaluation:  Appearance:  Adequate hygiene and grooming and Good eye contact   Behavior:  calm and cooperative   Fund of knowledge  Not assessed   Speech:   Language: Normal rate and Normal volume  No overt abnormality   Mood:  anxious   Affect:   Associations: constricted and appropriate  Tightly connected   Thought Process:  Goal directed and coherent   Thought Content:  Does not verbalize delusional material   Perceptual Disturbances: Denies hallucinations and does not appear to be responding to internal stimuli   Risk Potential: No suicidal or homicidal ideation   Orientation  Oriented x 3   Memory grossly intact   Attention/Concentration attention span and concentration were age appropriate   Insight:  Good insight   Judgment: Good judgment   Gait/Station: normal gait/station and normal balance   Motor Activity: No abnormal movement noted     Progress Toward Goals: No change    Assessment/Plan    Principal Problem:    Bipolar 2 disorder, major depressive episode (HCC)  Active Problems:    Chronic post-traumatic stress disorder (PTSD)      Recommended Treatment: Increase sertraline  Continue with pharmacotherapy, group therapy, milieu therapy and occupational therapy    The patient will be maintained on the following medications:    Current Facility-Administered Medications:  acetaminophen 650 mg Oral Q4H PRN Brigid Denny MD   acetaminophen 650 mg Oral Q6H [address] Brigid Denny MD   al mag oxide-diphenhydramine-lidocaine viscous 10 mL Swish & Spit 4x Daily (AC & HS) Brigid Denny MD   albuterol 2 puff Inhalation Q6H PRN Brigid Denny MD   ALPRAZolam 0 25 mg Oral Daily PRN Dierdre Moots Nate Bowers, MD   ARIPiprazole 10 mg Oral Daily Joel Sahu PA-C   benzocaine  Mucosal 4x Daily PRN Valerio Glynn MD   bisacodyl 10 mg Rectal Daily PRN Valerio Glynn MD   busPIRone 15 mg Oral TID Sean Drowbilly, CRLAMINE   chlorhexidine 15 mL Swish & Spit Q12H Brenda Foss MD   docusate sodium 100 mg Oral BID Valerio Glynn MD   fluticasone 1 spray Each Nare BID Valerio Glynn MD   fluticasone furoate-vilanterol 1 puff Inhalation Daily Chadwick Frank MD   gabapentin 300 mg Oral TID Sean Roland, CRLAMINE   HYDROmorphone 2 mg Oral TID PRN Valerio Glynn MD   loratadine 10 mg Oral Daily Valerio Glynn MD   melatonin 6 mg Oral HS Valerio Glynn MD   methocarbamol 500 mg Oral Q6H PRN Valerio Glynn MD   montelukast 10 mg Oral Daily Valerio Glynn MD   ondansetron 4 mg Oral Q6H PRN Valerio Glynn MD   pantoprazole 20 mg Oral Early Morning Valerio Glynn MD   pravastatin 40 mg Oral Daily With Leroy Geller MD   senna 1 tablet Oral HS Valerio Glynn MD   sertraline 100 mg Oral Daily Chadwick Frank MD   traZODone 25 mg Oral HS PRN Valerio Glynn MD   traZODone 50 mg Oral HS Sean Roland, CRNP       Risks, benefits and possible side effects of Medications:   Risks, benefits, and possible side effects of medications explained to patient and patient verbalizes understanding

## 2018-05-24 NOTE — PLAN OF CARE
Problem: Ineffective Coping  Goal: Participates in unit activities  Interventions:  - Provide therapeutic environment   - Provide required programming   - Redirect inappropriate behaviors    Outcome: Progressing  Pt attended both morning groups with minimum reminders  She continues to display fair eye contact but is willing to attempt group tasks as requested  Pt calm when reflecting on tent  discharge today

## 2018-05-25 ENCOUNTER — TELEPHONE (OUTPATIENT)
Dept: PSYCHIATRY | Facility: CLINIC | Age: 58
End: 2018-05-25

## 2018-05-25 VITALS
TEMPERATURE: 97.3 F | DIASTOLIC BLOOD PRESSURE: 58 MMHG | HEART RATE: 67 BPM | SYSTOLIC BLOOD PRESSURE: 111 MMHG | HEIGHT: 63 IN | RESPIRATION RATE: 16 BRPM | OXYGEN SATURATION: 95 % | BODY MASS INDEX: 33.01 KG/M2 | WEIGHT: 186.29 LBS

## 2018-05-25 PROCEDURE — 99239 HOSP IP/OBS DSCHRG MGMT >30: CPT | Performed by: NURSE PRACTITIONER

## 2018-05-25 PROCEDURE — 99238 HOSP IP/OBS DSCHRG MGMT 30/<: CPT | Performed by: PSYCHIATRY & NEUROLOGY

## 2018-05-25 RX ORDER — GABAPENTIN 300 MG/1
300 CAPSULE ORAL 3 TIMES DAILY
Qty: 90 CAPSULE | Refills: 0 | Status: SHIPPED | OUTPATIENT
Start: 2018-05-25

## 2018-05-25 RX ORDER — BUSPIRONE HYDROCHLORIDE 15 MG/1
15 TABLET ORAL 3 TIMES DAILY
Qty: 90 TABLET | Refills: 0 | Status: SHIPPED | OUTPATIENT
Start: 2018-05-25

## 2018-05-25 RX ORDER — HYDROXYZINE PAMOATE 25 MG/1
25 CAPSULE ORAL 3 TIMES DAILY PRN
Qty: 30 CAPSULE | Refills: 0 | Status: SHIPPED | OUTPATIENT
Start: 2018-05-25

## 2018-05-25 RX ORDER — ARIPIPRAZOLE 10 MG/1
10 TABLET ORAL DAILY
Qty: 30 TABLET | Refills: 0 | Status: SHIPPED | OUTPATIENT
Start: 2018-05-26

## 2018-05-25 RX ADMIN — PANTOPRAZOLE SODIUM 20 MG: 20 TABLET, DELAYED RELEASE ORAL at 06:33

## 2018-05-25 RX ADMIN — FLUTICASONE PROPIONATE 1 SPRAY: 50 SPRAY, METERED NASAL at 08:43

## 2018-05-25 RX ADMIN — CHLORHEXIDINE GLUCONATE 15 ML: 1.2 RINSE ORAL at 08:44

## 2018-05-25 RX ADMIN — DOCUSATE SODIUM 100 MG: 100 CAPSULE, LIQUID FILLED ORAL at 08:43

## 2018-05-25 RX ADMIN — ARIPIPRAZOLE 10 MG: 10 TABLET ORAL at 08:43

## 2018-05-25 RX ADMIN — FLUTICASONE FUROATE AND VILANTEROL 1 PUFF: 100; 25 POWDER RESPIRATORY (INHALATION) at 08:44

## 2018-05-25 RX ADMIN — ACETAMINOPHEN 650 MG: 325 TABLET, FILM COATED ORAL at 11:15

## 2018-05-25 RX ADMIN — BUSPIRONE HYDROCHLORIDE 15 MG: 5 TABLET ORAL at 08:43

## 2018-05-25 RX ADMIN — MONTELUKAST SODIUM 10 MG: 10 TABLET, FILM COATED ORAL at 08:43

## 2018-05-25 RX ADMIN — HYDROMORPHONE HYDROCHLORIDE 2 MG: 2 TABLET ORAL at 10:00

## 2018-05-25 RX ADMIN — GABAPENTIN 300 MG: 300 CAPSULE ORAL at 08:43

## 2018-05-25 RX ADMIN — LORATADINE 10 MG: 10 TABLET ORAL at 08:43

## 2018-05-25 RX ADMIN — ALPRAZOLAM 0.25 MG: 0.25 TABLET ORAL at 11:15

## 2018-05-25 RX ADMIN — SERTRALINE HYDROCHLORIDE 150 MG: 100 TABLET ORAL at 08:43

## 2018-05-25 RX ADMIN — ACETAMINOPHEN 650 MG: 325 TABLET, FILM COATED ORAL at 06:33
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
## 2018-05-25 NOTE — TELEPHONE ENCOUNTER
Pharmacist left message on office voicemail wanting to inform you that insurance will not pay for Zoloft prescription as written  He suggested it should be Zoloft 50 mg 1 tablet daily and Zoloft 100 mg 1 tablet daily so it would be paid for

## 2018-05-25 NOTE — PLAN OF CARE
Problem: Ineffective Coping  Goal: Participates in unit activities  Interventions:  - Provide therapeutic environment   - Provide required programming   - Redirect inappropriate behaviors    Outcome: Adequate for Discharge  Pt did not attend groups today in preparation for tent  discharge  She did express that she would be following through with out patient appointments

## 2018-06-01 ENCOUNTER — TELEPHONE (OUTPATIENT)
Dept: NEUROSURGERY | Facility: CLINIC | Age: 58
End: 2018-06-01

## 2018-06-01 NOTE — TELEPHONE ENCOUNTER
Pt provided with cental scheduling number to get CT done prior to her f/u  She requested pain medication for back pain  Since this office is managing her head bleed, she was referred to her PCP for any mediction  She was agreeable

## 2018-06-12 ENCOUNTER — HOSPITAL ENCOUNTER (OUTPATIENT)
Dept: RADIOLOGY | Facility: HOSPITAL | Age: 58
Discharge: HOME | End: 2018-06-12
Payer: COMMERCIAL

## 2018-06-12 ENCOUNTER — TRANSCRIBE ORDERS (OUTPATIENT)
Dept: RADIOLOGY | Facility: HOSPITAL | Age: 58
End: 2018-06-12

## 2018-06-12 DIAGNOSIS — I60.9 SAH (SUBARACHNOID HEMORRHAGE) (HCC): ICD-10-CM

## 2018-06-12 PROCEDURE — 70450 CT HEAD/BRAIN W/O DYE: CPT

## 2018-06-13 ENCOUNTER — DOCUMENTATION (OUTPATIENT)
Dept: NEUROSURGERY | Facility: CLINIC | Age: 58
End: 2018-06-13

## 2018-06-13 ENCOUNTER — OFFICE VISIT (OUTPATIENT)
Dept: NEUROSURGERY | Facility: CLINIC | Age: 58
End: 2018-06-13
Payer: COMMERCIAL

## 2018-06-13 VITALS
DIASTOLIC BLOOD PRESSURE: 89 MMHG | HEART RATE: 74 BPM | TEMPERATURE: 98 F | SYSTOLIC BLOOD PRESSURE: 132 MMHG | RESPIRATION RATE: 16 BRPM | BODY MASS INDEX: 35.44 KG/M2 | HEIGHT: 63 IN | WEIGHT: 200 LBS

## 2018-06-13 DIAGNOSIS — G91.9 ACQUIRED HYDROCEPHALUS (HCC): ICD-10-CM

## 2018-06-13 DIAGNOSIS — F06.30 MOOD DISORDER DUE TO KNOWN PHYSIOLOGICAL CONDITION: ICD-10-CM

## 2018-06-13 DIAGNOSIS — I60.9 SAH (SUBARACHNOID HEMORRHAGE) (HCC): Primary | ICD-10-CM

## 2018-06-13 PROCEDURE — 99215 OFFICE O/P EST HI 40 MIN: CPT | Performed by: NEUROLOGICAL SURGERY

## 2018-06-13 NOTE — LETTER
June 13, 2018     Marjorie Patel MD  [address] [address]    Patient: Cesar Moore   YOB: 1960   Date of Visit: 6/13/2018       Dear Dr Sabino Esquivel:    Thank you for referring Florence Bosch to me for evaluation  Below are my notes for this consultation  If you have questions, please do not hesitate to call me  I look forward to following your patient along with you  Sincerely,        Clayton Olivier MD        CC: No Recipients  Clayton Olivier MD  6/13/2018  7:29 PM  Sign at close encounter  Patient Id: Cesar Moore is a 63 y o  female        Assessment/Plan:    Diagnoses and all orders for this visit:    SAH (subarachnoid hemorrhage) ([facility] [address] )  -     Ambulatory referral to Physical Medicine Rehab; Future  -     IR cerebral angiography; Future    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  -     Ambulatory referral to Psychiatry; Future    Acquired hydrocephalus        Discussion Summary:   One month status post angio negative subarachnoid hemorrhage necessitating ventriculostomy placement which was ultimately weaned  She does have slightly enlarged ventricles on her CT scan, however these appear improved  She has significant psychiatric complaints however denies any acute suicidal or homicidal ideation  I will try to obtain follow-up with a psychiatrist as soon as possible  In addition I will her refer her to p francis  and R for evaluation to aid in her rehabilitation  Though her tox screen was positive for cocaine the amount of subarachnoid hemorrhage was quite significant  As such I would like to repeat arteriogram and delayed fashion  We will schedule this in August for greater than 3 months  In addition we will obtain a repeat head CT at that time to evaluate her ventricular size  She has not had any urinary incontinence and does not complain of significant headaches  I believe her mild ventriculomegaly is likely her new baseline      I spent 40 minutes with the patient greater than 50 percent of which was spent in counseling  Chief Complaint: Follow-up (1 month follow up with new imaging)        HPI:  This is a 79-year-old female with a long psychiatric history who presented to the hospital on April 17th with diffuse subarachnoid hemorrhage and acute hydrocephalus  Emergent ventriculostomy was placed and arteriogram was performed  To angiograms done as an inpatient did not reveal any source of her hemorrhage  Drug screen was positive for cocaine  She was ultimately discharged to inpatient psychiatric care for treatment of severe depression and bipolar disease  A recent head CT was done to evaluate for hydrocephalus and her ventricular size appears to be slightly decreased  Since the time of her discharge she states that she has had mild headaches in the morning  She also feels off balance at times while walking  She has felt significantly depressed and is her mood has worsened since the time of her discharge  She denies any suicidal or homicidal ideation  She has at home and currently unemployed  She is starting to physical therapy but has not been evaluated by rehab for her subarachnoid hemorrhage  She complains of significant fatigue  She complains of back pain  She denies any family history of subarachnoid hemorrhage  She denies any family history of aneurysm  She states that she has not recently done drugs  Review of Systems   HENT: Negative  Eyes: Positive for photophobia  Negative for pain, discharge, redness, itching and visual disturbance  Respiratory: Negative  Cardiovascular: Negative  Gastrointestinal: Negative  Endocrine: Negative  Genitourinary: Positive for frequency  Negative for decreased urine volume, difficulty urinating, dyspareunia, dysuria, enuresis, flank pain, genital sores, hematuria, menstrual problem, pelvic pain, urgency, vaginal bleeding, vaginal discharge and vaginal pain     Musculoskeletal: Positive for back pain (middle), gait problem (uses walker), myalgias (back muscles), neck pain (doesn't turn neck to far) and neck stiffness  Negative for arthralgias and joint swelling  Skin: Negative  Allergic/Immunologic: Negative  Neurological: Positive for dizziness (sometimes when she gets up), speech difficulty, weakness (left hand weaker than right) and light-headedness  Negative for tremors, seizures, syncope, facial asymmetry, numbness and headaches  Hematological: Negative for adenopathy  Bruises/bleeds easily (bruise)  Psychiatric/Behavioral: Negative  Physical Exam  Vitals:    06/13/18 1416   BP: 132/89   Pulse: 74   Resp: 16   Temp: 98 °F (36 7 °C)    She appears depressed  Her mood is altered and her affect is slightly flat  She is tearful at times  She does not appear to be in acute distress however  Her pupils are equal round reactive to light  Her extraocular movements are intact  Face is symmetric  Tongue is midline  Facial sensation is intact and symmetric throughout  She has full strength in bilateral in upper lower extremities  There is no drift or dysmetria  She does ambulate with a walker  Without the walker she does ambulate and appears slightly off balance      The following portions of the patient's history were reviewed and updated as appropriate: allergies, current medications, past family history, past medical history, past social history, past surgical history and problem list     Active Ambulatory Problems     Diagnosis Date Noted    [facility] 04/17/2018    History of asthma 04/17/2018    Tobacco use disorder 01/19/2015    Benign essential hypertension 07/31/2012    Moderate persistent asthma without complication 96/25/8294    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 07/31/2012    Nondependent alcohol abuse 07/31/2012    Nondependent cocaine abuse 66/11/2060    Renal colic 82/43/9006    Severe episode of recurrent major depressive disorder, without psychotic features [facility] ) 2016    Disorder of menstrual bleeding 2011    Anxiety state 2012    Bladder spasm 2015    Hypoalbuminemia 2018    Acquired hydrocephalus 2018    Polyuria 2018    Bipolar 2 disorder, major depressive episode ([facility] ) 05/15/2018    Chronic post-traumatic stress disorder (PTSD) 2018     Resolved Ambulatory Problems     Diagnosis Date Noted    Hypokalemia 2018    Hypophosphatemia 2018     Past Medical History:   Diagnosis Date    Anxiety     Asthma     Depression     Head injury     Seizures ([facility] )        Past Surgical History:   Procedure Laterality Date     SECTION      SPLENECTOMY           (Not in a hospital admission)    Results/Data:  I reviewed her imaging with her while she was an inpatient  In addition we went over her new CT of her head  Her ventricle size appears to be slightly decreased compared to her last CT

## 2018-06-13 NOTE — PROGRESS NOTES
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  -     Ambulatory referral to Psychiatry; Future    Acquired hydrocephalus        Discussion Summary:   One month status post angio negative subarachnoid hemorrhage necessitating ventriculostomy placement which was ultimately weaned  She does have slightly enlarged ventricles on her CT scan, however these appear improved  She has significant psychiatric complaints however denies any acute suicidal or homicidal ideation  I will try to obtain follow-up with a psychiatrist as soon as possible  In addition I will her refer her to p m  and ROBERT[facility] for evaluation to aid in her rehabilitation  Though her tox screen was positive for cocaine the amount of subarachnoid hemorrhage was quite significant  As such I would like to repeat arteriogram and delayed fashion  We will schedule this in August for greater than 3 months  In addition we will obtain a repeat head CT at that time to evaluate her ventricular size  She has not had any urinary incontinence and does not complain of significant headaches  I believe her mild ventriculomegaly is likely her new baseline  I spent 40 minutes with the patient greater than 50 percent of which was spent in counseling  Chief Complaint: Follow-up (1 month follow up with new imaging)        HPI:  This is a 36-year-old female with a long psychiatric history who presented to the hospital on April 17th with diffuse subarachnoid hemorrhage and acute hydrocephalus  Emergent ventriculostomy was placed and arteriogram was performed  To angiograms done as an inpatient did not reveal any source of her hemorrhage  Drug screen was positive for cocaine    She was ultimately discharged to inpatient psychiatric care for treatment of severe depression and bipolar disease  A recent head CT was done to evaluate for hydrocephalus and her ventricular size appears to be slightly decreased  Since the time of her discharge she states that she has had mild headaches in the morning  She also feels off balance at times while walking  She has felt significantly depressed and is her mood has worsened since the time of her discharge  She denies any suicidal or homicidal ideation  She has at home and currently unemployed  She is starting to physical therapy but has not been evaluated by rehab for her subarachnoid hemorrhage  She complains of significant fatigue  She complains of back pain  She denies any family history of subarachnoid hemorrhage  She denies any family history of aneurysm  She states that she has not recently done drugs  Review of Systems   HENT: Negative  Eyes: Positive for photophobia  Negative for pain, discharge, redness, itching and visual disturbance  Respiratory: Negative  Cardiovascular: Negative  Gastrointestinal: Negative  Endocrine: Negative  Genitourinary: Positive for frequency  Negative for decreased urine volume, difficulty urinating, dyspareunia, dysuria, enuresis, flank pain, genital sores, hematuria, menstrual problem, pelvic pain, urgency, vaginal bleeding, vaginal discharge and vaginal pain  Musculoskeletal: Positive for back pain (middle), gait problem (uses walker), myalgias (back muscles), neck pain (doesn't turn neck to far) and neck stiffness  Negative for arthralgias and joint swelling  Skin: Negative  Allergic/Immunologic: Negative  Neurological: Positive for dizziness (sometimes when she gets up), speech difficulty, weakness (left hand weaker than right) and light-headedness  Negative for tremors, seizures, syncope, facial asymmetry, numbness and headaches  Hematological: Negative for adenopathy   Bruises/bleeds easily (bruise)  Psychiatric/Behavioral: Negative  Physical Exam  Vitals:    06/13/18 1416   BP: 132/89   Pulse: 74   Resp: 16   Temp: 98 °F (36 7 °C)    She appears depressed  Her mood is altered and her affect is slightly flat  She is tearful at times  She does not appear to be in acute distress however  Her pupils are equal round reactive to light  Her extraocular movements are intact  Face is symmetric  Tongue is midline  Facial sensation is intact and symmetric throughout  She has full strength in bilateral in upper lower extremities  There is no drift or dysmetria  She does ambulate with a walker  Without the walker she does ambulate and appears slightly off balance      The following portions of the patient's history were reviewed and updated as appropriate: allergies, current medications, past family history, past medical history, past social history, past surgical history and problem list     Active Ambulatory Problems     Diagnosis Date Noted    SAH (subarachnoid hemorrhage) ([facility] ) 04/17/2018    History of asthma 04/17/2018    Tobacco use disorder 01/19/2015    Benign essential hypertension 07/31/2012    Moderate persistent asthma without complication 16/68/8687    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 07/31/2012    Nondependent alcohol abuse 07/31/2012    Nondependent cocaine abuse 78/31/7547    Renal colic 97/24/7299    Severe episode of recurrent major depressive disorder, without psychotic features ([facility] ) 12/14/2016    Disorder of menstrual bleeding 07/18/2011    Anxiety state 08/20/2012    Bladder spasm 08/06/2015    Hypoalbuminemia 04/17/2018    Acquired hydrocephalus 04/18/2018    Polyuria 04/20/2018    Bipolar 2 disorder, major depressive episode ([facility] ) 05/15/2018    Chronic post-traumatic stress disorder (PTSD) 05/19/2018     Resolved Ambulatory Problems     Diagnosis Date Noted    Hypokalemia 04/17/2018    Hypophosphatemia 04/19/2018     Past Medical History:   Diagnosis Date    Anxiety     Asthma     Depression     Head injury     Seizures ([facility] [address] )        Past Surgical History:   Procedure Laterality Date     SECTION      SPLENECTOMY           (Not in a hospital admission)    Results/Data:  I reviewed her imaging with her while she was an inpatient  In addition we went over her new CT of her head  Her ventricle size appears to be slightly decreased compared to her last CT

## 2018-06-13 NOTE — PROGRESS NOTES
Earlier I called [facility] for physicatry apt for pt f/u recent hospitalization in [facility] spoke w/ barry in [facility] office who advised me to call [facility] because pt was seen by dr Albino Milan   I called [facility] location spoke with Diony Monzon was told she was never seen by dr Albino Milan , she gave me number to call [phone] ask for bebeto or renae , meanwhile her office cordinator called me right back Garett, to advise same thing , I called that number was told this is an inpt unit by [facility] [employer] and she couldn't help me   I called [facility] floor and was told they didn't even have her ? I dug deeper and found out she was seen in house by Dr Verenice Stephens but he doesn't do f/u in office only sees pts in [place] [address]   Pt has a case manger card from [facility] and will f/u also has crisis info as noted on her d/c summary or report to ER if she cant wait for f/u apt sebasti[facility] office corrdinatow in [facility] said I could give her her name and number for apt pt was grateful    They will call her for out pt physiatry apt

## 2018-06-15 DIAGNOSIS — I60.9 SAH (SUBARACHNOID HEMORRHAGE) (HCC): Primary | ICD-10-CM

## 2018-06-19 ENCOUNTER — TELEPHONE (OUTPATIENT)
Dept: PSYCHIATRY | Facility: CLINIC | Age: 58
End: 2018-06-19

## 2018-06-19 NOTE — TELEPHONE ENCOUNTER
Please call her pharmacy and see if she is due for her prescriptions, she can have 1 more refill on all her medications from me which includes Abilify, BuSpar and Zoloft

## 2018-08-13 ENCOUNTER — TELEPHONE (OUTPATIENT)
Dept: RADIOLOGY | Facility: HOSPITAL | Age: 58
End: 2018-08-13

## 2018-08-14 LAB
APTT PPP: 25 SEC (ref 22–34)
BASOPHILS # BLD AUTO: 29 CELLS/UL (ref 0–200)
BASOPHILS NFR BLD AUTO: 0.3 %
BUN SERPL-MCNC: 12 MG/DL (ref 7–25)
BUN/CREAT SERPL: NORMAL (CALC) (ref 6–22)
CALCIUM SERPL-MCNC: 9 MG/DL (ref 8.6–10.4)
CHLORIDE SERPL-SCNC: 108 MMOL/L (ref 98–110)
CO2 SERPL-SCNC: 27 MMOL/L (ref 20–32)
CREAT SERPL-MCNC: 0.82 MG/DL (ref 0.5–1.05)
EOSINOPHIL # BLD AUTO: 269 CELLS/UL (ref 15–500)
EOSINOPHIL NFR BLD AUTO: 2.8 %
ERYTHROCYTE [DISTWIDTH] IN BLOOD BY AUTOMATED COUNT: 13.9 % (ref 11–15)
EST. AVERAGE GLUCOSE BLD GHB EST-MCNC: 100 (CALC)
EST. AVERAGE GLUCOSE BLD GHB EST-SCNC: 5.5 (CALC)
GLUCOSE SERPL-MCNC: 93 MG/DL (ref 65–139)
HBA1C MFR BLD: 5.1 % OF TOTAL HGB
HCT VFR BLD AUTO: 37.2 % (ref 35–45)
HGB BLD-MCNC: 12.3 G/DL (ref 11.7–15.5)
INR PPP: 0.9
LYMPHOCYTES # BLD AUTO: 2918 CELLS/UL (ref 850–3900)
LYMPHOCYTES NFR BLD AUTO: 30.4 %
MCH RBC QN AUTO: 30.4 PG (ref 27–33)
MCHC RBC AUTO-ENTMCNC: 33.1 G/DL (ref 32–36)
MCV RBC AUTO: 91.9 FL (ref 80–100)
MONOCYTES # BLD AUTO: 797 CELLS/UL (ref 200–950)
MONOCYTES NFR BLD AUTO: 8.3 %
NEUTROPHILS # BLD AUTO: 5587 CELLS/UL (ref 1500–7800)
NEUTROPHILS NFR BLD AUTO: 58.2 %
PLATELET # BLD AUTO: 581 THOUSAND/UL (ref 140–400)
PMV BLD REES-ECKER: 9.7 FL (ref 7.5–12.5)
POTASSIUM SERPL-SCNC: 4.2 MMOL/L (ref 3.5–5.3)
PROTHROMBIN TIME: 9.3 SEC (ref 9–11.5)
RBC # BLD AUTO: 4.05 MILLION/UL (ref 3.8–5.1)
SL AMB EGFR AFRICAN AMERICAN: 92 ML/MIN/1.73M2
SL AMB EGFR NON AFRICAN AMERICAN: 79 ML/MIN/1.73M2
SODIUM SERPL-SCNC: 140 MMOL/L (ref 135–146)
WBC # BLD AUTO: 9.6 THOUSAND/UL (ref 3.8–10.8)

## 2018-08-15 ENCOUNTER — TELEPHONE (OUTPATIENT)
Dept: RADIOLOGY | Facility: HOSPITAL | Age: 58
End: 2018-08-15

## 2018-08-15 RX ORDER — SODIUM CHLORIDE 9 MG/ML
75 INJECTION, SOLUTION INTRAVENOUS CONTINUOUS
Status: CANCELLED | OUTPATIENT
Start: 2018-08-15

## 2018-08-15 NOTE — PROGRESS NOTES
[facility] labs okay, history reviewed, NKA,

## 2018-08-16 ENCOUNTER — TELEPHONE (OUTPATIENT)
Dept: INPATIENT UNIT | Facility: HOSPITAL | Age: 58
End: 2018-08-16

## 2018-08-17 ENCOUNTER — HOSPITAL ENCOUNTER (OUTPATIENT)
Dept: RADIOLOGY | Facility: HOSPITAL | Age: 58
Discharge: HOME | End: 2018-08-17
Attending: NEUROLOGICAL SURGERY | Admitting: NEUROLOGICAL SURGERY
Payer: COMMERCIAL

## 2018-08-17 ENCOUNTER — ANESTHESIA EVENT (OUTPATIENT)
Dept: SURGERY | Facility: HOSPITAL | Age: 58
End: 2018-08-17
Payer: COMMERCIAL

## 2018-08-17 ENCOUNTER — ANESTHESIA (OUTPATIENT)
Dept: SURGERY | Facility: HOSPITAL | Age: 58
End: 2018-08-17
Payer: COMMERCIAL

## 2018-08-17 VITALS
DIASTOLIC BLOOD PRESSURE: 66 MMHG | HEART RATE: 70 BPM | SYSTOLIC BLOOD PRESSURE: 107 MMHG | TEMPERATURE: 97.8 F | RESPIRATION RATE: 14 BRPM | OXYGEN SATURATION: 96 %

## 2018-08-17 DIAGNOSIS — I60.9 SAH (SUBARACHNOID HEMORRHAGE) (HCC): ICD-10-CM

## 2018-08-17 PROCEDURE — 36223 PLACE CATH CAROTID/INOM ART: CPT | Performed by: NEUROLOGICAL SURGERY

## 2018-08-17 PROCEDURE — C1760 CLOSURE DEV, VASC: HCPCS

## 2018-08-17 PROCEDURE — 36226 PLACE CATH VERTEBRAL ART: CPT

## 2018-08-17 PROCEDURE — C1894 INTRO/SHEATH, NON-LASER: HCPCS

## 2018-08-17 PROCEDURE — C1769 GUIDE WIRE: HCPCS

## 2018-08-17 PROCEDURE — 36224 PLACE CATH CAROTD ART: CPT | Performed by: NEUROLOGICAL SURGERY

## 2018-08-17 PROCEDURE — 36226 PLACE CATH VERTEBRAL ART: CPT | Performed by: NEUROLOGICAL SURGERY

## 2018-08-17 PROCEDURE — 36224 PLACE CATH CAROTD ART: CPT

## 2018-08-17 PROCEDURE — 36223 PLACE CATH CAROTID/INOM ART: CPT

## 2018-08-17 RX ORDER — SODIUM CHLORIDE 9 MG/ML
125 INJECTION, SOLUTION INTRAVENOUS CONTINUOUS
Status: DISCONTINUED | OUTPATIENT
Start: 2018-08-17 | End: 2018-08-17 | Stop reason: HOSPADM

## 2018-08-17 RX ORDER — PROPOFOL 10 MG/ML
INJECTION, EMULSION INTRAVENOUS AS NEEDED
Status: DISCONTINUED | OUTPATIENT
Start: 2018-08-17 | End: 2018-08-17 | Stop reason: SURG

## 2018-08-17 RX ORDER — HYDROCODONE BITARTRATE AND ACETAMINOPHEN 5; 325 MG/1; MG/1
1 TABLET ORAL EVERY 6 HOURS PRN
Status: DISCONTINUED | OUTPATIENT
Start: 2018-08-17 | End: 2018-08-17 | Stop reason: HOSPADM

## 2018-08-17 RX ORDER — SODIUM CHLORIDE 9 MG/ML
75 INJECTION, SOLUTION INTRAVENOUS CONTINUOUS
Status: DISCONTINUED | OUTPATIENT
Start: 2018-08-17 | End: 2018-08-17 | Stop reason: HOSPADM

## 2018-08-17 RX ORDER — PROPOFOL 10 MG/ML
INJECTION, EMULSION INTRAVENOUS CONTINUOUS PRN
Status: DISCONTINUED | OUTPATIENT
Start: 2018-08-17 | End: 2018-08-17 | Stop reason: SURG

## 2018-08-17 RX ORDER — FENTANYL CITRATE 50 UG/ML
INJECTION, SOLUTION INTRAMUSCULAR; INTRAVENOUS AS NEEDED
Status: DISCONTINUED | OUTPATIENT
Start: 2018-08-17 | End: 2018-08-17 | Stop reason: SURG

## 2018-08-17 RX ADMIN — FENTANYL CITRATE 25 MCG: 50 INJECTION, SOLUTION INTRAMUSCULAR; INTRAVENOUS at 08:15

## 2018-08-17 RX ADMIN — FENTANYL CITRATE 25 MCG: 50 INJECTION, SOLUTION INTRAMUSCULAR; INTRAVENOUS at 08:10

## 2018-08-17 RX ADMIN — PROPOFOL 100 MCG/KG/MIN: 10 INJECTION, EMULSION INTRAVENOUS at 07:57

## 2018-08-17 RX ADMIN — LIDOCAINE HYDROCHLORIDE 100 MG: 20 INJECTION, SOLUTION INTRAVENOUS at 07:57

## 2018-08-17 RX ADMIN — IODIXANOL 100 ML: 320 INJECTION, SOLUTION INTRAVASCULAR at 08:52

## 2018-08-17 RX ADMIN — SODIUM CHLORIDE 75 ML/HR: 0.9 INJECTION, SOLUTION INTRAVENOUS at 07:10

## 2018-08-17 RX ADMIN — FENTANYL CITRATE 25 MCG: 50 INJECTION, SOLUTION INTRAMUSCULAR; INTRAVENOUS at 08:46

## 2018-08-17 RX ADMIN — FENTANYL CITRATE 50 MCG: 50 INJECTION, SOLUTION INTRAMUSCULAR; INTRAVENOUS at 08:20

## 2018-08-17 RX ADMIN — HYDROCODONE BITARTRATE AND ACETAMINOPHEN 1 TABLET: 5; 325 TABLET ORAL at 10:28

## 2018-08-17 RX ADMIN — PROPOFOL 50 MG: 10 INJECTION, EMULSION INTRAVENOUS at 08:46

## 2018-08-17 NOTE — DISCHARGE INSTRUCTIONS
Today, you underwent a diagnostic cerebral angiogram under the care of Dr Erin Sweeney for evaluation of ***  ? The following instructions will help you care for yourself, or be cared for upon your return home today  These are guidelines for your care right after your surgery only  ? Notify Your Doctor or Nurse if you have any of the following:  ? SYMPTOMS OF WOUND INFECTION--   Increased pain in or around the incision   Swelling around the incision  Any drainage from the incision  Incision separates or opens up  Warmth in the tissues around the incision  Redness or tenderness on the skin near the incision   Fever (temperature greater than 101 degrees F)   ? NEUROLOGICAL CHANGES--  Change in alertness  Increased sleepiness   Nausea and vomiting   New onset of numbness or weakness in arms or legs   New problems with your bowels or bladder  New or worse problems with balance or walking  Seizures, new or worsening  ? UNRELIEVED HEADACHE PAIN--  New or increased pain unrelieved with pain medications   Pain associated with nausea and vomiting   Pain associated with other symptoms  ? QUESTIONS OR PROBLEMS--  Any questions or problems that you are unsure about  Wound Care:  Keep Incision Clean and Dry   You may shower daily, but do not soak incision  Pat dry after showering  No tub baths, soaking, swimming for 1 week after angiogram    You do not need to cover the incision  Mild to moderate bruising and tenderness to the site is expected and may last up to 1-2 weeks after your procedure  ?  A closure device was placed at the catheter insertion site  This is MRI compatible  Remove the dressing 24 hours after your procedure  If your groin site is bleeding, apply firm pressure for 10 minutes  Reinforce dressing rather than removing and checking frequently  If continues to bleed through the dressing after 1 hour, contact your neurosurgeon's office  Anticipatory Education:  ?   PAIN MED W/ Acetaminophen (Tylenol)  --IF a prescription for pain medicine has been sent home with you:  --Narcotic pain medication may cause constipation  Be sure to take stool softeners or laxatives while you are on narcotic pain medication  --Do not drive after taking prescription pain medicine  ?  If this medicine is too strong, or no longer necessary, or we did NOT recommend/prescribe oral narcotics, you may take:   - Tylenol Extra-strength/Acetaminophen, 2 tablets every 4-6 hours as needed for mild pain  DO NOT TAKE MORE THAN 4000MG PER DAY from combined sources  NOTE: Remember to eat when taking pain medicines in order to avoid nausea  Watch for constipation  Eat plenty of fruits, vegetables, juices, and drink 6-8 glasses of water each day  Constipation: Stay active and drink at least 6-8 cups of fluid each day to prevent constipation  If you need a laxative or stool softener follow the package directions or consult with your local pharmacists if you have questions  ? After anesthesia, rest for 24 hours  Do not drive, drink alcohol beverages or make any important decisions during this time  General anesthesia may cause sore throat, jaw discomfort or muscle aches  These symptoms can last for one or two days  Activity: Please follow these instructions:  Advance your activity as you can tolerate  You may do light house work; nothing strenuous   You may walk all you want  You may go up and down the steps  Use the railing for support  Do not do excessive bending, straining or heavy lifting for 48 hours after your procedure  Do not drive or return to work until you are instructed   It is normal for your energy level and sleep patterns to change after surgery  Get extra sleep at night and take naps during the day to help you feel less tired  Take rest periods during the day  Complete recovery may take several weeks  ?  You may resume driving after 67-71 hours recovery  You may return to work after 48 hours of recovery     ?  Diet:  Your doctor has recommended that you follow these diet instructions at home  Refer to the patient education materials you received during your hospital stay  If you would like more nutrition counseling, ask your doctor about making an appointment with an outpatient dietitian  Resume your home diet  ? Medications:  Please resume your home medications as instructed  ? Home Supplies and Equipment:  none  Additional Contacts:  ? CONTACTS FOR NEUROSURGERY: You may call your neurosurgeons office if you have questions between 8:30 am and 4:30 pm  You may request to speak to the nurse practitioner who is available Monday through Friday  ?  For off hours or the weekend you may call your neurosurgeon's office to leave a message

## 2018-08-17 NOTE — ANESTHESIA POSTPROCEDURE EVALUATION
Post-Op Assessment Note      CV Status:  Stable    Mental Status:  Alert and awake    Hydration Status:  Euvolemic    PONV Controlled:  Controlled    Airway Patency:  Patent    Post Op Vitals Reviewed: Yes          Staff: CRNA           BP   112/73   Temp      Pulse  64   Resp      SpO2   99

## 2018-08-17 NOTE — H&P
This is current and up to date  Changes made as need   Discussion Summary:   status post angio negative subarachnoid hemorrhage necessitating ventriculostomy placement which was ultimately weaned  Though her tox screen was positive for cocaine the amount of subarachnoid hemorrhage was quite significant  As such I would like to repeat arteriogram and delayed fashion  We will schedule this in August for greater than 3 months  In addition we will obtain a repeat head CT at that time to evaluate her ventricular size  She has not had any urinary incontinence and does not complain of significant headaches  I believe her mild ventriculomegaly is likely her new baseline       Chief Complaint: Follow-up (1 month follow up with new imaging)           HPI:  This is a 28-year-old female with a long psychiatric history who presented to the hospital on April 17th with diffuse subarachnoid hemorrhage and acute hydrocephalus  Emergent ventriculostomy was placed and arteriogram was performed  To angiograms done as an inpatient did not reveal any source of her hemorrhage  Drug screen was positive for cocaine  She was ultimately discharged to inpatient psychiatric care for treatment of severe depression and bipolar disease  A recent head CT was done to evaluate for hydrocephalus and her ventricular size appears to be slightly decreased      Since the time of her discharge she states that she has had mild headaches in the morning  She also feels off balance at times while walking  She has felt significantly depressed and is her mood has worsened since the time of her discharge  She denies any suicidal or homicidal ideation  She has at home and currently unemployed  She is starting to physical therapy but has not been evaluated by rehab for her subarachnoid hemorrhage  She complains of significant fatigue  She complains of back pain      She denies any family history of subarachnoid hemorrhage    She denies any family history of aneurysm  She states that she has not recently done drugs         Review of Systems   HENT: Negative  Eyes: Positive for photophobia  Negative for pain, discharge, redness, itching and visual disturbance  Respiratory: Negative  Cardiovascular: Negative  Gastrointestinal: Negative  Endocrine: Negative  Genitourinary: Positive for frequency  Negative for decreased urine volume, difficulty urinating, dyspareunia, dysuria, enuresis, flank pain, genital sores, hematuria, menstrual problem, pelvic pain, urgency, vaginal bleeding, vaginal discharge and vaginal pain  Musculoskeletal: Positive for back pain (middle), gait problem (uses walker), myalgias (back muscles), neck pain (doesn't turn neck to far) and neck stiffness  Negative for arthralgias and joint swelling  Skin: Negative  Allergic/Immunologic: Negative  Neurological: Positive for dizziness (sometimes when she gets up), speech difficulty, weakness (left hand weaker than right) and light-headedness  Negative for tremors, seizures, syncope, facial asymmetry, numbness and headaches  Hematological: Negative for adenopathy  Bruises/bleeds easily (bruise)  Psychiatric/Behavioral: Negative         /57 (BP Location: Left arm)   Pulse 65   Temp 97 8 °F (36 6 °C) (Oral)   Resp 16   SpO2 96%     Her mood is altered and her affect is slightly flat  She is tearful at times  She does not appear to be in acute distress however  Her pupils are equal round reactive to light  Her extraocular movements are intact  Face is symmetric  Tongue is midline  Facial sensation is intact and symmetric throughout  She has full strength in bilateral in upper lower extremities  There is no drift or dysmetria  She does ambulate with a walker    Without the walker she does ambulate and appears slightly off balance            Active Ambulatory Problems     Diagnosis Date Noted   [facility] (subarachnoid hemorrhage) ([facility] [address] ) 04/17/2018  History of asthma 04/17/2018    Tobacco use disorder 01/19/2015    Benign essential hypertension 07/31/2012    Moderate persistent asthma without complication 27/33/8189    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 07/31/2012    Nondependent alcohol abuse 07/31/2012    Nondependent cocaine abuse 79/49/8197    Renal colic 94/36/3826    Severe episode of recurrent major depressive disorder, without psychotic features ([facility] [address] ) 12/14/2016    Disorder of menstrual bleeding 07/18/2011    Anxiety state 08/20/2012    Bladder spasm 08/06/2015    Hypoalbuminemia 04/17/2018    Acquired hydrocephalus 04/18/2018    Polyuria 04/20/2018    Bipolar 2 disorder, major depressive episode ([facility] [address] ) 05/15/2018    Chronic post-traumatic stress disorder (PTSD) 05/19/2018           Resolved Ambulatory Problems     Diagnosis Date Noted    Hypokalemia 04/17/2018    Hypophosphatemia 04/19/2018           Past Medical History:   Diagnosis Date    Anxiety      Asthma      Depression      Head injury      Seizures ([facility] [address] )

## 2018-08-17 NOTE — ANESTHESIA PREPROCEDURE EVALUATION
Review of Systems/Medical History  Patient summary reviewed  Chart reviewed      Cardiovascular  Exercise tolerance (METS): >4,  Hypertension controlled,    Pulmonary  Asthma , well controlled/ stable ,        GI/Hepatic            Endo/Other     GYN       Hematology   Musculoskeletal       Neurology  Seizures well controlled,     Psychology   Anxiety, Depression , bipolar disorder and being treated for depression,              Physical Exam    Airway    Mallampati score: II  TM Distance: >3 FB  Neck ROM: full     Dental       Cardiovascular  Rhythm: regular, Rate: normal,     Pulmonary  Breath sounds clear to auscultation,     Other Findings        Anesthesia Plan  ASA Score- 3     Anesthesia Type- IV sedation with anesthesia with ASA Monitors  Additional Monitors:   Airway Plan: NTT  Plan Factors-    Induction- intravenous  Postoperative Plan- Plan for postoperative opioid use  Informed Consent- Anesthetic plan and risks discussed with patient  I personally reviewed this patient with the CRNA  Discussed and agreed on the Anesthesia Plan with the CRNA  Leslee Reyes

## 2018-08-17 NOTE — OP NOTE
OPERATIVE REPORT  PATIENT NAME: Ida Small     :  1960  MRN: [id number]   Pt Location: Interventional radiology    SURGERY DATE: 18     Preop Diagnosis:  1  S/p angio negative SAH    Postop Diagnosis  1  S/p angio negative SAH    Procedure:  Right Common Carotid Arteriogram  Left Internal Carotid Arteriogram  Left Vertebral Artery Arteriogram  Limited Right Femoral Arteriogram    Surgeon:   Rolando Oglesby MD    Specimen(s):  None    Estimated Blood Loss:   None    Drains:  None    Anesthesia Type:   Monitored Anesthesia Care     Complications:  None    Operative Indications:  Ida Small  is a very pleasant 63 y o  female who previously suffered angio negative subarachnoid hemorrhage in the setting of cocaine use  Given the extent of subarachnoid hemorrhage a repeat arteriogram in a delayed fashion was indicated  As such we discussed the risks and benefits of the procedure including bleeding, stroke, groin hematoma, death  She elected to proceed  Procedure Details:  After obtaining written informed consent patient was brought to the operating room and monitored anesthesia was induced  Percutaneous access with a 5-Belizean micropuncture kit was obtained into the right femoral artery  A 5-Belizean introducer sheath was placed and a 5-Belizean angled glide catheter was then advanced into the aorta, and over the aortic arch over a Glidewire       The catheter was then advanced and the right common carotid artery was then catheterized  AP lateral and magnified oblique images of the right intracranial carotid circulation were obtained        The catheter was then advanced and the left internal carotid artery was then catheterized  AP lateral and magnified oblique images of the left intracranial carotid circulation were obtained  The the catheter was then withdrawn and the left vertebral artery was catheterized    Transitional lateral and oblique images were obtained      The catheter was then withdrawn from the body and a limited right femoral arteriogram was run  Puncture site was found to be compatible with a Star Closure device and this was done successfully  There was appropriate hemostasis  The patient was then awoken from his monitored anesthesia care and found to be in his neurologic baseline  All sponge and needle counts were correct          INTERPRETATION OF ANGIOGRAPHIC FINDINGS:   1  The Right common/internal carotid artery circulation reveals antegrade flow into the middle cerebral and anterior cerebral arteries  There is an azygous A2  There is a fetal posterior communicating artery  There is no evidence of aneurysm, AVM, or vascular malformation  The capillary and venous phases are unremarkable  The 3d rotational angiogram does not reveal any aneurysm, there are several infindibulums that match on diagnostic arteriography       2  The Left internal carotid artery circulation reveals antegrade flow into the middle cerebral and anterior cerebral arteries  There is a patent posterior communicating artery  There is no evidence of aneurysm, AVM, or vascular malformation  The capillary and venous phases are unremarkable       3  The left vertebral intracranial circulation reveals retrograde reflux down the contralateral vertebral artery  There is a small right P1 consistent with a fetal origin  The basilar apex is appears broad, but no aneurysmal dilitation  Both PICAs are well visualized  Antegrade flow is present into all the posterior circulation branches  There are no AVMs or aneurysms   The capillary and venous phases are unremarkable       Limited Right femoral arteriogram reveals normal puncture site anatomy      Impression:  Normal cerebral arteriogram    Patient Disposition:  Short Stay    SIGNATURE: Marline Hough MD  DATE: 08/17/18   TIME: 0900

## 2018-08-20 ENCOUNTER — TELEPHONE (OUTPATIENT)
Dept: NEUROSURGERY | Facility: CLINIC | Age: 58
End: 2018-08-20

## 2018-08-20 NOTE — TELEPHONE ENCOUNTER
Completed post angio [facility] d/c date of 8/17/18 to Charli Caballero at preferred contact number  The patient denies any pain, swelling, drainage, fevers, or numbness/tingling/weakness in her leg  Reports a mild headache yesterday / today which subsided on its own / with the use of tylenol  Advised that it is normal to experience fatigue and mild headaches for a few days after the procedure  Explained to contact the office if she should experience any pain, swelling, or drainage from the site, and report to ER or call 911 if she experiences [facility]  Reminded of post procedure follow up scheduled with Dr Kemar Melendez 9/5/18  Confirmed ok to swim at this time with surgeon  Patient appreciative of call

## 2018-09-11 ENCOUNTER — DOCUMENTATION (OUTPATIENT)
Dept: NEUROSURGERY | Facility: CLINIC | Age: 58
End: 2018-09-11

## 2018-10-22 ENCOUNTER — OFFICE VISIT (OUTPATIENT)
Dept: NEUROSURGERY | Facility: CLINIC | Age: 58
End: 2018-10-22
Payer: COMMERCIAL

## 2018-10-22 VITALS
HEIGHT: 63 IN | BODY MASS INDEX: 36.25 KG/M2 | TEMPERATURE: 96.3 F | DIASTOLIC BLOOD PRESSURE: 95 MMHG | WEIGHT: 204.6 LBS | RESPIRATION RATE: 16 BRPM | HEART RATE: 85 BPM | SYSTOLIC BLOOD PRESSURE: 138 MMHG

## 2018-10-22 DIAGNOSIS — I60.9 SAH (SUBARACHNOID HEMORRHAGE) (HCC): Primary | ICD-10-CM

## 2018-10-22 DIAGNOSIS — G91.9 ACQUIRED HYDROCEPHALUS (HCC): ICD-10-CM

## 2018-10-22 PROCEDURE — 99214 OFFICE O/P EST MOD 30 MIN: CPT | Performed by: NEUROLOGICAL SURGERY

## 2018-10-22 NOTE — PROGRESS NOTES
Patient Id: Juan Antonio Reyes is a 63 y o  female        Assessment/Plan:    There are no diagnoses linked to this encounter  Discussion Summary:   Two month status post repeat angiogram for negative subarachnoid hemorrhage necessitating ventriculostomy placement which was ultimately weaned  clinically she states that her headaches have resolved  She does remain fatigue at times  If she has not started to work  She is continuing and therapies  We discussed in detail her angiogram as well as reviewed the images  I not see any aneurysmal lesion or vascular abnormality to account for the hemorrhage  We discussed that I believe that this is secondary to the use of cocaine  She states that she has not recently used and months  We discussed the importance of smoking cessation  She states that she has cut back but is still smoking  Given the lack of a vascular structural lesion I do not anticipate the need for further follow-up at this time  Her head CT is otherwise stable  She can follow back as needed                                     I spent 40 minutes with the patient greater than 50 percent of which was spent in counseling  Chief Complaint: Follow-up (2 week angiogram follow up)        HPI:   This is a 81-year-old female with a long psychiatric history who presented to the hospital on April 17th with diffuse subarachnoid hemorrhage and acute hydrocephalus  Emergent ventriculostomy was placed and arteriogram was performed  Two angiograms done as an inpatient did not reveal any source of her hemorrhage  Drug screen was positive for cocaine  She was ultimately discharged to inpatient psychiatric care for treatment of severe depression and bipolar disease  A recent head CT was done to evaluate for hydrocephalus and her ventricular size appears to be slightly decreased      Since the time of her repeat angiogram 2 months ago her headaches have resolved   She has no complaints other than chronic fatigue  She is seeing rehab and psych  No suicidal ideation  She denies any family history of subarachnoid hemorrhage  She denies any family history of aneurysm  She states that she has not recently done drugs  She continues to smoke        Review of Systems   Constitutional: Negative  HENT: Negative  Eyes: Negative  Respiratory: Negative  Cardiovascular: Negative  Gastrointestinal: Negative  Endocrine: Negative  Genitourinary: Negative  Musculoskeletal: Negative  Skin: Negative  Allergic/Immunologic: Negative  Neurological: Positive for numbness (someitmes in fingers and toes)  Negative for dizziness, tremors, seizures, syncope, facial asymmetry, speech difficulty, weakness, light-headedness and headaches  Hematological: Negative  Psychiatric/Behavioral: Negative  Physical Exam  Vitals:    10/22/18 1335   BP: 138/95   Pulse: 85   Resp: 16   Temp: (!) 96 3 °F (35 7 °C)    Her affect is flat  She does not appear to be in acute distress however  Her pupils are equal round reactive to light  Her extraocular movements are intact  Face is symmetric  Tongue is midline  Facial sensation is intact and symmetric throughout  She has full strength in bilateral in upper lower extremities  There is no drift or dysmetria  She is ambulating independently without any assistive devices  Pulses 2+ and symmetric      The following portions of the patient's history were reviewed and updated as appropriate: allergies, current medications, past family history, past medical history, past social history, past surgical history and problem list     Active Ambulatory Problems     Diagnosis Date Noted    [facility] 04/17[facility]    History of asthma 04/17/2018    Tobacco use disorder 01/19/2015    Benign essential hypertension 07/31/2012    Moderate persistent asthma without complication 42/99/6562    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 2012    Nondependent alcohol abuse 2012    Nondependent cocaine abuse ([place] [address] )     Renal colic     Severe episode of recurrent major depressive disorder, without psychotic features ([place] [address] ) 2016    Disorder of menstrual bleeding 2011    Anxiety state 2012    Bladder spasm 2015    Hypoalbuminemia 2018    Acquired hydrocephalus 2018    Polyuria 2018    Bipolar 2 disorder, major depressive episode ([place] [address] ) 05/15/2018    Chronic post-traumatic stress disorder (PTSD) 2018     Resolved Ambulatory Problems     Diagnosis Date Noted    Hypokalemia 2018    Hypophosphatemia 2018     Past Medical History:   Diagnosis Date    Anxiety     Asthma     Depression     Head injury     Seizures ([place] [address] )        Past Surgical History:   Procedure Laterality Date     SECTION      IR CEREBRAL ANGIOGRAPHY  2018    SPLENECTOMY           Current Outpatient Prescriptions:     albuterol (PROVENTIL HFA,VENTOLIN HFA) 90 mcg/act inhaler, Inhale 2 puffs every 6 (six) hours as needed for wheezing, Disp: , Rfl:     ARIPiprazole (ABILIFY) 10 mg tablet, Take 1 tablet (10 mg total) by mouth daily, Disp: 30 tablet, Rfl: 0    busPIRone (BUSPAR) 15 mg tablet, Take 1 tablet (15 mg total) by mouth 3 (three) times a day, Disp: 90 tablet, Rfl: 0    fluticasone furoate-vilanterol (BREO ELLIPTA) 200-25 MCG/INH inhaler, Inhale 1 puff daily, Disp: , Rfl:     fluticasone furoate-vilanterol (BREO ELLIPTA), Inhale 1 puff daily, Disp: , Rfl: 0    gabapentin (NEURONTIN) 300 mg capsule, Take 1 capsule (300 mg total) by mouth 3 (three) times a day, Disp: 90 capsule, Rfl: 0    hydrOXYzine pamoate (VISTARIL) 25 mg capsule, Take 1 capsule (25 mg total) by mouth 3 (three) times a day as needed for itching, Disp: 30 capsule, Rfl: 0    montelukast (SINGULAIR) 10 mg tablet, Take 10 mg by mouth daily, Disp: , Rfl:    sertraline (ZOLOFT) 50 mg tablet, Take 3 tablets (150 mg total) by mouth daily, Disp: 90 tablet, Rfl: 0    Results/Data: We reviewed her CT scan as well as angiogram   I do not see any structural lesion to account for her subarachnoid hemorrhage  Her ventricles appear stable  There is no transependymal flow or interval increase in size

## 2019-11-15 ENCOUNTER — HOSPITAL ENCOUNTER (EMERGENCY)
Facility: HOSPITAL | Age: 59
Discharge: HOME | End: 2019-11-15
Attending: EMERGENCY MEDICINE | Admitting: EMERGENCY MEDICINE
Payer: COMMERCIAL

## 2019-11-15 ENCOUNTER — APPOINTMENT (EMERGENCY)
Dept: RADIOLOGY | Facility: HOSPITAL | Age: 59
End: 2019-11-15
Payer: COMMERCIAL

## 2019-11-15 ENCOUNTER — APPOINTMENT (EMERGENCY)
Dept: CT IMAGING | Facility: HOSPITAL | Age: 59
End: 2019-11-15
Payer: COMMERCIAL

## 2019-11-15 VITALS
SYSTOLIC BLOOD PRESSURE: 130 MMHG | TEMPERATURE: 98.2 F | BODY MASS INDEX: 34.52 KG/M2 | DIASTOLIC BLOOD PRESSURE: 80 MMHG | OXYGEN SATURATION: 95 % | HEART RATE: 84 BPM | RESPIRATION RATE: 18 BRPM | WEIGHT: 194.89 LBS

## 2019-11-15 DIAGNOSIS — R42 DIZZINESS: Primary | ICD-10-CM

## 2019-11-15 DIAGNOSIS — R20.0 NUMBNESS AND TINGLING IN BOTH HANDS: ICD-10-CM

## 2019-11-15 DIAGNOSIS — R25.1 TREMULOUSNESS: ICD-10-CM

## 2019-11-15 DIAGNOSIS — J45.909 ASTHMA: ICD-10-CM

## 2019-11-15 DIAGNOSIS — R20.2 NUMBNESS AND TINGLING IN BOTH HANDS: ICD-10-CM

## 2019-11-15 LAB
ALBUMIN SERPL BCP-MCNC: 3.5 G/DL (ref 3.5–5)
ALP SERPL-CCNC: 94 U/L (ref 46–116)
ALT SERPL W P-5'-P-CCNC: 19 U/L (ref 12–78)
ANION GAP SERPL CALCULATED.3IONS-SCNC: 9 MMOL/L (ref 4–13)
APAP SERPL-MCNC: <2 UG/ML (ref 10–20)
AST SERPL W P-5'-P-CCNC: 25 U/L (ref 5–45)
ATRIAL RATE: 71 BPM
BASOPHILS # BLD AUTO: 0.05 THOUSANDS/ΜL (ref 0–0.1)
BASOPHILS NFR BLD AUTO: 1 % (ref 0–1)
BILIRUB SERPL-MCNC: 0.33 MG/DL (ref 0.2–1)
BUN SERPL-MCNC: 11 MG/DL (ref 5–25)
CALCIUM SERPL-MCNC: 9.2 MG/DL (ref 8.3–10.1)
CHLORIDE SERPL-SCNC: 104 MMOL/L (ref 100–108)
CO2 SERPL-SCNC: 25 MMOL/L (ref 21–32)
CREAT SERPL-MCNC: 0.8 MG/DL (ref 0.6–1.3)
EOSINOPHIL # BLD AUTO: 0.06 THOUSAND/ΜL (ref 0–0.61)
EOSINOPHIL NFR BLD AUTO: 1 % (ref 0–6)
ERYTHROCYTE [DISTWIDTH] IN BLOOD BY AUTOMATED COUNT: 14.6 % (ref 11.6–15.1)
ETHANOL SERPL-MCNC: <3 MG/DL (ref 0–3)
GFR SERPL CREATININE-BSD FRML MDRD: 82 ML/MIN/1.73SQ M
GLUCOSE SERPL-MCNC: 112 MG/DL (ref 65–140)
HCT VFR BLD AUTO: 41.3 % (ref 34.8–46.1)
HGB BLD-MCNC: 12.7 G/DL (ref 11.5–15.4)
IMM GRANULOCYTES # BLD AUTO: 0.03 THOUSAND/UL (ref 0–0.2)
IMM GRANULOCYTES NFR BLD AUTO: 0 % (ref 0–2)
LYMPHOCYTES # BLD AUTO: 3.07 THOUSANDS/ΜL (ref 0.6–4.47)
LYMPHOCYTES NFR BLD AUTO: 30 % (ref 14–44)
MCH RBC QN AUTO: 28.3 PG (ref 26.8–34.3)
MCHC RBC AUTO-ENTMCNC: 30.8 G/DL (ref 31.4–37.4)
MCV RBC AUTO: 92 FL (ref 82–98)
MONOCYTES # BLD AUTO: 0.69 THOUSAND/ΜL (ref 0.17–1.22)
MONOCYTES NFR BLD AUTO: 7 % (ref 4–12)
NEUTROPHILS # BLD AUTO: 6.4 THOUSANDS/ΜL (ref 1.85–7.62)
NEUTS SEG NFR BLD AUTO: 61 % (ref 43–75)
NRBC BLD AUTO-RTO: 0 /100 WBCS
P AXIS: 69 DEGREES
PLATELET # BLD AUTO: 587 THOUSANDS/UL (ref 149–390)
PMV BLD AUTO: 9.2 FL (ref 8.9–12.7)
POTASSIUM SERPL-SCNC: 4.8 MMOL/L (ref 3.5–5.3)
PR INTERVAL: 168 MS
PROT SERPL-MCNC: 7.1 G/DL (ref 6.4–8.2)
QRS AXIS: 63 DEGREES
QRSD INTERVAL: 74 MS
QT INTERVAL: 368 MS
QTC INTERVAL: 399 MS
RBC # BLD AUTO: 4.48 MILLION/UL (ref 3.81–5.12)
SALICYLATES SERPL-MCNC: <3 MG/DL (ref 3–20)
SODIUM SERPL-SCNC: 138 MMOL/L (ref 136–145)
T WAVE AXIS: 60 DEGREES
VENTRICULAR RATE: 71 BPM
WBC # BLD AUTO: 10.3 THOUSAND/UL (ref 4.31–10.16)

## 2019-11-15 PROCEDURE — 80320 DRUG SCREEN QUANTALCOHOLS: CPT | Performed by: EMERGENCY MEDICINE

## 2019-11-15 PROCEDURE — 36415 COLL VENOUS BLD VENIPUNCTURE: CPT | Performed by: EMERGENCY MEDICINE

## 2019-11-15 PROCEDURE — 94640 AIRWAY INHALATION TREATMENT: CPT

## 2019-11-15 PROCEDURE — 80329 ANALGESICS NON-OPIOID 1 OR 2: CPT | Performed by: EMERGENCY MEDICINE

## 2019-11-15 PROCEDURE — 80053 COMPREHEN METABOLIC PANEL: CPT | Performed by: EMERGENCY MEDICINE

## 2019-11-15 PROCEDURE — 70450 CT HEAD/BRAIN W/O DYE: CPT

## 2019-11-15 PROCEDURE — 99284 EMERGENCY DEPT VISIT MOD MDM: CPT | Performed by: EMERGENCY MEDICINE

## 2019-11-15 PROCEDURE — 99284 EMERGENCY DEPT VISIT MOD MDM: CPT

## 2019-11-15 PROCEDURE — 85025 COMPLETE CBC W/AUTO DIFF WBC: CPT | Performed by: EMERGENCY MEDICINE

## 2019-11-15 PROCEDURE — 93010 ELECTROCARDIOGRAM REPORT: CPT

## 2019-11-15 PROCEDURE — 71046 X-RAY EXAM CHEST 2 VIEWS: CPT

## 2019-11-15 PROCEDURE — 93005 ELECTROCARDIOGRAM TRACING: CPT

## 2019-11-15 RX ORDER — PREDNISONE 20 MG/1
40 TABLET ORAL DAILY
Qty: 10 TABLET | Refills: 0 | Status: SHIPPED | OUTPATIENT
Start: 2019-11-15 | End: 2019-11-20

## 2019-11-15 RX ORDER — HYDROXYZINE HYDROCHLORIDE 25 MG/1
25 TABLET, FILM COATED ORAL EVERY 6 HOURS
Qty: 12 TABLET | Refills: 0 | Status: SHIPPED | OUTPATIENT
Start: 2019-11-15

## 2019-11-15 RX ORDER — ALBUTEROL SULFATE 2.5 MG/3ML
5 SOLUTION RESPIRATORY (INHALATION) ONCE
Status: COMPLETED | OUTPATIENT
Start: 2019-11-15 | End: 2019-11-15

## 2019-11-15 RX ORDER — ALBUTEROL SULFATE 90 UG/1
1-2 AEROSOL, METERED RESPIRATORY (INHALATION) EVERY 6 HOURS PRN
Qty: 1 INHALER | Refills: 0 | Status: SHIPPED | OUTPATIENT
Start: 2019-11-15

## 2019-11-15 RX ORDER — BUPRENORPHINE HYDROCHLORIDE AND NALOXONE HYDROCHLORIDE DIHYDRATE 2; .5 MG/1; MG/1
TABLET SUBLINGUAL DAILY
COMMUNITY

## 2019-11-15 RX ADMIN — ALBUTEROL SULFATE 5 MG: 2.5 SOLUTION RESPIRATORY (INHALATION) at 15:20

## 2019-11-15 RX ADMIN — IPRATROPIUM BROMIDE 0.5 MG: 0.5 SOLUTION RESPIRATORY (INHALATION) at 15:20

## 2019-11-15 NOTE — ED PROVIDER NOTES
History  Chief Complaint   Patient presents with    Dizziness     woke up this morning feeling dizzy, weird sensation in arms  denies n/v, blurry vision, headache  History provided by:  Patient   used: No    Dizziness   Quality:  Lightheadedness  Severity:  Moderate  Onset quality:  Gradual  Duration:  4 hours  Timing:  Intermittent  Progression:  Waxing and waning  Chronicity:  New  Context comment:  Woke up with Dizziness, Spacing, bilateral arm tingling around 10 am  Relieved by:  Nothing  Worsened by:  Nothing  Ineffective treatments:  None tried  Associated symptoms: no blood in stool, no chest pain, no diarrhea, no headaches, no nausea, no shortness of breath, no vision changes, no vomiting and no weakness    Risk factors comment:  Hx fo SAH      Prior to Admission Medications   Prescriptions Last Dose Informant Patient Reported? Taking?    ARIPiprazole (ABILIFY) 10 mg tablet Not Taking at Unknown time  No No   Sig: Take 1 tablet (10 mg total) by mouth daily   Patient not taking: Reported on 11/15/2019   albuterol (PROVENTIL HFA,VENTOLIN HFA) 90 mcg/act inhaler   Yes Yes   Sig: Inhale 2 puffs every 6 (six) hours as needed for wheezing   buprenorphine-naloxone (SUBOXONE) 2-0 5 mg per SL tablet   Yes Yes   Sig: Place under the tongue daily Dosage unknown to patient   busPIRone (BUSPAR) 15 mg tablet   No Yes   Sig: Take 1 tablet (15 mg total) by mouth 3 (three) times a day   fluticasone furoate-vilanterol (BREO ELLIPTA) Not Taking at Unknown time  No No   Sig: Inhale 1 puff daily   Patient not taking: Reported on 11/15/2019   fluticasone furoate-vilanterol (BREO ELLIPTA) 200-25 MCG/INH inhaler Not Taking at Unknown time  Yes No   Sig: Inhale 1 puff daily   gabapentin (NEURONTIN) 300 mg capsule   No Yes   Sig: Take 1 capsule (300 mg total) by mouth 3 (three) times a day   hydrOXYzine pamoate (VISTARIL) 25 mg capsule Not Taking at Unknown time  No No   Sig: Take 1 capsule (25 mg total) by mouth 3 (three) times a day as needed for itching   Patient not taking: Reported on 11/15/2019   montelukast (SINGULAIR) 10 mg tablet   Yes Yes   Sig: Take 10 mg by mouth daily   sertraline (ZOLOFT) 50 mg tablet Not Taking at Unknown time  No No   Sig: Take 3 tablets (150 mg total) by mouth daily   Patient not taking: Reported on 11/15/2019      Facility-Administered Medications: None       Past Medical History:   Diagnosis Date    Anxiety     Asthma     Depression     Head injury     Seizures ([facility] [address] )     "Last one was a couple of years ago"       Past Surgical History:   Procedure Laterality Date     SECTION      IR CEREBRAL ANGIOGRAPHY  2018    SPLENECTOMY         Family History   Problem Relation Age of Onset    Drug abuse Brother     Completed Suicide  Brother      I have reviewed and agree with the history as documented  Social History     Tobacco Use    Smoking status: Current Every Day Smoker     Packs/day: 0 25     Years: 20 00     Pack years: 5 00     Types: Cigarettes    Smokeless tobacco: Never Used   Substance Use Topics    Alcohol use: No     Comment: patient denies, despite findings of Alcohol in system    Drug use: No     Comment: per patient: history of cocaine abuse; Tox screen was positive for cocaine at admission;  patient denies current use  Review of Systems   Constitutional: Negative for chills and fever  HENT: Negative for facial swelling, sore throat and trouble swallowing  Eyes: Negative for pain and visual disturbance  Respiratory: Negative for cough and shortness of breath  Cardiovascular: Negative for chest pain and leg swelling  Gastrointestinal: Negative for abdominal pain, blood in stool, diarrhea, nausea and vomiting  Genitourinary: Negative for dysuria and flank pain  Musculoskeletal: Negative for back pain, neck pain and neck stiffness  Skin: Negative for pallor and rash     Allergic/Immunologic: Negative for environmental allergies and immunocompromised state  Neurological: Positive for dizziness  Negative for weakness and headaches  Hematological: Negative for adenopathy  Does not bruise/bleed easily  Psychiatric/Behavioral: Negative for agitation and behavioral problems  All other systems reviewed and are negative  Physical Exam  Physical Exam   Constitutional: She is oriented to person, place, and time  She appears well-developed and well-nourished  No distress  HENT:   Head: Normocephalic and atraumatic  Eyes: Pupils are equal, round, and reactive to light  EOM are normal    Neck: Normal range of motion  Neck supple  Cardiovascular: Normal rate, regular rhythm, normal heart sounds and intact distal pulses  Pulmonary/Chest: Effort normal and breath sounds normal    Abdominal: Soft  Bowel sounds are normal  There is no tenderness  There is no rebound and no guarding  Musculoskeletal: Normal range of motion  Neurological: She is alert and oriented to person, place, and time  No cranial nerve deficit  Coordination normal    Skin: Skin is warm and dry  Psychiatric: She has a normal mood and affect  Nursing note and vitals reviewed        Vital Signs  ED Triage Vitals   Temperature Pulse Respirations Blood Pressure SpO2   11/15/19 1212 11/15/19 1212 11/15/19 1212 11/15/19 1212 11/15/19 1212   98 2 °F (36 8 °C) 83 16 161/91 94 %      Temp Source Heart Rate Source Patient Position - Orthostatic VS BP Location FiO2 (%)   11/15/19 1212 11/15/19 1415 11/15/19 1415 11/15/19 1415 --   Oral Monitor Lying Right arm       Pain Score       11/15/19 1212       No Pain           Vitals:    11/15/19 1212 11/15/19 1415 11/15/19 1525 11/15/19 1606   BP: 161/91 164/94 134/83 130/80   Pulse: 83 80 73 84   Patient Position - Orthostatic VS:  Lying Lying Lying         Visual Acuity  Visual Acuity      Most Recent Value   L Pupil Size (mm)  4   R Pupil Size (mm)  4          ED Medications  Medications   albuterol inhalation solution 5 mg (5 mg Nebulization Given 11/15/19 1520)   ipratropium (ATROVENT) 0 02 % inhalation solution 0 5 mg (0 5 mg Nebulization Given 11/15/19 1520)       Diagnostic Studies  Results Reviewed     Procedure Component Value Units Date/Time    Ethanol [[id number]]  (Normal) Collected:  11/15/19 1422    Lab Status:  Final result Specimen:  Blood from Arm, Left Updated:  11/15/19 1549     Ethanol Lvl <3 mg/dL     Salicylate level [[id number]]  (Abnormal) Collected:  11/15/19 1422    Lab Status:  Final result Specimen:  Blood from Arm, Left Updated:  44/08/74 5810     Salicylate Lvl <3 mg/dL     Acetaminophen level-If concentration is detectable, please discuss with medical  on call   [[id number]]  (Abnormal) Collected:  11/15/19 1422    Lab Status:  Final result Specimen:  Blood from Arm, Left Updated:  11/15/19 1547     Acetaminophen Level <2 ug/mL     Comprehensive metabolic panel [[id number]] Collected:  11/15/19 1422    Lab Status:  Final result Specimen:  Blood from Arm, Left Updated:  11/15/19 1448     Sodium 138 mmol/L      Potassium 4 8 mmol/L      Chloride 104 mmol/L      CO2 25 mmol/L      ANION GAP 9 mmol/L      BUN 11 mg/dL      Creatinine 0 80 mg/dL      Glucose 112 mg/dL      Calcium 9 2 mg/dL      AST 25 U/L      ALT 19 U/L      Alkaline Phosphatase 94 U/L      Total Protein 7 1 g/dL      Albumin 3 5 g/dL      Total Bilirubin 0 33 mg/dL      eGFR 82 ml/min/1 73sq m     Narrative:       [place] guidelines for Chronic Kidney Disease (CKD):     Stage 1 with normal or high GFR (GFR > 90 mL/min/1 73 square meters)    Stage 2 Mild CKD (GFR = 60-89 mL/min/1 73 square meters)    Stage 3A Moderate CKD (GFR = 45-59 mL/min/1 73 square meters)    Stage 3B Moderate CKD (GFR = 30-44 mL/min/1 73 square meters)    Stage 4 Severe CKD (GFR = 15-29 mL/min/1 73 square meters)    Stage 5 End Stage CKD (GFR <15 mL/min/1 73 square meters)  Note: GFR calculation is accurate only with a steady state creatinine    CBC and differential [[id number]]  (Abnormal) Collected:  11/15/19 1422    Lab Status:  Final result Specimen:  Blood from Arm, Left Updated:  11/15/19 1429     WBC 10 30 Thousand/uL      RBC 4 48 Million/uL      Hemoglobin 12 7 g/dL      Hematocrit 41 3 %      MCV 92 fL      MCH 28 3 pg      MCHC 30 8 g/dL      RDW 14 6 %      MPV 9 2 fL      Platelets 948 Thousands/uL      nRBC 0 /100 WBCs      Neutrophils Relative 61 %      Immat GRANS % 0 %      Lymphocytes Relative 30 %      Monocytes Relative 7 %      Eosinophils Relative 1 %      Basophils Relative 1 %      Neutrophils Absolute 6 40 Thousands/µL      Immature Grans Absolute 0 03 Thousand/uL      Lymphocytes Absolute 3 07 Thousands/µL      Monocytes Absolute 0 69 Thousand/µL      Eosinophils Absolute 0 06 Thousand/µL      Basophils Absolute 0 05 Thousands/µL                  XR chest 2 views   Final Result by Sylvester Longoria MD (11/15 1531)      No acute cardiopulmonary disease  Workstation performed: [id number]         CT head without contrast   Final Result by Teresa Marmolejo DO (11/15 1502)   Stable postoperative changes  No acute intracranial abnormality  Workstation performed: [id number]                    Procedures  Procedures       ED Course  ED Course as of Nov 16 0137   Fri Nov 15, 2019   1456 WBC(!): 10 30   1456 Hemoglobin: 12 7   1456 Sodium: 138   1456 Potassium: 4 8   1456 CBC, CMP show no acute abnormality  BUN: 11   1506 CT head reviewed, no acute abnormality  1517 Patient has wheezing, we will give Duoneb       1600 MEDICAL ALCOHOL: <3   8390 SALICYLATE LEVEL(!): <3   1600 Coma panel negative  ACETAMINOPHEN LEVEL(!): <2   1600 Blood Pressure: 134/83   1600 Pulse: 73   1600 Respirations: 18   1600 Vitals reviewed, stable     SpO2: 96 %   1600 We will discharge, possible mild Asthma exacerbation, offered Prednisone but declines, will give ALbuterol IH, requests Anxiety med, will give Atarax, advised outpatient follow up  MDM  Number of Diagnoses or Management Options  Asthma: new and requires workup  Dizziness: new and requires workup  Numbness and tingling in both hands: new and requires workup  Tremulousness: new and requires workup  Diagnosis management comments: Patient is a 60-year-old female, previous history of subarachnoid hemorrhage, 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, comes with complaints of dizziness, spacing out, bilateral arm tingling, started around 10:00 a m  upon waking up, no recent illness, denies headache, nausea, vomiting, chest pain, dyspnea, fever  On exam patient appears slightly shaky/tremulous (denies regular alcohol use); is conscious, alert, oriented x3, vital signs are noted pharyngeal hypertension, however repeat blood pressure in the room came down to 164/90 (patient does not take any blood pressure medications); pupils equal round reactive light, no nystagmus, no cranial nerve or focal neuro deficits; lungs clear, heart sounds normal, abdomen soft nontender, no peripheral edema, no calf tenderness or swelling  Differential diagnosis:  Nonspecific dizziness, shaky/tremulous, tingling, possible opioid withdrawal, electrolyte imbalance, dehydration, due to patient's previous history of subarachnoid hemorrhage, will get CT scan however no stroke signs, NIH 0; check CBC, CMP, coag panel, drug screen, EKG, urine, chest x-ray         Amount and/or Complexity of Data Reviewed  Clinical lab tests: ordered and reviewed  Tests in the radiology section of CPT®: ordered and reviewed  Tests in the medicine section of CPT®: reviewed and ordered  Review and summarize past medical records: yes  Independent visualization of images, tracings, or specimens: yes        Disposition  Final diagnoses:   Dizziness   Numbness and tingling in both hands   Tremulousness   Asthma     Time reflects when diagnosis was documented in both MDM as applicable and the Disposition within this note     Time User Action Codes Description Comment    11/15/2019  2:41 PM Mary Copa Add [R42] Dizziness     11/15/2019  2:42 PM Mary Copa Add [R20 0,  R20 2] Numbness and tingling in both hands     11/15/2019  2:42 PM Mary Copa Add [R25 1] Tremulousness     11/15/2019  4:03 PM Mary Copa Add [E06 094] Asthma       ED Disposition     ED Disposition Condition Date/Time Comment    Discharge Stable Fri Nov 15, 2019  4:02 PM Oliva Brunner discharge to home/self care  Follow-up Information     Follow up With Specialties Details Why Contact Info Additional Information    Alexis Palumbo MD Family Medicine Schedule an appointment as soon as possible for a visit in 2 days  [place] [address] Emergency Department Emergency Medicine  If symptoms worsen [facility] [id number]  [phone] [address] [place] [address], [address] [place], [place], [address]          Discharge Medication List as of 11/15/2019  4:04 PM      START taking these medications    Details   !! albuterol (PROVENTIL HFA,VENTOLIN HFA) 90 mcg/act inhaler Inhale 1-2 puffs every 6 (six) hours as needed for wheezing, Starting Fri 11/15/2019, Print      predniSONE 20 mg tablet Take 2 tablets (40 mg total) by mouth daily for 5 days, Starting Fri 11/15/2019, Until Wed 11/20/2019, Print       !! - Potential duplicate medications found  Please discuss with provider        CONTINUE these medications which have NOT CHANGED    Details   !! albuterol (PROVENTIL HFA,VENTOLIN HFA) 90 mcg/act inhaler Inhale 2 puffs every 6 (six) hours as needed for wheezing, Historical Med      buprenorphine-naloxone (SUBOXONE) 2-0 5 mg per SL tablet Place under the tongue daily Dosage unknown to patient, Historical Med      busPIRone (BUSPAR) 15 mg tablet Take 1 tablet (15 mg total) by mouth 3 (three) times a day, Starting Fri 5/25/2018, Print gabapentin (NEURONTIN) 300 mg capsule Take 1 capsule (300 mg total) by mouth 3 (three) times a day, Starting Fri 5/25/2018, Print      montelukast (SINGULAIR) 10 mg tablet Take 10 mg by mouth daily, Historical Med      ARIPiprazole (ABILIFY) 10 mg tablet Take 1 tablet (10 mg total) by mouth daily, Starting Sat 5/26/2018, Print      fluticasone furoate-vilanterol (BREO ELLIPTA) 200-25 MCG/INH inhaler Inhale 1 puff daily, Historical Med      fluticasone furoate-vilanterol (BREO ELLIPTA) Inhale 1 puff daily, Starting Fri 5/11/2018, No Print      hydrOXYzine pamoate (VISTARIL) 25 mg capsule Take 1 capsule (25 mg total) by mouth 3 (three) times a day as needed for itching, Starting Fri 5/25/2018, Print      sertraline (ZOLOFT) 50 mg tablet Take 3 tablets (150 mg total) by mouth daily, Starting Sat 5/26/2018, Print       !! - Potential duplicate medications found  Please discuss with provider  No discharge procedures on file      ED Provider  Electronically Signed by           Teresa Wise MD  11/16/19 2904

## 2020-01-18 NOTE — PHYSICAL THERAPY NOTE
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    Nondependent alcohol abuse    Nondependent cocaine abuse    Renal colic    Severe episode of recurrent major depressive disorder (HCC)    Disorder of menstrual bleeding    Anxiety state    Bladder spasm    Hypoalbuminemia    Acquired hydrocephalus    Polyuria       Past Medical History  Past Medical History:   Diagnosis Date    Asthma        Past Surgical History  Past Surgical History:   Procedure Laterality Date     SECTION      SPLENECTOMY          18 1150   Pain Assessment   Pain Assessment 0-10   Pain Score Worst Possible Pain   Pain Type Acute pain   Pain Location Head   Hospital Pain Intervention(s) Ambulation/increased activity   Response to Interventions unchanged   Restrictions/Precautions   Weight Bearing Precautions Per Order No   Other Precautions Pain; Fall Risk  (ventric in place)   General   Chart Reviewed Yes   Family/Caregiver Present No   Cognition   Overall Cognitive Status Impaired   Arousal/Participation Responsive   Attention Attends with cues to redirect   Orientation Level Oriented X4   Memory Unable to assess   Following Commands Follows one step commands without difficulty   Subjective   Subjective states she is having a great deal of pain    cooperative w/ maximal encouragement   Bed Mobility   Supine to Sit 4  Minimal assistance   Additional items Assist x 1   Additional Comments sat EOB x 10 min prior to gait   Transfers   Sit to Stand 4  Minimal assistance   Additional items Assist x 1   Stand to Sit 4  Minimal assistance   Additional items Assist x 1   Ambulation/Elevation   Gait pattern (slow, antalgic, short step length, ataxia)   Gait Assistance 3  Moderate assist   Additional items Assist x 1   Assistive Device Rolling walker   Distance 15'x1 to BR, sat x 5 min to use same, then 20'x1 to chair  repositioned to comofrt in chair p session   Balance   Static Sitting Fair +   Dynamic Sitting Fair -   Static Standing Poor +   Dynamic Standing Poor +   Ambulatory Poor +   Endurance Deficit   Endurance Deficit Yes   Endurance Deficit Description fatigue, weakness, pain   Activity Tolerance   Activity Tolerance Patient limited by fatigue;Patient limited by pain;Treatment limited secondary to medical complications (Comment)   Nurse Made Aware yes   Assessment   Prognosis Fair   Problem List Decreased strength;Decreased endurance; Impaired balance;Decreased mobility; Decreased cognition; Impaired judgement;Decreased safety awareness;Pain   Assessment Pt seen for session, gait and PT 1:1 activity x 25 min for setup, bed mob, sitting EOB, gait, w/ seated rest on toilet, repositioning  remains in a great deal of pain, which is biggest limiting factor w/ mobility  Note improved WBing, balance w/ mobility tasks  still some continued LOB w/ gait  remains appropriate for rehab at d/c   Goals   Patient Goals none stated   STG Expiration Date 05/02/18   Treatment Day 2   Plan   Treatment/Interventions Functional transfer training;LE strengthening/ROM; Therapeutic exercise; Endurance training;Patient/family training;Equipment eval/education; Bed mobility;Gait training   Progress Progressing toward goals   PT Frequency 5x/wk   Recommendation   Recommendation (recommend rehab at d/c)   Equipment Recommended Hassan Shone   PT - OK to Discharge Yes  (to rehab when stable)           Remonia Bad PT, DPT, CSRS

## 2020-02-18 ENCOUNTER — HOSPITAL ENCOUNTER (EMERGENCY)
Facility: HOSPITAL | Age: 60
Discharge: HOME | End: 2020-02-18
Attending: EMERGENCY MEDICINE
Payer: COMMERCIAL

## 2020-02-18 VITALS
BODY MASS INDEX: 32.61 KG/M2 | OXYGEN SATURATION: 99 % | TEMPERATURE: 97.6 F | WEIGHT: 184.08 LBS | SYSTOLIC BLOOD PRESSURE: 109 MMHG | HEART RATE: 86 BPM | RESPIRATION RATE: 18 BRPM | DIASTOLIC BLOOD PRESSURE: 73 MMHG

## 2020-02-18 DIAGNOSIS — M71.21 BAKER'S CYST, RIGHT: Primary | ICD-10-CM

## 2020-02-18 PROCEDURE — 99283 EMERGENCY DEPT VISIT LOW MDM: CPT

## 2020-02-18 PROCEDURE — 96372 THER/PROPH/DIAG INJ SC/IM: CPT

## 2020-02-18 PROCEDURE — 99283 EMERGENCY DEPT VISIT LOW MDM: CPT | Performed by: EMERGENCY MEDICINE

## 2020-02-18 RX ORDER — KETOROLAC TROMETHAMINE 30 MG/ML
15 INJECTION, SOLUTION INTRAMUSCULAR; INTRAVENOUS ONCE
Status: COMPLETED | OUTPATIENT
Start: 2020-02-18 | End: 2020-02-18

## 2020-02-18 RX ADMIN — KETOROLAC TROMETHAMINE 15 MG: 30 INJECTION, SOLUTION INTRAMUSCULAR at 20:33

## 2020-02-19 NOTE — DISCHARGE INSTRUCTIONS
Follow-up with primary care and orthopedics for your Baker cyst, take the prescription for an outpatient and vascular ultrasound to evaluate and confirm the diagnosis  Take Tylenol and ibuprofen as needed for pain  Return to the emergency department for any worsening symptoms

## 2020-02-19 NOTE — ED ATTENDING ATTESTATION
2/18/2020  I, Merced Smith MD, saw and evaluated the patient  I have discussed the patient with the resident/non-physician practitioner and agree with the resident's/non-physician practitioner's findings, Plan of Care, and MDM as documented in the resident's/non-physician practitioner's note, except where noted  All available labs and Radiology studies were reviewed  I was present for key portions of any procedure(s) performed by the resident/non-physician practitioner and I was immediately available to provide assistance  At this point I agree with the current assessment done in the Emergency Department  I have conducted an independent evaluation of this patient a history and physical is as follows:  64 yo female pain she localizes to posterior right knee associated with a swelling in the area, which has been previously attributed to baker's cyst by exam   It was not previously bothering, but has become more painful with standing long periods at work  No fever no chills  No LE swelling  VS normal   She has single swollen area behind right knee, c/w single baker's bursitis  Normal distal pulses  No PE/DVT risk factor, no actual calf tenderness  I agree with management with NSAIDs, heating pad, referral to ortho and outpatient Duplex to confirm no DVT        ED Course         Critical Care Time  Procedures

## 2020-02-19 NOTE — ED PROVIDER NOTES
History  Chief Complaint   Patient presents with    Leg Pain     right leg pain   pt reports pain is mainly in calf  pt reports swelling to the area as well  Pt states this has been occuring for 1 month      60-year-old female presenting for evaluation right posterior knee pain  Patient states she has had a lump behind her right knee for months as she has followed with her primary care regarding this states that for the past few days she has been having pain with extension standing ambulating and range of motion  Patient denies any fevers chills warmth redness we hind the knee she has no traumatic injuries to it she has no swelling of the calf or any other location leg no history of DVTs or PEs she says she feels otherwise well  History provided by:  Patient   used: No    Leg Pain   Location:  Knee  Injury: no    Knee location:  R knee  Pain details:     Quality:  Aching    Radiates to:  Does not radiate    Severity:  Mild    Onset quality:  Gradual    Timing:  Constant    Progression:  Unchanged      Prior to Admission Medications   Prescriptions Last Dose Informant Patient Reported? Taking?    ARIPiprazole (ABILIFY) 10 mg tablet   No No   Sig: Take 1 tablet (10 mg total) by mouth daily   Patient not taking: Reported on 11/15/2019   albuterol (PROVENTIL HFA,VENTOLIN HFA) 90 mcg/act inhaler   Yes No   Sig: Inhale 2 puffs every 6 (six) hours as needed for wheezing   albuterol (PROVENTIL HFA,VENTOLIN HFA) 90 mcg/act inhaler   No No   Sig: Inhale 1-2 puffs every 6 (six) hours as needed for wheezing   buprenorphine-naloxone (SUBOXONE) 2-0 5 mg per SL tablet   Yes No   Sig: Place under the tongue daily Dosage unknown to patient   busPIRone (BUSPAR) 15 mg tablet   No No   Sig: Take 1 tablet (15 mg total) by mouth 3 (three) times a day   fluticasone furoate-vilanterol (BREO ELLIPTA)   No No   Sig: Inhale 1 puff daily   Patient not taking: Reported on 11/15/2019   fluticasone furoate-vilanterol (BREO ELLIPTA) 200-25 MCG/INH inhaler   Yes No   Sig: Inhale 1 puff daily   gabapentin (NEURONTIN) 300 mg capsule   No No   Sig: Take 1 capsule (300 mg total) by mouth 3 (three) times a day   hydrOXYzine HCL (ATARAX) 25 mg tablet   No No   Sig: Take 1 tablet (25 mg total) by mouth every 6 (six) hours   hydrOXYzine pamoate (VISTARIL) 25 mg capsule   No No   Sig: Take 1 capsule (25 mg total) by mouth 3 (three) times a day as needed for itching   Patient not taking: Reported on 11/15/2019   montelukast (SINGULAIR) 10 mg tablet   Yes No   Sig: Take 10 mg by mouth daily   sertraline (ZOLOFT) 50 mg tablet   No No   Sig: Take 3 tablets (150 mg total) by mouth daily   Patient not taking: Reported on 11/15/2019      Facility-Administered Medications: None       Past Medical History:   Diagnosis Date    Anxiety     Asthma     Depression     Head injury     Seizures ([facility] [address] )     "Last one was a couple of years ago"       Past Surgical History:   Procedure Laterality Date     SECTION      IR CEREBRAL ANGIOGRAPHY  2018    SPLENECTOMY         Family History   Problem Relation Age of Onset    Drug abuse Brother     Completed Suicide  Brother      I have reviewed and agree with the history as documented  Social History     Tobacco Use    Smoking status: Current Every Day Smoker     Packs/day: 0 25     Years: 20 00     Pack years: 5 00     Types: Cigarettes    Smokeless tobacco: Never Used   Substance Use Topics    Alcohol use: No     Comment: patient denies, despite findings of Alcohol in system    Drug use: No     Comment: per patient: history of cocaine abuse; Tox screen was positive for cocaine at admission;  patient denies current use          Review of Systems    Physical Exam  ED Triage Vitals   Temperature Pulse Respirations Blood Pressure SpO2   20 1847 20 1847 20 1847 20 1847 20 184   97 6 °F (36 4 °C) 86 18 109/73 99 %      Temp Source Heart Rate Source Patient Position - Orthostatic VS BP Location FiO2 (%)   02/18/20 1847 02/18/20 1847 02/18/20 1847 02/18/20 1847 --   Oral Monitor Sitting Right arm       Pain Score       02/18/20 2033       8             Orthostatic Vital Signs  Vitals:    02/18/20 1847   BP: 109/73   Pulse: 86   Patient Position - Orthostatic VS: Sitting       Physical Exam    ED Medications  Medications   ketorolac (TORADOL) injection 15 mg (15 mg Intramuscular Given 2/18/20 2033)       Diagnostic Studies  Results Reviewed     None                 VAS lower limb venous duplex study, unilateral/limited    (Results Pending)         Procedures  Procedures      ED Course               MDM  Number of Diagnoses or Management Options  Baker's cyst, right: new and requires workup  Diagnosis management comments: 76-year-old female presenting with Baker cyst causing inflammation of pain, will treat with Toradol and write her for an outpatient ultrasound for formal diagnosis  Gave patient Orthopedics follow-up and to take ibuprofen and Tylenol for her symptoms  Amount and/or Complexity of Data Reviewed  Review and summarize past medical records: yes          Disposition  Final diagnoses:   Baker's cyst, right     Time reflects when diagnosis was documented in both MDM as applicable and the Disposition within this note     Time User Action Codes Description Comment    2/18/2020  8:28 PM Terra Taco Add [M71 22] Baker's cyst of knee, left     2/18/2020  8:29 PM Terra Taco Remove [M71 22] Baker's cyst of knee, left     2/18/2020  8:29 PM Terra Taco Add [M71 21] Baker's cyst, right       ED Disposition     ED Disposition Condition Date/Time Comment    Discharge Stable Tue Feb 18, 2020  8:28 PM Ashish Slipper discharge to home/self care              Follow-up Information     Follow up With Specialties Details Why Contact Info Additional [address] [facility] Orthopedic Surgery Schedule an appointment as soon as possible for a visit   [address]  [place] [facility] [address]  [address] [place] [address], [address], [address], [place], [place], [address] [phone]    Danuta Geronimo MD Family Medicine Schedule an appointment as soon as possible for a visit   [facility] Emergency Department Emergency Medicine  If symptoms worsen [employer] [id number] [phone] [address] [facility] [address], [address]  , [place], [place], [address]          Discharge Medication List as of 2/18/2020  8:31 PM      CONTINUE these medications which have NOT CHANGED    Details   !! albuterol (PROVENTIL HFA,VENTOLIN HFA) 90 mcg/act inhaler Inhale 2 puffs every 6 (six) hours as needed for wheezing, Historical Med      !! albuterol (PROVENTIL HFA,VENTOLIN HFA) 90 mcg/act inhaler Inhale 1-2 puffs every 6 (six) hours as needed for wheezing, Starting Fri 11/15/2019, Print      ARIPiprazole (ABILIFY) 10 mg tablet Take 1 tablet (10 mg total) by mouth daily, Starting Sat 5/26/2018, Print      buprenorphine-naloxone (SUBOXONE) 2-0 5 mg per SL tablet Place under the tongue daily Dosage unknown to patient, Historical Med      busPIRone (BUSPAR) 15 mg tablet Take 1 tablet (15 mg total) by mouth 3 (three) times a day, Starting Fri 5/25/2018, Print      fluticasone furoate-vilanterol (BREO ELLIPTA) 200-25 MCG/INH inhaler Inhale 1 puff daily, Historical Med      fluticasone furoate-vilanterol (BREO ELLIPTA) Inhale 1 puff daily, Starting Fri 5/11/2018, No Print      gabapentin (NEURONTIN) 300 mg capsule Take 1 capsule (300 mg total) by mouth 3 (three) times a day, Starting Fri 5/25/2018, Print      hydrOXYzine HCL (ATARAX) 25 mg tablet Take 1 tablet (25 mg total) by mouth every 6 (six) hours, Starting Fri 11/15/2019, Print      hydrOXYzine pamoate (VISTARIL) 25 mg capsule Take 1 capsule (25 mg total) by mouth 3 (three) times a day as needed for itching, Starting Fri 5/25/2018, Print      montelukast (SINGULAIR) 10 mg tablet Take 10 mg by mouth daily, Historical Med      sertraline (ZOLOFT) 50 mg tablet Take 3 tablets (150 mg total) by mouth daily, Starting Sat 5/26/2018, Print       !! - Potential duplicate medications found  Please discuss with provider  Outpatient Discharge Orders   VAS lower limb venous duplex study, unilateral/limited   Standing Status: Future Standing Exp  Date: 02/18/24       ED Provider  Attending physically available and evaluated Cloyce Halsted  TAYLOR managed the patient along with the ED Attending      Electronically Signed by         Kojo San MD  02/18/20 6025

## 2020-02-21 ENCOUNTER — HOSPITAL ENCOUNTER (OUTPATIENT)
Dept: NON INVASIVE DIAGNOSTICS | Facility: HOSPITAL | Age: 60
Discharge: HOME | End: 2020-02-21
Attending: EMERGENCY MEDICINE
Payer: COMMERCIAL

## 2020-02-21 DIAGNOSIS — M71.21 BAKER'S CYST, RIGHT: ICD-10-CM

## 2020-02-21 PROCEDURE — 93971 EXTREMITY STUDY: CPT

## 2020-02-22 PROCEDURE — 93971 EXTREMITY STUDY: CPT | Performed by: SURGERY

## 2020-02-24 ENCOUNTER — OFFICE VISIT (OUTPATIENT)
Dept: OBGYN CLINIC | Facility: MEDICAL CENTER | Age: 60
End: 2020-02-24
Payer: COMMERCIAL

## 2020-02-24 ENCOUNTER — APPOINTMENT (OUTPATIENT)
Dept: RADIOLOGY | Facility: MEDICAL CENTER | Age: 60
End: 2020-02-24
Payer: COMMERCIAL

## 2020-02-24 VITALS
SYSTOLIC BLOOD PRESSURE: 130 MMHG | WEIGHT: 186.4 LBS | BODY MASS INDEX: 33.03 KG/M2 | HEIGHT: 63 IN | DIASTOLIC BLOOD PRESSURE: 84 MMHG

## 2020-02-24 DIAGNOSIS — M25.561 RIGHT KNEE PAIN, UNSPECIFIED CHRONICITY: ICD-10-CM

## 2020-02-24 DIAGNOSIS — Z01.89 ENCOUNTER FOR LOWER EXTREMITY COMPARISON IMAGING STUDY: ICD-10-CM

## 2020-02-24 DIAGNOSIS — M17.11 PATELLOFEMORAL ARTHRITIS OF RIGHT KNEE: Primary | ICD-10-CM

## 2020-02-24 PROCEDURE — 99204 OFFICE O/P NEW MOD 45 MIN: CPT | Performed by: ORTHOPAEDIC SURGERY

## 2020-02-24 PROCEDURE — 73564 X-RAY EXAM KNEE 4 OR MORE: CPT

## 2020-02-24 PROCEDURE — 73560 X-RAY EXAM OF KNEE 1 OR 2: CPT

## 2020-02-24 PROCEDURE — 20610 DRAIN/INJ JOINT/BURSA W/O US: CPT | Performed by: ORTHOPAEDIC SURGERY

## 2020-02-24 RX ORDER — DICLOFENAC SODIUM 75 MG/1
75 TABLET, DELAYED RELEASE ORAL 2 TIMES DAILY PRN
Qty: 60 TABLET | Refills: 1 | Status: SHIPPED | OUTPATIENT
Start: 2020-02-24 | End: 2020-04-21

## 2020-02-24 RX ORDER — METHYLPREDNISOLONE ACETATE 40 MG/ML
2 INJECTION, SUSPENSION INTRA-ARTICULAR; INTRALESIONAL; INTRAMUSCULAR; SOFT TISSUE
Status: COMPLETED | OUTPATIENT
Start: 2020-02-24 | End: 2020-02-24

## 2020-02-24 RX ORDER — LIDOCAINE HYDROCHLORIDE 10 MG/ML
3 INJECTION, SOLUTION INFILTRATION; PERINEURAL
Status: COMPLETED | OUTPATIENT
Start: 2020-02-24 | End: 2020-02-24

## 2020-02-24 RX ADMIN — METHYLPREDNISOLONE ACETATE 2 ML: 40 INJECTION, SUSPENSION INTRA-ARTICULAR; INTRALESIONAL; INTRAMUSCULAR; SOFT TISSUE at 14:53

## 2020-02-24 RX ADMIN — LIDOCAINE HYDROCHLORIDE 3 ML: 10 INJECTION, SOLUTION INFILTRATION; PERINEURAL at 14:53

## 2020-02-24 NOTE — LETTER
February 24, 2020     Patient: Yunier Parker   YOB: 1960   Date of Visit: 2/24/2020       To Whom it May Concern:    Magdaleno Alves is under my professional care  She was seen in my office on 2/24/2020  She may return to work immediately  If you have any questions or concerns, please don't hesitate to call           Sincerely,          Deangelo Savage DO        CC: No Recipients

## 2020-02-24 NOTE — PROGRESS NOTES
Assessment/Plan     1  Patellofemoral arthritis of right knee    2  Right knee pain, unspecified chronicity    3  Encounter for lower extremity comparison imaging study      Orders Placed This Encounter   Procedures    Large joint arthrocentesis: R knee    XR knee 4+ vw right injury    XR knee 1 or 2 vw left    Ambulatory referral to Physical Therapy     -XR and exam suggestive of severe patellofemoral arthritis  -Although ultrasound was non-yielding, she may have a Baker's cyst secondary to osteoarthritis  -Steroid injection today  Risks and benefits discussed in detail    -Brace fitting  -Start PT and HEP  -Trial of Diclofenac 75 mg BID PRN  Explained in detail that she should stop all other NSAIDs like Advil, Ibuprofen, Aleeve, Naproxen, etc  Continue Tylenol PRN  -Return in 6 weeks for follow-up  If symptoms resolve she can follow-up PRN  If symptoms worsen she was encouraged to return earlier   -Monitor left knee at follow-up  Return in about 6 weeks (around 4/6/2020) for Recheck R knee  I answered all of the patient's questions during the visit and provided education of the patient's condition during the visit  The patient verbalized understanding of the information given and agrees with the plan  This note was dictated using [employer] software  It may contain errors including improperly dictated words  Please contact physician directly for any questions  History of Present Illness   Chief complaint:   Chief Complaint   Patient presents with    Right Knee - Pain       HPI: Tee Darden is a 63 y o  female that c/o right knee pain        Right knee pain  Location: posterior of right knee  Severity: at rest 0, with activity it reaches 10  Duration: few years, worsening over few months since starting job as  at [employer]  Timing: present from the time she wakes up, no clear pattern, off and on through the day  Context: weight bearing or activity  Modifying factors: Mild relief with Advil 400 mg TID, Tylenol 1300 mg BID, and Naproxen 200 mg at bed time  Associated signs and symptoms: Sometimes wakes up from sleep with pain in her right knee  It is affecting her daily life and she would to improve it  Denies injury  Left knee with mild symptoms for a few weeks now  Concerned she may be overusing it because her right knee is giving her problems  Denies locking or instability  Prior work-up included b/l lower limb duplex in the ER for possible Baker's cyst which was negative  Her sister had similar symptoms and was diagnosed with this so she is wondering if she has one too  Denies history of knee surgeries or injections  ROS:    See HPI for musculoskeletal review  Endocrine: +frequent urination, +thirsty  Psychiatric: +anxiety  All other systems reviewed are negative     Historical Information   Past Medical History:   Diagnosis Date    Anxiety     Asthma     Depression     Head injury     Seizures ([facility] [address] )     "Last one was a couple of years ago"     Past Surgical History:   Procedure Laterality Date     SECTION      IR CEREBRAL ANGIOGRAPHY  2018    SPLENECTOMY       Social History   Social History     Substance and Sexual Activity   Alcohol Use No    Comment: patient denies, despite findings of Alcohol in system     Social History     Substance and Sexual Activity   Drug Use No    Comment: per patient: history of cocaine abuse; Tox screen was positive for cocaine at admission;  patient denies current use       Social History     Tobacco Use   Smoking Status Current Every Day Smoker    Packs/day: 0 25    Years: 20 00    Pack years: 5 00    Types: Cigarettes   Smokeless Tobacco Never Used     Family History:   Family History   Problem Relation Age of Onset    Drug abuse Brother     Completed Suicide  Brother        Current Outpatient Medications on File Prior to Visit   Medication Sig Dispense Refill    albuterol (PROVENTIL HFA,VENTOLIN HFA) 90 mcg/act inhaler Inhale 2 puffs every 6 (six) hours as needed for wheezing      albuterol (PROVENTIL HFA,VENTOLIN HFA) 90 mcg/act inhaler Inhale 1-2 puffs every 6 (six) hours as needed for wheezing 1 Inhaler 0    ARIPiprazole (ABILIFY) 10 mg tablet Take 1 tablet (10 mg total) by mouth daily (Patient not taking: Reported on 11/15/2019) 30 tablet 0    buprenorphine-naloxone (SUBOXONE) 2-0 5 mg per SL tablet Place under the tongue daily Dosage unknown to patient      busPIRone (BUSPAR) 15 mg tablet Take 1 tablet (15 mg total) by mouth 3 (three) times a day 90 tablet 0    fluticasone furoate-vilanterol (BREO ELLIPTA) 200-25 MCG/INH inhaler Inhale 1 puff daily      fluticasone furoate-vilanterol (BREO ELLIPTA) Inhale 1 puff daily (Patient not taking: Reported on 11/15/2019)  0    gabapentin (NEURONTIN) 300 mg capsule Take 1 capsule (300 mg total) by mouth 3 (three) times a day 90 capsule 0    hydrOXYzine HCL (ATARAX) 25 mg tablet Take 1 tablet (25 mg total) by mouth every 6 (six) hours 12 tablet 0    hydrOXYzine pamoate (VISTARIL) 25 mg capsule Take 1 capsule (25 mg total) by mouth 3 (three) times a day as needed for itching (Patient not taking: Reported on 11/15/2019) 30 capsule 0    montelukast (SINGULAIR) 10 mg tablet Take 10 mg by mouth daily      sertraline (ZOLOFT) 50 mg tablet Take 3 tablets (150 mg total) by mouth daily (Patient not taking: Reported on 11/15/2019) 90 tablet 0     No current facility-administered medications on file prior to visit        No Known Allergies    Current Outpatient Medications on File Prior to Visit   Medication Sig Dispense Refill    albuterol (PROVENTIL HFA,VENTOLIN HFA) 90 mcg/act inhaler Inhale 2 puffs every 6 (six) hours as needed for wheezing      albuterol (PROVENTIL HFA,VENTOLIN HFA) 90 mcg/act inhaler Inhale 1-2 puffs every 6 (six) hours as needed for wheezing 1 Inhaler 0    ARIPiprazole (ABILIFY) 10 mg tablet Take 1 tablet (10 mg total) by mouth daily (Patient not taking: Reported on 11/15/2019) 30 tablet 0    buprenorphine-naloxone (SUBOXONE) 2-0 5 mg per SL tablet Place under the tongue daily Dosage unknown to patient      busPIRone (BUSPAR) 15 mg tablet Take 1 tablet (15 mg total) by mouth 3 (three) times a day 90 tablet 0    fluticasone furoate-vilanterol (BREO ELLIPTA) 200-25 MCG/INH inhaler Inhale 1 puff daily      fluticasone furoate-vilanterol (BREO ELLIPTA) Inhale 1 puff daily (Patient not taking: Reported on 11/15/2019)  0    gabapentin (NEURONTIN) 300 mg capsule Take 1 capsule (300 mg total) by mouth 3 (three) times a day 90 capsule 0    hydrOXYzine HCL (ATARAX) 25 mg tablet Take 1 tablet (25 mg total) by mouth every 6 (six) hours 12 tablet 0    hydrOXYzine pamoate (VISTARIL) 25 mg capsule Take 1 capsule (25 mg total) by mouth 3 (three) times a day as needed for itching (Patient not taking: Reported on 11/15/2019) 30 capsule 0    montelukast (SINGULAIR) 10 mg tablet Take 10 mg by mouth daily      sertraline (ZOLOFT) 50 mg tablet Take 3 tablets (150 mg total) by mouth daily (Patient not taking: Reported on 11/15/2019) 90 tablet 0     No current facility-administered medications on file prior to visit  Objective   Vitals: Blood pressure 130/84, height 5' 3" (1 6 m), weight 84 6 kg (186 lb 6 4 oz)  ,Body mass index is 33 02 kg/m²  PE:  AAOx 3  WDWN  Hearing intact, no drainage from eyes  Regular rate  no audible wheezing  no abdominal distension  LE compartments soft, skin intact    right knee:    Appearance:  no swelling   No ecchymosis  no obvious joint deformity   No effusion  Few scattered excoriations over the shin without signs of infection or bleeding    Palpation/Tenderness:  No TTP over medial joint line  No TTP over lateral joint line   No TTP over patella  No TTP over patellar tendon  No TTP over pes anserine bursa  Mild tenderness in posterior fossa  Active Range of Motion:  AROM: 0-90, passive 0-100  Special Tests:  Medial Yanna's Test: Positive  Lateral Yanna's Test:  Positive  Apley's compression test:  Negative  Patellar grind:  Negative  Valgus Stress Test:  negative  Varus Stress Test:  negative     No ipsilateral hip pain with ROM    bilateral LE:    Sensation grossly intact  Palpable PT pulse  AT/GS/EHL intact    Imaging Studies: I have personally reviewed pertinent reports      right knee: severe patellofemoral arthritis, moderate elsewhere    Large joint arthrocentesis: R knee  Date/Time: 2/24/2020 2:53 PM  Site marked: site marked  Timeout: Immediately prior to procedure a time out was called to verify the correct patient, procedure, equipment, support staff and site/side marked as required   Supporting Documentation  Indications: pain   Procedure Details  Location: knee - R knee  Preparation: Patient was prepped and draped in the usual sterile fashion  Needle size: 22 G  Ultrasound guidance: no  Approach: anterolateral  Medications administered: 3 mL lidocaine 1 %; 2 mL methylPREDNISolone acetate 40 mg/mL    Patient tolerance: patient tolerated the procedure well with no immediate complications  Dressing:  Sterile dressing applied

## 2020-03-31 ENCOUNTER — TELEPHONE (OUTPATIENT)
Dept: OBGYN CLINIC | Facility: MEDICAL CENTER | Age: 60
End: 2020-03-31

## 2020-03-31 NOTE — TELEPHONE ENCOUNTER
Patient was left a 3rd v/m asking her to call back and let Dr Suzanne Guillaume know whether she'd like to:    1  Schedule a video visit or telephone visit    2  To cancel appt if she would like us to do so

## 2020-04-21 DIAGNOSIS — M17.11 PATELLOFEMORAL ARTHRITIS OF RIGHT KNEE: ICD-10-CM

## 2020-04-21 DIAGNOSIS — M25.561 RIGHT KNEE PAIN, UNSPECIFIED CHRONICITY: ICD-10-CM

## 2020-04-21 RX ORDER — DICLOFENAC SODIUM 75 MG/1
TABLET, DELAYED RELEASE ORAL
Qty: 60 TABLET | Refills: 1 | Status: SHIPPED | OUTPATIENT
Start: 2020-04-21 | End: 2020-08-10 | Stop reason: SDUPTHER

## 2020-08-10 ENCOUNTER — OFFICE VISIT (OUTPATIENT)
Dept: OBGYN CLINIC | Facility: MEDICAL CENTER | Age: 60
End: 2020-08-10
Payer: COMMERCIAL

## 2020-08-10 VITALS
TEMPERATURE: 97.9 F | WEIGHT: 184 LBS | HEART RATE: 84 BPM | DIASTOLIC BLOOD PRESSURE: 89 MMHG | HEIGHT: 63 IN | BODY MASS INDEX: 32.6 KG/M2 | SYSTOLIC BLOOD PRESSURE: 139 MMHG

## 2020-08-10 DIAGNOSIS — M17.11 PATELLOFEMORAL ARTHRITIS OF RIGHT KNEE: Primary | ICD-10-CM

## 2020-08-10 DIAGNOSIS — M25.561 RIGHT KNEE PAIN, UNSPECIFIED CHRONICITY: ICD-10-CM

## 2020-08-10 PROCEDURE — 99213 OFFICE O/P EST LOW 20 MIN: CPT | Performed by: ORTHOPAEDIC SURGERY

## 2020-08-10 RX ORDER — DICLOFENAC SODIUM 75 MG/1
75 TABLET, DELAYED RELEASE ORAL 2 TIMES DAILY PRN
Qty: 60 TABLET | Refills: 2 | Status: SHIPPED | OUTPATIENT
Start: 2020-08-10 | End: 2020-11-10

## 2020-08-10 NOTE — PROGRESS NOTES
Assessment/Plan:  1  Patellofemoral arthritis of right knee    2  Right knee pain, unspecified chronicity      Orders Placed This Encounter   Procedures    Injection procedure prior authorization       · Patient would like to try right knee Euflexxa series  Order placed, injections can be administered as soon as approved  · Refill provided for diclofenac 75 mg bid prn pain  Provided script for diclofenac gel as needed  Patient aware to avoid other NSAIDs while taking diclofenac  She can add tylenol up to 3,000 mg per day if needed  · Continue home exercises and aqua aerobics  · Continue short hinged knee brace for comfort as needed  · Discussed TKA with patient  She is currently not a candidate due to being a smoker, however patient states that she is actively working on quitting  Declined smoking cessation referral at this time  Otherwise her BMI <40, she does not have diabetes  Return for right knee euflexxa  I answered all of the patient's questions during the visit and provided education of the patient's condition during the visit  The patient verbalized understanding of the information given and agrees with the plan  This note was dictated using [employer] software  It may contain errors including improperly dictated words  Please contact physician directly for any questions  Subjective   Chief Complaint:   Chief Complaint   Patient presents with    Right Knee - Follow-up       HPI  Erick Whit is a 62 y o  female who presents for follow up for right knee osteoarthritis  Patient received right knee steroid injection on 2/24/2020 and reports no pain relief  She reports severe constant pain on the anterior aspect of the knee  She has occasional radiation to the posterior knee  She also notes new onset of clicking and instability  She is taking diclofenac 75 mg bid prn pain and using her sister's diclofenac gel with reliable relief  She has a knee brace but states she has not been wearing it  She is not doing PT but exercises in her pool  She finds that stretching in the water provides significant relief for her knee pain  Denies new injury, numbness, or tingling  Patient is actively working on smoking cessation  Review of Systems  ROS:    See HPI for musculoskeletal review  All other systems reviewed are negative     History:  Past Medical History:   Diagnosis Date    Anxiety     Asthma     Depression     Head injury     Seizures ([address] )     "Last one was a couple of years ago"     Past Surgical History:   Procedure Laterality Date     SECTION      IR CEREBRAL ANGIOGRAPHY  2018    SPLENECTOMY       Social History   Social History     Substance and Sexual Activity   Alcohol Use No    Comment: patient denies, despite findings of Alcohol in system     Social History     Substance and Sexual Activity   Drug Use No    Comment: per patient: history of cocaine abuse; Tox screen was positive for cocaine at admission;  patient denies current use       Social History     Tobacco Use   Smoking Status Current Every Day Smoker    Packs/day: 0 25    Years: 20 00    Pack years: 5 00    Types: Cigarettes   Smokeless Tobacco Never Used     Family History:   Family History   Problem Relation Age of Onset    Drug abuse Brother     Completed Suicide  Brother        Current Outpatient Medications on File Prior to Visit   Medication Sig Dispense Refill    albuterol (PROVENTIL HFA,VENTOLIN HFA) 90 mcg/act inhaler Inhale 2 puffs every 6 (six) hours as needed for wheezing      albuterol (PROVENTIL HFA,VENTOLIN HFA) 90 mcg/act inhaler Inhale 1-2 puffs every 6 (six) hours as needed for wheezing 1 Inhaler 0    ARIPiprazole (ABILIFY) 10 mg tablet Take 1 tablet (10 mg total) by mouth daily (Patient not taking: Reported on 11/15/2019) 30 tablet 0    buprenorphine-naloxone (SUBOXONE) 2-0 5 mg per SL tablet Place under the tongue daily Dosage unknown to patient      busPIRone (BUSPAR) 15 mg tablet Take 1 tablet (15 mg total) by mouth 3 (three) times a day 90 tablet 0    fluticasone furoate-vilanterol (BREO ELLIPTA) 200-25 MCG/INH inhaler Inhale 1 puff daily      fluticasone furoate-vilanterol (BREO ELLIPTA) Inhale 1 puff daily (Patient not taking: Reported on 11/15/2019)  0    gabapentin (NEURONTIN) 300 mg capsule Take 1 capsule (300 mg total) by mouth 3 (three) times a day 90 capsule 0    hydrOXYzine HCL (ATARAX) 25 mg tablet Take 1 tablet (25 mg total) by mouth every 6 (six) hours 12 tablet 0    hydrOXYzine pamoate (VISTARIL) 25 mg capsule Take 1 capsule (25 mg total) by mouth 3 (three) times a day as needed for itching (Patient not taking: Reported on 11/15/2019) 30 capsule 0    montelukast (SINGULAIR) 10 mg tablet Take 10 mg by mouth daily      sertraline (ZOLOFT) 50 mg tablet Take 3 tablets (150 mg total) by mouth daily (Patient not taking: Reported on 11/15/2019) 90 tablet 0    [DISCONTINUED] diclofenac (VOLTAREN) 75 mg EC tablet TAKE 1 TABLET BY MOUTH TWICE DAILY AS NEEDED FOR RIGHT KNEE PAIN 60 tablet 1     No current facility-administered medications on file prior to visit        No Known Allergies     Objective     /89   Pulse 84   Temp 97 9 °F (36 6 °C)   Ht 5' 3" (1 6 m)   Wt 83 5 kg (184 lb)   BMI 32 59 kg/m²      PE:  AAOx 3  WDWN  Hearing intact, no drainage from eyes  no audible wheezing  no abdominal distension  LE compartments soft, skin intact    Ortho Exam:  right Knee:   No erythema  no swelling  no effusion  no warmth  +TTP over medial joint line and posterior knee  AROM: 0- 100  Stable to varus/valgus stress

## 2020-08-26 ENCOUNTER — TELEPHONE (OUTPATIENT)
Dept: OBGYN CLINIC | Facility: MEDICAL CENTER | Age: 60
End: 2020-08-26

## 2020-08-26 NOTE — TELEPHONE ENCOUNTER
Rt Knee Euflexxa from [facility] has been delivered to Dr Hernando Tracey office in [facility]

## 2020-09-03 NOTE — TELEPHONE ENCOUNTER
Call dropped as I was speaking to patient  I called patient and left msg to call back and schedule appt with Dr Gustabo Stephen starting her euflexxa injections on 9/22 for a late appt

## 2020-09-03 NOTE — TELEPHONE ENCOUNTER
Dr Melany Jonesroom  Can you give this patient a work note for Euflexxa injections  She will not schedule until she knows this    Thank you

## 2020-09-03 NOTE — TELEPHONE ENCOUNTER
Appts will be scheduled with above Maite  She will not schedule the appt until she knows she can get a work note  Please advise

## 2020-09-03 NOTE — TELEPHONE ENCOUNTER
Patient is calling back in wanting to know if we are able to give her a note for her injection appointments, she is going to just take off of work for this because she does not want to wait         Call back# [phone]

## 2020-09-03 NOTE — TELEPHONE ENCOUNTER
TO BE COMPLETED BY :     Office location appointment scheduled: [id number]    Date of First Appointment scheduled: 09/22/20    Additional Comments: WITH DR Capps [facility]

## 2020-09-03 NOTE — TELEPHONE ENCOUNTER
Patient sees Dr Ashley Johnson  Patient is calling to schedule her 3 Euflexxa injections  Every appt I offered her with Yu Burgess she declined and stated Karissa's schedule doesn't work for her  She needs a Thursday or Friday only  She wanted to know what can be done for her to get these injections  Can someone else give them to her?       CB: [phone]

## 2020-09-22 ENCOUNTER — OFFICE VISIT (OUTPATIENT)
Dept: OBGYN CLINIC | Facility: MEDICAL CENTER | Age: 60
End: 2020-09-22
Payer: COMMERCIAL

## 2020-09-22 VITALS
HEIGHT: 63 IN | TEMPERATURE: 98.2 F | SYSTOLIC BLOOD PRESSURE: 142 MMHG | DIASTOLIC BLOOD PRESSURE: 80 MMHG | WEIGHT: 185 LBS | BODY MASS INDEX: 32.78 KG/M2

## 2020-09-22 DIAGNOSIS — M17.11 PATELLOFEMORAL ARTHRITIS OF RIGHT KNEE: Primary | ICD-10-CM

## 2020-09-22 PROCEDURE — 20610 DRAIN/INJ JOINT/BURSA W/O US: CPT | Performed by: ORTHOPAEDIC SURGERY

## 2020-09-22 RX ORDER — HYALURONATE SODIUM 10 MG/ML
20 SYRINGE (ML) INTRAARTICULAR
Status: COMPLETED | OUTPATIENT
Start: 2020-09-22 | End: 2020-09-22

## 2020-09-22 RX ADMIN — Medication 20 MG: at 18:03

## 2020-09-22 NOTE — PROGRESS NOTES
Patient presents for VS injections - recommend By DAO Infante PA-C  Discussed risks with patient  Recommend followup with Dr Anay Mosley 3 months      Large joint arthrocentesis: R knee  Date/Time: 9/22/2020 6:03 PM  Consent given by: patient  Site marked: site marked  Timeout: Immediately prior to procedure a time out was called to verify the correct patient, procedure, equipment, support staff and site/side marked as required   Supporting Documentation  Indications: pain   Procedure Details  Location: knee - R knee  Needle size: 20 G  Approach: anterior  Medications administered: 20 mg Sodium Hyaluronate 20 MG/2ML    Patient tolerance: patient tolerated the procedure well with no immediate complications  Dressing:  Sterile dressing applied

## 2020-09-29 ENCOUNTER — OFFICE VISIT (OUTPATIENT)
Dept: OBGYN CLINIC | Facility: MEDICAL CENTER | Age: 60
End: 2020-09-29
Payer: COMMERCIAL

## 2020-09-29 VITALS — DIASTOLIC BLOOD PRESSURE: 83 MMHG | SYSTOLIC BLOOD PRESSURE: 134 MMHG | HEART RATE: 87 BPM | TEMPERATURE: 98.3 F

## 2020-09-29 DIAGNOSIS — M17.11 PATELLOFEMORAL ARTHRITIS OF RIGHT KNEE: Primary | ICD-10-CM

## 2020-09-29 PROCEDURE — 20610 DRAIN/INJ JOINT/BURSA W/O US: CPT | Performed by: ORTHOPAEDIC SURGERY

## 2020-09-29 RX ORDER — HYALURONATE SODIUM 10 MG/ML
20 SYRINGE (ML) INTRAARTICULAR
Status: COMPLETED | OUTPATIENT
Start: 2020-09-29 | End: 2020-09-29

## 2020-09-29 RX ADMIN — Medication 20 MG: at 17:16

## 2020-09-29 NOTE — PROGRESS NOTES
Returns for Henri Carmona, #2  Large joint arthrocentesis: R knee  Date/Time: 9/29/2020 5:16 PM  Consent given by: patient  Site marked: site marked  Timeout: Immediately prior to procedure a time out was called to verify the correct patient, procedure, equipment, support staff and site/side marked as required   Supporting Documentation  Indications: pain   Procedure Details  Location: knee - R knee  Needle size: 20 G  Approach: anterior  Medications administered: 20 mg Sodium Hyaluronate 20 MG/2ML    Patient tolerance: patient tolerated the procedure well with no immediate complications  Dressing:  Sterile dressing applied          Cullen Morales MD  Adult Reconstruction  Department of [address]  09/29/20  5:16 PM

## 2020-10-06 ENCOUNTER — OFFICE VISIT (OUTPATIENT)
Dept: OBGYN CLINIC | Facility: MEDICAL CENTER | Age: 60
End: 2020-10-06
Payer: MEDICARE

## 2020-10-06 VITALS — WEIGHT: 180 LBS | TEMPERATURE: 97.8 F | HEIGHT: 63 IN | BODY MASS INDEX: 31.89 KG/M2

## 2020-10-06 DIAGNOSIS — G89.29 CHRONIC PAIN OF RIGHT KNEE: ICD-10-CM

## 2020-10-06 DIAGNOSIS — M25.561 CHRONIC PAIN OF RIGHT KNEE: ICD-10-CM

## 2020-10-06 DIAGNOSIS — M17.11 PRIMARY OSTEOARTHRITIS OF RIGHT KNEE: Primary | ICD-10-CM

## 2020-10-06 PROCEDURE — 99213 OFFICE O/P EST LOW 20 MIN: CPT | Performed by: ORTHOPAEDIC SURGERY

## 2020-10-06 PROCEDURE — 20610 DRAIN/INJ JOINT/BURSA W/O US: CPT | Performed by: PHYSICIAN ASSISTANT

## 2020-10-06 RX ORDER — HYALURONATE SODIUM 10 MG/ML
20 SYRINGE (ML) INTRAARTICULAR
Status: COMPLETED | OUTPATIENT
Start: 2020-10-06 | End: 2020-10-06

## 2020-10-06 RX ADMIN — Medication 20 MG: at 17:22

## 2020-11-10 DIAGNOSIS — M17.11 PATELLOFEMORAL ARTHRITIS OF RIGHT KNEE: ICD-10-CM

## 2020-11-10 DIAGNOSIS — M25.561 RIGHT KNEE PAIN, UNSPECIFIED CHRONICITY: ICD-10-CM

## 2020-11-10 RX ORDER — DICLOFENAC SODIUM 75 MG/1
TABLET, DELAYED RELEASE ORAL
Qty: 60 TABLET | Refills: 2 | Status: SHIPPED | OUTPATIENT
Start: 2020-11-10

## 2020-12-08 ENCOUNTER — OFFICE VISIT (OUTPATIENT)
Dept: OBGYN CLINIC | Facility: MEDICAL CENTER | Age: 60
End: 2020-12-08
Payer: MEDICARE

## 2020-12-08 VITALS — TEMPERATURE: 97.7 F | HEART RATE: 84 BPM | DIASTOLIC BLOOD PRESSURE: 99 MMHG | SYSTOLIC BLOOD PRESSURE: 150 MMHG

## 2020-12-08 DIAGNOSIS — M25.562 LEFT KNEE PAIN, UNSPECIFIED CHRONICITY: ICD-10-CM

## 2020-12-08 DIAGNOSIS — M17.0 PRIMARY OSTEOARTHRITIS OF BOTH KNEES: Primary | ICD-10-CM

## 2020-12-08 PROCEDURE — 20610 DRAIN/INJ JOINT/BURSA W/O US: CPT | Performed by: ORTHOPAEDIC SURGERY

## 2020-12-08 RX ORDER — BUPIVACAINE HYDROCHLORIDE 2.5 MG/ML
4 INJECTION, SOLUTION INFILTRATION; PERINEURAL
Status: COMPLETED | OUTPATIENT
Start: 2020-12-08 | End: 2020-12-08

## 2020-12-08 RX ADMIN — BUPIVACAINE HYDROCHLORIDE 4 ML: 2.5 INJECTION, SOLUTION INFILTRATION; PERINEURAL at 17:37

## 2021-01-02 ENCOUNTER — APPOINTMENT (EMERGENCY)
Dept: CT IMAGING | Facility: HOSPITAL | Age: 61
End: 2021-01-02
Payer: MEDICARE

## 2021-01-02 ENCOUNTER — HOSPITAL ENCOUNTER (EMERGENCY)
Facility: HOSPITAL | Age: 61
Discharge: HOME | End: 2021-01-02
Attending: EMERGENCY MEDICINE | Admitting: EMERGENCY MEDICINE
Payer: MEDICARE

## 2021-01-02 VITALS
TEMPERATURE: 98 F | BODY MASS INDEX: 34.87 KG/M2 | OXYGEN SATURATION: 94 % | WEIGHT: 196.87 LBS | HEART RATE: 72 BPM | RESPIRATION RATE: 18 BRPM | DIASTOLIC BLOOD PRESSURE: 90 MMHG | SYSTOLIC BLOOD PRESSURE: 153 MMHG

## 2021-01-02 DIAGNOSIS — M54.50 ACUTE LOW BACK PAIN: Primary | ICD-10-CM

## 2021-01-02 LAB
ALBUMIN SERPL BCP-MCNC: 3.7 G/DL (ref 3.5–5)
ALP SERPL-CCNC: 69 U/L (ref 46–116)
ALT SERPL W P-5'-P-CCNC: 33 U/L (ref 12–78)
ANION GAP SERPL CALCULATED.3IONS-SCNC: 6 MMOL/L (ref 4–13)
AST SERPL W P-5'-P-CCNC: 20 U/L (ref 5–45)
BACTERIA UR QL AUTO: ABNORMAL /HPF
BASOPHILS # BLD AUTO: 0.06 THOUSANDS/ΜL (ref 0–0.1)
BASOPHILS NFR BLD AUTO: 1 % (ref 0–1)
BILIRUB SERPL-MCNC: 0.32 MG/DL (ref 0.2–1)
BILIRUB UR QL STRIP: NEGATIVE
BUN SERPL-MCNC: 16 MG/DL (ref 5–25)
CALCIUM SERPL-MCNC: 8.9 MG/DL (ref 8.3–10.1)
CHLORIDE SERPL-SCNC: 104 MMOL/L (ref 100–108)
CLARITY UR: CLEAR
CO2 SERPL-SCNC: 30 MMOL/L (ref 21–32)
COLOR UR: YELLOW
CREAT SERPL-MCNC: 0.76 MG/DL (ref 0.6–1.3)
EOSINOPHIL # BLD AUTO: 0.11 THOUSAND/ΜL (ref 0–0.61)
EOSINOPHIL NFR BLD AUTO: 1 % (ref 0–6)
ERYTHROCYTE [DISTWIDTH] IN BLOOD BY AUTOMATED COUNT: 13.5 % (ref 11.6–15.1)
EXT PREG TEST URINE: NEGATIVE
EXT. CONTROL ED NAV: NORMAL
GFR SERPL CREATININE-BSD FRML MDRD: 86 ML/MIN/1.73SQ M
GLUCOSE SERPL-MCNC: 105 MG/DL (ref 65–140)
GLUCOSE UR STRIP-MCNC: NEGATIVE MG/DL
HCT VFR BLD AUTO: 44.3 % (ref 34.8–46.1)
HGB BLD-MCNC: 14.2 G/DL (ref 11.5–15.4)
HGB UR QL STRIP.AUTO: NEGATIVE
IMM GRANULOCYTES # BLD AUTO: 0.05 THOUSAND/UL (ref 0–0.2)
IMM GRANULOCYTES NFR BLD AUTO: 1 % (ref 0–2)
KETONES UR STRIP-MCNC: NEGATIVE MG/DL
LEUKOCYTE ESTERASE UR QL STRIP: ABNORMAL
LIPASE SERPL-CCNC: 124 U/L (ref 73–393)
LYMPHOCYTES # BLD AUTO: 3.44 THOUSANDS/ΜL (ref 0.6–4.47)
LYMPHOCYTES NFR BLD AUTO: 34 % (ref 14–44)
MCH RBC QN AUTO: 31.4 PG (ref 26.8–34.3)
MCHC RBC AUTO-ENTMCNC: 32.1 G/DL (ref 31.4–37.4)
MCV RBC AUTO: 98 FL (ref 82–98)
MONOCYTES # BLD AUTO: 0.78 THOUSAND/ΜL (ref 0.17–1.22)
MONOCYTES NFR BLD AUTO: 8 % (ref 4–12)
NEUTROPHILS # BLD AUTO: 5.83 THOUSANDS/ΜL (ref 1.85–7.62)
NEUTS SEG NFR BLD AUTO: 55 % (ref 43–75)
NITRITE UR QL STRIP: NEGATIVE
NON-SQ EPI CELLS URNS QL MICRO: ABNORMAL /HPF
NRBC BLD AUTO-RTO: 0 /100 WBCS
OTHER STN SPEC: ABNORMAL
PH UR STRIP.AUTO: 5.5 [PH] (ref 4.5–8)
PLATELET # BLD AUTO: 424 THOUSANDS/UL (ref 149–390)
PMV BLD AUTO: 9.4 FL (ref 8.9–12.7)
POTASSIUM SERPL-SCNC: 3.7 MMOL/L (ref 3.5–5.3)
PROT SERPL-MCNC: 7.2 G/DL (ref 6.4–8.2)
PROT UR STRIP-MCNC: NEGATIVE MG/DL
RBC # BLD AUTO: 4.52 MILLION/UL (ref 3.81–5.12)
RBC #/AREA URNS AUTO: ABNORMAL /HPF
SODIUM SERPL-SCNC: 140 MMOL/L (ref 136–145)
SP GR UR STRIP.AUTO: <=1.005 (ref 1–1.03)
UROBILINOGEN UR QL STRIP.AUTO: 0.2 E.U./DL
WBC # BLD AUTO: 10.27 THOUSAND/UL (ref 4.31–10.16)
WBC #/AREA URNS AUTO: ABNORMAL /HPF

## 2021-01-02 PROCEDURE — 96375 TX/PRO/DX INJ NEW DRUG ADDON: CPT

## 2021-01-02 PROCEDURE — 99284 EMERGENCY DEPT VISIT MOD MDM: CPT

## 2021-01-02 PROCEDURE — 81025 URINE PREGNANCY TEST: CPT | Performed by: EMERGENCY MEDICINE

## 2021-01-02 PROCEDURE — 83690 ASSAY OF LIPASE: CPT | Performed by: EMERGENCY MEDICINE

## 2021-01-02 PROCEDURE — 96374 THER/PROPH/DIAG INJ IV PUSH: CPT

## 2021-01-02 PROCEDURE — 36415 COLL VENOUS BLD VENIPUNCTURE: CPT | Performed by: EMERGENCY MEDICINE

## 2021-01-02 PROCEDURE — 80053 COMPREHEN METABOLIC PANEL: CPT | Performed by: EMERGENCY MEDICINE

## 2021-01-02 PROCEDURE — 85025 COMPLETE CBC W/AUTO DIFF WBC: CPT | Performed by: EMERGENCY MEDICINE

## 2021-01-02 PROCEDURE — 74176 CT ABD & PELVIS W/O CONTRAST: CPT

## 2021-01-02 PROCEDURE — 99285 EMERGENCY DEPT VISIT HI MDM: CPT | Performed by: EMERGENCY MEDICINE

## 2021-01-02 PROCEDURE — 81001 URINALYSIS AUTO W/SCOPE: CPT

## 2021-01-02 RX ORDER — LIDOCAINE 50 MG/G
1 PATCH TOPICAL ONCE
Status: DISCONTINUED | OUTPATIENT
Start: 2021-01-02 | End: 2021-01-02 | Stop reason: HOSPADM

## 2021-01-02 RX ORDER — HYDROMORPHONE HCL/PF 1 MG/ML
1 SYRINGE (ML) INJECTION ONCE
Status: COMPLETED | OUTPATIENT
Start: 2021-01-02 | End: 2021-01-02

## 2021-01-02 RX ORDER — LIDOCAINE 40 MG/G
CREAM TOPICAL AS NEEDED
Qty: 30 G | Refills: 0 | Status: SHIPPED | OUTPATIENT
Start: 2021-01-02

## 2021-01-02 RX ORDER — KETOROLAC TROMETHAMINE 30 MG/ML
15 INJECTION, SOLUTION INTRAMUSCULAR; INTRAVENOUS ONCE
Status: COMPLETED | OUTPATIENT
Start: 2021-01-02 | End: 2021-01-02

## 2021-01-02 RX ORDER — METHOCARBAMOL 750 MG/1
750 TABLET, FILM COATED ORAL 3 TIMES DAILY
Qty: 30 TABLET | Refills: 0 | Status: SHIPPED | OUTPATIENT
Start: 2021-01-02 | End: 2021-01-12

## 2021-01-02 RX ORDER — ONDANSETRON 2 MG/ML
4 INJECTION INTRAMUSCULAR; INTRAVENOUS ONCE
Status: COMPLETED | OUTPATIENT
Start: 2021-01-02 | End: 2021-01-02

## 2021-01-02 RX ADMIN — LIDOCAINE 1 PATCH: 50 PATCH CUTANEOUS at 18:47

## 2021-01-02 RX ADMIN — KETOROLAC TROMETHAMINE 15 MG: 30 INJECTION, SOLUTION INTRAMUSCULAR at 18:46

## 2021-01-02 RX ADMIN — HYDROMORPHONE HYDROCHLORIDE 1 MG: 1 INJECTION, SOLUTION INTRAMUSCULAR; INTRAVENOUS; SUBCUTANEOUS at 17:18

## 2021-01-02 RX ADMIN — ONDANSETRON 4 MG: 2 INJECTION INTRAMUSCULAR; INTRAVENOUS at 17:15

## 2021-01-02 NOTE — ED PROVIDER NOTES
History  Chief Complaint   Patient presents with    Back Pain     pt c/o lower back pain, "in the center, but kind of off to the left", reports that pain is radiating into her groin  Hx of kidney stones in the past, "I'm not sure if this feels like this or not", was at urgent care PTA to ED but left after waiting too long  60-year-old female presents for evaluation of pain in her right flank  She states it feels like a knot like sensation in her right flank  She states started gradually 2 days ago is constant, severe, occasionally radiates towards her groin is worse with laying flat her moving  She denies nausea, vomiting, fevers, chills, bowel/bladder is complaints, saddle anesthesia, history of IV drug use     Minimal improvement with naproxen  History provided by:  Patient  Back Pain  Associated symptoms: no abdominal pain, no chest pain, no dysuria, no fever, no numbness and no weakness        Prior to Admission Medications   Prescriptions Last Dose Informant Patient Reported? Taking?    ARIPiprazole (ABILIFY) 10 mg tablet   No No   Sig: Take 1 tablet (10 mg total) by mouth daily   Patient not taking: Reported on 11/15/2019   albuterol (PROVENTIL HFA,VENTOLIN HFA) 90 mcg/act inhaler   Yes No   Sig: Inhale 2 puffs every 6 (six) hours as needed for wheezing   albuterol (PROVENTIL HFA,VENTOLIN HFA) 90 mcg/act inhaler   No No   Sig: Inhale 1-2 puffs every 6 (six) hours as needed for wheezing   buprenorphine-naloxone (SUBOXONE) 2-0 5 mg per SL tablet   Yes No   Sig: Place under the tongue daily Dosage unknown to patient   busPIRone (BUSPAR) 15 mg tablet   No No   Sig: Take 1 tablet (15 mg total) by mouth 3 (three) times a day   diclofenac (VOLTAREN) 75 mg EC tablet   No No   Sig: TAKE 1 TABLET(75 MG) BY MOUTH TWICE DAILY AS NEEDED FOR PAIN   diclofenac sodium (VOLTAREN) 1 %   No No   Sig: Apply 2 g topically 4 (four) times a day   fluticasone furoate-vilanterol (BREO ELLIPTA)   No No   Sig: Inhale 1 puff daily   Patient not taking: Reported on 11/15/2019   fluticasone furoate-vilanterol (BREO ELLIPTA) 200-25 MCG/INH inhaler   Yes No   Sig: Inhale 1 puff daily   gabapentin (NEURONTIN) 300 mg capsule   No No   Sig: Take 1 capsule (300 mg total) by mouth 3 (three) times a day   hydrOXYzine HCL (ATARAX) 25 mg tablet   No No   Sig: Take 1 tablet (25 mg total) by mouth every 6 (six) hours   hydrOXYzine pamoate (VISTARIL) 25 mg capsule   No No   Sig: Take 1 capsule (25 mg total) by mouth 3 (three) times a day as needed for itching   Patient not taking: Reported on 11/15/2019   montelukast (SINGULAIR) 10 mg tablet   Yes No   Sig: Take 10 mg by mouth daily   sertraline (ZOLOFT) 50 mg tablet   No No   Sig: Take 3 tablets (150 mg total) by mouth daily   Patient not taking: Reported on 11/15/2019      Facility-Administered Medications: None       Past Medical History:   Diagnosis Date    Anxiety     Asthma     Depression     Head injury     Seizures ([facility] [address] )     "Last one was a couple of years ago"       Past Surgical History:   Procedure Laterality Date     SECTION      IR CEREBRAL ANGIOGRAPHY  2018    SPLENECTOMY         Family History   Problem Relation Age of Onset    Drug abuse Brother     Completed Suicide  Brother      I have reviewed and agree with the history as documented  E-Cigarette/Vaping     E-Cigarette/Vaping Substances     Social History     Tobacco Use    Smoking status: Current Every Day Smoker     Packs/day: 0 25     Years: 20 00     Pack years: 5 00     Types: Cigarettes    Smokeless tobacco: Never Used   Substance Use Topics    Alcohol use: No     Comment: patient denies, despite findings of Alcohol in system    Drug use: No     Comment: per patient: history of cocaine abuse; Tox screen was positive for cocaine at admission;  patient denies current use  Review of Systems   Constitutional: Negative for activity change, appetite change, fatigue and fever     HENT: Negative for congestion, dental problem, ear pain, rhinorrhea and sore throat  Eyes: Negative for pain and redness  Respiratory: Negative for chest tightness, shortness of breath and wheezing  Cardiovascular: Negative for chest pain and palpitations  Gastrointestinal: Negative for abdominal pain, blood in stool, constipation, diarrhea, nausea and vomiting  Endocrine: Negative for cold intolerance and heat intolerance  Genitourinary: Positive for flank pain  Negative for difficulty urinating, dyspareunia, dysuria, frequency and hematuria  Musculoskeletal: Positive for back pain  Negative for arthralgias and myalgias  Skin: Negative for color change, pallor and rash  Neurological: Negative for weakness and numbness  Hematological: Does not bruise/bleed easily  Psychiatric/Behavioral: Negative for agitation, hallucinations and suicidal ideas  Physical Exam  Physical Exam  Constitutional:       Appearance: She is well-developed  HENT:      Head: Normocephalic and atraumatic  Eyes:      Pupils: Pupils are equal, round, and reactive to light  Neck:      Musculoskeletal: Normal range of motion  Vascular: No JVD  Trachea: No tracheal deviation  Cardiovascular:      Rate and Rhythm: Normal rate and regular rhythm  Pulses: Normal pulses  Heart sounds: Normal heart sounds  Pulmonary:      Effort: Pulmonary effort is normal  No tachypnea, accessory muscle usage or respiratory distress  Breath sounds: Normal breath sounds  Abdominal:      General: There is no distension  Palpations: There is no mass  Tenderness: There is no abdominal tenderness  There is right CVA tenderness  There is no left CVA tenderness or guarding  Hernia: No hernia is present  Comments: +r cvat ttp and R lower lumbar region ttp, nvi b/l le  Musculoskeletal:      Right lower leg: Normal       Left lower leg: Normal    Skin:     General: Skin is warm        Capillary Refill: Capillary refill takes less than 2 seconds  Neurological:      Mental Status: She is alert and oriented to person, place, and time     Psychiatric:         Behavior: Behavior normal          Vital Signs  ED Triage Vitals   Temperature Pulse Respirations Blood Pressure SpO2   01/02/21 1233 01/02/21 1233 01/02/21 1233 01/02/21 1233 01/02/21 1233   98 °F (36 7 °C) 77 18 (!) 172/77 97 %      Temp Source Heart Rate Source Patient Position - Orthostatic VS BP Location FiO2 (%)   01/02/21 1233 01/02/21 1233 01/02/21 1609 01/02/21 1609 --   Temporal Monitor Sitting Left arm       Pain Score       01/02/21 1233       9           Vitals:    01/02/21 1233 01/02/21 1609   BP: (!) 172/77 156/95   Pulse: 77 80   Patient Position - Orthostatic VS:  Sitting         Visual Acuity      ED Medications  Medications   ketorolac (TORADOL) injection 15 mg (has no administration in time range)   lidocaine (LIDODERM) 5 % patch 1 patch (has no administration in time range)   HYDROmorphone (DILAUDID) injection 1 mg (1 mg Intravenous Given 1/2/21 1718)   ondansetron (ZOFRAN) injection 4 mg (4 mg Intravenous Given 1/2/21 1715)       Diagnostic Studies  Results Reviewed     Procedure Component Value Units Date/Time    Comprehensive metabolic panel [[id number]] Collected: 01/02/21 1619    Lab Status: Final result Specimen: Blood from Arm, Right Updated: 01/02/21 1708     Sodium 140 mmol/L      Potassium 3 7 mmol/L      Chloride 104 mmol/L      CO2 30 mmol/L      ANION GAP 6 mmol/L      BUN 16 mg/dL      Creatinine 0 76 mg/dL      Glucose 105 mg/dL      Calcium 8 9 mg/dL      AST 20 U/L      ALT 33 U/L      Alkaline Phosphatase 69 U/L      Total Protein 7 2 g/dL      Albumin 3 7 g/dL      Total Bilirubin 0 32 mg/dL      eGFR 86 ml/min/1 73sq m     Narrative:      Katarina guidelines for Chronic Kidney Disease (CKD):     Stage 1 with normal or high GFR (GFR > 90 mL/min/1 73 square meters)    Stage 2 Mild CKD (GFR = 60-89 mL/min/1 73 square meters)    Stage 3A Moderate CKD (GFR = 45-59 mL/min/1 73 square meters)    Stage 3B Moderate CKD (GFR = 30-44 mL/min/1 73 square meters)    Stage 4 Severe CKD (GFR = 15-29 mL/min/1 73 square meters)    Stage 5 End Stage CKD (GFR <15 mL/min/1 73 square meters)  Note: GFR calculation is accurate only with a steady state creatinine    Lipase [[id number]]  (Normal) Collected: 01/02/21 1619    Lab Status: Final result Specimen: Blood from Arm, Right Updated: 01/02/21 1708     Lipase 124 u/L     Urine Microscopic [[id number]]  (Abnormal) Collected: 01/02/21 1627    Lab Status: Final result Specimen: Urine, Clean Catch Updated: 01/02/21 1656     RBC, UA None Seen /hpf      WBC, UA 0-1 /hpf      Epithelial Cells Occasional /hpf      Bacteria, UA None Seen /hpf      OTHER OBSERVATIONS Renal Epithelial Cells Present    CBC and differential [[id number]]  (Abnormal) Collected: 01/02/21 1619    Lab Status: Final result Specimen: Blood from Arm, Right Updated: 01/02/21 1655     WBC 10 27 Thousand/uL      RBC 4 52 Million/uL      Hemoglobin 14 2 g/dL      Hematocrit 44 3 %      MCV 98 fL      MCH 31 4 pg      MCHC 32 1 g/dL      RDW 13 5 %      MPV 9 4 fL      Platelets 918 Thousands/uL      nRBC 0 /100 WBCs      Neutrophils Relative 55 %      Immat GRANS % 1 %      Lymphocytes Relative 34 %      Monocytes Relative 8 %      Eosinophils Relative 1 %      Basophils Relative 1 %      Neutrophils Absolute 5 83 Thousands/µL      Immature Grans Absolute 0 05 Thousand/uL      Lymphocytes Absolute 3 44 Thousands/µL      Monocytes Absolute 0 78 Thousand/µL      Eosinophils Absolute 0 11 Thousand/µL      Basophils Absolute 0 06 Thousands/µL     POCT pregnancy, urine [[id number]]  (Normal) Resulted: 01/02/21 1646    Lab Status: Final result Updated: 01/02/21 1646     EXT PREG TEST UR (Ref: Negative) Negative     Control Valid    POCT urinalysis dipstick [[id number]]  (Abnormal) Resulted: 01/02/21 1630    Lab Status: Final result Updated: 01/02/21 1630    Urine Macroscopic, POC [[id number]]  (Abnormal) Collected: 01/02/21 1627    Lab Status: Final result Specimen: Urine Updated: 01/02/21 1629     Color, UA Yellow     Clarity, UA Clear     pH, UA 5 5     Leukocytes, UA Trace     Nitrite, UA Negative     Protein, UA Negative mg/dl      Glucose, UA Negative mg/dl      Ketones, UA Negative mg/dl      Urobilinogen, UA 0 2 E U /dl      Bilirubin, UA Negative     Blood, UA Negative     Specific Gravity, UA <=1 005    Narrative:      CLIN[facility] RESULT                 CT renal stone study abdomen pelvis without contrast   Final Result by Geovanny Zaragoza MD (01/02 1817)      No evidence of urinary tract stone disease  Workstation performed: [id number]                    Procedures  Procedures         ED Course  ED Course as of Jan 02 1832   Sat Jan 02, 2021   1207 Work up reviewed and benign  Will tx pt for msk back pain, pcp f/u                                              MDM  Number of Diagnoses or Management Options  Diagnosis management comments: Right flank pain without red flag signs or symptoms-will do abdominal labs, CT stone study, treat symptoms reassess      Disposition  Final diagnoses:   Acute low back pain     Time reflects when diagnosis was documented in both MDM as applicable and the Disposition within this note     Time User Action Codes Description Comment    1/2/2021  6:29 PM Joel Quiroz Add [M54 5] Acute low back pain       ED Disposition     ED Disposition Condition Date/Time Comment    Discharge Stable Sat Jan 2, 2021  6:29 PM Montana Frank discharge to home/self care              Follow-up Information     Follow up With Specialties Details Why Contact Info    Sarah Harrell MD Family Medicine Schedule an appointment as soon as possible for a visit in 2 days  [facility]  [phone]            Patient's Medications   Discharge Prescriptions    LIDOCAINE (LMX) 4 % CREAM    Apply topically as needed for moderate pain       Start Date: 1/2/2021  End Date: --       Order Dose: --       Quantity: 30 g    Refills: 0    METHOCARBAMOL (ROBAXIN) 750 MG TABLET    Take 1 tablet (750 mg total) by mouth 3 (three) times a day for 30 doses       Start Date: 1/2/2021  End Date: 1/12/2021       Order Dose: 750 mg       Quantity: 30 tablet    Refills: 0     No discharge procedures on file      PDMP Review     None          ED Provider  Electronically Signed by           Usama West MD  01/02/21 6857

## 2021-01-02 NOTE — Clinical Note
Dayne Lubin was seen and treated in our emergency department on 1/2/2021  Diagnosis:     Geri White  may return to work on return date  She may return on this date: 01/04/2021         If you have any questions or concerns, please don't hesitate to call        Reynold Malagon MD    ______________________________           _______________          _______________  Hospital Representative                              Date                                Time

## 2021-01-02 NOTE — ED NOTES
Pt reports h/o kidney stone but denies urinary symptoms  Reports taking 2 naproxen and 1 tylenol at ~1000 today  Pt unable to urinate at this time, as had peed just prior to being called back to room       Clementina Foss RN  01/02/21 1998

## 2021-03-23 ENCOUNTER — OFFICE VISIT (OUTPATIENT)
Dept: OBGYN CLINIC | Facility: MEDICAL CENTER | Age: 61
End: 2021-03-23
Payer: COMMERCIAL

## 2021-03-23 VITALS
SYSTOLIC BLOOD PRESSURE: 143 MMHG | BODY MASS INDEX: 35.87 KG/M2 | DIASTOLIC BLOOD PRESSURE: 84 MMHG | HEIGHT: 61 IN | HEART RATE: 98 BPM | WEIGHT: 190 LBS | TEMPERATURE: 98.2 F

## 2021-03-23 DIAGNOSIS — M17.0 PRIMARY OSTEOARTHRITIS OF BOTH KNEES: Primary | ICD-10-CM

## 2021-03-23 PROCEDURE — 20610 DRAIN/INJ JOINT/BURSA W/O US: CPT | Performed by: ORTHOPAEDIC SURGERY

## 2021-03-23 RX ORDER — METHYLPREDNISOLONE ACETATE 40 MG/ML
1 INJECTION, SUSPENSION INTRA-ARTICULAR; INTRALESIONAL; INTRAMUSCULAR; SOFT TISSUE
Status: COMPLETED | OUTPATIENT
Start: 2021-03-23 | End: 2021-03-23

## 2021-03-23 RX ORDER — BUPIVACAINE HYDROCHLORIDE 2.5 MG/ML
4 INJECTION, SOLUTION INFILTRATION; PERINEURAL
Status: COMPLETED | OUTPATIENT
Start: 2021-03-23 | End: 2021-03-23

## 2021-03-23 RX ADMIN — BUPIVACAINE HYDROCHLORIDE 4 ML: 2.5 INJECTION, SOLUTION INFILTRATION; PERINEURAL at 17:59

## 2021-03-23 RX ADMIN — METHYLPREDNISOLONE ACETATE 1 ML: 40 INJECTION, SUSPENSION INTRA-ARTICULAR; INTRALESIONAL; INTRAMUSCULAR; SOFT TISSUE at 17:59

## 2021-03-23 NOTE — PROGRESS NOTES
Large joint arthrocentesis: bilateral knee  Universal Protocol:  Consent given by: patient  Time out: Immediately prior to procedure a "time out" was called to verify the correct patient, procedure, equipment, support staff and site/side marked as required    Site marked: the operative site was marked  Supporting Documentation  Indications: pain   Procedure Details  Location: knee - bilateral knee  Needle size: 20 G  Approach: anterior    Medications (Right): 4 mL bupivacaine 0 25 %; 1 mL methylPREDNISolone acetate 40 mg/mLMedications (Left): 4 mL bupivacaine 0 25 %; 1 mL methylPREDNISolone acetate 40 mg/mL   Patient tolerance: patient tolerated the procedure well with no immediate complications  Dressing:  Sterile dressing applied

## 2021-05-27 ENCOUNTER — TELEPHONE (OUTPATIENT)
Dept: OBGYN CLINIC | Facility: HOSPITAL | Age: 61
End: 2021-05-27

## 2021-05-27 NOTE — TELEPHONE ENCOUNTER
Patient sees Dr David Lopes  Patient is calling in stating that she is in a lot of pain and is doing a lot of taking OTC medication to help her get through, she is asking if she is able to get more injections before the 3 month robert or what further she can do for the pain  She is asking for a call back relating this        Call back# [phone]

## 2021-05-27 NOTE — TELEPHONE ENCOUNTER
Patient has been advised  She stated she is taking 2 advil 200 mg tablets in the morning and 2 in the evening as well as 2 tylenol 325mg tablets in the am and 2 tylenol in the evening  Patient has been advised to ice 15 mins at a time  Encouraged ice and elevation with rest when needed  She denies redness, heat to touch, or swelling of the knees at this time  Encouraged call back with any other questions or concerns or if redness, heat to touch, or swelling develop  Verbalized understanding

## 2021-06-29 ENCOUNTER — OFFICE VISIT (OUTPATIENT)
Dept: OBGYN CLINIC | Facility: MEDICAL CENTER | Age: 61
End: 2021-06-29
Payer: COMMERCIAL

## 2021-06-29 VITALS
SYSTOLIC BLOOD PRESSURE: 128 MMHG | DIASTOLIC BLOOD PRESSURE: 87 MMHG | HEIGHT: 61 IN | WEIGHT: 180 LBS | BODY MASS INDEX: 33.99 KG/M2 | HEART RATE: 89 BPM

## 2021-06-29 DIAGNOSIS — M17.0 PRIMARY OSTEOARTHRITIS OF BOTH KNEES: Primary | ICD-10-CM

## 2021-06-29 PROCEDURE — 20610 DRAIN/INJ JOINT/BURSA W/O US: CPT | Performed by: ORTHOPAEDIC SURGERY

## 2021-06-29 PROCEDURE — 99213 OFFICE O/P EST LOW 20 MIN: CPT | Performed by: ORTHOPAEDIC SURGERY

## 2021-06-29 RX ORDER — BUPIVACAINE HYDROCHLORIDE 2.5 MG/ML
4 INJECTION, SOLUTION INFILTRATION; PERINEURAL
Status: COMPLETED | OUTPATIENT
Start: 2021-06-29 | End: 2021-06-29

## 2021-06-29 RX ORDER — METHYLPREDNISOLONE ACETATE 40 MG/ML
1 INJECTION, SUSPENSION INTRA-ARTICULAR; INTRALESIONAL; INTRAMUSCULAR; SOFT TISSUE
Status: COMPLETED | OUTPATIENT
Start: 2021-06-29 | End: 2021-06-29

## 2021-06-29 RX ADMIN — METHYLPREDNISOLONE ACETATE 1 ML: 40 INJECTION, SUSPENSION INTRA-ARTICULAR; INTRALESIONAL; INTRAMUSCULAR; SOFT TISSUE at 17:26

## 2021-06-29 RX ADMIN — BUPIVACAINE HYDROCHLORIDE 4 ML: 2.5 INJECTION, SOLUTION INFILTRATION; PERINEURAL at 17:26

## 2021-09-30 ENCOUNTER — OFFICE VISIT (OUTPATIENT)
Dept: OBGYN CLINIC | Facility: MEDICAL CENTER | Age: 61
End: 2021-09-30
Payer: COMMERCIAL

## 2021-09-30 VITALS
DIASTOLIC BLOOD PRESSURE: 82 MMHG | WEIGHT: 180 LBS | HEART RATE: 80 BPM | BODY MASS INDEX: 33.99 KG/M2 | SYSTOLIC BLOOD PRESSURE: 137 MMHG | HEIGHT: 61 IN

## 2021-09-30 DIAGNOSIS — M17.0 BILATERAL PRIMARY OSTEOARTHRITIS OF KNEE: Primary | ICD-10-CM

## 2021-09-30 PROCEDURE — 99213 OFFICE O/P EST LOW 20 MIN: CPT | Performed by: PHYSICIAN ASSISTANT

## 2021-09-30 PROCEDURE — 20610 DRAIN/INJ JOINT/BURSA W/O US: CPT | Performed by: PHYSICIAN ASSISTANT

## 2021-09-30 RX ORDER — LIDOCAINE HYDROCHLORIDE 10 MG/ML
3 INJECTION, SOLUTION INFILTRATION; PERINEURAL
Status: COMPLETED | OUTPATIENT
Start: 2021-09-30 | End: 2021-09-30

## 2021-09-30 RX ORDER — METHYLPREDNISOLONE ACETATE 40 MG/ML
2 INJECTION, SUSPENSION INTRA-ARTICULAR; INTRALESIONAL; INTRAMUSCULAR; SOFT TISSUE
Status: COMPLETED | OUTPATIENT
Start: 2021-09-30 | End: 2021-09-30

## 2021-09-30 RX ADMIN — METHYLPREDNISOLONE ACETATE 2 ML: 40 INJECTION, SUSPENSION INTRA-ARTICULAR; INTRALESIONAL; INTRAMUSCULAR; SOFT TISSUE at 13:17

## 2021-09-30 RX ADMIN — LIDOCAINE HYDROCHLORIDE 3 ML: 10 INJECTION, SOLUTION INFILTRATION; PERINEURAL at 13:17

## 2021-09-30 NOTE — PROGRESS NOTES
Assessment/Plan:  1  Bilateral primary osteoarthritis of knee      Orders Placed This Encounter   Procedures    Large joint arthrocentesis       · Patient received bilateral knee steroid injections today  Tolerated the procedure well  Advised to apply ice and avoid strenuous activity for 1-2 days as needed  · Continue ibuprofen prn pain  · Continue knee braces for comfort  Return in about 3 months (around 2021) for bilateral knee CSI  I answered all of the patient's questions during the visit and provided education of the patient's condition during the visit  The patient verbalized understanding of the information given and agrees with the plan  This note was dictated using [employer] software  It may contain errors including improperly dictated words  Please contact physician directly for any questions  Subjective   Chief Complaint:   Chief Complaint   Patient presents with    Left Knee - Follow-up    Right Knee - Follow-up       HPI  Catrachito Null is a 62 y o  female who presents for follow up for bilateral knee pain and OA  Patient has been getting bilateral knee injections with Dr Henok Gutierrez  She reports bilateral knee pain, left knee worse than right knee pain  Patient states she puts most of her pressure on the left knee which causes pain  She is taking ibuprofen for pain  She is not doing PT  She is not wearing knee braces currently but does have them if she needs them  Patient has had good relief with steroid injections and would like to continue this  Review of Systems  ROS:    See HPI for musculoskeletal review     All other systems reviewed are negative     History:  Past Medical History:   Diagnosis Date    Anxiety     Asthma     Depression     Head injury     Seizures ([address] )     "Last one was a couple of years ago"     Past Surgical History:   Procedure Laterality Date     SECTION      IR CEREBRAL ANGIOGRAPHY  2018    SPLENECTOMY       Social History   Social History     Substance and Sexual Activity   Alcohol Use No    Comment: patient denies, despite findings of Alcohol in system     Social History     Substance and Sexual Activity   Drug Use No    Comment: per patient: history of cocaine abuse; Tox screen was positive for cocaine at admission;  patient denies current use       Social History     Tobacco Use   Smoking Status Current Every Day Smoker    Packs/day: 0 25    Years: 20 00    Pack years: 5 00    Types: Cigarettes   Smokeless Tobacco Never Used     Family History:   Family History   Problem Relation Age of Onset    Drug abuse Brother     Completed Suicide  Brother        Current Outpatient Medications on File Prior to Visit   Medication Sig Dispense Refill    albuterol (PROVENTIL HFA,VENTOLIN HFA) 90 mcg/act inhaler Inhale 2 puffs every 6 (six) hours as needed for wheezing      albuterol (PROVENTIL HFA,VENTOLIN HFA) 90 mcg/act inhaler Inhale 1-2 puffs every 6 (six) hours as needed for wheezing (Patient not taking: Reported on 6/29/2021) 1 Inhaler 0    ARIPiprazole (ABILIFY) 10 mg tablet Take 1 tablet (10 mg total) by mouth daily 30 tablet 0    buprenorphine-naloxone (SUBOXONE) 2-0 5 mg per SL tablet Place under the tongue daily Dosage unknown to patient      busPIRone (BUSPAR) 15 mg tablet Take 1 tablet (15 mg total) by mouth 3 (three) times a day 90 tablet 0    diclofenac (VOLTAREN) 75 mg EC tablet TAKE 1 TABLET(75 MG) BY MOUTH TWICE DAILY AS NEEDED FOR PAIN (Patient not taking: Reported on 6/29/2021) 60 tablet 2    diclofenac sodium (VOLTAREN) 1 % Apply 2 g topically 4 (four) times a day (Patient not taking: Reported on 6/29/2021) 1 Tube 0    fluticasone furoate-vilanterol (BREO ELLIPTA) 200-25 MCG/INH inhaler Inhale 1 puff daily      fluticasone furoate-vilanterol (BREO ELLIPTA) Inhale 1 puff daily  0    gabapentin (NEURONTIN) 300 mg capsule Take 1 capsule (300 mg total) by mouth 3 (three) times a day 90 capsule 0    hydrOXYzine HCL (ATARAX) 25 mg tablet Take 1 tablet (25 mg total) by mouth every 6 (six) hours 12 tablet 0    hydrOXYzine pamoate (VISTARIL) 25 mg capsule Take 1 capsule (25 mg total) by mouth 3 (three) times a day as needed for itching 30 capsule 0    lidocaine (LMX) 4 % cream Apply topically as needed for moderate pain 30 g 0    methocarbamol (ROBAXIN) 750 mg tablet Take 1 tablet (750 mg total) by mouth 3 (three) times a day for 30 doses 30 tablet 0    montelukast (SINGULAIR) 10 mg tablet Take 10 mg by mouth daily      sertraline (ZOLOFT) 50 mg tablet Take 3 tablets (150 mg total) by mouth daily 90 tablet 0     No current facility-administered medications on file prior to visit  No Known Allergies     Objective     /82   Pulse 80   Ht 5' 1" (1 549 m)   Wt 81 6 kg (180 lb)   BMI 34 01 kg/m²      PE:  AAOx 3  WDWN  Hearing intact, no drainage from eyes  no audible wheezing  no abdominal distension  LE compartments soft, skin intact    Ortho Exam:  bilateral Knee:   No erythema  no swelling  no effusion  no warmth  No TTP  AROM: 3- 115  Stable to varus/valgus stress      Large joint arthrocentesis: bilateral knee  Universal Protocol:  Consent: Verbal consent obtained    Risks and benefits: risks, benefits and alternatives were discussed  Consent given by: patient  Site marked: the operative site was marked  Supporting Documentation  Indications: pain   Procedure Details  Location: knee - bilateral knee  Preparation: Patient was prepped and draped in the usual sterile fashion  Needle size: 22 G  Ultrasound guidance: no  Approach: anterolateral    Medications (Right): 3 mL lidocaine 1 %; 2 mL methylPREDNISolone acetate 40 mg/mLMedications (Left): 3 mL lidocaine 1 %; 2 mL methylPREDNISolone acetate 40 mg/mL   Patient tolerance: patient tolerated the procedure well with no immediate complications  Dressing:  Sterile dressing applied

## 2021-12-30 ENCOUNTER — OFFICE VISIT (OUTPATIENT)
Dept: OBGYN CLINIC | Facility: CLINIC | Age: 61
End: 2021-12-30
Payer: COMMERCIAL

## 2021-12-30 VITALS
SYSTOLIC BLOOD PRESSURE: 116 MMHG | WEIGHT: 181 LBS | HEIGHT: 61 IN | TEMPERATURE: 97.6 F | DIASTOLIC BLOOD PRESSURE: 80 MMHG | BODY MASS INDEX: 34.17 KG/M2 | HEART RATE: 65 BPM

## 2021-12-30 DIAGNOSIS — M17.0 BILATERAL PRIMARY OSTEOARTHRITIS OF KNEE: Primary | ICD-10-CM

## 2021-12-30 PROCEDURE — 99213 OFFICE O/P EST LOW 20 MIN: CPT | Performed by: PHYSICIAN ASSISTANT

## 2021-12-30 PROCEDURE — 20610 DRAIN/INJ JOINT/BURSA W/O US: CPT | Performed by: PHYSICIAN ASSISTANT

## 2021-12-30 RX ORDER — TRIAMCINOLONE ACETONIDE 40 MG/ML
40 INJECTION, SUSPENSION INTRA-ARTICULAR; INTRAMUSCULAR
Status: COMPLETED | OUTPATIENT
Start: 2021-12-30 | End: 2021-12-30

## 2021-12-30 RX ORDER — LIDOCAINE HYDROCHLORIDE 10 MG/ML
4 INJECTION, SOLUTION INFILTRATION; PERINEURAL
Status: COMPLETED | OUTPATIENT
Start: 2021-12-30 | End: 2021-12-30

## 2021-12-30 RX ADMIN — LIDOCAINE HYDROCHLORIDE 4 ML: 10 INJECTION, SOLUTION INFILTRATION; PERINEURAL at 11:20

## 2021-12-30 RX ADMIN — TRIAMCINOLONE ACETONIDE 40 MG: 40 INJECTION, SUSPENSION INTRA-ARTICULAR; INTRAMUSCULAR at 11:20

## 2022-03-31 ENCOUNTER — OFFICE VISIT (OUTPATIENT)
Dept: OBGYN CLINIC | Facility: CLINIC | Age: 62
End: 2022-03-31
Payer: COMMERCIAL

## 2022-03-31 VITALS
HEIGHT: 61 IN | HEART RATE: 60 BPM | WEIGHT: 177 LBS | SYSTOLIC BLOOD PRESSURE: 135 MMHG | DIASTOLIC BLOOD PRESSURE: 80 MMHG | BODY MASS INDEX: 33.42 KG/M2

## 2022-03-31 DIAGNOSIS — M17.0 PRIMARY OSTEOARTHRITIS OF BOTH KNEES: Primary | ICD-10-CM

## 2022-03-31 DIAGNOSIS — M17.11 PRIMARY OSTEOARTHRITIS OF RIGHT KNEE: ICD-10-CM

## 2022-03-31 PROCEDURE — 20610 DRAIN/INJ JOINT/BURSA W/O US: CPT | Performed by: ORTHOPAEDIC SURGERY

## 2022-03-31 PROCEDURE — 99213 OFFICE O/P EST LOW 20 MIN: CPT | Performed by: ORTHOPAEDIC SURGERY

## 2022-03-31 RX ORDER — TRIAMCINOLONE ACETONIDE 40 MG/ML
20 INJECTION, SUSPENSION INTRA-ARTICULAR; INTRAMUSCULAR
Status: COMPLETED | OUTPATIENT
Start: 2022-03-31 | End: 2022-03-31

## 2022-03-31 RX ORDER — LIDOCAINE HYDROCHLORIDE 10 MG/ML
4 INJECTION, SOLUTION INFILTRATION; PERINEURAL
Status: COMPLETED | OUTPATIENT
Start: 2022-03-31 | End: 2022-03-31

## 2022-03-31 RX ADMIN — LIDOCAINE HYDROCHLORIDE 4 ML: 10 INJECTION, SOLUTION INFILTRATION; PERINEURAL at 11:48

## 2022-03-31 RX ADMIN — TRIAMCINOLONE ACETONIDE 20 MG: 40 INJECTION, SUSPENSION INTRA-ARTICULAR; INTRAMUSCULAR at 11:48

## 2022-03-31 NOTE — PROGRESS NOTES
Assessment/Plan:  1  Primary osteoarthritis of both knees    2  Primary osteoarthritis of right knee      Orders Placed This Encounter   Procedures    Large joint arthrocentesis: bilateral knee    Brace    Injection Procedure Prior Authorization     · Patient has severe bilateral  knee osteoarthritis   · Received right knee steroid injection today  Patient knows to ice and avoid strenuous activity for 1-2 days if needed  · Will be ordering bilateral knee  knee Euflexxa 3 series injections   · Knee brace was provided for the right knee today   · Prescribed patient Diclofenac gel   · Declined RX for Diclofenac 75 mg   · Continue taking Advil as needed for pain   Return in about 2 months (around 5/31/2022) for Bilateral knee  I answered all of the patient's questions during the visit and provided education of the patient's condition during the visit  The patient verbalized understanding of the information given and agrees with the plan  This note was dictated using [employer] software  It may contain errors including improperly dictated words  Please contact physician directly for any questions  Subjective   Chief Complaint:   Chief Complaint   Patient presents with    Left Knee - Pain    Right Knee - Pain       HPI  Jaci Hinkle is a 65 y o  female who presents for follow up for bilateral knee due to osteoarthritis  She had bilateral knee steroid injections on 12/30/21 and states she had minimal relief for one month  She is having achy pain over anterior bilateral knee, worse on the right  She does states instability  Pain is worse with walking and increase activities  She is taking Advil and Tylenol  as needed for pain with relief  She did try Aleve  She did have right knee Euflexxa 3 series , last one on 10//6/2020 with Dr Justus Alonzo with relief  Review of Systems  ROS:    See HPI for musculoskeletal review     All other systems reviewed are negative     History:  Past Medical History:   Diagnosis Date    Anxiety     Asthma     Depression     Head injury     Seizures ([facility] [address] )     "Last one was a couple of years ago"     Past Surgical History:   Procedure Laterality Date     SECTION      IR CEREBRAL ANGIOGRAPHY  2018    SPLENECTOMY       Social History   Social History     Substance and Sexual Activity   Alcohol Use No    Comment: patient denies, despite findings of Alcohol in system     Social History     Substance and Sexual Activity   Drug Use No    Comment: per patient: history of cocaine abuse; Tox screen was positive for cocaine at admission;  patient denies current use       Social History     Tobacco Use   Smoking Status Current Every Day Smoker    Packs/day: 0 25    Years: 20 00    Pack years: 5 00    Types: Cigarettes   Smokeless Tobacco Never Used     Family History:   Family History   Problem Relation Age of Onset    Drug abuse Brother     Completed Suicide  Brother        Current Outpatient Medications on File Prior to Visit   Medication Sig Dispense Refill    albuterol (PROVENTIL HFA,VENTOLIN HFA) 90 mcg/act inhaler Inhale 2 puffs every 6 (six) hours as needed for wheezing      albuterol (PROVENTIL HFA,VENTOLIN HFA) 90 mcg/act inhaler Inhale 1-2 puffs every 6 (six) hours as needed for wheezing (Patient not taking: Reported on 2021) 1 Inhaler 0    ARIPiprazole (ABILIFY) 10 mg tablet Take 1 tablet (10 mg total) by mouth daily 30 tablet 0    buprenorphine-naloxone (SUBOXONE) 2-0 5 mg per SL tablet Place under the tongue daily Dosage unknown to patient      busPIRone (BUSPAR) 15 mg tablet Take 1 tablet (15 mg total) by mouth 3 (three) times a day 90 tablet 0    diclofenac (VOLTAREN) 75 mg EC tablet TAKE 1 TABLET(75 MG) BY MOUTH TWICE DAILY AS NEEDED FOR PAIN (Patient not taking: Reported on 2021) 60 tablet 2    diclofenac sodium (VOLTAREN) 1 % Apply 2 g topically 4 (four) times a day (Patient not taking: Reported on 2021) 1 Tube 0    fluticasone furoate-vilanterol (BREO ELLIPTA) 200-25 MCG/INH inhaler Inhale 1 puff daily      fluticasone furoate-vilanterol (BREO ELLIPTA) Inhale 1 puff daily  0    gabapentin (NEURONTIN) 300 mg capsule Take 1 capsule (300 mg total) by mouth 3 (three) times a day 90 capsule 0    hydrOXYzine HCL (ATARAX) 25 mg tablet Take 1 tablet (25 mg total) by mouth every 6 (six) hours 12 tablet 0    hydrOXYzine pamoate (VISTARIL) 25 mg capsule Take 1 capsule (25 mg total) by mouth 3 (three) times a day as needed for itching 30 capsule 0    lidocaine (LMX) 4 % cream Apply topically as needed for moderate pain 30 g 0    methocarbamol (ROBAXIN) 750 mg tablet Take 1 tablet (750 mg total) by mouth 3 (three) times a day for 30 doses 30 tablet 0    montelukast (SINGULAIR) 10 mg tablet Take 10 mg by mouth daily      sertraline (ZOLOFT) 50 mg tablet Take 3 tablets (150 mg total) by mouth daily 90 tablet 0     No current facility-administered medications on file prior to visit  No Known Allergies     Objective     /80   Pulse 60   Ht 5' 1" (1 549 m)   Wt 80 3 kg (177 lb)   BMI 33 44 kg/m²      PE:  AAOx 3  WDWN  Hearing intact, no drainage from eyes  no audible wheezing  no abdominal distension  LE compartments soft, skin intact    Ortho Exam:  bilateral Knee:   No erythema  no swelling  no effusion  no warmth  AROM: 0-130   Stable to varus/valgus stress      Large joint arthrocentesis: bilateral knee  Universal Protocol:  Consent: Verbal consent obtained  Risks and benefits: risks, benefits and alternatives were discussed  Consent given by: patient  Time out: Immediately prior to procedure a "time out" was called to verify the correct patient, procedure, equipment, support staff and site/side marked as required    Timeout called at: 3/31/2022 11:47 AM   Patient understanding: patient states understanding of the procedure being performed  Site marked: the operative site was marked  Supporting Documentation  Indications: pain Procedure Details  Location: knee - bilateral knee  Preparation: Patient was prepped and draped in the usual sterile fashion  Needle size: 22 G  Approach: anterolateral    Medications (Right): 4 mL lidocaine 1 %; 20 mg triamcinolone acetonide 40 mg/mLMedications (Left): 4 mL lidocaine 1 %; 20 mg triamcinolone acetonide 40 mg/mL           Scribe Attestation    I,:  Burton Robert am acting as a scribe while in the presence of the attending physician :       I,:  Dieudonne Bean DO personally performed the services described in this documentation    as scribed in my presence :

## 2022-06-09 ENCOUNTER — PROCEDURE VISIT (OUTPATIENT)
Dept: OBGYN CLINIC | Facility: CLINIC | Age: 62
End: 2022-06-09
Payer: COMMERCIAL

## 2022-06-09 VITALS
SYSTOLIC BLOOD PRESSURE: 125 MMHG | DIASTOLIC BLOOD PRESSURE: 85 MMHG | BODY MASS INDEX: 33.12 KG/M2 | HEIGHT: 61 IN | WEIGHT: 175.4 LBS | HEART RATE: 67 BPM

## 2022-06-09 DIAGNOSIS — M17.0 BILATERAL PRIMARY OSTEOARTHRITIS OF KNEE: Primary | ICD-10-CM

## 2022-06-09 PROCEDURE — 20610 DRAIN/INJ JOINT/BURSA W/O US: CPT | Performed by: PHYSICIAN ASSISTANT

## 2022-06-16 ENCOUNTER — PROCEDURE VISIT (OUTPATIENT)
Dept: OBGYN CLINIC | Facility: CLINIC | Age: 62
End: 2022-06-16
Payer: COMMERCIAL

## 2022-06-16 VITALS
HEART RATE: 64 BPM | WEIGHT: 175 LBS | SYSTOLIC BLOOD PRESSURE: 126 MMHG | DIASTOLIC BLOOD PRESSURE: 85 MMHG | BODY MASS INDEX: 33.04 KG/M2 | HEIGHT: 61 IN

## 2022-06-16 DIAGNOSIS — M17.0 BILATERAL PRIMARY OSTEOARTHRITIS OF KNEE: Primary | ICD-10-CM

## 2022-06-16 PROCEDURE — 20610 DRAIN/INJ JOINT/BURSA W/O US: CPT | Performed by: PHYSICIAN ASSISTANT

## 2022-06-16 NOTE — PROGRESS NOTES
Patient is here for bilateral knee orthovisc injections  Post injection instructions reviewed  Follow up 1 week  Large joint arthrocentesis: bilateral knee  Universal Protocol:  Consent: Verbal consent obtained    Risks and benefits: risks, benefits and alternatives were discussed  Consent given by: patient  Site marked: the operative site was marked  Supporting Documentation  Indications: pain   Procedure Details  Location: knee - bilateral knee  Preparation: Patient was prepped and draped in the usual sterile fashion  Needle size: 22 G  Ultrasound guidance: no  Approach: anterolateral    Medications (Right): 30 mg sodium hyaluronate 30 mg/2 mLMedications (Left): 30 mg sodium hyaluronate 30 mg/2 mL   Patient tolerance: patient tolerated the procedure well with no immediate complications  Dressing:  Sterile dressing applied

## 2022-06-23 ENCOUNTER — PROCEDURE VISIT (OUTPATIENT)
Dept: OBGYN CLINIC | Facility: CLINIC | Age: 62
End: 2022-06-23
Payer: COMMERCIAL

## 2022-06-23 VITALS
HEIGHT: 61 IN | WEIGHT: 178 LBS | DIASTOLIC BLOOD PRESSURE: 78 MMHG | BODY MASS INDEX: 33.61 KG/M2 | SYSTOLIC BLOOD PRESSURE: 130 MMHG

## 2022-06-23 DIAGNOSIS — M17.12 ARTHRITIS OF LEFT KNEE: ICD-10-CM

## 2022-06-23 DIAGNOSIS — M17.11 ARTHRITIS OF RIGHT KNEE: Primary | ICD-10-CM

## 2022-06-23 PROCEDURE — 20610 DRAIN/INJ JOINT/BURSA W/O US: CPT | Performed by: ORTHOPAEDIC SURGERY

## 2022-06-23 NOTE — PROGRESS NOTES
Received bilateral knee orthovisc #3 injections today  Can follow up in 2 months  Large joint arthrocentesis: L knee  Universal Protocol:  Consent given by: patient  Time out: Immediately prior to procedure a "time out" was called to verify the correct patient, procedure, equipment, support staff and site/side marked as required  Site marked: the operative site was marked  Supporting Documentation  Indications: pain   Procedure Details  Location: knee - L knee  Preparation: Patient was prepped and draped in the usual sterile fashion  Needle size: 22 G  Ultrasound guidance: no  Approach: anterolateral  Medications administered: 30 mg sodium hyaluronate 30 mg/2 mL    Patient tolerance: patient tolerated the procedure well with no immediate complications  Dressing:  Sterile dressing applied    Large joint arthrocentesis: R knee  Universal Protocol:  Consent given by: patient  Time out: Immediately prior to procedure a "time out" was called to verify the correct patient, procedure, equipment, support staff and site/side marked as required    Site marked: the operative site was marked  Supporting Documentation  Indications: pain   Procedure Details  Location: knee - R knee  Preparation: Patient was prepped and draped in the usual sterile fashion  Needle size: 22 G  Ultrasound guidance: no  Approach: anterolateral  Medications administered: 30 mg sodium hyaluronate 30 mg/2 mL    Patient tolerance: patient tolerated the procedure well with no immediate complications  Dressing:  Sterile dressing applied

## 2022-08-26 ENCOUNTER — OFFICE VISIT (OUTPATIENT)
Dept: OBGYN CLINIC | Facility: MEDICAL CENTER | Age: 62
End: 2022-08-26
Payer: COMMERCIAL

## 2022-08-26 VITALS
WEIGHT: 180 LBS | HEART RATE: 77 BPM | BODY MASS INDEX: 33.99 KG/M2 | HEIGHT: 61 IN | SYSTOLIC BLOOD PRESSURE: 127 MMHG | DIASTOLIC BLOOD PRESSURE: 74 MMHG

## 2022-08-26 DIAGNOSIS — M17.0 BILATERAL PRIMARY OSTEOARTHRITIS OF KNEE: Primary | ICD-10-CM

## 2022-08-26 PROCEDURE — 99213 OFFICE O/P EST LOW 20 MIN: CPT | Performed by: PHYSICIAN ASSISTANT

## 2022-08-26 PROCEDURE — 20610 DRAIN/INJ JOINT/BURSA W/O US: CPT | Performed by: PHYSICIAN ASSISTANT

## 2022-08-26 RX ORDER — TRIAMCINOLONE ACETONIDE 40 MG/ML
40 INJECTION, SUSPENSION INTRA-ARTICULAR; INTRAMUSCULAR
Status: COMPLETED | OUTPATIENT
Start: 2022-08-26 | End: 2022-08-26

## 2022-08-26 RX ORDER — BUPIVACAINE HYDROCHLORIDE 5 MG/ML
2 INJECTION, SOLUTION EPIDURAL; INTRACAUDAL
Status: COMPLETED | OUTPATIENT
Start: 2022-08-26 | End: 2022-08-26

## 2022-08-26 RX ADMIN — BUPIVACAINE HYDROCHLORIDE 2 ML: 5 INJECTION, SOLUTION EPIDURAL; INTRACAUDAL at 14:04

## 2022-08-26 RX ADMIN — TRIAMCINOLONE ACETONIDE 40 MG: 40 INJECTION, SUSPENSION INTRA-ARTICULAR; INTRAMUSCULAR at 14:04

## 2022-08-26 NOTE — PROGRESS NOTES
Assessment/Plan:  1  Bilateral primary osteoarthritis of knee      Orders Placed This Encounter   Procedures    Large joint arthrocentesis       · Patient has moderate to severe bilateral knee osteoarthritis  · Patient received bilateral knee steroid injections today  Post injection instructions reviewed  She would like to continue steroid injections every 3 months  · She will stop NSAIDs and ASA  May take tylenol as needed for pain  · Continue activity as tolerated  · Continue knee braces for comfort  Return in about 3 months (around 2022) for bilat knee CSI  I answered all of the patient's questions during the visit and provided education of the patient's condition during the visit  The patient verbalized understanding of the information given and agrees with the plan  This note was dictated using [employer] software  It may contain errors including improperly dictated words  Please contact physician directly for any questions  Subjective   Chief Complaint:   Chief Complaint   Patient presents with    Left Knee - Follow-up    Right Knee - Follow-up       HPI  Josselyn Vega is a 65 y o  female who presents for follow up for bilateral knee pain and OA  Patient received bilateral knee orthovisc series at her last appt on 22 and reports no relief  Patient is experiencing bilateral knee pain, neither worse today  Patient was taking ibuprofen and aspirin for pain but noted she was bleeding from small cuts  She does have an appointment with vascular  Patient is wearing knee braces for comfort  She would like bilateral knee steroid injections today  Review of Systems  ROS:    See HPI for musculoskeletal review     All other systems reviewed are negative     History:  Past Medical History:   Diagnosis Date    Anxiety     Asthma     Depression     Head injury     Seizures ([facility] [address] )     "Last one was a couple of years ago"     Past Surgical History:   Procedure Laterality Date     SECTION      IR CEREBRAL ANGIOGRAPHY  8/17/2018    SPLENECTOMY       Social History   Social History     Substance and Sexual Activity   Alcohol Use No    Comment: patient denies, despite findings of Alcohol in system     Social History     Substance and Sexual Activity   Drug Use No    Comment: per patient: history of cocaine abuse; Tox screen was positive for cocaine at admission;  patient denies current use       Social History     Tobacco Use   Smoking Status Current Every Day Smoker    Packs/day: 0 25    Years: 20 00    Pack years: 5 00    Types: Cigarettes   Smokeless Tobacco Never Used     Family History:   Family History   Problem Relation Age of Onset    Drug abuse Brother     Completed Suicide  Brother        Current Outpatient Medications on File Prior to Visit   Medication Sig Dispense Refill    albuterol (PROVENTIL HFA,VENTOLIN HFA) 90 mcg/act inhaler Inhale 2 puffs every 6 (six) hours as needed for wheezing      albuterol (PROVENTIL HFA,VENTOLIN HFA) 90 mcg/act inhaler Inhale 1-2 puffs every 6 (six) hours as needed for wheezing (Patient not taking: Reported on 6/29/2021) 1 Inhaler 0    ARIPiprazole (ABILIFY) 10 mg tablet Take 1 tablet (10 mg total) by mouth daily 30 tablet 0    buprenorphine-naloxone (SUBOXONE) 2-0 5 mg per SL tablet Place under the tongue daily Dosage unknown to patient      busPIRone (BUSPAR) 15 mg tablet Take 1 tablet (15 mg total) by mouth 3 (three) times a day 90 tablet 0    diclofenac (VOLTAREN) 75 mg EC tablet TAKE 1 TABLET(75 MG) BY MOUTH TWICE DAILY AS NEEDED FOR PAIN (Patient not taking: Reported on 6/29/2021) 60 tablet 2    diclofenac sodium (VOLTAREN) 1 % Apply 2 g topically 4 (four) times a day (Patient not taking: Reported on 6/29/2021) 1 Tube 0    Diclofenac Sodium (VOLTAREN) 1 % Apply 2 g topically 4 (four) times a day as needed (knee pain) PRN for pain 2 g 2    fluticasone furoate-vilanterol (BREO ELLIPTA) 200-25 MCG/INH inhaler Inhale 1 puff daily  fluticasone furoate-vilanterol (BREO ELLIPTA) Inhale 1 puff daily  0    gabapentin (NEURONTIN) 300 mg capsule Take 1 capsule (300 mg total) by mouth 3 (three) times a day 90 capsule 0    hydrOXYzine HCL (ATARAX) 25 mg tablet Take 1 tablet (25 mg total) by mouth every 6 (six) hours 12 tablet 0    hydrOXYzine pamoate (VISTARIL) 25 mg capsule Take 1 capsule (25 mg total) by mouth 3 (three) times a day as needed for itching 30 capsule 0    lidocaine (LMX) 4 % cream Apply topically as needed for moderate pain 30 g 0    methocarbamol (ROBAXIN) 750 mg tablet Take 1 tablet (750 mg total) by mouth 3 (three) times a day for 30 doses 30 tablet 0    montelukast (SINGULAIR) 10 mg tablet Take 10 mg by mouth daily      sertraline (ZOLOFT) 50 mg tablet Take 3 tablets (150 mg total) by mouth daily 90 tablet 0     No current facility-administered medications on file prior to visit  No Known Allergies     Objective     /74   Pulse 77   Ht 5' 1" (1 549 m)   Wt 81 6 kg (180 lb)   BMI 34 01 kg/m²      PE:  AAOx 3  WDWN  Hearing intact, no drainage from eyes  no audible wheezing  no abdominal distension  LE compartments soft, skin intact    Ortho Exam:  bilateral Knee:   No erythema  no swelling  no effusion  no warmth  No TTP  AROM: 0- 120  Stable to varus/valgus stress      Large joint arthrocentesis: bilateral knee  Universal Protocol:  Consent: Verbal consent obtained    Risks and benefits: risks, benefits and alternatives were discussed  Consent given by: patient  Site marked: the operative site was marked  Supporting Documentation  Indications: pain   Procedure Details  Location: knee - bilateral knee  Preparation: Patient was prepped and draped in the usual sterile fashion  Needle size: 22 G  Ultrasound guidance: no  Approach: anterolateral    Medications (Right): 2 mL bupivacaine (PF) 0 5 %; 40 mg triamcinolone acetonide 40 mg/mLMedications (Left): 2 mL bupivacaine (PF) 0 5 %; 40 mg triamcinolone acetonide 40 mg/mL   Patient tolerance: patient tolerated the procedure well with no immediate complications  Dressing:  Sterile dressing applied            Scribe Attestation    I,:  Roxi Hinojosa PA-C am acting as a scribe while in the presence of the attending physician :       I,:  Lamar Cabello DO personally performed the services described in this documentation    as scribed in my presence :

## 2022-09-21 NOTE — PROGRESS NOTES
Pt called in complaining of a productive cough and wheezing which started 2 days ago  She describes this breathing problem as "more annoying than severe"  Pt says it feels like her chest is congested, and she is producing a greenish/yellow phlegm  She says she coughs so forcefully sometimes she involuntarily urinates  She denies coughing up blood  Denies fever, chest pain, blue lips/face, difficulty speaking  She says she started Keflex last Wednesday, and is wondering if this could be related  Advised pt if she develops chest pain, or new or worsening symptoms she should go immediately to the ER - verbalized understanding  Virtual appt scheduled for this afternoon at 3:30  Please advise if there is anything else to do at this time  Pt is awake and having difficulty sleeping, medicated with prn trazodone  Pt fell asleep 25 minutes later

## 2022-12-02 ENCOUNTER — OFFICE VISIT (OUTPATIENT)
Dept: OBGYN CLINIC | Facility: MEDICAL CENTER | Age: 62
End: 2022-12-02

## 2022-12-02 VITALS
HEIGHT: 61 IN | BODY MASS INDEX: 34.78 KG/M2 | HEART RATE: 82 BPM | WEIGHT: 184.2 LBS | SYSTOLIC BLOOD PRESSURE: 121 MMHG | DIASTOLIC BLOOD PRESSURE: 85 MMHG

## 2022-12-02 DIAGNOSIS — M17.0 BILATERAL PRIMARY OSTEOARTHRITIS OF KNEE: Primary | ICD-10-CM

## 2022-12-02 RX ORDER — BUPIVACAINE HYDROCHLORIDE 5 MG/ML
2 INJECTION, SOLUTION EPIDURAL; INTRACAUDAL
Status: COMPLETED | OUTPATIENT
Start: 2022-12-02 | End: 2022-12-02

## 2022-12-02 RX ORDER — TRIAMCINOLONE ACETONIDE 40 MG/ML
40 INJECTION, SUSPENSION INTRA-ARTICULAR; INTRAMUSCULAR
Status: COMPLETED | OUTPATIENT
Start: 2022-12-02 | End: 2022-12-02

## 2022-12-02 RX ADMIN — BUPIVACAINE HYDROCHLORIDE 2 ML: 5 INJECTION, SOLUTION EPIDURAL; INTRACAUDAL at 15:56

## 2022-12-02 RX ADMIN — TRIAMCINOLONE ACETONIDE 40 MG: 40 INJECTION, SUSPENSION INTRA-ARTICULAR; INTRAMUSCULAR at 15:56

## 2022-12-02 NOTE — PROGRESS NOTES
Assessment/Plan:  1  Bilateral primary osteoarthritis of knee      Orders Placed This Encounter   Procedures   • Large joint arthrocentesis     -home exercise program reviewed with the patient during today's visit     -After a discussion of risks and benefits the patient elected to proceed with a bilateral knee steroid injection today  Patient should ice and avoid strenuous activity for 1-2 days if needed  Patient should avoid vaccines for 2 weeks if possible  If patient is diabetic should also monitor glucose over the next 7 to 10 days     -activity as tolerated  -continue use of ice and over-the-counter medication as needed for pain relief  Return in about 3 months (around 3/2/2023) for Possible repeat cortisone injections  I answered all of the patient's questions during the visit and provided education of the patient's condition during the visit  The patient verbalized understanding of the information given and agrees with the plan  This note was dictated using [employer] software  It may contain errors including improperly dictated words  Please contact physician directly for any questions  Subjective   Chief Complaint: No chief complaint on file  HPI  Sandra Goodman is a 65 y o  female who presents for follow up for bilateral knee osteoarthritis  She states she is overall doing well this time  She states that her left knee does bother her more than her right, however she does still believe that she is getting relief of her symptoms from a cortisone injections  She states that her pain is mostly along the anterior and lateral aspect of both knees  She denies any sense of instability  She does report occasional clicking and grinding sensation that is worse with going up and down steps  She states she has been compliant with a home exercise program   She is also currently using over-the-counter medication as needed for pain relief  She denies any radiating symptoms    She denies any new injury or trauma to her knee  Review of Systems  ROS:    See HPI for musculoskeletal review  All other systems reviewed are negative     History:  Past Medical History:   Diagnosis Date   • Anxiety    • Asthma    • Depression    • Head injury    • Seizures ([address] )     "Last one was a couple of years ago"     Past Surgical History:   Procedure Laterality Date   •  SECTION     • IR CEREBRAL ANGIOGRAPHY  2018   • SPLENECTOMY       Social History   Social History     Substance and Sexual Activity   Alcohol Use No    Comment: patient denies, despite findings of Alcohol in system     Social History     Substance and Sexual Activity   Drug Use No    Comment: per patient: history of cocaine abuse; Tox screen was positive for cocaine at admission;  patient denies current use       Social History     Tobacco Use   Smoking Status Every Day   • Packs/day: 0 25   • Years: 20 00   • Pack years: 5 00   • Types: Cigarettes   Smokeless Tobacco Never     Family History:   Family History   Problem Relation Age of Onset   • Drug abuse Brother    • Completed Suicide  Brother        Current Outpatient Medications on File Prior to Visit   Medication Sig Dispense Refill   • albuterol (PROVENTIL HFA,VENTOLIN HFA) 90 mcg/act inhaler Inhale 2 puffs every 6 (six) hours as needed for wheezing     • albuterol (PROVENTIL HFA,VENTOLIN HFA) 90 mcg/act inhaler Inhale 1-2 puffs every 6 (six) hours as needed for wheezing (Patient not taking: Reported on 2021) 1 Inhaler 0   • ARIPiprazole (ABILIFY) 10 mg tablet Take 1 tablet (10 mg total) by mouth daily 30 tablet 0   • buprenorphine-naloxone (SUBOXONE) 2-0 5 mg per SL tablet Place under the tongue daily Dosage unknown to patient     • busPIRone (BUSPAR) 15 mg tablet Take 1 tablet (15 mg total) by mouth 3 (three) times a day 90 tablet 0   • diclofenac (VOLTAREN) 75 mg EC tablet TAKE 1 TABLET(75 MG) BY MOUTH TWICE DAILY AS NEEDED FOR PAIN (Patient not taking: Reported on 2021) 60 tablet 2   • diclofenac sodium (VOLTAREN) 1 % Apply 2 g topically 4 (four) times a day (Patient not taking: Reported on 6/29/2021) 1 Tube 0   • Diclofenac Sodium (VOLTAREN) 1 % Apply 2 g topically 4 (four) times a day as needed (knee pain) PRN for pain 2 g 2   • fluticasone furoate-vilanterol (BREO ELLIPTA) 200-25 MCG/INH inhaler Inhale 1 puff daily     • fluticasone furoate-vilanterol (BREO ELLIPTA) Inhale 1 puff daily  0   • gabapentin (NEURONTIN) 300 mg capsule Take 1 capsule (300 mg total) by mouth 3 (three) times a day 90 capsule 0   • hydrOXYzine HCL (ATARAX) 25 mg tablet Take 1 tablet (25 mg total) by mouth every 6 (six) hours 12 tablet 0   • hydrOXYzine pamoate (VISTARIL) 25 mg capsule Take 1 capsule (25 mg total) by mouth 3 (three) times a day as needed for itching 30 capsule 0   • lidocaine (LMX) 4 % cream Apply topically as needed for moderate pain 30 g 0   • methocarbamol (ROBAXIN) 750 mg tablet Take 1 tablet (750 mg total) by mouth 3 (three) times a day for 30 doses 30 tablet 0   • montelukast (SINGULAIR) 10 mg tablet Take 10 mg by mouth daily     • sertraline (ZOLOFT) 50 mg tablet Take 3 tablets (150 mg total) by mouth daily 90 tablet 0     No current facility-administered medications on file prior to visit  No Known Allergies     Objective     /85   Pulse 82   Ht 5' 1" (1 549 m)   Wt 83 6 kg (184 lb 3 2 oz)   BMI 34 80 kg/m²      PE:  AAOx 3  WDWN  Hearing intact, no drainage from eyes  no audible wheezing  no abdominal distension  LE compartments soft, skin intact    Ortho Exam:  bilateral Knee:   No erythema  no swelling  no effusion  no warmth  AROM: 0-120  No tenderness to palpation   Mild patellar crepitus bilaterally  Stable to varus/valgus stress    Large joint arthrocentesis: bilateral knee  Universal Protocol:  Consent: Verbal consent obtained  Written consent not obtained    Risks and benefits: risks, benefits and alternatives were discussed  Consent given by: patient  Time out: Immediately prior to procedure a "time out" was called to verify the correct patient, procedure, equipment, support staff and site/side marked as required    Timeout called at: 12/2/2022 3:54 PM   Patient understanding: patient states understanding of the procedure being performed  Patient consent: the patient's understanding of the procedure matches consent given  Site marked: the operative site was marked  Supporting Documentation  Indications: pain and diagnostic evaluation   Procedure Details  Location: knee - bilateral knee  Preparation: Patient was prepped and draped in the usual sterile fashion  Needle size: 22 G  Ultrasound guidance: no  Approach: anterolateral    Medications (Right): 2 mL bupivacaine (PF) 0 5 %; 40 mg triamcinolone acetonide 40 mg/mLMedications (Left): 2 mL bupivacaine (PF) 0 5 %; 40 mg triamcinolone acetonide 40 mg/mL

## 2024-09-21 DIAGNOSIS — Z00.6 ENCOUNTER FOR EXAMINATION FOR NORMAL COMPARISON OR CONTROL IN CLINICAL RESEARCH PROGRAM: ICD-10-CM

## 2024-09-23 ENCOUNTER — APPOINTMENT (OUTPATIENT)
Dept: LAB | Facility: HOSPITAL | Age: 64
End: 2024-09-23

## 2024-09-23 DIAGNOSIS — Z00.6 ENCOUNTER FOR EXAMINATION FOR NORMAL COMPARISON OR CONTROL IN CLINICAL RESEARCH PROGRAM: ICD-10-CM

## 2024-09-23 PROCEDURE — 36415 COLL VENOUS BLD VENIPUNCTURE: CPT

## 2024-10-04 LAB
APOB+LDLR+PCSK9 GENE MUT ANL BLD/T: NOT DETECTED
BRCA1+BRCA2 DEL+DUP + FULL MUT ANL BLD/T: NOT DETECTED
MLH1+MSH2+MSH6+PMS2 GN DEL+DUP+FUL M: NOT DETECTED

## 2024-11-16 ENCOUNTER — HOSPITAL ENCOUNTER (OUTPATIENT)
Dept: CT IMAGING | Facility: HOSPITAL | Age: 64
Discharge: HOME | End: 2024-11-16

## 2024-11-16 DIAGNOSIS — Z87.891 PERSONAL HISTORY OF NICOTINE DEPENDENCE: ICD-10-CM
